# Patient Record
Sex: MALE | Race: WHITE | NOT HISPANIC OR LATINO | Employment: OTHER | URBAN - METROPOLITAN AREA
[De-identification: names, ages, dates, MRNs, and addresses within clinical notes are randomized per-mention and may not be internally consistent; named-entity substitution may affect disease eponyms.]

---

## 2017-10-16 ENCOUNTER — HOSPITAL ENCOUNTER (OUTPATIENT)
Dept: RADIOLOGY | Facility: HOSPITAL | Age: 67
Discharge: HOME | End: 2017-10-16
Attending: ORTHOPAEDIC SURGERY
Payer: MEDICARE

## 2017-10-16 ENCOUNTER — OFFICE VISIT (OUTPATIENT)
Dept: SPORTS MEDICINE | Facility: CLINIC | Age: 67
End: 2017-10-16
Payer: MEDICARE

## 2017-10-16 VITALS
HEIGHT: 76 IN | DIASTOLIC BLOOD PRESSURE: 93 MMHG | BODY MASS INDEX: 23.38 KG/M2 | HEART RATE: 72 BPM | WEIGHT: 192 LBS | SYSTOLIC BLOOD PRESSURE: 144 MMHG

## 2017-10-16 DIAGNOSIS — M25.569 KNEE PAIN, UNSPECIFIED CHRONICITY, UNSPECIFIED LATERALITY: ICD-10-CM

## 2017-10-16 DIAGNOSIS — M23.91 ACUTE INTERNAL DERANGEMENT OF RIGHT KNEE: ICD-10-CM

## 2017-10-16 DIAGNOSIS — M76.51 PATELLAR TENDINITIS OF RIGHT KNEE: ICD-10-CM

## 2017-10-16 DIAGNOSIS — M25.569 KNEE PAIN, UNSPECIFIED CHRONICITY, UNSPECIFIED LATERALITY: Primary | ICD-10-CM

## 2017-10-16 DIAGNOSIS — M17.31 POST-TRAUMATIC OSTEOARTHRITIS OF RIGHT KNEE: ICD-10-CM

## 2017-10-16 PROCEDURE — 73564 X-RAY EXAM KNEE 4 OR MORE: CPT | Mod: TC,50,PO

## 2017-10-16 PROCEDURE — 99999 PR PBB SHADOW E&M-NEW PATIENT-LVL III: CPT | Mod: PBBFAC,,, | Performed by: ORTHOPAEDIC SURGERY

## 2017-10-16 PROCEDURE — 73564 X-RAY EXAM KNEE 4 OR MORE: CPT | Mod: 26,RT,, | Performed by: RADIOLOGY

## 2017-10-16 PROCEDURE — 99203 OFFICE O/P NEW LOW 30 MIN: CPT | Mod: PBBFAC,25,PO | Performed by: ORTHOPAEDIC SURGERY

## 2017-10-16 PROCEDURE — 73564 X-RAY EXAM KNEE 4 OR MORE: CPT | Mod: 26,59,LT, | Performed by: RADIOLOGY

## 2017-10-16 PROCEDURE — 99204 OFFICE O/P NEW MOD 45 MIN: CPT | Mod: S$PBB,,, | Performed by: ORTHOPAEDIC SURGERY

## 2017-10-16 RX ORDER — ATORVASTATIN CALCIUM 10 MG/1
10 TABLET, FILM COATED ORAL NIGHTLY
Status: ON HOLD | COMMUNITY
End: 2018-05-14 | Stop reason: HOSPADM

## 2017-10-16 RX ORDER — ZOLPIDEM TARTRATE 10 MG/1
TABLET ORAL
Refills: 2 | COMMUNITY
Start: 2017-08-22

## 2017-10-16 RX ORDER — MELOXICAM 15 MG/1
15 TABLET ORAL DAILY
COMMUNITY
End: 2018-04-30 | Stop reason: SDUPTHER

## 2017-10-16 NOTE — PROGRESS NOTES
Subjective:          Chief Complaint: Leonid Harris III is a 67 y.o. male who had concerns including Pain of the Right Knee.    Patient reports history of bilateral knee arthroscopy for meniscus tears in remote past.  Now has right knee pain. Is taking mobic. No PT or injections.    He has undergone bilateral knee arthroscopic procedures 20 + years ago. He recently stopped running and now plays volleyball.      Handedness: right-handed  Sport played: volleyball      Level: recreational          Leonid also participates in running.  Pain   This is a new problem. The current episode started 1 to 4 weeks ago. The problem occurs intermittently. The problem has been waxing and waning. Pertinent negatives include no chest pain, fever, joint swelling, numbness or rash. The symptoms are aggravated by exertion. He has tried nothing for the symptoms. The treatment provided mild relief.       Review of Systems   Constitution: Negative for fever and night sweats.   HENT: Negative for hearing loss.    Eyes: Negative for blurred vision and visual disturbance.   Cardiovascular: Negative for chest pain and leg swelling.   Respiratory: Negative for shortness of breath.    Endocrine: Negative for polyuria.   Hematologic/Lymphatic: Negative for bleeding problem.   Skin: Negative for rash.   Musculoskeletal: Positive for joint pain. Negative for back pain, joint swelling, muscle cramps and muscle weakness.   Gastrointestinal: Negative for melena.   Genitourinary: Negative for hematuria.   Neurological: Negative for loss of balance, numbness and paresthesias.   Psychiatric/Behavioral: Negative for altered mental status.       Pain Related Questions  Over the past 3 days, what was your average pain during activity? (I.e. running, jogging, walking, climbing stairs, getting dressed, ect.): 4  Over the past 3 days, what was your highest pain level?: 6  Over the past 3 days, what was your lowest pain level? : 1    Other  How many nights a  week are you awakened by your affected body part?: 0  Was the patient's HEIGHT measured or patient reported?: Patient Reported  Was the patient's WEIGHT measured or patient reported?: Measured      Objective:        General: Leonid is well-developed, well-nourished, appears stated age, in no acute distress, alert and oriented to time, place and person.     General    Vitals reviewed.  Constitutional: He is oriented to person, place, and time. He appears well-developed and well-nourished. No distress.   HENT:   Mouth/Throat: No oropharyngeal exudate.   Eyes: Right eye exhibits no discharge. Left eye exhibits no discharge.   Neck: Normal range of motion.   Pulmonary/Chest: Effort normal and breath sounds normal. No respiratory distress.   Neurological: He is alert and oriented to person, place, and time. He has normal reflexes. No cranial nerve deficit. Coordination normal.   Psychiatric: He has a normal mood and affect. His behavior is normal. Judgment and thought content normal.     General Musculoskeletal Exam   Gait: normal       Right Knee Exam     Inspection   Erythema: absent  Scars: present  Swelling: absent  Effusion: effusion  Deformity: deformity  Bruising: absent    Tenderness   The patient is tender to palpation of the patella and patellar tendon.    Crepitus   The patient has crepitus of the patella (moderate).    Range of Motion   Extension: 0   Flexion: 150     Tests   Meniscus   Michi:  Medial - negative Lateral - negative  Ligament Examination Lachman: normal (-1 to 2mm) PCL-Posterior Drawer: normal (0 to 2mm)     MCL - Valgus: normal (0 to 2mm)  LCL - Varus: normalPivot Shift: normal (Equal)Reverse Pivot Shift: normal (Equal)Dial Test at 30 degrees: normal (< 5 degrees)Dial Test at 90 degrees: normal (< 5 degrees)  Posterior Sag Test: negative  Posterolateral Corner: unstable (>15 degrees difference)  Patella   Patellar Apprehension: negative  Passive Patellar Tilt: neutral  Patellar Tracking:  normal  Patellar Glide (quadrants): Lateral - 1   Medial - 2  Q-Angle at 90 degrees: normal  Patellar Grind: negative  J-Sign: none    Other   Meniscal Cyst: absent  Popliteal (Baker's) Cyst: absent  Sensation: normal    Left Knee Exam     Inspection   Erythema: absent  Scars: present  Swelling: absent  Effusion: absent  Deformity: deformity  Bruising: absent    Tenderness   The patient is experiencing no tenderness.         Range of Motion   Extension: 0   Flexion: 150     Tests   Meniscus   Michi:  Medial - negative Lateral - negative  Stability Lachman: normal (-1 to 2mm) PCL-Posterior Drawer: normal (0 to 2mm)  MCL - Valgus: normal (0 to 2mm)  LCL - Varus: normal (0 to 2mm)Pivot Shift: normal (Equal)Reverse Pivot Shift: normal (Equal)Dial Test at 30 degrees: normal (< 5 degrees)Dial Test at 90 degrees: normal (< 5 degrees)  Posterior Sag Test: negative  Posterolateral Corner: unstable (>15 degrees difference)  Patella   Patellar Apprehension: negative  Passive Patellar Tilt: neutral  Patellar Tracking: normal  Patellar Glide (Quadrants): Lateral - 1 Medial - 2  Q-Angle at 90 degrees: normal  Patellar Grind: negative  J-Sign: J sign absent    Other   Meniscal Cyst: absent  Popliteal (Baker's) Cyst: absent  Sensation: normal    Right Hip Exam     Tests   Erin: negative  Left Hip Exam     Tests   Erin: negative          Muscle Strength   Right Lower Extremity   Hip Abduction: 5/5   Quadriceps:  5/5   Hamstrin/5   Left Lower Extremity   Hip Abduction: 5/5   Quadriceps:  5/5   Hamstrin/5     Reflexes     Left Side  Quadriceps:  2+  Achilles:  2+    Right Side   Quadriceps:  2+  Achilles:  2+    Vascular Exam     Right Pulses  Dorsalis Pedis:      2+  Posterior Tibial:      2+        Left Pulses  Dorsalis Pedis:      2+  Posterior Tibial:      2+        Narrative     Time of Procedure: 10/16/17 12:32:27  Accession # [id number]    Comparison: None.    Number of images: 5.    Presented for review:  Both  knees, AP standing  Both knees, AP standing in flexion  Each knee, lateral view  Both knees, Merchant view.      Findings:    There is slight lateral tilt of each patella on merchant views, worse on the RIGHT.    AP standing views reveal mild loss of joint space the medial tibial femoral compartments and spurring of the intra-articular eminences.    Moderate spurring is present at the intra-articular eminences bilaterally.  Small spurs are present at the superior pole of each patella.  I detect no fracture, dislocation, radiopaque retained foreign body, lytic or blastic lesion, erosion or chondrocalcinosis.   Impression       Please see above.                   Assessment:       Encounter Diagnoses   Name Primary?    Knee pain, unspecified chronicity, unspecified laterality Yes    Patellar tendinitis of right knee     Acute internal derangement of right knee     Post-traumatic osteoarthritis of right knee           Plan:       1. IKDC, SF-12 and KOOS was filled out today in clinic.     RTC in 2 weeks with Dr. Fran Shelton Synvisc one injection and MRI results review. Patient will not fill out IKDC, SF-12 and KOOS on return.    2. Authorization placed for Synvisc one injection    3. Information given for Arthrosurface patellofemoral replacement    4.  Continue meloxicam at this time    5. MRI ordered                      [facility] patient questionnaires have been collected today.

## 2017-10-16 NOTE — PATIENT INSTRUCTIONS
DESCRIPTION  Synvisc-One (hylan G-F 20) is an elastoviscous high molecular weight fluid containing hylan A and hylan B polymers produced from chicken almaraz. Hylans are derivatives of hyaluronan (sodium hyaluronate). Hylan G-F 20 is unique in that the hyaluronan is chemically cross linked. Hyaluronan is a long-chain polymer containing repeating disaccharide units of Ax-pamxnbawmak-C-acetylglucosamine.     INDICATIONS FOR USE  Synvisc-One is indicated for the treatment of pain in osteoarthritis (OA) of the knee in patients who have failed to respond adequately to conservative nonpharmacologic therapy and simple analgesics, e.g., acetaminophen.     Who is a candidate for Synvisc-One  Patients with knee osteoarthritis, who have tried diet, exercise and over-the-counter pain medication but still have pain, should talk to their doctor to see if Synvisc-One is right for them.     How Synvisc-One is administered  Synvisc-One is a single injection. It's a simple, in-office procedure that only takes a few minutes.     What you can expect following a Synvisc-One knee injection Synvisc-One can provide up to six months of osteoarthritis knee pain relief. Everyone responds differently, but in a medical study* patients experienced relief starting one month after the injection.     After the injection, you can resume normal day-to-day activities, but you should avoid any strenuous activities for about 48 hours.     Contraindication  Do not administer to patients with known hypersensitivity (allergy) to hyaluronan (sodium hyaluronate) preparations.   Do not inject Synvisc-One in the knees of patients having knee joint infections or skin diseases or infections in the area of theinjection site.    What are possible side effects?  Synvisc may occur short-term pain, swelling at the injection site and / or the appearance of synovial exudate after injection. In some cases, exudation may be significant and cause more prolonged pain.      Important Safety Information  Before trying Synvisc-One or SYNVISC, tell your doctor if you are allergic to products from birds - such as feathers, eggs or poultry - or if your leg is swollen or infected. Synvisc-One and SYNVISC are only for injection into the knee, performed by a doctor or other qualified health care professional. Synvisc-One and SYNVISC have not been tested to show pain relief in joints other than the knee. Talk to your doctor before resuming strenuous weight-bearing activities after treatment. Synvisc-One and SYNVISC have not been tested in children, pregnant women or women who are nursing. You should tell your doctor if you think you are pregnant or if you are nursing a child. The side effects most commonly seen when Synvisc-One or SYNVISC is injected into the knee were pain, swelling and/or fluid buildup in or around the knee. Cases where the swelling is extensive or painful should be discussed with your doctor. Allergic reactions such as rash and hives have been reported rarely.

## 2017-10-24 ENCOUNTER — HOSPITAL ENCOUNTER (OUTPATIENT)
Dept: RADIOLOGY | Facility: HOSPITAL | Age: 67
Discharge: HOME | End: 2017-10-24
Attending: ORTHOPAEDIC SURGERY
Payer: MEDICARE

## 2017-10-24 DIAGNOSIS — M76.51 PATELLAR TENDINITIS OF RIGHT KNEE: ICD-10-CM

## 2017-10-24 DIAGNOSIS — M25.569 KNEE PAIN, UNSPECIFIED CHRONICITY, UNSPECIFIED LATERALITY: ICD-10-CM

## 2017-10-24 DIAGNOSIS — M23.91 ACUTE INTERNAL DERANGEMENT OF RIGHT KNEE: ICD-10-CM

## 2017-10-24 PROCEDURE — 73721 MRI JNT OF LWR EXTRE W/O DYE: CPT | Mod: TC,RT

## 2017-10-24 PROCEDURE — 73721 MRI JNT OF LWR EXTRE W/O DYE: CPT | Mod: 26,RT,GC, | Performed by: RADIOLOGY

## 2017-11-01 ENCOUNTER — OFFICE VISIT (OUTPATIENT)
Dept: SPORTS MEDICINE | Facility: CLINIC | Age: 67
End: 2017-11-01
Payer: MEDICARE

## 2017-11-01 VITALS
BODY MASS INDEX: 23.38 KG/M2 | HEIGHT: 76 IN | SYSTOLIC BLOOD PRESSURE: 138 MMHG | DIASTOLIC BLOOD PRESSURE: 85 MMHG | WEIGHT: 192 LBS | HEART RATE: 65 BPM

## 2017-11-01 DIAGNOSIS — M23.91 ACUTE INTERNAL DERANGEMENT OF RIGHT KNEE: ICD-10-CM

## 2017-11-01 DIAGNOSIS — G89.29 CHRONIC PAIN OF RIGHT KNEE: ICD-10-CM

## 2017-11-01 DIAGNOSIS — M25.561 CHRONIC PAIN OF RIGHT KNEE: ICD-10-CM

## 2017-11-01 DIAGNOSIS — M76.51 PATELLAR TENDINITIS OF RIGHT KNEE: ICD-10-CM

## 2017-11-01 DIAGNOSIS — M17.31 POST-TRAUMATIC OSTEOARTHRITIS OF RIGHT KNEE: Primary | ICD-10-CM

## 2017-11-01 PROCEDURE — 99499 UNLISTED E&M SERVICE: CPT | Mod: S$PBB,,, | Performed by: ORTHOPAEDIC SURGERY

## 2017-11-01 PROCEDURE — 99213 OFFICE O/P EST LOW 20 MIN: CPT | Mod: PBBFAC,PO | Performed by: ORTHOPAEDIC SURGERY

## 2017-11-01 PROCEDURE — 20611 DRAIN/INJ JOINT/BURSA W/US: CPT | Mod: S$PBB,RT,, | Performed by: ORTHOPAEDIC SURGERY

## 2017-11-01 PROCEDURE — 99999 PR PBB SHADOW E&M-EST. PATIENT-LVL III: CPT | Mod: PBBFAC,,, | Performed by: ORTHOPAEDIC SURGERY

## 2017-11-01 PROCEDURE — 20610 DRAIN/INJ JOINT/BURSA W/O US: CPT | Mod: PBBFAC,PO | Performed by: ORTHOPAEDIC SURGERY

## 2017-11-01 PROCEDURE — 20611 DRAIN/INJ JOINT/BURSA W/US: CPT | Mod: PBBFAC,PO | Performed by: ORTHOPAEDIC SURGERY

## 2017-11-01 RX ADMIN — Medication 48 MG: at 09:11

## 2017-11-01 NOTE — PATIENT INSTRUCTIONS
DESCRIPTION  Synvisc-One (hylan G-F 20) is an elastoviscous high molecular weight fluid containing hylan A and hylan B polymers produced from chicken almaraz. Hylans are derivatives of hyaluronan (sodium hyaluronate). Hylan G-F 20 is unique in that the hyaluronan is chemically cross linked. Hyaluronan is a long-chain polymer containing repeating disaccharide units of Wu-beasvevosnd-V-acetylglucosamine.     INDICATIONS FOR USE  Synvisc-One is indicated for the treatment of pain in osteoarthritis (OA) of the knee in patients who have failed to respond adequately to conservative nonpharmacologic therapy and simple analgesics, e.g., acetaminophen.     Who is a candidate for Synvisc-One  Patients with knee osteoarthritis, who have tried diet, exercise and over-the-counter pain medication but still have pain, should talk to their doctor to see if Synvisc-One is right for them.     How Synvisc-One is administered  Synvisc-One is a single injection. It's a simple, in-office procedure that only takes a few minutes.     What you can expect following a Synvisc-One knee injection Synvisc-One can provide up to six months of osteoarthritis knee pain relief. Everyone responds differently, but in a medical study* patients experienced relief starting one month after the injection.     After the injection, you can resume normal day-to-day activities, but you should avoid any strenuous activities for about 48 hours.     Contraindication  Do not administer to patients with known hypersensitivity (allergy) to hyaluronan (sodium hyaluronate) preparations.   Do not inject Synvisc-One in the knees of patients having knee joint infections or skin diseases or infections in the area of theinjection site.    What are possible side effects?  Synvisc may occur short-term pain, swelling at the injection site and / or the appearance of synovial exudate after injection. In some cases, exudation may be significant and cause more prolonged pain.      Important Safety Information  Before trying Synvisc-One or SYNVISC, tell your doctor if you are allergic to products from birds - such as feathers, eggs or poultry - or if your leg is swollen or infected. Synvisc-One and SYNVISC are only for injection into the knee, performed by a doctor or other qualified health care professional. Synvisc-One and SYNVISC have not been tested to show pain relief in joints other than the knee. Talk to your doctor before resuming strenuous weight-bearing activities after treatment. Synvisc-One and SYNVISC have not been tested in children, pregnant women or women who are nursing. You should tell your doctor if you think you are pregnant or if you are nursing a child. The side effects most commonly seen when Synvisc-One or SYNVISC is injected into the knee were pain, swelling and/or fluid buildup in or around the knee. Cases where the swelling is extensive or painful should be discussed with your doctor. Allergic reactions such as rash and hives have been reported rarely.

## 2017-11-01 NOTE — PROGRESS NOTES
Subjective:          Chief Complaint: Leonid Harris III is a 67 y.o. male who had concerns including Injections of the Right Knee.    Patient is here for a follow up for his right knee to review his MRI results and to proceed with a right knee Synvisc-One injection.    He has undergone bilateral knee arthroscopic procedures 20 + years ago. He recently stopped running and now plays volleyball.      Handedness: right-handed  Sport played: volleyball      Level: recreational          Leonid also participates in running.  Pain   This is a new problem. The current episode started 1 to 4 weeks ago. The problem occurs intermittently. The problem has been waxing and waning. Pertinent negatives include no chest pain, fever, joint swelling, numbness or rash. The symptoms are aggravated by exertion. He has tried nothing for the symptoms. The treatment provided mild relief.       Review of Systems   Constitution: Negative for fever and night sweats.   HENT: Negative for hearing loss.    Eyes: Negative for blurred vision and visual disturbance.   Cardiovascular: Negative for chest pain and leg swelling.   Respiratory: Negative for shortness of breath.    Endocrine: Negative for polyuria.   Hematologic/Lymphatic: Negative for bleeding problem.   Skin: Negative for rash.   Musculoskeletal: Positive for joint pain. Negative for back pain, joint swelling, muscle cramps and muscle weakness.   Gastrointestinal: Negative for melena.   Genitourinary: Negative for hematuria.   Neurological: Negative for loss of balance, numbness and paresthesias.   Psychiatric/Behavioral: Negative for altered mental status.       Pain Related Questions  Over the past 3 days, what was your average pain during activity? (I.e. running, jogging, walking, climbing stairs, getting dressed, ect.): 0  Over the past 3 days, what was your highest pain level?: 0  Over the past 3 days, what was your lowest pain level? : 0    Other  How many nights a week are you  awakened by your affected body part?: 0  Was the patient's HEIGHT measured or patient reported?: Patient Reported  Was the patient's WEIGHT measured or patient reported?: Measured      Objective:        General: Leonid is well-developed, well-nourished, appears stated age, in no acute distress, alert and oriented to time, place and person.     General    Vitals reviewed.  Constitutional: He is oriented to person, place, and time. He appears well-developed and well-nourished. No distress.   HENT:   Mouth/Throat: No oropharyngeal exudate.   Eyes: Right eye exhibits no discharge. Left eye exhibits no discharge.   Neck: Normal range of motion.   Pulmonary/Chest: Effort normal and breath sounds normal. No respiratory distress.   Neurological: He is alert and oriented to person, place, and time. He has normal reflexes. No cranial nerve deficit. Coordination normal.   Psychiatric: He has a normal mood and affect. His behavior is normal. Judgment and thought content normal.     General Musculoskeletal Exam   Gait: normal       Right Knee Exam     Inspection   Erythema: absent  Scars: present  Swelling: absent  Effusion: effusion  Deformity: deformity  Bruising: absent    Tenderness   The patient is tender to palpation of the patella and patellar tendon.    Crepitus   The patient has crepitus of the patella (moderate).    Range of Motion   Extension: 0   Flexion: 150     Tests   Meniscus   Michi:  Medial - negative Lateral - negative  Ligament Examination Lachman: normal (-1 to 2mm) PCL-Posterior Drawer: normal (0 to 2mm)     MCL - Valgus: normal (0 to 2mm)  LCL - Varus: normalPivot Shift: normal (Equal)Reverse Pivot Shift: normal (Equal)Dial Test at 30 degrees: normal (< 5 degrees)Dial Test at 90 degrees: normal (< 5 degrees)  Posterior Sag Test: negative  Posterolateral Corner: unstable (>15 degrees difference)  Patella   Patellar Apprehension: negative  Passive Patellar Tilt: neutral  Patellar Tracking: normal  Patellar  Glide (quadrants): Lateral - 1   Medial - 2  Q-Angle at 90 degrees: normal  Patellar Grind: negative  J-Sign: none    Other   Meniscal Cyst: absent  Popliteal (Baker's) Cyst: absent  Sensation: normal    Left Knee Exam     Inspection   Erythema: absent  Scars: present  Swelling: absent  Effusion: absent  Deformity: deformity  Bruising: absent    Tenderness   The patient is experiencing no tenderness.         Range of Motion   Extension: 0   Flexion: 150     Tests   Meniscus   Michi:  Medial - negative Lateral - negative  Stability Lachman: normal (-1 to 2mm) PCL-Posterior Drawer: normal (0 to 2mm)  MCL - Valgus: normal (0 to 2mm)  LCL - Varus: normal (0 to 2mm)Pivot Shift: normal (Equal)Reverse Pivot Shift: normal (Equal)Dial Test at 30 degrees: normal (< 5 degrees)Dial Test at 90 degrees: normal (< 5 degrees)  Posterior Sag Test: negative  Posterolateral Corner: unstable (>15 degrees difference)  Patella   Patellar Apprehension: negative  Passive Patellar Tilt: neutral  Patellar Tracking: normal  Patellar Glide (Quadrants): Lateral - 1 Medial - 2  Q-Angle at 90 degrees: normal  Patellar Grind: negative  J-Sign: J sign absent    Other   Meniscal Cyst: absent  Popliteal (Baker's) Cyst: absent  Sensation: normal    Right Hip Exam     Tests   Erin: negative  Left Hip Exam     Tests   Erin: negative          Muscle Strength   Right Lower Extremity   Hip Abduction: 5/5   Quadriceps:  5/5   Hamstrin/5   Left Lower Extremity   Hip Abduction: 5/5   Quadriceps:  5/5   Hamstrin/5     Reflexes     Left Side  Quadriceps:  2+  Achilles:  2+    Right Side   Quadriceps:  2+  Achilles:  2+    Vascular Exam     Right Pulses  Dorsalis Pedis:      2+  Posterior Tibial:      2+        Left Pulses  Dorsalis Pedis:      2+  Posterior Tibial:      2+        MRI RESULTS    Menisci:  Lateral meniscus is intact.  Medial meniscus demonstrates a diminutive body segment consistent with prior surgery and/or complex tearing.      Ligaments:  ACL, PCL, MCL, and LCL complex are intact.    Tendons:  Quadriceps tendon is intact.  Patellar tendon is intact with some mild thickening proximally.    Cartilage:   Patellofemoral: There is a large area of full-thickness cartilage loss involving the lateral patellar facet and median eminence.  Subchondral edema/cystic changes noted.  High-grade partial to full-thickness cartilage loss noted of the lateral trochlea with underlying subchondral edema.  Small osteochondral lesion is present within the central and lateral trochlea.  Medial tibiofemoral: Articular cartilage is maintained.  Lateral tibiofemoral: Articular cartilage is maintained.  Marginal osteophyte formation present.    Bone: No acute fracture.    Miscellaneous: Significant joint effusion                  Assessment:       Encounter Diagnoses   Name Primary?    Post-traumatic osteoarthritis of right knee Yes    Chronic pain of right knee     Acute internal derangement of right knee     Patellar tendinitis of right knee           Plan:       1. IKDC, SF-12 and KOOS was not filled out today in clinic.     RTC in 3 months with Dr. Fran Shelton Patient will fill out IKDC, SF-12 and KOOS on return.    2. Injection Procedure right knee    After time out was performed, including verification of patient ID, procedure, site and side, availability of information and equipment, review of safety issues, and agreement with consent, the procedure site was marked and the patient was prepped aseptically. A diagnostic and therapeutic injection of 6cc SynviscOne and ethyl chloride spray was given under sterile technique using a 22g x 1.5 needle into the right Knee Joint in seated position.     The patient had no adverse reactions to the medication. Pain decreased. The patient was instructed to apply ice to the joint for 20 minutes and avoid strenuous activities for 24-36 hours following the injection. Patient was warned of possible blood sugar and/or  blood pressure changes during that time. Following that time, patient can resume regular activities.    Patient was reminded to call the clinic immediately for any adverse side effects as explained in clinic today.    Ultrasound Guidance Procedure  right Knee Joint visualized with documented DJD. Dynamic visualization of the 22g x 1.5 needle performed.      3. Discussed Arthrosurface patellofemoral replacement                              [facility] patient questionnaires have been collected today.

## 2017-11-06 ENCOUNTER — PATIENT MESSAGE (OUTPATIENT)
Dept: SPORTS MEDICINE | Facility: CLINIC | Age: 67
End: 2017-11-06

## 2017-12-19 ENCOUNTER — TELEPHONE (OUTPATIENT)
Dept: SPORTS MEDICINE | Facility: CLINIC | Age: 67
End: 2017-12-19

## 2017-12-19 NOTE — TELEPHONE ENCOUNTER
Called and s/w the pt in regards to the recall of the synvisc one injection. Told him that it is a voluntary recall due to possible bacterial contamination and we are seeing how he was doing and if he would like to come in to see Dr. Shelton free of charge sooner to discuss. He said that he has not had any issues and does not feel that he needs to move his appt up to see him.

## 2018-01-04 ENCOUNTER — PATIENT MESSAGE (OUTPATIENT)
Dept: SPORTS MEDICINE | Facility: CLINIC | Age: 68
End: 2018-01-04

## 2018-01-24 ENCOUNTER — OFFICE VISIT (OUTPATIENT)
Dept: SPORTS MEDICINE | Facility: CLINIC | Age: 68
End: 2018-01-24
Payer: MEDICARE

## 2018-01-24 VITALS
HEIGHT: 76 IN | BODY MASS INDEX: 23.38 KG/M2 | WEIGHT: 192 LBS | DIASTOLIC BLOOD PRESSURE: 88 MMHG | SYSTOLIC BLOOD PRESSURE: 145 MMHG | HEART RATE: 63 BPM

## 2018-01-24 DIAGNOSIS — M17.31 POST-TRAUMATIC OSTEOARTHRITIS OF RIGHT KNEE: ICD-10-CM

## 2018-01-24 DIAGNOSIS — M23.91 ACUTE INTERNAL DERANGEMENT OF RIGHT KNEE: Primary | ICD-10-CM

## 2018-01-24 PROCEDURE — 99214 OFFICE O/P EST MOD 30 MIN: CPT | Mod: S$PBB,,, | Performed by: PHYSICIAN ASSISTANT

## 2018-01-24 PROCEDURE — 99213 OFFICE O/P EST LOW 20 MIN: CPT | Mod: PBBFAC,PO | Performed by: PHYSICIAN ASSISTANT

## 2018-01-24 PROCEDURE — 99999 PR PBB SHADOW E&M-EST. PATIENT-LVL III: CPT | Mod: PBBFAC,,, | Performed by: PHYSICIAN ASSISTANT

## 2018-01-24 NOTE — PROGRESS NOTES
Subjective:          Chief Complaint: Leonid Harris III is a 67 y.o. male who had concerns including Follow-up of the Right Knee.    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 The pain started years ago and is becoming progressively worse over the last couple of weeks. He reports getting a Synvisc One injection 3 months ago with mild relief. He still feels pain behind his patella when playing volleyball. He and Dr. Shelton talked about surgery (Arthrosurface patellofemoral wave) and wants to go forward. He reports that the pain is a 2 /10 sharp, stabbing and intermittent pain and not responding adequately to conservative measures which have included activity modifications, rest, and oral medication. Is affecting ADLs and limiting desired level of activity. Denies numbness, tingling, radiation, and  inability to bear weight.  Pain is 3 /10 at its worst    Mechanical symptoms: none  Subjective instability: (--)   Worse with planting and bending  Better with rest.   Nocturnal symptoms: (--)      He has undergone bilateral knee arthroscopic procedures 20 + years ago. He recently stopped running and now plays volleyball.      Handedness: right-handed  Sport played: volleyball      Level: recreational          Leonid also participates in running.  Pain   This is a new problem. The current episode started 1 to 4 weeks ago. The problem occurs intermittently. The problem has been waxing and waning. Pertinent negatives include no abdominal pain, chest pain, chills, congestion, coughing, fever, joint swelling, myalgias, nausea, numbness, rash, sore throat or vomiting. The symptoms are aggravated by exertion. He has tried nothing for the symptoms. The treatment provided mild relief.       Review of Systems   Constitution: Negative for chills, fever and night sweats.   HENT: Negative for congestion, hearing loss and sore throat.    Eyes: Negative for blurred vision, discharge, double vision  and visual disturbance.   Cardiovascular: Negative for chest pain, leg swelling, palpitations and syncope.   Respiratory: Negative for cough and shortness of breath.    Endocrine: Negative for cold intolerance, heat intolerance and polyuria.   Hematologic/Lymphatic: Negative for bleeding problem.   Skin: Negative for dry skin and rash.   Musculoskeletal: Positive for joint pain. Negative for back pain, falls, joint swelling, muscle cramps, muscle weakness, myalgias and stiffness.   Gastrointestinal: Negative for abdominal pain, melena, nausea and vomiting.   Genitourinary: Negative for hematuria.   Neurological: Negative for focal weakness, loss of balance, numbness and paresthesias.   Psychiatric/Behavioral: Negative for altered mental status.       Pain Related Questions  Over the past 3 days, what was your average pain during activity? (I.e. running, jogging, walking, climbing stairs, getting dressed, ect.): 3  Over the past 3 days, what was your highest pain level?: 3  Over the past 3 days, what was your lowest pain level? : 2    Other  How many nights a week are you awakened by your affected body part?: 0  Was the patient's HEIGHT measured or patient reported?: Patient Reported  Was the patient's WEIGHT measured or patient reported?: Measured      Objective:        General: Leonid is well-developed, well-nourished, appears stated age, in no acute distress, alert and oriented to time, place and person.     General    Vitals reviewed.  Constitutional: He is oriented to person, place, and time. He appears well-developed and well-nourished. No distress.   HENT:   Mouth/Throat: No oropharyngeal exudate.   Eyes: Conjunctivae and EOM are normal. Pupils are equal, round, and reactive to light. Right eye exhibits no discharge. Left eye exhibits no discharge.   Neck: Normal range of motion. Neck supple. No JVD present.   Cardiovascular: Normal heart sounds and intact distal pulses.    No murmur heard.  Pulmonary/Chest:  Effort normal and breath sounds normal. No respiratory distress.   Abdominal: Soft. Bowel sounds are normal. He exhibits no distension. There is no tenderness.   Neurological: He is alert and oriented to person, place, and time. He has normal reflexes. No cranial nerve deficit. Coordination normal.   Psychiatric: He has a normal mood and affect. His behavior is normal. Judgment and thought content normal.     General Musculoskeletal Exam   Gait: normal       Right Knee Exam     Inspection   Erythema: absent  Scars: present  Swelling: absent  Effusion: effusion  Deformity: deformity  Bruising: absent    Tenderness   The patient is tender to palpation of the patella and patellar tendon.    Crepitus   The patient has crepitus of the patella (moderate).    Range of Motion   Extension: -5   Flexion: 150     Tests   Meniscus   Michi:  Medial - negative Lateral - negative  Ligament Examination Lachman: normal (-1 to 2mm) PCL-Posterior Drawer: normal (0 to 2mm)     MCL - Valgus: normal (0 to 2mm)  LCL - Varus: normalPivot Shift: normal (Equal)Reverse Pivot Shift: normal (Equal)Dial Test at 30 degrees: normal (< 5 degrees)Dial Test at 90 degrees: normal (< 5 degrees)  Posterior Sag Test: negative  Posterolateral Corner: unstable (>15 degrees difference)  Patella   Patellar Apprehension: negative  Passive Patellar Tilt: neutral  Patellar Tracking: normal  Patellar Glide (quadrants): Lateral - 1   Medial - 2  Q-Angle at 90 degrees: normal  Patellar Grind: positive  J-Sign: none    Other   Meniscal Cyst: absent  Popliteal (Baker's) Cyst: absent  Sensation: normal    Left Knee Exam     Inspection   Erythema: absent  Scars: present  Swelling: absent  Effusion: absent  Deformity: deformity  Bruising: absent    Tenderness   The patient is experiencing no tenderness.         Range of Motion   Extension: 0   Flexion:  150 normal     Tests   Meniscus   Michi:  Medial - negative Lateral - negative  Stability Lachman: normal (-1  to 2mm) PCL-Posterior Drawer: normal (0 to 2mm)  MCL - Valgus: normal (0 to 2mm)  LCL - Varus: normal (0 to 2mm)Pivot Shift: normal (Equal)Reverse Pivot Shift: normal (Equal)Dial Test at 30 degrees: normal (< 5 degrees)Dial Test at 90 degrees: normal (< 5 degrees)  Posterior Sag Test: negative  Posterolateral Corner: unstable (>15 degrees difference)  Patella   Patellar Apprehension: negative  Passive Patellar Tilt: neutral  Patellar Tracking: normal  Patellar Glide (Quadrants): Lateral - 1 Medial - 2  Q-Angle at 90 degrees: normal  Patellar Grind: negative  J-Sign: J sign absent    Other   Meniscal Cyst: absent  Popliteal (Baker's) Cyst: absent  Sensation: normal    Right Hip Exam     Tests   Erin: negative  Left Hip Exam     Tests   Erin: negative          Muscle Strength   Right Lower Extremity   Hip Abduction: 5/5   Quadriceps:  5/5   Hamstrin/5   Left Lower Extremity   Hip Abduction: 5/5   Quadriceps:  5/5   Hamstrin/5     Reflexes     Left Side  Quadriceps:  2+  Achilles:  2+    Right Side   Quadriceps:  2+  Achilles:  2+    Vascular Exam     Right Pulses  Dorsalis Pedis:      2+  Posterior Tibial:      2+        Left Pulses  Dorsalis Pedis:      2+  Posterior Tibial:      2+        Edema  Right Lower Leg: absent  Left Lower Leg: absent  MRI RESULTS    Menisci:  Lateral meniscus is intact.  Medial meniscus demonstrates a diminutive body segment consistent with prior surgery and/or complex tearing.     Ligaments:  ACL, PCL, MCL, and LCL complex are intact.    Tendons:  Quadriceps tendon is intact.  Patellar tendon is intact with some mild thickening proximally.    Cartilage:   Patellofemoral: There is a large area of full-thickness cartilage loss involving the lateral patellar facet and median eminence.  Subchondral edema/cystic changes noted.  High-grade partial to full-thickness cartilage loss noted of the lateral trochlea with underlying subchondral edema.  Small osteochondral lesion is present  within the central and lateral trochlea.  Medial tibiofemoral: Articular cartilage is maintained.  Lateral tibiofemoral: Articular cartilage is maintained.  Marginal osteophyte formation present.    Bone: No acute fracture.    Miscellaneous: Significant joint effusion                  Assessment:       Encounter Diagnoses   Name Primary?    Acute internal derangement of right knee Yes    Post-traumatic osteoarthritis of right knee           Plan:       1. IKDC, SF-12 and KOOS was filled out today in clinic.     RTC in 1 months with Dr. Fran Shelton Patient will not out IKDC, SF-12 and KOOS on return.    2. We reviewed with Leonid today, the pathology and natural history of his diagnosis. We have discussed a variety of treatment options including medications, physical therapy and other alternative treatments. I also explained the indications, risks and benefits of surgery. After discussion, he decided to proceed with surgery. The decision was made to go forward with      1. RIGHT Arthrosurface patellofemoral replacement WAVE  2. RIGHT knee arthroscopic chondroplasty   3. RIGHT knee arthroscopic meniscectomy   4. Amniox arthrocentesis, 09713     The details of the surgical procedure were explained, including the location of probable incisions and a description of likely hardware and/or grafts to be used.  The patient understands the likely convalescence after surgery.  Also, we have thoroughly discussed the risks, benefits and alternatives to surgery, including, but not limited to, the risk of infection, joint stiffness, blood clot (including DVT and/or pulmonary embolus), neurologic and vascular injury.  It was explained that, if tissue has been repaired or reconstructed, there is a chance of failure, which may require further management.    3. Continue Mobic 15 mg 1 time daily PRN for pain management.    All of the patient's questions were answered and the patient will contact us if they have any questions or  concerns in the interim.             [facility] patient questionnaires have been collected today.

## 2018-01-26 ENCOUNTER — PATIENT MESSAGE (OUTPATIENT)
Dept: SPORTS MEDICINE | Facility: CLINIC | Age: 68
End: 2018-01-26

## 2018-01-30 ENCOUNTER — PATIENT MESSAGE (OUTPATIENT)
Dept: SPORTS MEDICINE | Facility: CLINIC | Age: 68
End: 2018-01-30

## 2018-02-08 ENCOUNTER — PATIENT MESSAGE (OUTPATIENT)
Dept: SPORTS MEDICINE | Facility: CLINIC | Age: 68
End: 2018-02-08

## 2018-02-09 DIAGNOSIS — M23.91 INTERNAL DERANGEMENT OF RIGHT KNEE: ICD-10-CM

## 2018-02-09 DIAGNOSIS — M22.41 CHONDROMALACIA OF RIGHT PATELLA: Primary | ICD-10-CM

## 2018-02-26 ENCOUNTER — OFFICE VISIT (OUTPATIENT)
Dept: SPORTS MEDICINE | Facility: CLINIC | Age: 68
End: 2018-02-26
Payer: MEDICARE

## 2018-02-26 ENCOUNTER — HOSPITAL ENCOUNTER (OUTPATIENT)
Dept: PREADMISSION TESTING | Facility: OTHER | Age: 68
Discharge: HOME | End: 2018-02-26
Attending: ORTHOPAEDIC SURGERY
Payer: MEDICARE

## 2018-02-26 ENCOUNTER — ANESTHESIA EVENT (OUTPATIENT)
Dept: SURGERY | Facility: OTHER | Age: 68
End: 2018-02-26
Payer: MEDICARE

## 2018-02-26 VITALS
DIASTOLIC BLOOD PRESSURE: 81 MMHG | WEIGHT: 192 LBS | SYSTOLIC BLOOD PRESSURE: 136 MMHG | HEART RATE: 63 BPM | BODY MASS INDEX: 23.38 KG/M2 | HEIGHT: 76 IN

## 2018-02-26 VITALS
HEIGHT: 76 IN | RESPIRATION RATE: 16 BRPM | DIASTOLIC BLOOD PRESSURE: 75 MMHG | TEMPERATURE: 99 F | SYSTOLIC BLOOD PRESSURE: 139 MMHG | WEIGHT: 194 LBS | BODY MASS INDEX: 23.62 KG/M2 | OXYGEN SATURATION: 97 % | HEART RATE: 77 BPM

## 2018-02-26 DIAGNOSIS — M25.561 RIGHT KNEE PAIN, UNSPECIFIED CHRONICITY: Primary | ICD-10-CM

## 2018-02-26 DIAGNOSIS — M23.91 ACUTE INTERNAL DERANGEMENT OF RIGHT KNEE: ICD-10-CM

## 2018-02-26 DIAGNOSIS — M17.31 POST-TRAUMATIC OSTEOARTHRITIS OF RIGHT KNEE: ICD-10-CM

## 2018-02-26 DIAGNOSIS — I49.3 PVC (PREMATURE VENTRICULAR CONTRACTION): ICD-10-CM

## 2018-02-26 LAB
ANION GAP SERPL CALC-SCNC: 9 MMOL/L
BASOPHILS # BLD AUTO: 0.02 K/UL
BASOPHILS NFR BLD: 0.3 %
BUN SERPL-MCNC: 14 MG/DL
CALCIUM SERPL-MCNC: 9.5 MG/DL
CHLORIDE SERPL-SCNC: 106 MMOL/L
CO2 SERPL-SCNC: 26 MMOL/L
CREAT SERPL-MCNC: 0.9 MG/DL
DIFFERENTIAL METHOD: NORMAL
EOSINOPHIL # BLD AUTO: 0.1 K/UL
EOSINOPHIL NFR BLD: 1.9 %
ERYTHROCYTE [DISTWIDTH] IN BLOOD BY AUTOMATED COUNT: 12.5 %
EST. GFR  (AFRICAN AMERICAN): >60 ML/MIN/1.73 M^2
EST. GFR  (NON AFRICAN AMERICAN): >60 ML/MIN/1.73 M^2
GLUCOSE SERPL-MCNC: 93 MG/DL
HCT VFR BLD AUTO: 42.8 %
HGB BLD-MCNC: 14.5 G/DL
LYMPHOCYTES # BLD AUTO: 1.7 K/UL
LYMPHOCYTES NFR BLD: 29.6 %
MCH RBC QN AUTO: 30.9 PG
MCHC RBC AUTO-ENTMCNC: 33.9 G/DL
MCV RBC AUTO: 91 FL
MONOCYTES # BLD AUTO: 0.4 K/UL
MONOCYTES NFR BLD: 7.4 %
NEUTROPHILS # BLD AUTO: 3.6 K/UL
NEUTROPHILS NFR BLD: 60.8 %
PLATELET # BLD AUTO: 198 K/UL
PMV BLD AUTO: 10.2 FL
POTASSIUM SERPL-SCNC: 4.1 MMOL/L
RBC # BLD AUTO: 4.69 M/UL
SODIUM SERPL-SCNC: 141 MMOL/L
WBC # BLD AUTO: 5.85 K/UL

## 2018-02-26 PROCEDURE — 85025 COMPLETE CBC W/AUTO DIFF WBC: CPT

## 2018-02-26 PROCEDURE — 99213 OFFICE O/P EST LOW 20 MIN: CPT | Mod: PBBFAC,PO | Performed by: ORTHOPAEDIC SURGERY

## 2018-02-26 PROCEDURE — 80048 BASIC METABOLIC PNL TOTAL CA: CPT

## 2018-02-26 PROCEDURE — 93010 ELECTROCARDIOGRAM REPORT: CPT | Mod: ,,, | Performed by: INTERNAL MEDICINE

## 2018-02-26 PROCEDURE — 99999 PR PBB SHADOW E&M-EST. PATIENT-LVL III: CPT | Mod: PBBFAC,,, | Performed by: ORTHOPAEDIC SURGERY

## 2018-02-26 PROCEDURE — 36415 COLL VENOUS BLD VENIPUNCTURE: CPT

## 2018-02-26 PROCEDURE — 93005 ELECTROCARDIOGRAM TRACING: CPT

## 2018-02-26 PROCEDURE — 99214 OFFICE O/P EST MOD 30 MIN: CPT | Mod: S$PBB,,, | Performed by: ORTHOPAEDIC SURGERY

## 2018-02-26 RX ORDER — SODIUM CHLORIDE 0.9 % (FLUSH) 0.9 %
3 SYRINGE (ML) INJECTION
Status: CANCELLED | OUTPATIENT
Start: 2018-02-26

## 2018-02-26 RX ORDER — FAMOTIDINE 20 MG/1
20 TABLET, FILM COATED ORAL
Status: CANCELLED | OUTPATIENT
Start: 2018-02-26 | End: 2018-02-26

## 2018-02-26 RX ORDER — DILTIAZEM HYDROCHLORIDE 180 MG/1
180 CAPSULE, COATED, EXTENDED RELEASE ORAL DAILY
COMMUNITY
End: 2019-03-18 | Stop reason: CLARIF

## 2018-02-26 RX ORDER — OXYCODONE AND ACETAMINOPHEN 10; 325 MG/1; MG/1
1 TABLET ORAL EVERY 4 HOURS PRN
Qty: 60 TABLET | Refills: 0 | Status: ON HOLD | OUTPATIENT
Start: 2018-02-26 | End: 2018-05-14

## 2018-02-26 RX ORDER — TRAMADOL HYDROCHLORIDE 50 MG/1
50 TABLET ORAL EVERY 6 HOURS PRN
Qty: 60 TABLET | Refills: 0 | Status: SHIPPED | OUTPATIENT
Start: 2018-02-26 | End: 2018-03-08

## 2018-02-26 RX ORDER — ASPIRIN 325 MG
325 TABLET ORAL DAILY
Qty: 42 TABLET | Refills: 0 | COMMUNITY
Start: 2018-02-26 | End: 2018-04-09

## 2018-02-26 RX ORDER — PREGABALIN 75 MG/1
150 CAPSULE ORAL
Status: DISCONTINUED | OUTPATIENT
Start: 2018-02-27 | End: 2018-02-27 | Stop reason: HOSPADM

## 2018-02-26 RX ORDER — SODIUM CHLORIDE, SODIUM LACTATE, POTASSIUM CHLORIDE, CALCIUM CHLORIDE 600; 310; 30; 20 MG/100ML; MG/100ML; MG/100ML; MG/100ML
INJECTION, SOLUTION INTRAVENOUS CONTINUOUS
Status: CANCELLED | OUTPATIENT
Start: 2018-02-26

## 2018-02-26 RX ORDER — MUPIROCIN 20 MG/G
OINTMENT TOPICAL
Status: CANCELLED | OUTPATIENT
Start: 2018-02-26

## 2018-02-26 RX ORDER — PROMETHAZINE HYDROCHLORIDE 25 MG/1
25 TABLET ORAL EVERY 6 HOURS PRN
Qty: 20 TABLET | Refills: 0 | Status: SHIPPED | OUTPATIENT
Start: 2018-02-26 | End: 2018-03-14 | Stop reason: SDUPTHER

## 2018-02-26 NOTE — ANESTHESIA PREPROCEDURE EVALUATION
02/26/2018  Leonid Harris III is a 68 y.o., male.    Anesthesia Evaluation    I have reviewed the Patient Summary Reports.    I have reviewed the Nursing Notes.   I have reviewed the Medications.     Review of Systems  Anesthesia Hx:  No problems with previous Anesthesia  Denies Family Hx of Anesthesia complications.   Denies Personal Hx of Anesthesia complications.   Social:  Former Smoker    Hematology/Oncology:  Hematology Normal      Oncology Comments: Prostate cancer s/p prostatectomy 2010    Cardiovascular:   Dysrhythmias Hx of PVCs recent Rx with Cardizem   Pulmonary:  Pulmonary Normal    Renal/:  Renal/ Normal     Hepatic/GI:   GERD    Musculoskeletal:   Low back pain Spine Disorders: lumbar Degenerative disease    Neurological:  Neurology Normal    Endocrine:  Endocrine Normal        Physical Exam  General:  Well nourished    Airway/Jaw/Neck:  Airway Findings: Mouth Opening: Normal Tongue: Normal  General Airway Assessment: Adult  Mallampati: I  TM Distance: Normal, at least 6 cm         Dental:  Dental Findings:Upper front fixed bridge x 3 teeth             Anesthesia Plan  Type of Anesthesia, risks & benefits discussed:  Anesthesia Type:  general  Patient's Preference:   Intra-op Monitoring Plan: standard ASA monitors  Intra-op Monitoring Plan Comments:   Post Op Pain Control Plan: peripheral nerve block  Post Op Pain Control Plan Comments:   Induction:   IV  Beta Blocker:         Informed Consent: Patient understands risks and agrees with Anesthesia plan.  Questions answered. Anesthesia consent signed with patient.  ASA Score: 2     Day of Surgery Review of History & Physical:    H&P update referred to the surgeon.         Ready For Surgery From Anesthesia Perspective.

## 2018-02-26 NOTE — PRE ADMISSION SCREENING
Pt states he is not having a knee or patella replacement as stated as procedure in Epic. Scheduling notified and E. Dafne notified. Consent not available. Pt agrees  He will review consent in AM to verify procedure.

## 2018-02-26 NOTE — H&P
Leonid Harris III  is here for a completion of his perioperative paperwork. he  Is scheduled to undergo   1. RIGHT Arthrosurface patellofemoral replacement WAVE  2. RIGHT knee arthroscopic chondroplasty   3. RIGHT knee arthroscopic meniscectomy   4. Amniox arthrocentesis, 93130 on 2/27/18.  He is a healthy individual and does not need clearance for this procedure.     Risks, indications and benefits of the surgical procedure were discussed with the patient. All questions with regard to surgery, rehab, expected return to functional activities, activities of daily living and recreational endeavors were answered to his satisfaction.    Once no other questions were asked, a brief history and physical exam was then performed.      PHYSICAL EXAM:  GEN: A&Ox3, WD WN NAD  HEENT: WNL  CHEST: CTAB, no W/R/R  HEART: RRR, no M/R/G  ABD: Soft, NT ND, BS x4 QUADS  MS; See Epic  NEURO: CN II-XII intact       The surgical consent was then reviewed with the patient, who agreed with all the contents of the consent form and it was signed. he was then given the [facility] surgery packet to bring with him to [facility] for the anesthesia portion of his perioperative paperwork.     PHYSICAL THERAPY:  He was also instructed regarding physical therapy and will begin on  POD 3. He was given a copy of the original prescription to schedule.   POST OP CARE:instructions were reviewed including care of the wound and dressing after surgery and when he can shower.     PAIN MANAGEMENT: Leonid Harris III was also given  pain management regime, which includes the TENS unit given to him by Moreno Spivey along with the education required for its use. He was also instructed regarding the Polar ice unit that will be in place after surgery and his postoperative pain medications.     PAIN MEDICATION:  Percocet 10/325mg 1 po q 4-6 hours prn pain  Ultram 50 mg one p.o. q.4-6 hours p.r.n. breakthrough pain,   Phenergan 25 mg one p.o. q.4-6 hours p.r.n. nausea  and vomiting.    As there were no other questions to be asked, he was given my business card along with Fran Shelton MD business card if he has any questions or concerns prior to surgery or in the postop period.

## 2018-02-26 NOTE — DISCHARGE INSTRUCTIONS
PRE-ADMIT TESTING -  [phone]    [address]  [place] [facility]          Your surgery has been scheduled at [facility]. We are pleased to have the opportunity to serve you. For Further Information please call [phone].    On the day of surgery please report to the Information Desk on the 1st floor.    · CONTACT YOUR PHYSICIAN'S OFFICE THE DAY PRIOR TO YOUR SURGERY TO OBTAIN YOUR ARRIVAL TIME.     · The evening before surgery do not eat anything after 9 p.m. ( this includes hard candy, chewing gum and mints).  You may only have GATORADE, POWERADE AND WATER  from 9 p.m. until you leave your home.   DO NOT DRINK ANY LIQUIDS ON THE WAY TO THE HOSPITAL.      SPECIAL MEDICATION INSTRUCTIONS: TAKE medications checked off by the Anesthesiologist on your Medication List.    Angiogram Patients: Take medications as instructed by your physician, including aspirin.     Surgery Patients:    If you take ASPIRIN - Your PHYSICIAN/SURGEON will need to inform you IF/OR when you need to stop taking aspirin prior to your surgery.     Do Not take any medications containing IBUPROFEN.  Do Not Wear any make-up or dark nail polish   (especially eye make-up) to surgery. If you come to surgery with makeup on you will be required to remove the makeup or nail polish.    Do not shave your surgical area at least 5 days prior to your surgery. The surgical prep will be performed at the hospital according to Infection Control regulations.    Leave all valuables at home.   Do Not wear any jewelry or watches, including any metal in body piercings.  Contact Lens must be removed before surgery. Either do not wear the contact lens or bring a case and solution for storage.  Please bring a container for eyeglasses or dentures as required.  Bring any paperwork your physician has provided, such as consent forms,  history and physicals, doctor's orders, etc.   Bring comfortable clothes that are loose fitting to wear upon  discharge. Take into consideration the type of surgery being performed.  Maintain your diet as advised per your physician the day prior to surgery.      Adequate rest the night before surgery is advised.   Park in the Parking lot behind the hospital or in the [facility] across the street from the parking lot. Parking is complimentary.  If you will be discharged the same day as your procedure, please arrange for a responsible adult to drive you home or to accompany you if traveling by taxi.   YOU WILL NOT BE PERMITTED TO DRIVE OR TO LEAVE THE HOSPITAL ALONE AFTER SURGERY.   It is strongly recommended that you arrange for someone to remain with you for the first 24 hrs following your surgery.       Thank you for your cooperation.  The Staff of [facility].        Bathing Instructions                                                                 Please shower the evening before and morning of your procedure with    ANTIBACTERIAL SOAP. ( DIAL, etc )  Concentrate on the surgical area   for at least 3 minutes and rinse completely. Dry off as usual.   Do not use any deodorant, powder, body lotions, perfume, after shave or    cologne.

## 2018-02-27 ENCOUNTER — ANESTHESIA (OUTPATIENT)
Dept: SURGERY | Facility: OTHER | Age: 68
End: 2018-02-27
Payer: MEDICARE

## 2018-02-27 ENCOUNTER — HOSPITAL ENCOUNTER (OUTPATIENT)
Facility: OTHER | Age: 68
Discharge: HOME | End: 2018-02-28
Attending: ORTHOPAEDIC SURGERY | Admitting: ORTHOPAEDIC SURGERY
Payer: MEDICARE

## 2018-02-27 DIAGNOSIS — M25.561 RIGHT KNEE PAIN, UNSPECIFIED CHRONICITY: ICD-10-CM

## 2018-02-27 DIAGNOSIS — M22.41 CHONDROMALACIA OF RIGHT PATELLA: ICD-10-CM

## 2018-02-27 DIAGNOSIS — G89.29 CHRONIC PAIN OF RIGHT KNEE: Primary | ICD-10-CM

## 2018-02-27 DIAGNOSIS — M25.561 CHRONIC PAIN OF RIGHT KNEE: Primary | ICD-10-CM

## 2018-02-27 LAB
HCT VFR BLD AUTO: 40.4 %
HGB BLD-MCNC: 13.7 G/DL

## 2018-02-27 PROCEDURE — 63600175 PHARM REV CODE 636 W HCPCS: Performed by: ANESTHESIOLOGY

## 2018-02-27 PROCEDURE — 25000003 PHARM REV CODE 250: Performed by: ANESTHESIOLOGY

## 2018-02-27 PROCEDURE — 63600175 PHARM REV CODE 636 W HCPCS: Performed by: ORTHOPAEDIC SURGERY

## 2018-02-27 PROCEDURE — 36000710: Performed by: ORTHOPAEDIC SURGERY

## 2018-02-27 PROCEDURE — 25000003 PHARM REV CODE 250: Performed by: NURSE ANESTHETIST, CERTIFIED REGISTERED

## 2018-02-27 PROCEDURE — C9290 INJ, BUPIVACAINE LIPOSOME: HCPCS | Performed by: ORTHOPAEDIC SURGERY

## 2018-02-27 PROCEDURE — 25000003 PHARM REV CODE 250: Performed by: ORTHOPAEDIC SURGERY

## 2018-02-27 PROCEDURE — V2790 AMNIOTIC MEMBRANE: HCPCS | Performed by: ORTHOPAEDIC SURGERY

## 2018-02-27 PROCEDURE — C1776 JOINT DEVICE (IMPLANTABLE): HCPCS | Performed by: ORTHOPAEDIC SURGERY

## 2018-02-27 PROCEDURE — 71000033 HC RECOVERY, INTIAL HOUR: Performed by: ORTHOPAEDIC SURGERY

## 2018-02-27 PROCEDURE — 36000711: Performed by: ORTHOPAEDIC SURGERY

## 2018-02-27 PROCEDURE — 36415 COLL VENOUS BLD VENIPUNCTURE: CPT

## 2018-02-27 PROCEDURE — 37000009 HC ANESTHESIA EA ADD 15 MINS: Performed by: ORTHOPAEDIC SURGERY

## 2018-02-27 PROCEDURE — 71000039 HC RECOVERY, EACH ADD'L HOUR: Performed by: ORTHOPAEDIC SURGERY

## 2018-02-27 PROCEDURE — 37000008 HC ANESTHESIA 1ST 15 MINUTES: Performed by: ORTHOPAEDIC SURGERY

## 2018-02-27 PROCEDURE — 27438 REVISE KNEECAP WITH IMPLANT: CPT | Mod: RT,,, | Performed by: ORTHOPAEDIC SURGERY

## 2018-02-27 PROCEDURE — C1713 ANCHOR/SCREW BN/BN,TIS/BN: HCPCS | Performed by: ORTHOPAEDIC SURGERY

## 2018-02-27 PROCEDURE — 85018 HEMOGLOBIN: CPT

## 2018-02-27 PROCEDURE — 29881 ARTHRS KNE SRG MNISECTMY M/L: CPT | Mod: 59,RT,, | Performed by: ORTHOPAEDIC SURGERY

## 2018-02-27 PROCEDURE — 85014 HEMATOCRIT: CPT

## 2018-02-27 PROCEDURE — C1769 GUIDE WIRE: HCPCS | Performed by: ORTHOPAEDIC SURGERY

## 2018-02-27 PROCEDURE — 17999 UNLISTD PX SKN MUC MEMB SUBQ: CPT | Mod: ,,, | Performed by: ORTHOPAEDIC SURGERY

## 2018-02-27 PROCEDURE — 27201423 OPTIME MED/SURG SUP & DEVICES STERILE SUPPLY: Performed by: ORTHOPAEDIC SURGERY

## 2018-02-27 PROCEDURE — 63600175 PHARM REV CODE 636 W HCPCS: Performed by: NURSE ANESTHETIST, CERTIFIED REGISTERED

## 2018-02-27 DEVICE — IMPLANTABLE DEVICE: Type: IMPLANTABLE DEVICE | Site: KNEE | Status: FUNCTIONAL

## 2018-02-27 DEVICE — CEMENT BONE IMPLANT: Type: IMPLANTABLE DEVICE | Site: KNEE | Status: FUNCTIONAL

## 2018-02-27 DEVICE — MATRIX REGENERATIVE CLARIX FLO: Type: IMPLANTABLE DEVICE | Site: KNEE | Status: FUNCTIONAL

## 2018-02-27 RX ORDER — CEFAZOLIN SODIUM 2 G/50ML
2 SOLUTION INTRAVENOUS
Status: COMPLETED | OUTPATIENT
Start: 2018-02-27 | End: 2018-02-28

## 2018-02-27 RX ORDER — FENTANYL CITRATE 50 UG/ML
INJECTION, SOLUTION INTRAMUSCULAR; INTRAVENOUS
Status: DISCONTINUED | OUTPATIENT
Start: 2018-02-27 | End: 2018-02-27

## 2018-02-27 RX ORDER — HYDROMORPHONE HYDROCHLORIDE 2 MG/ML
0.4 INJECTION, SOLUTION INTRAMUSCULAR; INTRAVENOUS; SUBCUTANEOUS EVERY 5 MIN PRN
Status: DISCONTINUED | OUTPATIENT
Start: 2018-02-27 | End: 2018-02-27 | Stop reason: HOSPADM

## 2018-02-27 RX ORDER — SODIUM CHLORIDE, SODIUM LACTATE, POTASSIUM CHLORIDE, CALCIUM CHLORIDE 600; 310; 30; 20 MG/100ML; MG/100ML; MG/100ML; MG/100ML
INJECTION, SOLUTION INTRAVENOUS CONTINUOUS
Status: DISCONTINUED | OUTPATIENT
Start: 2018-02-27 | End: 2018-02-27

## 2018-02-27 RX ORDER — MUPIROCIN 20 MG/G
OINTMENT TOPICAL
Status: DISCONTINUED | OUTPATIENT
Start: 2018-02-27 | End: 2018-02-27 | Stop reason: HOSPADM

## 2018-02-27 RX ORDER — DEXAMETHASONE SODIUM PHOSPHATE 4 MG/ML
INJECTION, SOLUTION INTRA-ARTICULAR; INTRALESIONAL; INTRAMUSCULAR; INTRAVENOUS; SOFT TISSUE
Status: DISCONTINUED | OUTPATIENT
Start: 2018-02-27 | End: 2018-02-27

## 2018-02-27 RX ORDER — GLYCOPYRROLATE 0.2 MG/ML
INJECTION INTRAMUSCULAR; INTRAVENOUS
Status: DISCONTINUED | OUTPATIENT
Start: 2018-02-27 | End: 2018-02-27

## 2018-02-27 RX ORDER — SODIUM CHLORIDE 0.9 % (FLUSH) 0.9 %
3 SYRINGE (ML) INJECTION
Status: DISCONTINUED | OUTPATIENT
Start: 2018-02-27 | End: 2018-02-28 | Stop reason: HOSPADM

## 2018-02-27 RX ORDER — MUPIROCIN 20 MG/G
1 OINTMENT TOPICAL 2 TIMES DAILY
Status: DISCONTINUED | OUTPATIENT
Start: 2018-02-27 | End: 2018-02-28 | Stop reason: HOSPADM

## 2018-02-27 RX ORDER — OXYCODONE HYDROCHLORIDE 5 MG/1
10 TABLET ORAL
Status: DISCONTINUED | OUTPATIENT
Start: 2018-02-27 | End: 2018-02-28 | Stop reason: HOSPADM

## 2018-02-27 RX ORDER — TRANEXAMIC ACID 100 MG/ML
1000 INJECTION, SOLUTION INTRAVENOUS ONCE
Status: COMPLETED | OUTPATIENT
Start: 2018-02-27 | End: 2018-02-27

## 2018-02-27 RX ORDER — BACITRACIN 50000 [IU]/1
INJECTION, POWDER, FOR SOLUTION INTRAMUSCULAR
Status: DISCONTINUED | OUTPATIENT
Start: 2018-02-27 | End: 2018-02-27 | Stop reason: HOSPADM

## 2018-02-27 RX ORDER — MEPERIDINE HYDROCHLORIDE 50 MG/ML
12.5 INJECTION INTRAMUSCULAR; INTRAVENOUS; SUBCUTANEOUS ONCE AS NEEDED
Status: DISCONTINUED | OUTPATIENT
Start: 2018-02-27 | End: 2018-02-27 | Stop reason: HOSPADM

## 2018-02-27 RX ORDER — FENTANYL CITRATE 50 UG/ML
100 INJECTION, SOLUTION INTRAMUSCULAR; INTRAVENOUS EVERY 5 MIN PRN
Status: COMPLETED | OUTPATIENT
Start: 2018-02-27 | End: 2018-02-27

## 2018-02-27 RX ORDER — MIDAZOLAM HYDROCHLORIDE 1 MG/ML
2 INJECTION INTRAMUSCULAR; INTRAVENOUS ONCE
Status: COMPLETED | OUTPATIENT
Start: 2018-02-27 | End: 2018-02-27

## 2018-02-27 RX ORDER — ASPIRIN 325 MG
325 TABLET ORAL DAILY
Status: DISCONTINUED | OUTPATIENT
Start: 2018-02-28 | End: 2018-02-28 | Stop reason: HOSPADM

## 2018-02-27 RX ORDER — ZOLPIDEM TARTRATE 5 MG/1
5 TABLET ORAL NIGHTLY PRN
Status: DISCONTINUED | OUTPATIENT
Start: 2018-02-27 | End: 2018-02-28 | Stop reason: HOSPADM

## 2018-02-27 RX ORDER — CEFAZOLIN SODIUM 1 G/3ML
2 INJECTION, POWDER, FOR SOLUTION INTRAMUSCULAR; INTRAVENOUS
Status: COMPLETED | OUTPATIENT
Start: 2018-02-27 | End: 2018-02-27

## 2018-02-27 RX ORDER — OXYCODONE HCL 10 MG/1
10 TABLET, FILM COATED, EXTENDED RELEASE ORAL EVERY 12 HOURS
Status: DISCONTINUED | OUTPATIENT
Start: 2018-02-27 | End: 2018-02-28 | Stop reason: HOSPADM

## 2018-02-27 RX ORDER — ONDANSETRON 2 MG/ML
4 INJECTION INTRAMUSCULAR; INTRAVENOUS DAILY PRN
Status: DISCONTINUED | OUTPATIENT
Start: 2018-02-27 | End: 2018-02-27 | Stop reason: HOSPADM

## 2018-02-27 RX ORDER — FENTANYL CITRATE 50 UG/ML
25 INJECTION, SOLUTION INTRAMUSCULAR; INTRAVENOUS EVERY 5 MIN PRN
Status: DISCONTINUED | OUTPATIENT
Start: 2018-02-27 | End: 2018-02-27 | Stop reason: HOSPADM

## 2018-02-27 RX ORDER — SODIUM CHLORIDE 0.9 % (FLUSH) 0.9 %
3 SYRINGE (ML) INJECTION
Status: DISCONTINUED | OUTPATIENT
Start: 2018-02-27 | End: 2018-02-27 | Stop reason: HOSPADM

## 2018-02-27 RX ORDER — LIDOCAINE HCL/PF 100 MG/5ML
SYRINGE (ML) INTRAVENOUS
Status: DISCONTINUED | OUTPATIENT
Start: 2018-02-27 | End: 2018-02-27

## 2018-02-27 RX ORDER — ATORVASTATIN CALCIUM 10 MG/1
10 TABLET, FILM COATED ORAL DAILY
Status: DISCONTINUED | OUTPATIENT
Start: 2018-02-28 | End: 2018-02-28 | Stop reason: HOSPADM

## 2018-02-27 RX ORDER — OXYCODONE HYDROCHLORIDE 5 MG/1
5 TABLET ORAL
Status: DISCONTINUED | OUTPATIENT
Start: 2018-02-27 | End: 2018-02-27 | Stop reason: HOSPADM

## 2018-02-27 RX ORDER — DILTIAZEM HYDROCHLORIDE 180 MG/1
180 CAPSULE, COATED, EXTENDED RELEASE ORAL DAILY
Status: DISCONTINUED | OUTPATIENT
Start: 2018-02-28 | End: 2018-02-28 | Stop reason: HOSPADM

## 2018-02-27 RX ORDER — ROPIVACAINE HYDROCHLORIDE 5 MG/ML
INJECTION, SOLUTION EPIDURAL; INFILTRATION; PERINEURAL
Status: DISCONTINUED | OUTPATIENT
Start: 2018-02-27 | End: 2018-02-27

## 2018-02-27 RX ORDER — EPINEPHRINE 1 MG/ML
INJECTION INTRAMUSCULAR; INTRAVENOUS; SUBCUTANEOUS
Status: DISCONTINUED | OUTPATIENT
Start: 2018-02-27 | End: 2018-02-27 | Stop reason: HOSPADM

## 2018-02-27 RX ORDER — ONDANSETRON 2 MG/ML
INJECTION INTRAMUSCULAR; INTRAVENOUS
Status: DISCONTINUED | OUTPATIENT
Start: 2018-02-27 | End: 2018-02-27

## 2018-02-27 RX ORDER — MORPHINE SULFATE 2 MG/ML
2 INJECTION, SOLUTION INTRAMUSCULAR; INTRAVENOUS
Status: DISCONTINUED | OUTPATIENT
Start: 2018-02-27 | End: 2018-02-28 | Stop reason: HOSPADM

## 2018-02-27 RX ORDER — ACETAMINOPHEN 10 MG/ML
INJECTION, SOLUTION INTRAVENOUS
Status: DISCONTINUED | OUTPATIENT
Start: 2018-02-27 | End: 2018-02-27

## 2018-02-27 RX ORDER — BUPIVACAINE HYDROCHLORIDE 2.5 MG/ML
INJECTION, SOLUTION EPIDURAL; INFILTRATION; INTRACAUDAL
Status: DISCONTINUED | OUTPATIENT
Start: 2018-02-27 | End: 2018-02-27 | Stop reason: HOSPADM

## 2018-02-27 RX ORDER — PROMETHAZINE HYDROCHLORIDE 25 MG/1
25 TABLET ORAL EVERY 6 HOURS PRN
Status: DISCONTINUED | OUTPATIENT
Start: 2018-02-27 | End: 2018-02-28 | Stop reason: HOSPADM

## 2018-02-27 RX ORDER — PROPOFOL 10 MG/ML
VIAL (ML) INTRAVENOUS
Status: DISCONTINUED | OUTPATIENT
Start: 2018-02-27 | End: 2018-02-27

## 2018-02-27 RX ORDER — FAMOTIDINE 20 MG/1
20 TABLET, FILM COATED ORAL
Status: COMPLETED | OUTPATIENT
Start: 2018-02-27 | End: 2018-02-27

## 2018-02-27 RX ORDER — TRANEXAMIC ACID 100 MG/ML
1000 INJECTION, SOLUTION INTRAVENOUS
Status: COMPLETED | OUTPATIENT
Start: 2018-02-27 | End: 2018-02-27

## 2018-02-27 RX ADMIN — MUPIROCIN: 20 OINTMENT TOPICAL at 10:02

## 2018-02-27 RX ADMIN — SODIUM CHLORIDE, SODIUM LACTATE, POTASSIUM CHLORIDE, AND CALCIUM CHLORIDE: 600; 310; 30; 20 INJECTION, SOLUTION INTRAVENOUS at 11:02

## 2018-02-27 RX ADMIN — FAMOTIDINE 20 MG: 20 TABLET, FILM COATED ORAL at 10:02

## 2018-02-27 RX ADMIN — ZOLPIDEM TARTRATE 5 MG: 5 TABLET, FILM COATED ORAL at 11:02

## 2018-02-27 RX ADMIN — FENTANYL CITRATE 100 MCG: 50 INJECTION, SOLUTION INTRAMUSCULAR; INTRAVENOUS at 11:02

## 2018-02-27 RX ADMIN — ROPIVACAINE HYDROCHLORIDE 30 ML: 5 INJECTION, SOLUTION EPIDURAL; INFILTRATION; PERINEURAL at 11:02

## 2018-02-27 RX ADMIN — MIDAZOLAM HYDROCHLORIDE 2 MG: 1 INJECTION INTRAMUSCULAR; INTRAVENOUS at 11:02

## 2018-02-27 RX ADMIN — OXYCODONE HYDROCHLORIDE 10 MG: 5 TABLET ORAL at 07:02

## 2018-02-27 RX ADMIN — FENTANYL CITRATE 25 MCG: 50 INJECTION, SOLUTION INTRAMUSCULAR; INTRAVENOUS at 03:02

## 2018-02-27 RX ADMIN — PROPOFOL 200 MG: 10 INJECTION, EMULSION INTRAVENOUS at 12:02

## 2018-02-27 RX ADMIN — TRANEXAMIC ACID 1000 MG: 100 INJECTION, SOLUTION INTRAVENOUS at 10:02

## 2018-02-27 RX ADMIN — CARBOXYMETHYLCELLULOSE SODIUM 2 DROP: 2.5 SOLUTION/ DROPS OPHTHALMIC at 12:02

## 2018-02-27 RX ADMIN — CEFAZOLIN 2 G: 330 INJECTION, POWDER, FOR SOLUTION INTRAMUSCULAR; INTRAVENOUS at 12:02

## 2018-02-27 RX ADMIN — FENTANYL CITRATE 100 MCG: 50 INJECTION, SOLUTION INTRAMUSCULAR; INTRAVENOUS at 12:02

## 2018-02-27 RX ADMIN — PROPOFOL 50 MG: 10 INJECTION, EMULSION INTRAVENOUS at 02:02

## 2018-02-27 RX ADMIN — CEFAZOLIN SODIUM 2 G: 2 SOLUTION INTRAVENOUS at 07:02

## 2018-02-27 RX ADMIN — TRANEXAMIC ACID 1 G: 100 INJECTION, SOLUTION INTRAVENOUS at 12:02

## 2018-02-27 RX ADMIN — EPHEDRINE SULFATE 25 MG: 50 INJECTION INTRAMUSCULAR; INTRAVENOUS; SUBCUTANEOUS at 01:02

## 2018-02-27 RX ADMIN — DEXAMETHASONE SODIUM PHOSPHATE 8 MG: 4 INJECTION, SOLUTION INTRAMUSCULAR; INTRAVENOUS at 12:02

## 2018-02-27 RX ADMIN — ONDANSETRON 4 MG: 2 INJECTION INTRAMUSCULAR; INTRAVENOUS at 12:02

## 2018-02-27 RX ADMIN — LIDOCAINE HYDROCHLORIDE 100 MG: 20 INJECTION, SOLUTION INTRAVENOUS at 12:02

## 2018-02-27 RX ADMIN — GLYCOPYRROLATE 0.2 MG: 0.2 INJECTION, SOLUTION INTRAMUSCULAR; INTRAVENOUS at 12:02

## 2018-02-27 RX ADMIN — EPHEDRINE SULFATE 25 MG: 50 INJECTION INTRAMUSCULAR; INTRAVENOUS; SUBCUTANEOUS at 12:02

## 2018-02-27 RX ADMIN — ACETAMINOPHEN 1000 MG: 10 INJECTION, SOLUTION INTRAVENOUS at 12:02

## 2018-02-27 RX ADMIN — MUPIROCIN 1 G: 20 OINTMENT TOPICAL at 09:02

## 2018-02-27 RX ADMIN — OXYCODONE HYDROCHLORIDE 10 MG: 10 TABLET, FILM COATED, EXTENDED RELEASE ORAL at 09:02

## 2018-02-27 RX ADMIN — FENTANYL CITRATE 100 MCG: 50 INJECTION, SOLUTION INTRAMUSCULAR; INTRAVENOUS at 01:02

## 2018-02-27 RX ADMIN — OXYCODONE HYDROCHLORIDE 5 MG: 5 TABLET ORAL at 03:02

## 2018-02-27 NOTE — INTERVAL H&P NOTE
The patient has been examined and the H&P has been reviewed:    I concur with the findings and no changes have occurred since H&P was written.    Anesthesia/Surgery risks, benefits and alternative options discussed and understood by patient/family.          Active Hospital Problems    Diagnosis  POA    Right knee pain [M25.561]  Yes      Resolved Hospital Problems    Diagnosis Date Resolved POA   No resolved problems to display.

## 2018-02-27 NOTE — ANESTHESIA POSTPROCEDURE EVALUATION
"Anesthesia Post Evaluation    Patient: Leonid Harris III    Procedure(s) Performed: Procedure(s) (LRB):  ARTHROPLASTY-KNEE WAVE ARTHROSURFACE PATELLO-FEMORAL REPLACEMENT (Right)  INJECTION-STEROID; RIGHT KNEE AMNIOX (Right)  ARTHROSCOPIC PARTIAL SYNOVECTOMY-KNEE (Right)  MENISCECTOMY-MEDIAL ARTHROSCOPIC (Right)    Final Anesthesia Type: general  Patient location during evaluation: PACU  Patient participation: Yes- Able to Participate  Level of consciousness: awake and alert  Post-procedure vital signs: reviewed and stable  Pain management: adequate  Airway patency: patent  PONV status at discharge: No PONV  Anesthetic complications: no      Cardiovascular status: blood pressure returned to baseline  Respiratory status: unassisted, spontaneous ventilation and room air  Hydration status: euvolemic  Follow-up not needed.        Visit Vitals  /62   Pulse 80   Temp 36.5 °C (97.7 °F) (Oral)   Resp 18   Ht 6' 4" (1.93 m)   Wt 88 kg (194 lb)   SpO2 100%   BMI 23.61 kg/m²       Pain/Marguerite Score: Pain Assessment Performed: Yes (2/27/2018  3:03 PM)  Presence of Pain: complains of pain/discomfort (2/27/2018  3:30 PM)  Pain Rating Prior to Med Admin: 6 (2/27/2018  3:49 PM)  Pain Rating Post Med Admin: 5 (2/27/2018  5:05 PM)  Marguerite Score: 10 (2/27/2018  5:05 PM)      "

## 2018-02-27 NOTE — DISCHARGE INSTRUCTIONS
[address], [place]                                                                                          [phone]                   Postoperative Instructions for Knee Surgery                 Your Surgery Included:   Open               Arthroscopic   [] Ligament Repair       [] Diagnostic           [] ACL     [] PCL     [] MCL     [] PLLC      [] Synovectomy / Plica Removal [] Meniscal Cartilage Repair / Transplantation      [] Lysis of Adhesions / Manipulation [] Articular Cartilage Repair      [] Interval Release           [] Microfracture       [] OATS   [] ACI      [] Meniscectomy           [] Osteochondral Allograft      [] Meniscal Cartilage Repair  [] Patellar Realignment       [] Debridement / Chondroplasty         [] Lateral Release   [] Ligament Repair      [] Articular Cartilage Repair          [] Extensor Mechanism             []   Microfracture  []  OATS         []  Cartilage Biopsy [] Tendon Repair          [] Ligament Reconstruction          [] Patella                  [] Quadriceps             []   ACL    []   PCL  [] High Tibial Osteotomy       [] PRP Arthrocentesis  [] Joint Replacement         [] Amniox Arthrocentesis           [] Unicompartmental   [x] Patellofemoral                    [] Total Knee                  Call our office ([phone]) immediately if you experience any of the following:       Excessive bleeding or pus like drainage at the incision site       Uncontrollable pain not relieved by pain medication       Excessive swelling or redness at the incision site       Fever above 101.5 degrees not controlled with Tylenol or Motrin       Shortness of Breath       Any foul odor or blistering from the surgery site    FOR EMERGENCIES: If any unusual problems or difficulties occur, call our office at [phone], or page the  at [phone] who will direct your call appropriately    1.   Pain Management: A cold therapy cuff,  pain medications, local injections, and in some cases, regional anesthesia injections are used to manage your post-operative pain. The decision to use each of these options is based on their risks and benefits.     Medications: You were given one or more of the following medication prescriptions before leaving the hospital. Have the prescriptions filled at a pharmacy on your way home and follow the instructions on the bottles. If you need a refill, please call your pharmacy.      Narcotic Medication (usually Vicodin ES, Lortab, Percocet or Nucynta): Begin taking the medication before your knee starts to hurt. Some patients do not like to take any medication, but if you wait until your pain is severe before taking, you will be very uncomfortable for several hours waiting for the narcotic to work. Always take with food.     Nausea / Vomiting: For this issue, we prescribe Phenergan, use this medication as directed.     Cold Therapy: You may have been sent home with a [facility]® cold therapy unit and wrap for your knee. Fill with ice and water to the indicated fill line and use throughout the day for the first two days and then as needed to help relieve pain and control swelling.      Regional Anesthesia Injections (Blocks): You may have been given a regional nerve block either before or after surgery. This may make your entire leg numb for 24-36 hours.                            * Proceed with caution when bearing weight on your leg.     2.   Diet: Eat a bland diet for the first day after surgery. Progress your diet as tolerated. Constipation may occur with Narcotic usage, contact our office if you are experiencing constipation.    3.   Activity: Limit your activity during the first 48 hours, keep your leg elevated with pillows under your heel. After the first 48 hours at home, increase your activity level based on your symptoms.    4.   Dressing Change: Remove the dressing on the 3rd day. It is normal for some blood  to be seen on the dressings. It is also normal for you to see apparent bruising on the skin around your knee when you remove the dressing. If present, leave the steri-strip tape across the incisions. If you are concerned by the drainage or the appearance of your knee, please call one of the numbers listed below.    5.   Showering: You may shower on the 3rd day after surgery with waterproof bandages over small incisions. If you have an incision with Prineo (clear mesh), it does not need to be covered. Do not submerge in any water until after your postoperative appointment in clinic.    6.   Knee Brace: You may have been sent home in a hinged knee brace. Your brace is set at 0 to 120 degrees of motion. Wear the brace for 4 weeks, LOCKED in full extension when walking, you will need to wear this brace at all time unless instructed otherwise. You may unlock at rest or for exercises.    7.   CPM Device: You may have been sent home with a Continuous Passive Motion (CPM) machine. This device helps relieve pain, improve motion, and heal cartilage. Use the machine for 3 weeks (Settings: Extension 0, Flexion 100). Try to use at night if able. Use 6 hours per day. Call the phone number on the device for return instructions.    8.   Weight Bearing: You may have been sent home with crutches. If instructed (see below), use these crutches at all times unless at complete rest.      [] Non-weight bearing for      weeks (you may touch your toes to the floor)      [] Partial weight bearing for   weeks    [] 25% Body Weight   [] 50% Body Weight      [x] Full weight bearing            [x]  NOW    []  after  weeks     9.  Knee Exercises: Begin these exercises the first day after surgery in order to help you regain your motion and strength. You may do the following marked exercises:     [x] Quad Sets - Begin activating your quadriceps muscle by driving your          knee downward into full knee extension while seated on a table or bed  "  with a towel rolled and propped under your heel     [x] Straight Leg Raise (SLR) - While thomas your quadriceps muscle, lift     your fully extended leg to the level of your non-operative knee (as shown)     [x] Heel Slides - With the knee straight, slide your heel slowly toward your   buttocks, hold at the endpoint for 10-15 seconds, then slowly straighten     [x] Ankle pumps - With your knee straight, move your ankle in a "pumping"    fashion to activate your calf and leg muscles      10.  Physical Therapy: Physical therapy is an essential component to your recovery from surgery. Your physical therapy will start in 3 days.    FIRST POSTOPERATIVE VISIT: As scheduled.       Deep Vein Thrombosis (DVT)    Deep vein thrombosis (DVT) occurs when a blood clot (thrombus) forms in a deep vein. This happens most often in the leg. It can also happen in the arms or other parts of the body. A part of the clot called an embolus can break off and travel to the lungs. When this happens, its called a pulmonary embolism (PE). PE is a medical emergency. It can cut off blood flow and lead to death. Both DVT and PE are closely related. Together, they are often referred to by the term venous thromboembolism (VTE).  Risk factors for DVT  Anything that slows blood flow, injures the lining of a vein, or increases blood clotting can make you more prone to having DVT. This includes the following:  · Long periods without movement (such as when sitting for many hours at a time or when recovering from major surgery or illness)  · Estrogen (female hormone) therapy, such as hormone replacement therapy (HRT) or oral contraceptives  · Fractured hip or leg  · Major surgery or joint replacement  · Major trauma or spinal cord injury  · Cancer  · Family history  · Excess weight or obesity  · Smoking  · Older age  Symptoms  DVT does not always cause symptoms. When symptoms do occur, they may appear around the site of the DVT, such as in the " leg. Possible symptoms include:  · Swelling  · Pain  · Warmth  · Redness  · Tenderness  Home care  · You were likely prescribed blood thinners (anticoagulants). They may be given as pills (oral) or shots (injections). Follow all instructions when using these medicines. Note: Do not take blood thinners with other medicines, herbal remedies, or supplements without talking to your provider first. Certain medicines or products can affect how blood thinners work.  · Follow your providers instructions about activity and rest.  · If support or compression stockings are prescribed, wear them as directed. These may help improve blood flow in the legs.  · When sitting or lying down, move your ankles, toes and knees often. This may also help improve blood flow in the legs.  Follow-up care  Follow up with your healthcare provider, or as advised. If imaging tests were done, they may need further review by a doctor. You will be told of any new findings that may affect your care.  When to seek medical advice  Call your healthcare provider right away if any of these occur:  · New or increased swelling, pain, tenderness, warmth, or redness, in the leg, arm, or other area  · Blood in the urine  · Bleeding with bowel movements  Call 911  Call 911 right away if any of these occur:  · Bleeding from the nose, gums, a cut, or vagina  · Heavy or uncontrolled bleeding  · Trouble breathing  · Chest pain or discomfort that worsens with deep breathing or coughing  · Coughing (may cough up blood)  · Fast heartbeat  · Sweating  · Anxiety  · Lightheadedness, dizziness, or fainting  Date Last Reviewed: 9/21/2015 © 2000-2017 [employer]. [address] [place] [address], [place] [address]. All rights reserved. This information is not intended as a substitute for professional medical care. Always follow your healthcare professional's instructions.

## 2018-02-27 NOTE — NURSING
Pt arrived to floor via stretcher with ,RN and transferred to bed. Oriented to room, call light placed within reach, bed low and locked, and family at bedside. Pt reports  pain 4/10. Dressing to LLE , CDI. No acute distress noted at this time. Will continue to monitor.

## 2018-02-27 NOTE — ANESTHESIA PROCEDURE NOTES
Peripheral    Patient location during procedure: holding area   Block not for primary anesthetic.  Reason for block: at surgeon's request and post-op pain management   Post-op Pain Location: shoulder pain   End time: 2/27/2018 11:28 AM  Staffing  Anesthesiologist: ELVI HUERTA  Preanesthetic Checklist  Completed: patient identified, site marked, surgical consent, pre-op evaluation, timeout performed, IV checked, risks and benefits discussed and monitors and equipment checked  Peripheral Block  Patient position: supine  Prep: ChloraPrep  Patient monitoring: heart rate, cardiac monitor, continuous pulse ox and frequent blood pressure checks  Block type: adductor canal  Laterality: right  Injection technique: single shot  Needle  Needle type: Stimuplex   Needle gauge: 22 G  Needle length: 4 in  Needle localization: ultrasound guidance   -ultrasound image captured on disc.  Assessment  Injection assessment: negative aspiration, negative parasthesia and local visualized surrounding nerve  Paresthesia pain: none  Slow fractionated injection: yes  Medications:  Bolus administered: 30 mL of 0.5 ropivacaine

## 2018-02-27 NOTE — BRIEF OP NOTE
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]     * Internal derangement of right knee [M23.91]    Procedure: Procedure(s) (LRB):  ARTHROPLASTY-KNEE WAVE ARTHROSURFACE PATELLO-FEMORAL REPLACEMENT (Right)  INJECTION-STEROID; RIGHT KNEE AMNIOX (Right)  ARTHROSCOPIC PARTIAL SYNOVECTOMY-KNEE (Right)  MENISCECTOMY-MEDIAL ARTHROSCOPIC (Right)    Anesthesia: General    Description of the findings of the procedure: See Dictation    Findings/Key Components: see op note    Estimated Blood Loss: * No values recorded between 2/27/2018 12:33 PM and 2/27/2018  2:44 PM *         Specimens:   Specimen (12h ago through future)    None          Disposition: Patient tolerated the procedure well and was transferred to PACU in stable condition.

## 2018-02-27 NOTE — OR NURSING
Marge Haynes called with exact wording of consent and verbalized that the case booking is the same procedure.

## 2018-02-27 NOTE — TRANSFER OF CARE
"Anesthesia Transfer of Care Note    Patient: Leonid Harris III    Procedure(s) Performed: Procedure(s) (LRB):  ARTHROPLASTY-KNEE WAVE ARTHROSURFACE PATELLO-FEMORAL REPLACEMENT (Right)  INJECTION-STEROID; RIGHT KNEE AMNIOX (Right)  ARTHROSCOPIC PARTIAL SYNOVECTOMY-KNEE (Right)  MENISCECTOMY-MEDIAL ARTHROSCOPIC (Right)    Patient location: PACU    Anesthesia Type: general    Transport from OR: Transported from OR on 2-3 L/min O2 by NC with adequate spontaneous ventilation    Post pain: adequate analgesia    Post assessment: no apparent anesthetic complications    Post vital signs: stable    Level of consciousness: awake, alert and oriented    Nausea/Vomiting: no nausea/vomiting    Complications: none    Transfer of care protocol was followed      Last vitals:   Visit Vitals  /82 (BP Location: Left arm, Patient Position: Lying)   Pulse 60   Temp 36.6 °C (97.9 °F) (Oral)   Resp 18   Ht 6' 4" (1.93 m)   Wt 88 kg (194 lb)   SpO2 100%   BMI 23.61 kg/m²     "

## 2018-02-28 VITALS
BODY MASS INDEX: 23.62 KG/M2 | HEART RATE: 68 BPM | TEMPERATURE: 97 F | DIASTOLIC BLOOD PRESSURE: 68 MMHG | SYSTOLIC BLOOD PRESSURE: 121 MMHG | WEIGHT: 194 LBS | HEIGHT: 76 IN | RESPIRATION RATE: 16 BRPM | OXYGEN SATURATION: 97 %

## 2018-02-28 PROBLEM — M22.41 CHONDROMALACIA OF RIGHT PATELLA: Status: ACTIVE | Noted: 2018-02-28

## 2018-02-28 LAB
HCT VFR BLD AUTO: 40.4 %
HGB BLD-MCNC: 13.6 G/DL

## 2018-02-28 PROCEDURE — G8979 MOBILITY GOAL STATUS: HCPCS | Mod: CJ

## 2018-02-28 PROCEDURE — G8978 MOBILITY CURRENT STATUS: HCPCS | Mod: CJ

## 2018-02-28 PROCEDURE — 85018 HEMOGLOBIN: CPT

## 2018-02-28 PROCEDURE — 63600175 PHARM REV CODE 636 W HCPCS: Performed by: ORTHOPAEDIC SURGERY

## 2018-02-28 PROCEDURE — G8980 MOBILITY D/C STATUS: HCPCS | Mod: CJ

## 2018-02-28 PROCEDURE — 85014 HEMATOCRIT: CPT

## 2018-02-28 PROCEDURE — 36415 COLL VENOUS BLD VENIPUNCTURE: CPT

## 2018-02-28 PROCEDURE — 97161 PT EVAL LOW COMPLEX 20 MIN: CPT

## 2018-02-28 RX ADMIN — CEFAZOLIN SODIUM 2 G: 2 SOLUTION INTRAVENOUS at 04:02

## 2018-02-28 NOTE — PT/OT/SLP EVAL
"Physical Therapy Evaluation and treatment/Discharge    Patient Name:  Leonid Harris III   MRN:  [id number]    Recommendations:     Discharge Recommendations:  outpatient PT (OP PT set up for tomorrow afternoon)   Discharge Equipment Recommendations: none (axillary crutches delivered to room)   Barriers to discharge: None    Assessment:     Leonid Harris III is a 68 y.o. male admitted with a medical diagnosis of Chondromalacia of right patella.  He presents with the following impairments/functional limitations:  weakness, gait instability, decreased ROM. PT evaluation completed. Pt educated on proper use of axillary crutches and tolerated gait with CGA without increase in pain.     Rehab Prognosis:  Good; patient would benefit from acute skilled PT services to address these deficits and reach maximum level of function.      Recent Surgery: Procedure(s) (LRB):  ARTHROPLASTY-KNEE WAVE ARTHROSURFACE PATELLO-FEMORAL REPLACEMENT (Right)  INJECTION-STEROID; RIGHT KNEE AMNIOX (Right)  ARTHROSCOPIC PARTIAL SYNOVECTOMY-KNEE (Right)  MENISCECTOMY-MEDIAL ARTHROSCOPIC (Right) 1 Day Post-Op    Plan:     During this hospitalization, patient to be seen   to address the above listed problems via gait training, therapeutic activities, therapeutic exercises, neuromuscular re-education  · Plan of Care Expires:  03/30/18   Plan of Care Reviewed with: patient    Subjective     Communicated with RN prior to session.  Patient found Supine on CPM upon PT entry to room, agreeable to evaluation.      Chief Complaint: "I'm a little anxious" regarding gait  Patient comments/goals: To return home   Pain/Comfort:  · Pain Rating 1: 0/10  · Pain Rating Post-Intervention 1: 0/10    Patients cultural, spiritual, Confucianist conflicts given the current situation: None specified    Living Environment:  Pt lives with his wife in a 1 story house with 0 SIERRA.   Prior to admission, patients level of function was (I) with all mobility and ADLS.  Patient " has the following equipment: none.  DME owned (not currently used): none.  Upon discharge, patient will have assistance from wife.    Objective:     Patient found with: peripheral IV     General Precautions: Standard, fall   Orthopedic Precautions:RLE weight bearing as tolerated Verified by MD Dr. Wright in the patient room.   Braces: Hinged knee brace (0-120 at rest and locked in -10 with gait)  Verified by MD Dr. Wright in the patient room.     Exams:  · Cognition: Patient is oriented to Person, Place, Time and Situation and follows approximately 100% of one step simple commands.    · Posture:    · -       No postural abnormalities identified  · Sensation: Intact to light touch bilateral LEs.   · Skin Integrity: Visible skin intact  · Edema: None noted   · Coordination: No coordination impairments identified with functional mobility. No formal testing performed.   · LE ROM/Strength: RLE: ankle DF: 5, good quad contraction, hip flexion: 3  · Tone: No tone impairments identified during functional mobility.     Functional Mobility:  · Bed Mobility:     · Supine to Sit: independence  · Sit to Supine: independence  · Transfers:     · Sit to Stand:  contact guard assistance with axillary crutches (upon initial attempt pt required return to sitting) upon 2nd attempt pt performed with CGA. Pt required verbal and visual cues to complete.   · Gait: 1 x 150' with axillary crutches and CGA. pt required verbal and visual cues for proper technique, sequencing, and safety.     AM-PAC 6 CLICK MOBILITY  Total Score:20     Therapeutic Activities and Exercises:   Pt educated on locking and unlocking of brace     Patient left supine with all lines intact, call button in reach, RN\ notified and wife present.    GOALS:    Physical Therapy Goals     Not on file          Multidisciplinary Problems (Resolved)        Problem: Physical Therapy Goal    Goal Priority Disciplines Outcome Goal Variances Interventions   Physical Therapy Goal    (Resolved)     PT/OT, PT Outcome(s) achieved                   History:     Past Medical History:   Diagnosis Date    Hyperlipidemia     PVC (premature ventricular contraction)        Past Surgical History:   Procedure Laterality Date    KNEE SURGERY      PROSTATECTOMY      TONSILLECTOMY         Clinical Decision Making:     History  Co-morbidities and personal factors that may impact the plan of care Examination  Body Structures and Functions, activity limitations and participation restrictions that may impact the plan of care Clinical Presentation   Decision Making/ Complexity Score   Co-morbidities:   [] Time since onset of injury / illness / exacerbation  [] Status of current condition  []Patient's cognitive status and safety concerns    [] Multiple Medical Problems (see med hx)  Personal Factors:   [] Patient's age  [] Prior Level of function   [] Patient's home situation (environment and family support)  [] Patient's level of motivation  [] Expected progression of patient      HISTORY:(criteria)    [] 00964 - no personal factors/history    [] 19965 - has 1-2 personal factor/comorbidity     [] 31917 - has >3 personal factor/comorbidity     Body Regions:  [] Objective examination findings  [] Head     []  Neck  [] Trunk   [] Upper Extremity  [] Lower Extremity    Body Systems:  [] For communication ability, affect, cognition, language, and learning style: the assessment of the ability to make needs known, consciousness, orientation (person, place, and time), expected emotional /behavioral responses, and learning preferences (eg, learning barriers, education  needs)  [] For the neuromuscular system: a general assessment of gross coordinated movement (eg, balance, gait, locomotion, transfers, and transitions) and motor function  (motor control and motor learning)  [] For the musculoskeletal system: the assessment of gross symmetry, gross range of motion, gross strength, height, and weight  [] For the  integumentary system: the assessment of pliability(texture), presence of scar formation, skin color, and skin integrity  [] For cardiovascular/pulmonary system: the assessment of heart rate, respiratory rate, blood pressure, and edema     Activity limitations:    [] Patient's cognitive status and saf ety concerns          [] Status of current condition      [] Weight bearing restriction  [] Cardiopulmunary Restriction    Participation Restrictions:   [] Goals and goal agreement with the patient     [] Rehab potential (prognosis) and probable outcome      Examination of Body System: (criteria)    [] 43276 - addressing 1-2 elements    [] 67222 - addressing a total of 3 or more elements     [] 30929 -  Addressing a total of 4 or more elements         Clinical Presentation: (criteria)  Choose one     On examination of body system using standardized tests and measures patient presents with (CHOOSE ONE) elements from any of the following: body structures and functions, activity limitations, and/or participation restrictions.  Leading to a clinical presentation that is considered (CHOOSE ONE)                              Clinical Decision Making  (Eval Complexity):  Low- 01086     Time Tracking:     PT Received On: 02/28/18  PT Start Time: 0844     PT Stop Time: 0915  PT Total Time (min): 31 min     Billable Minutes: Evaluation 16 and Gait Training 15    Melita Juan, PT, DPT  02/28/2018

## 2018-02-28 NOTE — PLAN OF CARE
Eager & in agreement with discharge. Verbal understanding of discharge instructions-- paperwork passed and explained to patient and family. Prescriptions filled in house and delivered by pharmacy.  IV removed w/ cath tip intact, no redness or edema, placed pressure dressing with Coban and gauze. CPM, polar care, and personal belongings packed and ready. Ice pack provided for drive home. To be DCd home w/ family-- will be escorted downstairs via wheelchair and transport team once dressed, ready & ride arrives. Free from falls, injury, or skin breakdown this hospital admission.

## 2018-02-28 NOTE — DISCHARGE SUMMARY
"[facility]  Orthopedics  Discharge Summary      Patient Name: Leonid Harris III  MRN: [id number]  Admission Date: 2/27/2018  Hospital Length of Stay: 0 days  Discharge Date and Time:  02/28/2018 9:18 AM  Attending Physician: Fran Shelton MD   Discharging Provider: Ike Wright MD  Primary Care Provider: David Lane MD    HPI:   Patient admitted for post opPT, antibiotics.      Procedure(s) (LRB):  ARTHROPLASTY-KNEE WAVE ARTHROSURFACE PATELLO-FEMORAL REPLACEMENT (Right)  INJECTION-STEROID; RIGHT KNEE AMNIOX (Right)  ARTHROSCOPIC PARTIAL SYNOVECTOMY-KNEE (Right)  MENISCECTOMY-MEDIAL ARTHROSCOPIC (Right)      Hospital Course:  No acute events.  Worked well with PT.         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    Specialties:  Sports Medicine, Orthopedic Surgery  Why:  as scheduled pre op  Contact information:  [address]  [place] [address]  [phone]                 Patient Instructions:     CRUTCHES FOR HOME USE   Order Comments: Provide if needed   Order Specific Question Answer Comments   Type: Axillary    Height: 6' 4" (1.93 m)    Weight: 88 kg (194 lb)    Does patient have medical equipment at home? none    Length of need (1-99 months): 24      Diet general     Call MD for:  temperature >100.4     Call MD for:  persistent nausea and vomiting     Call MD for:  severe uncontrolled pain     Call MD for:  difficulty breathing, headache or visual disturbances     Call MD for:  redness, tenderness, or signs of infection (pain, " swelling, redness, odor or green/yellow discharge around incision site)     Call MD for:  hives     Call MD for:  persistent dizziness or light-headedness     Remove dressing in 72 hours     Weight bearing as tolerated     Call MD for:  temperature >100.4     Call MD for:  persistent nausea and vomiting or diarrhea     Call MD for:  severe uncontrolled pain     Call MD for:  redness, tenderness, or signs of infection (pain, swelling, redness, odor or green/yellow discharge around incision site)     Call MD for:  difficulty breathing or increased cough     Leave dressing on - Keep it clean, dry, and intact until clinic visit   Order Comments: No showers or baths.  Keep dressing clean and dry.  Home health with change dressing only if completely saturated.     Other restrictions (specify):   Scheduling Instructions: Knee locked at -10 for gait.       Medications:  Reconciled Home Medications:   Current Discharge Medication List      CONTINUE these medications which have NOT CHANGED    Details   aspirin 325 MG tablet Take 1 tablet (325 mg total) by mouth once daily.  Qty: 42 tablet, Refills: 0      diltiaZEM (CARDIZEM CD) 180 MG 24 hr capsule Take 180 mg by mouth once daily.      meloxicam (MOBIC) 15 MG tablet Take 15 mg by mouth once daily.      zolpidem (AMBIEN) 10 mg Tab Refills: 2      atorvastatin (LIPITOR) 10 MG tablet Take 10 mg by mouth once daily.      oxyCODONE-acetaminophen (PERCOCET)  mg per tablet Take 1 tablet by mouth every 4 (four) hours as needed for Pain.  Qty: 60 tablet, Refills: 0      promethazine (PHENERGAN) 25 MG tablet Take 1 tablet (25 mg total) by mouth every 6 (six) hours as needed for Nausea.  Qty: 20 tablet, Refills: 0      traMADol (ULTRAM) 50 mg tablet Take 1 tablet (50 mg total) by mouth every 6 (six) hours as needed for Pain.  Qty: 60 tablet, Refills: 0             Ike Wright MD  Orthopedics  [facility]

## 2018-02-28 NOTE — OP NOTE
DATE OF PROCEDURE: 2/27/2018    ATTENDING SURGEON: Surgeon(s) and Role:     * Fran Shelton MD - Primary    Co-surgeon:  Jose Alberto Perez MD    First Assistant:  SMA Jame     PREOPERATIVE DIAGNOSIS:  Right  M17.11    POSTOPERATIVE DIAGNOSIS:   Right  Tear, Medial meniscus, old M23.205   M17.11    PROCEDURES(S) PERFORMED:   1. Right  Patellofemoral Arthroplasty, 10009  2.  Right  Arthroscopy, with meniscectomy (medial OR lateral) 44841, medial  3.  Right  Amniox Arthrocentesis, 62530      ANESTHESIA: General, Local and Regional w/o Catheter  Adductor block, Exparel mixture 30 cc 0.5% ropivicaine mixture, 30 cc 0.5% Marcaine with epinephrine    FLUIDS IN THE CASE: 800 ml    ESTIMATED BLOOD LOSS: less than 50 mL    URINE OUTPUT: 0 ml    COMPLICATIONS: none    CONDITION ON RETURN TO RECOVERY ROOM: Good     Expand All Collapse All       IMPLANTS UTILIZED:     Arthrosurface HemiCAP resurfacing 11-mm taper post,   Arthrosurface HemiCAP femoral trochlear extra large + 7.0  x - 4.0 mm offset component,   Arthrosurface 30 mm patellar dome 9 mm thick patellar component, SmartSet with   gentamicin utilized on patellar component.    GRAFT SOURCE: Clarix DAVID 100 mg injectable material    INDICATIONS FOR OPERATIVE PROCEDURE: Leonid Harris III is a 68 y.o.  male with history of right knee pain and pathology. The patient's history and physical examination findings consistent with the procedure performed. He noted significant problems in the area of concern with problems on activities of daily living and aggressive use of the right leg. As a result of these problems and problems with overall activity level, the patient was deemed to be an appropriate candidate for operative intervention. Nonoperative versus operative options were discussed. The risks and benefits were discussed with the patient. The patient acknowledged understanding and wished to proceed with operative intervention. Informed consent was obtained  prior to the procedure. Details of the surgical procedure were explained, including incisions and probable rehabilitation course. The patient understands the likely length of convalescence after surgery; and we have explained the risks, benefits, and alternatives of surgery. Reasonable expectations and potential complications were discussed and acknowledged, including but not limited to infection, bleeding, blood clots, (DVT and/or PE), nerve injury, retear, instability, continued pain and stiffness. It was also explained that there was a chance of failure which may require further management. The patient agreed and understood and wished to proceed.       FINDINGS:     ARTICULAR CARTILAGE LESION(S):  Medial Femoral Condyle: ICRS Grade 0      Size: none  Medial tibial plateau: ICRS Grade 0      Size: none        Lateral Femoral Condyle: ICRS Grade 0      Size: none  Lateral tibial plateau: ICRS Grade 0      Size: none        Patellar surface: ICRS Grade 4A      Size: 3.5 x 3.5 cm  Trochlear groove: ICRS Grade 4A      Size: 3.0 x 3.0 cm        EXAMINATION UNDER ANESTHESIA:   Extension 0 degrees  Flexion 140 degrees  Lachman Maneuver:  Negative  Anterior Drawer: Negative  Pivot Shift: Negative  Posterior Drawer:  Negative  Varus stability @ 30 degrees: 0  Valgus stability @ 30 degrees: 0  Patellar glide:1 quadrant lateral, 2 quadrant medial      DESCRIPTION OF PROCEDURE: The patient was brought into the Operating Room and placed in supine position. Upon application of Regional w/o Catheter  adductor block in the preoperative holding area, the patient underwent General to stabilize the airway. The patient was given the appropriate dose of antibiotics based on body weight. Timeout was utilized to verify the side as the operative side. Both upper extremities were placed in comfortable position. Examination under anesthesia was performed. The nonoperative leg was carefully padded along the heel and peroneal nerve regions  and maintained flat on the table. The operative leg was then stabilized with a lateral post for intra-operative positioning as well as a popliteal post placed at the mid-calf level.  No bump was placed under the hip on the operative side. The operative leg was prepped and draped in a sterile fashion with ChloraPrep material.    We injected 0.5% ropivacaine mixture into the anterolateral and anteromedial aspect of the knee with application of 15 mL per portal site. A #11 blade was used to make the arthroscopic portals. Blunt trocar and sheath were inserted into the intercondylar notch and then subsequently into the suprapatellar pouch. This patellofemoral joint was visualized, demonstrating mild lateral patellar tilt, mild patellar subluxation. The patellar tracked midline with flexion and extension. Arthroscopic pictures were obtained. There was articular cartilage damage was present in the patellofemoral compartment as noted in the Findings section of this operative report. The lesion if present was treated with conversion to open procedure to replace the patellofemoral joint as later described.    Attention was turned to the intercondylar notch where the anterior cruciate ligament (ACL) and posterior cruciate ligament (PCL) structures were visualized. Visualization demonstrated an intact ACL and an intact PCL. Probe analysis revealed no signs of occult pathology within the ligamentous structures.     Attention was then turned to the lateral compartment. The patient demonstrated an intact lateral meniscus with probe analysis demonstrating no occult tears or pathology Arthroscopic instrumentation was used to veify no tear and pictures obtained. no articular cartilage damage in the lateral compartment The lesion if present was treated withobservation.    Attention was then turned to the medial compartment. The patient demonstrated a complex type tear of the posterior horn and body region of the medial meniscus.  Arthroscopic instrumentation was used to  carefully remove the tear and removing 50 % of the posterior horn and body region of the medial meniscus. The remaining meniscus structure medially was found to be intact. There was no articular cartilage damage was noted in the medial compartment. The lesion if present was treated with observation.     Final arthroscopic pictures were obtained. Fluid was extravasated from the joint. A 4-0 nylon sutures were used to close the arthroscopic portal on the medial side of the anterior region of the knee.     Attention was turned to the open portion of the procedure.  A 4-0 nylon suture was used to close the medial portal. An Ioban was placed   over the knee. A lateral based incision was carried down through the skin down   to fat and fascia incorporating the previous lateral portal. We raised flaps   superiorly and inferiorly as well as medially and laterally along the area of   concern. A subvastus approach was performed in the lateral aspect of knee.   Patella was flexed medially. Distal femur was exposed demonstrating grade IV   lesion along the trochlear region. With the use a sizing guide demonstrating a   size +7.0 x -4.0 mm Arthrosurface femoral implant would most normalize the patient's   anatomy. As a result, we placed the central pin. We then reamed centrally. We  replaced this with a proximal and distal reaming device from the Arthrosurface   set and reamed proximally and distally with the 2 reaming devices from the   Arthrosurface set. We then further contoured the area of concern with a   high-speed bur and placed a trial implant, which fit in excellent fashion. We   then pinned this in position. We then drilled centrally, tapped centrally and   then placed the tapered centrally. We maintained bone and blood along the   surface of the prepared femoral region and using modular technique, press-fit   and HemiCAP trochlear implant into position. This was performed with  press-fit   technique. We then rongeured along the periphery of the trochlear implant   demonstrating appropriate contour the area of concern. Final pictures were   obtained of the implant in place and position.    Attention was turned to the patella itself. The patella edges and involved area of concern which was exposed with Bovie cautery. We then used a trial patellar implant and placed a central pin within the patella, then based on the trial implant, we   then reamed centrally and then placed trial implant, was fit in excellent   Fashion. The exposed patellar edges that were not covered by the trial implant were then contoured with the use of sagittal saw. An excellent contour was achieved. We drilled the base of the prepared site and pulsavaced the base of   the site, dried the area and placed the cement, we placed the actual patellar   component, which was opened sterilely and then used the patellar clamp to   extrude cement. Extruded cement was removed. We then used a rongeur to remove   additional bone around the periphery of the patella as well as using Bovie   cautery further expose scar along the region of concern to fully expose the   edges of the patella. This left an appropriate remnant of patellar bone and the  implant in position. Following appropriate hardening, clamp was removed. We   then took the knee through range of motion demonstrating excellent tracking of   the patella within in the center of the knee and appropriate tracking medially   and laterally as well as superiorly and inferiorly. Final 1-0 vicryl sutures were placed around the periphery of the patella to allow for normal soft tissue coverage around the periphery of the patella. Final debridement of all scar along the superior and inferior aspects of the patella was performed. We then pulsavaced the area of concern with 3 L of antibiotic solution and Pulsavac the region of concern.     We then injected additional Exparel mixture  using 10 ml per portal site and along then anterior and lateral regions of the knee. We then closed the defects within the quadriceps tendon to obtain the hardware removal with a series of #1 Vicryl sutures placed in figure-of-eight fashion to close the patellar tendon defects distally with #1 Vicryl sutures placed in figure-of-eight fashion and closed the subvastus approach and parapatellar tendon region with a series of #1 Vicryl placed in figure-of-eight fashion.   Knee was taken through range of motion demonstrating excellent tracking with   full extension and flexion to 130+ degrees. No significant damage to the soft   tissue structures was noted with flexion. Irrigation performed along the area   of concern. 3-0 Vicryl sutures to close the subcutaneous tissues. Skin was   closed with 4-0 Monocryl sutures placed in subcuticular fashion along with   application of Dermabond ointment and tape.    Amniox arthrocentesis was performed. Xeroform was applied along with application of sterile electrodes proximally and distally, gauze, ABD pads,cast padding, long-leg NORMA hose stocking and cooling unit. The patient's knee was placed into a hinged immobilizer, which was locked in -10 degrees extension and allowed the flexion to 120 degrees. The patient was then allowed to recover from anesthesia.  General was removed. The patient was taken to Recovery Room in  Good condition. At the completion case, all instrument and sponge counts were correct.    NOTE: I was present and scrubbed for the key portions of the procedure.    PHYSICAL THERAPY:  The patient should begin physical therapy on postoperative   day # 3 and will be advanced to outpatient therapy as soon as   Possible following discharge.  Weight bearing:as tolerated  right leg  Range of Motion:Full normal motion symmetric to opposite side    Continuous passive motion: (CPM) machine will be used postoperatively:    CPM :   Start CPM on postop day # 0 at  10 degrees  hyperextension to 120}degrees flexion as tolerated for 6-8 hours per day for 4 weeks.     If the CPM is required the patient is to be taken out of the CPM machine as much as possible:    Additional exercises to be performed are:   To begin quad sets with a heel roll and heel slides  To begin quad sets with a heel roll to obtain hyperextension  Straight leg raise and heel slides with the heel supported in a closed-chain fashion  Avoid motion maintaining the immobilizer locked in hyperextension while not in the CPM    Immediate specific exercises should include:   Gait program should include the above stated weightbearing; in addition: active extension with a heel-to-toe gait working on maintaining extension    Immobilizer if present should be locked @ -10 degrees with gait and allowed to flex to 120 degrees at rest.     Discharge summary:  The patient was discharged to home in Good  Follow-up as scheduled preoperatively.    Medication(s): Refer to Discharge Medication List         Resume preoperative diet as tolerated    Activity per outpatient discharge instruction sheet

## 2018-02-28 NOTE — PLAN OF CARE
Problem: Physical Therapy Goal  Goal: Physical Therapy Goal  Outcome: Outcome(s) achieved Date Met: 02/28/18  PT evaluation completed. Please see progress note for details, POC, and recommendations.

## 2018-02-28 NOTE — DISCHARGE SUMMARY
"[facility]  Orthopedics  Discharge Summary      Patient Name: Leonid Harris III  MRN: [id number]  Admission Date: 2/27/2018  Hospital Length of Stay: 0 days  Discharge Date and Time:  02/28/2018 9:16 AM  Attending Physician: Fran Shelton MD   Discharging Provider: Ike Wright MD  Primary Care Provider: David Lane MD    HPI:   Patient admitted for post opPT, antibiotics.      Procedure(s) (LRB):  ARTHROPLASTY-KNEE WAVE ARTHROSURFACE PATELLO-FEMORAL REPLACEMENT (Right)  INJECTION-STEROID; RIGHT KNEE AMNIOX (Right)  ARTHROSCOPIC PARTIAL SYNOVECTOMY-KNEE (Right)  MENISCECTOMY-MEDIAL ARTHROSCOPIC (Right)      Hospital Course:  No acute events.  Worked well with PT.         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    Specialties:  Sports Medicine, Orthopedic Surgery  Why:  as scheduled pre op  Contact information:  [address]  [place] [address]  [phone]                 Patient Instructions:     CRUTCHES FOR HOME USE   Order Comments: Provide if needed   Order Specific Question Answer Comments   Type: Axillary    Height: 6' 4" (1.93 m)    Weight: 88 kg (194 lb)    Does patient have medical equipment at home? none    Length of need (1-99 months): 24      Diet general     Call MD for:  temperature >100.4     Call MD for:  persistent nausea and vomiting     Call MD for:  severe uncontrolled pain     Call MD for:  difficulty breathing, headache or visual disturbances     Call MD for:  redness, tenderness, or signs of infection (pain, " swelling, redness, odor or green/yellow discharge around incision site)     Call MD for:  hives     Call MD for:  persistent dizziness or light-headedness     Remove dressing in 72 hours     Weight bearing as tolerated     Call MD for:  temperature >100.4     Call MD for:  persistent nausea and vomiting or diarrhea     Call MD for:  severe uncontrolled pain     Call MD for:  redness, tenderness, or signs of infection (pain, swelling, redness, odor or green/yellow discharge around incision site)     Call MD for:  difficulty breathing or increased cough     Leave dressing on - Keep it clean, dry, and intact until clinic visit   Order Comments: No showers or baths.  Keep dressing clean and dry.  Home health with change dressing only if completely saturated.     Other restrictions (specify):   Scheduling Instructions: Knee locked at -10 for gait.       Medications:  Reconciled Home Medications:   Current Discharge Medication List      CONTINUE these medications which have NOT CHANGED    Details   aspirin 325 MG tablet Take 1 tablet (325 mg total) by mouth once daily.  Qty: 42 tablet, Refills: 0      diltiaZEM (CARDIZEM CD) 180 MG 24 hr capsule Take 180 mg by mouth once daily.      meloxicam (MOBIC) 15 MG tablet Take 15 mg by mouth once daily.      zolpidem (AMBIEN) 10 mg Tab Refills: 2      atorvastatin (LIPITOR) 10 MG tablet Take 10 mg by mouth once daily.      oxyCODONE-acetaminophen (PERCOCET)  mg per tablet Take 1 tablet by mouth every 4 (four) hours as needed for Pain.  Qty: 60 tablet, Refills: 0      promethazine (PHENERGAN) 25 MG tablet Take 1 tablet (25 mg total) by mouth every 6 (six) hours as needed for Nausea.  Qty: 20 tablet, Refills: 0      traMADol (ULTRAM) 50 mg tablet Take 1 tablet (50 mg total) by mouth every 6 (six) hours as needed for Pain.  Qty: 60 tablet, Refills: 0             Ike Wright MD  Orthopedics  [facility]

## 2018-02-28 NOTE — PLAN OF CARE
02/28/18 0941   Final Note   Assessment Type Final Discharge Note   Discharge Disposition Home  (outpatient rehab)   What phone number can be called within the next 1-3 days to see how you are doing after discharge? [phone]   Hospital Follow Up  Appt(s) scheduled? Yes   Discharge plans and expectations educations in teach back method with documentation complete? Yes   Right Care Referral Info   Post Acute Recommendation No Care

## 2018-02-28 NOTE — PROGRESS NOTES
"Doing well.  No pain.  Tolerating diet.     No chest pain, dyspnea, nausea/vomiting, dysuria, headaches, syncope.    /68 (Patient Position: Lying)   Pulse 68   Temp 97.4 °F (36.3 °C) (Oral)   Resp 16   Ht 6' 4" (1.93 m)   Wt 88 kg (194 lb)   SpO2 97%   BMI 23.61 kg/m²   RLE:  Dressings c/d/i. NVI distally.      POD-1 s/p right patellofemoral replacement.  Doing well    Home today after PT.    Ike Wright MD  Orthopedic Surgery PGY-5  [phone] pager    "

## 2018-02-28 NOTE — PT/OT/SLP PROGRESS
Occupational Therapy  Not Seen - Discharge    Patient Name:  Leonid Harris III   MRN:  [id number]    Patient not seen today secondary to Other (the patient was discharged from the hospital before he could been for OT this morning). No follow-up planned..    BING Mendoza  2/28/2018

## 2018-02-28 NOTE — PLAN OF CARE
"Met with patient & wife at bedside to complete discharge planning assessment. Patient lives with wife & was independent of ADLs prior to today's surgery. Wife will provide minimal assist needed during recovery. Patient is current with Dr David Lane PCP & pharmacy of choice is [facility]. Patient will attend outpatient rehab at [facility]. Patient reports "i'm calling to make my first appt on the way home." Patient will need crutches. Approval given from Debbie at [facility] to pull from our supply. Delivered to patient's bedside      02/28/18 0910   Discharge Assessment   Assessment Type Discharge Planning Assessment   Confirmed/corrected address and phone number on facesheet? Yes   Assessment information obtained from? Patient;Caregiver   Expected Length of Stay (days) 0   Communicated expected length of stay with patient/caregiver yes   Prior to hospitilization cognitive status: Alert/Oriented   Prior to hospitalization functional status: Independent   Current cognitive status: Alert/Oriented   Current Functional Status: Needs Assistance;Assistive Equipment   Lives With spouse   Able to Return to Prior Arrangements yes   Is patient able to care for self after discharge? Yes   Who are your caregiver(s) and their phone number(s)? Chari Harris [phone]   Patient's perception of discharge disposition home or selfcare  (outpatient rehab)   Readmission Within The Last 30 Days no previous admission in last 30 days   Patient currently being followed by outpatient case management? No   Patient currently receives any other outside agency services? No   Equipment Currently Used at Home none   Do you have any problems affording any of your prescribed medications? No   Is the patient taking medications as prescribed? yes   Does the patient have transportation home? Yes   Transportation Available family or friend will provide   Does the patient receive services at the [facility]? No   Discharge Plan A Home with family  (outpatient rehab)   Patient/Family In Agreement With Plan yes

## 2018-03-01 PROBLEM — M22.41 CHONDROMALACIA OF RIGHT PATELLA: Status: ACTIVE | Noted: 2018-03-01

## 2018-03-07 ENCOUNTER — PATIENT MESSAGE (OUTPATIENT)
Dept: SPORTS MEDICINE | Facility: CLINIC | Age: 68
End: 2018-03-07

## 2018-03-14 ENCOUNTER — HOSPITAL ENCOUNTER (OUTPATIENT)
Dept: RADIOLOGY | Facility: HOSPITAL | Age: 68
Discharge: HOME | End: 2018-03-14
Attending: ORTHOPAEDIC SURGERY
Payer: MEDICARE

## 2018-03-14 ENCOUNTER — OFFICE VISIT (OUTPATIENT)
Dept: SPORTS MEDICINE | Facility: CLINIC | Age: 68
End: 2018-03-14
Payer: MEDICARE

## 2018-03-14 VITALS
HEIGHT: 76 IN | DIASTOLIC BLOOD PRESSURE: 69 MMHG | HEART RATE: 76 BPM | SYSTOLIC BLOOD PRESSURE: 112 MMHG | WEIGHT: 194 LBS | BODY MASS INDEX: 23.62 KG/M2

## 2018-03-14 DIAGNOSIS — M76.51 PATELLAR TENDINITIS OF RIGHT KNEE: ICD-10-CM

## 2018-03-14 DIAGNOSIS — M23.91 ACUTE INTERNAL DERANGEMENT OF RIGHT KNEE: ICD-10-CM

## 2018-03-14 DIAGNOSIS — G89.29 CHRONIC PAIN OF RIGHT KNEE: ICD-10-CM

## 2018-03-14 DIAGNOSIS — M25.461 EFFUSION OF RIGHT KNEE: ICD-10-CM

## 2018-03-14 DIAGNOSIS — M17.31 POST-TRAUMATIC OSTEOARTHRITIS OF RIGHT KNEE: ICD-10-CM

## 2018-03-14 DIAGNOSIS — M25.561 RIGHT KNEE PAIN, UNSPECIFIED CHRONICITY: Primary | ICD-10-CM

## 2018-03-14 DIAGNOSIS — M25.561 RIGHT KNEE PAIN, UNSPECIFIED CHRONICITY: ICD-10-CM

## 2018-03-14 DIAGNOSIS — M25.561 CHRONIC PAIN OF RIGHT KNEE: ICD-10-CM

## 2018-03-14 PROBLEM — M22.41 CHONDROMALACIA OF RIGHT PATELLA: Status: RESOLVED | Noted: 2018-03-01 | Resolved: 2018-03-14

## 2018-03-14 LAB
APPEARANCE FLD: NORMAL
BODY FLD TYPE: NORMAL
COLOR FLD: NORMAL
LYMPHOCYTES NFR FLD MANUAL: 15 %
MONOS+MACROS NFR FLD MANUAL: 49 %
NEUTROPHILS NFR FLD MANUAL: 36 %
WBC # FLD: 760 /CU MM

## 2018-03-14 PROCEDURE — 20610 DRAIN/INJ JOINT/BURSA W/O US: CPT | Mod: 58,S$PBB,RT, | Performed by: ORTHOPAEDIC SURGERY

## 2018-03-14 PROCEDURE — 89051 BODY FLUID CELL COUNT: CPT

## 2018-03-14 PROCEDURE — 87102 FUNGUS ISOLATION CULTURE: CPT

## 2018-03-14 PROCEDURE — 73560 X-RAY EXAM OF KNEE 1 OR 2: CPT | Mod: TC,FY,PO,RT

## 2018-03-14 PROCEDURE — 99024 POSTOP FOLLOW-UP VISIT: CPT | Mod: POP,,, | Performed by: ORTHOPAEDIC SURGERY

## 2018-03-14 PROCEDURE — 87116 MYCOBACTERIA CULTURE: CPT

## 2018-03-14 PROCEDURE — 87070 CULTURE OTHR SPECIMN AEROBIC: CPT

## 2018-03-14 PROCEDURE — 99213 OFFICE O/P EST LOW 20 MIN: CPT | Mod: PBBFAC,25,PO | Performed by: ORTHOPAEDIC SURGERY

## 2018-03-14 PROCEDURE — 87206 SMEAR FLUORESCENT/ACID STAI: CPT

## 2018-03-14 PROCEDURE — 73560 X-RAY EXAM OF KNEE 1 OR 2: CPT | Mod: 26,RT,, | Performed by: RADIOLOGY

## 2018-03-14 PROCEDURE — 99999 PR PBB SHADOW E&M-EST. PATIENT-LVL III: CPT | Mod: PBBFAC,,, | Performed by: ORTHOPAEDIC SURGERY

## 2018-03-14 PROCEDURE — 20610 DRAIN/INJ JOINT/BURSA W/O US: CPT | Mod: PBBFAC,PO | Performed by: ORTHOPAEDIC SURGERY

## 2018-03-14 PROCEDURE — 87075 CULTR BACTERIA EXCEPT BLOOD: CPT

## 2018-03-14 PROCEDURE — 87205 SMEAR GRAM STAIN: CPT

## 2018-03-14 RX ORDER — PROMETHAZINE HYDROCHLORIDE 25 MG/1
25 TABLET ORAL EVERY 6 HOURS PRN
Qty: 20 TABLET | Refills: 0 | Status: SHIPPED | OUTPATIENT
Start: 2018-03-14 | End: 2018-05-28

## 2018-03-14 NOTE — PROGRESS NOTES
Subjective:          Chief Complaint: Leonid Harris III is a 68 y.o. male who had concerns including Post-op Evaluation of the Right Knee.    Patient is here for a follow up for his right knee.     DATE OF PROCEDURE: 2/27/2018     ATTENDING SURGEON: Surgeon(s) and Role:     * Fran Shelton MD - Primary     Co-surgeon:  Jose Alberto Perez MD     First Assistant:  SMA Jame     PREOPERATIVE DIAGNOSIS:  Right  M17.11     POSTOPERATIVE DIAGNOSIS:   Right  Tear, Medial meniscus, old M23.205   M17.11     PROCEDURES(S) PERFORMED:   1. Right  Patellofemoral Arthroplasty, 35118  2.  Right  Arthroscopy, with meniscectomy (medial OR lateral) 47199, medial  3.  Right  Amniox Arthrocentesis, 75527      Handedness: right-handed  Sport played: volleyball      Level: recreational          Leonid also participates in running.  Pain   This is a new problem. The current episode started 1 to 4 weeks ago. The problem occurs intermittently. The problem has been waxing and waning. Pertinent negatives include no abdominal pain, chest pain, chills, congestion, coughing, fever, joint swelling, myalgias, nausea, numbness, rash, sore throat or vomiting. The symptoms are aggravated by exertion. He has tried nothing for the symptoms. The treatment provided mild relief.       Review of Systems   Constitution: Negative for chills, fever and night sweats.   HENT: Negative for congestion, hearing loss and sore throat.    Eyes: Negative for blurred vision, discharge, double vision and visual disturbance.   Cardiovascular: Negative for chest pain, leg swelling, palpitations and syncope.   Respiratory: Negative for cough and shortness of breath.    Endocrine: Negative for cold intolerance, heat intolerance and polyuria.   Hematologic/Lymphatic: Negative for bleeding problem.   Skin: Negative for dry skin and rash.   Musculoskeletal: Positive for joint pain. Negative for back pain, falls, joint swelling, muscle cramps, muscle weakness,  myalgias and stiffness.   Gastrointestinal: Negative for abdominal pain, melena, nausea and vomiting.   Genitourinary: Negative for hematuria.   Neurological: Negative for focal weakness, loss of balance, numbness and paresthesias.   Psychiatric/Behavioral: Negative for altered mental status.       Pain Related Questions  Over the past 3 days, what was your average pain during activity? (I.e. running, jogging, walking, climbing stairs, getting dressed, ect.): 3  Over the past 3 days, what was your highest pain level?: 3  Over the past 3 days, what was your lowest pain level? : 0    Other  How many nights a week are you awakened by your affected body part?: 0  Was the patient's HEIGHT measured or patient reported?: Patient Reported  Was the patient's WEIGHT measured or patient reported?: Measured      Objective:        General: Leonid is well-developed, well-nourished, appears stated age, in no acute distress, alert and oriented to time, place and person.     General    Vitals reviewed.  Constitutional: He is oriented to person, place, and time. He appears well-developed and well-nourished. No distress.   HENT:   Mouth/Throat: No oropharyngeal exudate.   Eyes: Conjunctivae and EOM are normal. Pupils are equal, round, and reactive to light. Right eye exhibits no discharge. Left eye exhibits no discharge.   Neck: Normal range of motion. Neck supple. No JVD present.   Cardiovascular: Normal heart sounds and intact distal pulses.    No murmur heard.  Pulmonary/Chest: Effort normal and breath sounds normal. No respiratory distress.   Abdominal: Soft. Bowel sounds are normal. He exhibits no distension. There is no tenderness.   Neurological: He is alert and oriented to person, place, and time. He has normal reflexes. No cranial nerve deficit. Coordination normal.   Psychiatric: He has a normal mood and affect. His behavior is normal. Judgment and thought content normal.     General Musculoskeletal Exam   Gait: antalgic        Right Knee Exam     Inspection   Erythema: absent  Scars: present  Swelling: present  Effusion: effusion  Deformity: deformity  Bruising: absent    Tenderness   The patient is tender to palpation of the patella and patellar tendon.    Crepitus   The patient has crepitus of the patella (moderate).    Range of Motion   Extension:  0 abnormal   Flexion:  100 abnormal     Tests   Meniscus   Michi:  Medial - negative Lateral - negative  Ligament Examination Lachman: normal (-1 to 2mm) PCL-Posterior Drawer: normal (0 to 2mm)     MCL - Valgus: normal (0 to 2mm)  LCL - Varus: normalPivot Shift: normal (Equal)Reverse Pivot Shift: normal (Equal)Dial Test at 30 degrees: normal (< 5 degrees)Dial Test at 90 degrees: normal (< 5 degrees)  Posterior Sag Test: negative  Posterolateral Corner: unstable (>15 degrees difference)  Patella   Patellar Apprehension: negative  Passive Patellar Tilt: neutral  Patellar Tracking: normal  Patellar Glide (quadrants): Lateral - 1   Medial - 2  Q-Angle at 90 degrees: normal  Patellar Grind: negative  J-Sign: none    Other   Meniscal Cyst: absent  Popliteal (Baker's) Cyst: absent  Sensation: normal    Left Knee Exam     Inspection   Erythema: absent  Scars: present  Swelling: absent  Effusion: absent  Deformity: deformity  Bruising: absent    Tenderness   The patient is experiencing no tenderness.         Range of Motion   Extension: 0   Flexion:  150 normal     Tests   Meniscus   Michi:  Medial - negative Lateral - negative  Stability Lachman: normal (-1 to 2mm) PCL-Posterior Drawer: normal (0 to 2mm)  MCL - Valgus: normal (0 to 2mm)  LCL - Varus: normal (0 to 2mm)Pivot Shift: normal (Equal)Reverse Pivot Shift: normal (Equal)Dial Test at 30 degrees: normal (< 5 degrees)Dial Test at 90 degrees: normal (< 5 degrees)  Posterior Sag Test: negative  Posterolateral Corner: unstable (>15 degrees difference)  Patella   Patellar Apprehension: negative  Passive Patellar Tilt: neutral  Patellar  Tracking: normal  Patellar Glide (Quadrants): Lateral - 1 Medial - 2  Q-Angle at 90 degrees: normal  Patellar Grind: negative  J-Sign: J sign absent    Other   Meniscal Cyst: absent  Popliteal (Baker's) Cyst: absent  Sensation: normal    Right Hip Exam     Tests   Erin: negative  Left Hip Exam     Tests   Erin: negative          Muscle Strength   Right Lower Extremity   Hip Abduction: 5/5   Quadriceps:  4/5   Hamstrin/5   Left Lower Extremity   Hip Abduction: 5/5   Quadriceps:  5/5   Hamstrin/5     Reflexes     Left Side  Quadriceps:  2+  Achilles:  2+    Right Side   Quadriceps:  2+  Achilles:  2+    Vascular Exam     Right Pulses  Dorsalis Pedis:      2+  Posterior Tibial:      2+        Left Pulses  Dorsalis Pedis:      2+  Posterior Tibial:      2+        Edema  Right Lower Leg: absent  Left Lower Leg: absent    RADIOGRAPHS TODAY              Assessment:       Encounter Diagnosis   Name Primary?    Right knee pain, unspecified chronicity Yes          Plan:       1. IKDC, SF-12 and KOOS was not filled out today in clinic.     RTC in 4 weeks with Dr. Fran Shelton Patient will not fill out IKDC, SF-12 and KOOS and bilateral knee series on return.    2. Continue PT per protocol     3. Continue brace - locked in full extension  Full weight bearing as tolerated    4. Jobst stocking prescribed today     5. Aspiration Procedure right knee    After time out was performed, including verification of patient ID, procedure, site and side, availability of information and equipment, review of safety issues, and agreement with consent, the procedure site was marked and the patient was prepped aseptically. 62cc's of sanguineous joint fluid was aspirated from the right Knee Joint using an 22g x 1.5 needle in sterile fashion without complication. Bandage was applied. Patient was reminded to rest with RICE for 48 hours post injection and to call the clinic immediately for any adverse side effects as explained in clinic  today.    6. Fluid sent for cultures today               [facility] patient questionnaires have been collected today.

## 2018-03-15 LAB
GRAM STN SPEC: NORMAL
GRAM STN SPEC: NORMAL

## 2018-03-16 DIAGNOSIS — M23.91 ACUTE INTERNAL DERANGEMENT OF RIGHT KNEE: ICD-10-CM

## 2018-03-16 DIAGNOSIS — M76.51 PATELLAR TENDINITIS OF RIGHT KNEE: ICD-10-CM

## 2018-03-16 DIAGNOSIS — M25.461 EFFUSION OF RIGHT KNEE: ICD-10-CM

## 2018-03-16 DIAGNOSIS — M17.31 POST-TRAUMATIC OSTEOARTHRITIS OF RIGHT KNEE: ICD-10-CM

## 2018-03-16 DIAGNOSIS — M25.561 RIGHT KNEE PAIN, UNSPECIFIED CHRONICITY: ICD-10-CM

## 2018-03-16 RX ORDER — PROMETHAZINE HYDROCHLORIDE 25 MG/1
25 TABLET ORAL EVERY 6 HOURS PRN
Qty: 20 TABLET | Refills: 0 | OUTPATIENT
Start: 2018-03-16

## 2018-03-19 LAB — BACTERIA SPEC AEROBE CULT: NO GROWTH

## 2018-03-22 LAB — BACTERIA SPEC ANAEROBE CULT: NORMAL

## 2018-03-27 ENCOUNTER — PATIENT MESSAGE (OUTPATIENT)
Dept: SPORTS MEDICINE | Facility: CLINIC | Age: 68
End: 2018-03-27

## 2018-04-09 ENCOUNTER — OFFICE VISIT (OUTPATIENT)
Dept: SPORTS MEDICINE | Facility: CLINIC | Age: 68
DRG: 857 | End: 2018-04-09
Payer: MEDICARE

## 2018-04-09 ENCOUNTER — HOSPITAL ENCOUNTER (OUTPATIENT)
Dept: RADIOLOGY | Facility: HOSPITAL | Age: 68
Discharge: HOME | DRG: 857 | End: 2018-04-09
Attending: ORTHOPAEDIC SURGERY
Payer: MEDICARE

## 2018-04-09 VITALS
DIASTOLIC BLOOD PRESSURE: 73 MMHG | WEIGHT: 195 LBS | HEIGHT: 76 IN | SYSTOLIC BLOOD PRESSURE: 117 MMHG | HEART RATE: 65 BPM | BODY MASS INDEX: 23.75 KG/M2

## 2018-04-09 DIAGNOSIS — M25.561 RIGHT KNEE PAIN, UNSPECIFIED CHRONICITY: ICD-10-CM

## 2018-04-09 DIAGNOSIS — M23.91 INTERNAL DERANGEMENT OF KNEE JOINT, RIGHT: ICD-10-CM

## 2018-04-09 DIAGNOSIS — M25.561 RIGHT KNEE PAIN, UNSPECIFIED CHRONICITY: Primary | ICD-10-CM

## 2018-04-09 PROCEDURE — 87077 CULTURE AEROBIC IDENTIFY: CPT

## 2018-04-09 PROCEDURE — 73564 X-RAY EXAM KNEE 4 OR MORE: CPT | Mod: 26,50,, | Performed by: RADIOLOGY

## 2018-04-09 PROCEDURE — 99999 PR PBB SHADOW E&M-EST. PATIENT-LVL IV: CPT | Mod: PBBFAC,,, | Performed by: ORTHOPAEDIC SURGERY

## 2018-04-09 PROCEDURE — 87186 SC STD MICRODIL/AGAR DIL: CPT

## 2018-04-09 PROCEDURE — 73564 X-RAY EXAM KNEE 4 OR MORE: CPT | Mod: TC,50,FY,PO

## 2018-04-09 PROCEDURE — 87075 CULTR BACTERIA EXCEPT BLOOD: CPT

## 2018-04-09 PROCEDURE — 87070 CULTURE OTHR SPECIMN AEROBIC: CPT

## 2018-04-09 PROCEDURE — 99024 POSTOP FOLLOW-UP VISIT: CPT | Mod: POP,,, | Performed by: ORTHOPAEDIC SURGERY

## 2018-04-09 PROCEDURE — 99214 OFFICE O/P EST MOD 30 MIN: CPT | Mod: PBBFAC,25,PO | Performed by: ORTHOPAEDIC SURGERY

## 2018-04-09 PROCEDURE — 97110 THERAPEUTIC EXERCISES: CPT | Mod: PBBFAC,PO | Performed by: ORTHOPAEDIC SURGERY

## 2018-04-09 RX ORDER — SULFAMETHOXAZOLE AND TRIMETHOPRIM 800; 160 MG/1; MG/1
1 TABLET ORAL 2 TIMES DAILY
Qty: 28 TABLET | Refills: 0 | Status: ON HOLD | OUTPATIENT
Start: 2018-04-09 | End: 2018-04-13 | Stop reason: HOSPADM

## 2018-04-09 NOTE — PROGRESS NOTES
Subjective:          Chief Complaint: Leonid Harris III is a 68 y.o. male who had concerns including Post-op Evaluation of the Right Knee.    Patient is here for a follow up for his right knee. He is doing PT in [place]. She is able to sleep well. He is taking Mobic and using voltaren. He has some discomfort with steps    DATE OF PROCEDURE: 2/27/2018     ATTENDING SURGEON: Surgeon(s) and Role:     * Fran Shelton MD - Primary     Co-surgeon:  Jose Alberto Perez MD     First Assistant:  SMA Jame     PREOPERATIVE DIAGNOSIS:  Right  M17.11     POSTOPERATIVE DIAGNOSIS:   Right  Tear, Medial meniscus, old M23.205   M17.11     PROCEDURES(S) PERFORMED:   1. Right  Patellofemoral Arthroplasty, 32793  2.  Right  Arthroscopy, with meniscectomy (medial OR lateral) 52142, medial  3.  Right  Amniox Arthrocentesis, 06897      Handedness: right-handed  Sport played: volleyball      Level: recreational          Leonid also participates in running.  Pain   This is a new problem. The current episode started 1 to 4 weeks ago. The problem occurs intermittently. The problem has been waxing and waning. Associated symptoms include joint swelling. Pertinent negatives include no abdominal pain, chest pain, chills, congestion, coughing, fever, myalgias, nausea, numbness, rash, sore throat or vomiting. The symptoms are aggravated by exertion. He has tried nothing for the symptoms. The treatment provided mild relief.       Review of Systems   Constitution: Negative for chills, fever and night sweats.   HENT: Negative for congestion, hearing loss and sore throat.    Eyes: Negative for blurred vision, discharge, double vision and visual disturbance.   Cardiovascular: Negative for chest pain, leg swelling, palpitations and syncope.   Respiratory: Negative for cough and shortness of breath.    Endocrine: Negative for cold intolerance, heat intolerance and polyuria.   Hematologic/Lymphatic: Negative for bleeding problem.   Skin:  Negative for dry skin and rash.   Musculoskeletal: Positive for joint pain and joint swelling. Negative for back pain, falls, muscle cramps, muscle weakness, myalgias and stiffness.   Gastrointestinal: Negative for abdominal pain, melena, nausea and vomiting.   Genitourinary: Negative for hematuria.   Neurological: Negative for focal weakness, loss of balance, numbness and paresthesias.   Psychiatric/Behavioral: Negative for altered mental status.       Pain Related Questions  Over the past 3 days, what was your average pain during activity? (I.e. running, jogging, walking, climbing stairs, getting dressed, ect.): 3  Over the past 3 days, what was your highest pain level?: 3  Over the past 3 days, what was your lowest pain level? : 0    Other  How many nights a week are you awakened by your affected body part?: 7  Was the patient's HEIGHT measured or patient reported?: Patient Reported  Was the patient's WEIGHT measured or patient reported?: Measured      Objective:        General: Leonid is well-developed, well-nourished, appears stated age, in no acute distress, alert and oriented to time, place and person.     General    Vitals reviewed.  Constitutional: He is oriented to person, place, and time. He appears well-developed and well-nourished. No distress.   HENT:   Mouth/Throat: No oropharyngeal exudate.   Eyes: Conjunctivae and EOM are normal. Pupils are equal, round, and reactive to light. Right eye exhibits no discharge. Left eye exhibits no discharge.   Neck: Normal range of motion. Neck supple. No JVD present.   Cardiovascular: Normal heart sounds and intact distal pulses.    No murmur heard.  Pulmonary/Chest: Effort normal and breath sounds normal. No respiratory distress.   Abdominal: Soft. Bowel sounds are normal. He exhibits no distension. There is no tenderness.   Neurological: He is alert and oriented to person, place, and time. He has normal reflexes. No cranial nerve deficit. Coordination normal.    Psychiatric: He has a normal mood and affect. His behavior is normal. Judgment and thought content normal.     General Musculoskeletal Exam   Gait: antalgic       Right Knee Exam     Inspection   Erythema: absent  Scars: present  Swelling: present  Effusion: effusion  Deformity: deformity  Bruising: absent    Crepitus   The patient has crepitus of the patella (moderate).    Range of Motion   Extension:  0 abnormal   Flexion:  140 abnormal     Tests   Meniscus   Michi:  Medial - negative Lateral - negative  Ligament Examination Lachman: normal (-1 to 2mm) PCL-Posterior Drawer: normal (0 to 2mm)     MCL - Valgus: normal (0 to 2mm)  LCL - Varus: normalPivot Shift: normal (Equal)Reverse Pivot Shift: normal (Equal)Dial Test at 30 degrees: normal (< 5 degrees)Dial Test at 90 degrees: normal (< 5 degrees)  Posterior Sag Test: negative  Posterolateral Corner: unstable (>15 degrees difference)  Patella   Patellar Apprehension: negative  Passive Patellar Tilt: neutral  Patellar Tracking: normal  Patellar Glide (quadrants): Lateral - 1   Medial - 2  Q-Angle at 90 degrees: normal  Patellar Grind: negative  J-Sign: none    Other   Meniscal Cyst: absent  Popliteal (Baker's) Cyst: absent  Sensation: normal    Comments:  Scab at distal incision, unroofed today, does not track    Left Knee Exam     Inspection   Erythema: absent  Scars: present  Swelling: absent  Effusion: absent  Deformity: deformity  Bruising: absent    Tenderness   The patient is experiencing no tenderness.         Range of Motion   Extension: 0   Flexion:  150 normal     Tests   Meniscus   Michi:  Medial - negative Lateral - negative  Stability Lachman: normal (-1 to 2mm) PCL-Posterior Drawer: normal (0 to 2mm)  MCL - Valgus: normal (0 to 2mm)  LCL - Varus: normal (0 to 2mm)Pivot Shift: normal (Equal)Reverse Pivot Shift: normal (Equal)Dial Test at 30 degrees: normal (< 5 degrees)Dial Test at 90 degrees: normal (< 5 degrees)  Posterior Sag Test:  negative  Posterolateral Corner: unstable (>15 degrees difference)  Patella   Patellar Apprehension: negative  Passive Patellar Tilt: neutral  Patellar Tracking: normal  Patellar Glide (Quadrants): Lateral - 1 Medial - 2  Q-Angle at 90 degrees: normal  Patellar Grind: negative  J-Sign: J sign absent    Other   Meniscal Cyst: absent  Popliteal (Baker's) Cyst: absent  Sensation: normal    Right Hip Exam     Tests   Erin: negative  Left Hip Exam     Tests   Erin: negative          Muscle Strength   Right Lower Extremity   Hip Abduction: 5/5   Quadriceps:  5/5   Hamstrin/5   Left Lower Extremity   Hip Abduction: 5/5   Quadriceps:  5/5   Hamstrin/5     Reflexes     Left Side  Quadriceps:  2+  Achilles:  2+    Right Side   Quadriceps:  2+  Achilles:  2+    Vascular Exam     Right Pulses  Dorsalis Pedis:      2+  Posterior Tibial:      2+        Left Pulses  Dorsalis Pedis:      2+  Posterior Tibial:      2+        Edema  Right Lower Leg: absent  Left Lower Leg: absent    RADIOGRAPHS TODAY: Radiographs ordered and reviewed today in clinic of the right knee demonstrates stable patellofemoral arthroplasty.                 Assessment:       Encounter Diagnoses   Name Primary?    Right knee pain, unspecified chronicity Yes    Internal derangement of knee joint, right           Plan:       1. IKDC, SF-12 and KOOS was not filled out today in clinic.     RTC in 6 weeks with Dr. Fran Shelton Patient will not fill out IKDC, SF-12 and KOOS and bilateral knee series on return.    2. Continue PT per protocol     3. Full weight bearing as tolerated, can wean off brace    4. Bactrim DS PO BID x 2 weeks for mild redness and distal incision scab    5. Superficial cultures taken today, will wait for results    6. HEP 43753 - Fran Shelton, instructed and demonstrated a core HEP. The patient then demonstrated understanding of exercises and proper technique. This program was performed for 15 minutes.     7. 71260 - Moreno Spivey  performed a custom orthotic / brace adjustment, fitting and training with the patient. The patient demonstrated understanding and proper care. This was performed for 15 minutes. viscoskin    ADDENDUM: 4/11/18  Swab culture demonstrated MRSA growth; talked with the patient on the phone tonight (4/11/18) at 7:11 pm and no improvement with Bactrim. Will have him come down, maintain NPO after midnight and Plan:    We reviewed with Leonid today, the pathology and natural history of his diagnosis. We have discussed a variety of treatment options including medications, physical therapy and other alternative treatments. I also explained the indications, risks and benefits of surgery. After discussion, Leonid decided to proceed with surgery. The decision was made to go forward with :  1. Right knee arthroscopic incision and drainage  2. Right knee arthroscopic debridement / Synovectomy  3. Possible conversion and open debridement lateral knee wound  4. KRISTI drain application    The details of the surgical procedure were explained, including the location of probable incisions and a description of likely hardware and/or grafts to be used.  The patient understands the likely convalescence after surgery.  Also, we have thoroughly discussed the risks, benefits and alternatives to surgery, including, but not limited to, the risk of infection, joint stiffness, blood clot (including DVT and/or pulmonary embolus), neurologic and vascular injury.  It was explained that, if tissue has been repaired or reconstructed, there is a chance of failure, which may require further management.      All of the patient's questions were answered and informed consent was obtained. The patient will contact us if they have any questions or concerns in the interim.             [facility] patient questionnaires have been collected today.

## 2018-04-11 LAB — BACTERIA SPEC AEROBE CULT: NORMAL

## 2018-04-12 ENCOUNTER — ANESTHESIA (OUTPATIENT)
Dept: SURGERY | Facility: OTHER | Age: 68
DRG: 857 | End: 2018-04-12
Payer: MEDICARE

## 2018-04-12 ENCOUNTER — ANESTHESIA EVENT (OUTPATIENT)
Dept: SURGERY | Facility: OTHER | Age: 68
DRG: 857 | End: 2018-04-12
Payer: MEDICARE

## 2018-04-12 ENCOUNTER — HOSPITAL ENCOUNTER (INPATIENT)
Facility: OTHER | Age: 68
LOS: 1 days | Discharge: HOME HEALTH | DRG: 857 | End: 2018-04-13
Attending: ORTHOPAEDIC SURGERY | Admitting: ORTHOPAEDIC SURGERY
Payer: MEDICARE

## 2018-04-12 ENCOUNTER — SURGERY (OUTPATIENT)
Age: 68
End: 2018-04-12

## 2018-04-12 DIAGNOSIS — L08.9 RIGHT KNEE SKIN INFECTION: ICD-10-CM

## 2018-04-12 DIAGNOSIS — M25.461 EFFUSION OF RIGHT KNEE: Primary | ICD-10-CM

## 2018-04-12 DIAGNOSIS — T81.49XA WOUND INFECTION AFTER SURGERY: ICD-10-CM

## 2018-04-12 PROCEDURE — 63600175 PHARM REV CODE 636 W HCPCS: Performed by: ANESTHESIOLOGY

## 2018-04-12 PROCEDURE — 87186 SC STD MICRODIL/AGAR DIL: CPT

## 2018-04-12 PROCEDURE — 25000003 PHARM REV CODE 250: Performed by: ORTHOPAEDIC SURGERY

## 2018-04-12 PROCEDURE — 87102 FUNGUS ISOLATION CULTURE: CPT | Mod: 59

## 2018-04-12 PROCEDURE — 87205 SMEAR GRAM STAIN: CPT

## 2018-04-12 PROCEDURE — 87075 CULTR BACTERIA EXCEPT BLOOD: CPT | Mod: 59

## 2018-04-12 PROCEDURE — 25000003 PHARM REV CODE 250: Performed by: NURSE ANESTHETIST, CERTIFIED REGISTERED

## 2018-04-12 PROCEDURE — 63600175 PHARM REV CODE 636 W HCPCS: Performed by: ORTHOPAEDIC SURGERY

## 2018-04-12 PROCEDURE — 87070 CULTURE OTHR SPECIMN AEROBIC: CPT

## 2018-04-12 PROCEDURE — 25000003 PHARM REV CODE 250: Performed by: PHYSICIAN ASSISTANT

## 2018-04-12 PROCEDURE — 87116 MYCOBACTERIA CULTURE: CPT

## 2018-04-12 PROCEDURE — 25000003 PHARM REV CODE 250: Performed by: ANESTHESIOLOGY

## 2018-04-12 PROCEDURE — 37000008 HC ANESTHESIA 1ST 15 MINUTES: Performed by: ORTHOPAEDIC SURGERY

## 2018-04-12 PROCEDURE — 0SBC4ZZ EXCISION OF RIGHT KNEE JOINT, PERCUTANEOUS ENDOSCOPIC APPROACH: ICD-10-PCS | Performed by: ORTHOPAEDIC SURGERY

## 2018-04-12 PROCEDURE — 63600175 PHARM REV CODE 636 W HCPCS: Performed by: NURSE ANESTHETIST, CERTIFIED REGISTERED

## 2018-04-12 PROCEDURE — 37000009 HC ANESTHESIA EA ADD 15 MINS: Performed by: ORTHOPAEDIC SURGERY

## 2018-04-12 PROCEDURE — 36000711: Performed by: ORTHOPAEDIC SURGERY

## 2018-04-12 PROCEDURE — 71000039 HC RECOVERY, EACH ADD'L HOUR: Performed by: ORTHOPAEDIC SURGERY

## 2018-04-12 PROCEDURE — 0J9N0ZZ DRAINAGE OF RIGHT LOWER LEG SUBCUTANEOUS TISSUE AND FASCIA, OPEN APPROACH: ICD-10-PCS | Performed by: ORTHOPAEDIC SURGERY

## 2018-04-12 PROCEDURE — 0S9C4ZZ DRAINAGE OF RIGHT KNEE JOINT, PERCUTANEOUS ENDOSCOPIC APPROACH: ICD-10-PCS | Performed by: ORTHOPAEDIC SURGERY

## 2018-04-12 PROCEDURE — 36000710: Performed by: ORTHOPAEDIC SURGERY

## 2018-04-12 PROCEDURE — 11000001 HC ACUTE MED/SURG PRIVATE ROOM

## 2018-04-12 PROCEDURE — 71000033 HC RECOVERY, INTIAL HOUR: Performed by: ORTHOPAEDIC SURGERY

## 2018-04-12 PROCEDURE — 87206 SMEAR FLUORESCENT/ACID STAI: CPT | Mod: 91

## 2018-04-12 PROCEDURE — 87077 CULTURE AEROBIC IDENTIFY: CPT

## 2018-04-12 PROCEDURE — 27201423 OPTIME MED/SURG SUP & DEVICES STERILE SUPPLY: Performed by: ORTHOPAEDIC SURGERY

## 2018-04-12 PROCEDURE — 29876 ARTHRS KNEE SURG SYNVCT MAJ: CPT | Mod: 58,RT,, | Performed by: ORTHOPAEDIC SURGERY

## 2018-04-12 PROCEDURE — 94761 N-INVAS EAR/PLS OXIMETRY MLT: CPT

## 2018-04-12 PROCEDURE — 27301 DRAIN THIGH/KNEE LESION: CPT | Mod: 58,51,RT, | Performed by: ORTHOPAEDIC SURGERY

## 2018-04-12 PROCEDURE — C1729 CATH, DRAINAGE: HCPCS | Performed by: ORTHOPAEDIC SURGERY

## 2018-04-12 RX ORDER — ONDANSETRON 2 MG/ML
4 INJECTION INTRAMUSCULAR; INTRAVENOUS DAILY PRN
Status: DISCONTINUED | OUTPATIENT
Start: 2018-04-12 | End: 2018-04-12 | Stop reason: HOSPADM

## 2018-04-12 RX ORDER — OXYCODONE AND ACETAMINOPHEN 10; 325 MG/1; MG/1
2 TABLET ORAL EVERY 6 HOURS PRN
Status: DISCONTINUED | OUTPATIENT
Start: 2018-04-12 | End: 2018-04-13 | Stop reason: HOSPADM

## 2018-04-12 RX ORDER — SODIUM CHLORIDE, SODIUM LACTATE, POTASSIUM CHLORIDE, CALCIUM CHLORIDE 600; 310; 30; 20 MG/100ML; MG/100ML; MG/100ML; MG/100ML
INJECTION, SOLUTION INTRAVENOUS CONTINUOUS PRN
Status: DISCONTINUED | OUTPATIENT
Start: 2018-04-12 | End: 2018-04-12

## 2018-04-12 RX ORDER — OXYCODONE AND ACETAMINOPHEN 10; 325 MG/1; MG/1
1 TABLET ORAL EVERY 6 HOURS PRN
Status: DISCONTINUED | OUTPATIENT
Start: 2018-04-12 | End: 2018-04-13 | Stop reason: HOSPADM

## 2018-04-12 RX ORDER — ONDANSETRON 2 MG/ML
4 INJECTION INTRAMUSCULAR; INTRAVENOUS EVERY 12 HOURS PRN
Status: DISCONTINUED | OUTPATIENT
Start: 2018-04-12 | End: 2018-04-13 | Stop reason: HOSPADM

## 2018-04-12 RX ORDER — ONDANSETRON 2 MG/ML
INJECTION INTRAMUSCULAR; INTRAVENOUS
Status: DISCONTINUED | OUTPATIENT
Start: 2018-04-12 | End: 2018-04-12

## 2018-04-12 RX ORDER — DOCUSATE SODIUM 100 MG/1
100 CAPSULE, LIQUID FILLED ORAL 2 TIMES DAILY
Status: DISCONTINUED | OUTPATIENT
Start: 2018-04-12 | End: 2018-04-13 | Stop reason: HOSPADM

## 2018-04-12 RX ORDER — SODIUM CHLORIDE, SODIUM LACTATE, POTASSIUM CHLORIDE, CALCIUM CHLORIDE 600; 310; 30; 20 MG/100ML; MG/100ML; MG/100ML; MG/100ML
INJECTION, SOLUTION INTRAVENOUS CONTINUOUS
Status: DISCONTINUED | OUTPATIENT
Start: 2018-04-12 | End: 2018-04-12

## 2018-04-12 RX ORDER — HYDROMORPHONE HYDROCHLORIDE 1 MG/ML
0.5 INJECTION, SOLUTION INTRAMUSCULAR; INTRAVENOUS; SUBCUTANEOUS EVERY 6 HOURS PRN
Status: DISCONTINUED | OUTPATIENT
Start: 2018-04-12 | End: 2018-04-13 | Stop reason: HOSPADM

## 2018-04-12 RX ORDER — ASPIRIN 81 MG/1
81 TABLET ORAL DAILY
Status: ON HOLD | COMMUNITY
End: 2018-04-13 | Stop reason: HOSPADM

## 2018-04-12 RX ORDER — CEFAZOLIN SODIUM 2 G/50ML
2 SOLUTION INTRAVENOUS
Status: COMPLETED | OUTPATIENT
Start: 2018-04-12 | End: 2018-04-13

## 2018-04-12 RX ORDER — PROPOFOL 10 MG/ML
VIAL (ML) INTRAVENOUS
Status: DISCONTINUED | OUTPATIENT
Start: 2018-04-12 | End: 2018-04-12

## 2018-04-12 RX ORDER — ASPIRIN 325 MG
325 TABLET ORAL DAILY
Status: DISCONTINUED | OUTPATIENT
Start: 2018-04-13 | End: 2018-04-13 | Stop reason: HOSPADM

## 2018-04-12 RX ORDER — MIDAZOLAM HYDROCHLORIDE 1 MG/ML
INJECTION INTRAMUSCULAR; INTRAVENOUS
Status: DISCONTINUED | OUTPATIENT
Start: 2018-04-12 | End: 2018-04-12

## 2018-04-12 RX ORDER — HYDROMORPHONE HYDROCHLORIDE 2 MG/ML
0.4 INJECTION, SOLUTION INTRAMUSCULAR; INTRAVENOUS; SUBCUTANEOUS EVERY 5 MIN PRN
Status: COMPLETED | OUTPATIENT
Start: 2018-04-12 | End: 2018-04-12

## 2018-04-12 RX ORDER — BACITRACIN 50000 [IU]/1
INJECTION, POWDER, FOR SOLUTION INTRAMUSCULAR
Status: DISCONTINUED | OUTPATIENT
Start: 2018-04-12 | End: 2018-04-12 | Stop reason: HOSPADM

## 2018-04-12 RX ORDER — DIPHENHYDRAMINE HYDROCHLORIDE 50 MG/ML
25 INJECTION INTRAMUSCULAR; INTRAVENOUS EVERY 6 HOURS PRN
Status: DISCONTINUED | OUTPATIENT
Start: 2018-04-12 | End: 2018-04-12 | Stop reason: HOSPADM

## 2018-04-12 RX ORDER — FENTANYL CITRATE 50 UG/ML
INJECTION, SOLUTION INTRAMUSCULAR; INTRAVENOUS
Status: DISCONTINUED | OUTPATIENT
Start: 2018-04-12 | End: 2018-04-12

## 2018-04-12 RX ORDER — MAG HYDROX/ALUMINUM HYD/SIMETH 200-200-20
30 SUSPENSION, ORAL (FINAL DOSE FORM) ORAL
Status: DISCONTINUED | OUTPATIENT
Start: 2018-04-12 | End: 2018-04-13 | Stop reason: HOSPADM

## 2018-04-12 RX ORDER — VANCOMYCIN HYDROCHLORIDE
1500 ONCE
Status: COMPLETED | OUTPATIENT
Start: 2018-04-13 | End: 2018-04-13

## 2018-04-12 RX ORDER — CEFAZOLIN SODIUM 1 G/3ML
2 INJECTION, POWDER, FOR SOLUTION INTRAMUSCULAR; INTRAVENOUS
Status: DISCONTINUED | OUTPATIENT
Start: 2018-04-12 | End: 2018-04-13 | Stop reason: HOSPADM

## 2018-04-12 RX ORDER — FENTANYL CITRATE 50 UG/ML
25 INJECTION, SOLUTION INTRAMUSCULAR; INTRAVENOUS EVERY 5 MIN PRN
Status: COMPLETED | OUTPATIENT
Start: 2018-04-12 | End: 2018-04-12

## 2018-04-12 RX ORDER — LIDOCAINE HCL/PF 100 MG/5ML
SYRINGE (ML) INTRAVENOUS
Status: DISCONTINUED | OUTPATIENT
Start: 2018-04-12 | End: 2018-04-12

## 2018-04-12 RX ORDER — OXYCODONE HYDROCHLORIDE 5 MG/1
5 TABLET ORAL
Status: DISCONTINUED | OUTPATIENT
Start: 2018-04-12 | End: 2018-04-12 | Stop reason: HOSPADM

## 2018-04-12 RX ORDER — MEPERIDINE HYDROCHLORIDE 50 MG/ML
12.5 INJECTION INTRAMUSCULAR; INTRAVENOUS; SUBCUTANEOUS ONCE AS NEEDED
Status: COMPLETED | OUTPATIENT
Start: 2018-04-12 | End: 2018-04-12

## 2018-04-12 RX ORDER — EPINEPHRINE 1 MG/ML
INJECTION, SOLUTION INTRACARDIAC; INTRAMUSCULAR; INTRAVENOUS; SUBCUTANEOUS
Status: DISCONTINUED | OUTPATIENT
Start: 2018-04-12 | End: 2018-04-12 | Stop reason: HOSPADM

## 2018-04-12 RX ORDER — ZOLPIDEM TARTRATE 5 MG/1
5 TABLET ORAL NIGHTLY PRN
Status: DISCONTINUED | OUTPATIENT
Start: 2018-04-12 | End: 2018-04-13 | Stop reason: HOSPADM

## 2018-04-12 RX ORDER — MELOXICAM 7.5 MG/1
15 TABLET ORAL DAILY
Status: DISCONTINUED | OUTPATIENT
Start: 2018-04-13 | End: 2018-04-13 | Stop reason: HOSPADM

## 2018-04-12 RX ORDER — MUPIROCIN 20 MG/G
OINTMENT TOPICAL
Status: DISCONTINUED | OUTPATIENT
Start: 2018-04-12 | End: 2018-04-12 | Stop reason: HOSPADM

## 2018-04-12 RX ORDER — OXYCODONE HCL 10 MG/1
10 TABLET, FILM COATED, EXTENDED RELEASE ORAL EVERY 12 HOURS
Status: DISCONTINUED | OUTPATIENT
Start: 2018-04-12 | End: 2018-04-13 | Stop reason: HOSPADM

## 2018-04-12 RX ORDER — DILTIAZEM HYDROCHLORIDE 180 MG/1
180 CAPSULE, COATED, EXTENDED RELEASE ORAL DAILY
Status: DISCONTINUED | OUTPATIENT
Start: 2018-04-13 | End: 2018-04-13 | Stop reason: HOSPADM

## 2018-04-12 RX ORDER — MUPIROCIN 20 MG/G
1 OINTMENT TOPICAL 2 TIMES DAILY
Status: DISCONTINUED | OUTPATIENT
Start: 2018-04-12 | End: 2018-04-13 | Stop reason: HOSPADM

## 2018-04-12 RX ORDER — PROMETHAZINE HYDROCHLORIDE 25 MG/1
25 TABLET ORAL EVERY 6 HOURS PRN
Status: DISCONTINUED | OUTPATIENT
Start: 2018-04-12 | End: 2018-04-13 | Stop reason: HOSPADM

## 2018-04-12 RX ORDER — KETOROLAC TROMETHAMINE 30 MG/ML
INJECTION, SOLUTION INTRAMUSCULAR; INTRAVENOUS
Status: DISCONTINUED | OUTPATIENT
Start: 2018-04-12 | End: 2018-04-12

## 2018-04-12 RX ORDER — SODIUM CHLORIDE 0.9 % (FLUSH) 0.9 %
3 SYRINGE (ML) INJECTION
Status: DISCONTINUED | OUTPATIENT
Start: 2018-04-12 | End: 2018-04-13 | Stop reason: HOSPADM

## 2018-04-12 RX ORDER — ATORVASTATIN CALCIUM 10 MG/1
10 TABLET, FILM COATED ORAL NIGHTLY
Status: DISCONTINUED | OUTPATIENT
Start: 2018-04-12 | End: 2018-04-13 | Stop reason: HOSPADM

## 2018-04-12 RX ORDER — SODIUM CHLORIDE 0.9 % (FLUSH) 0.9 %
5 SYRINGE (ML) INJECTION
Status: DISCONTINUED | OUTPATIENT
Start: 2018-04-12 | End: 2018-04-12 | Stop reason: HOSPADM

## 2018-04-12 RX ADMIN — FENTANYL CITRATE 25 MCG: 50 INJECTION INTRAMUSCULAR; INTRAVENOUS at 05:04

## 2018-04-12 RX ADMIN — DOCUSATE SODIUM 100 MG: 100 CAPSULE, LIQUID FILLED ORAL at 09:04

## 2018-04-12 RX ADMIN — PROPOFOL 200 MG: 10 INJECTION, EMULSION INTRAVENOUS at 04:04

## 2018-04-12 RX ADMIN — SODIUM CHLORIDE, POTASSIUM CHLORIDE, SODIUM LACTATE AND CALCIUM CHLORIDE: 600; 310; 30; 20 INJECTION, SOLUTION INTRAVENOUS at 11:04

## 2018-04-12 RX ADMIN — ONDANSETRON 4 MG: 2 INJECTION INTRAMUSCULAR; INTRAVENOUS at 04:04

## 2018-04-12 RX ADMIN — MUPIROCIN: 20 OINTMENT TOPICAL at 11:04

## 2018-04-12 RX ADMIN — HYDROMORPHONE HYDROCHLORIDE 0.4 MG: 2 INJECTION, SOLUTION INTRAMUSCULAR; INTRAVENOUS; SUBCUTANEOUS at 05:04

## 2018-04-12 RX ADMIN — SODIUM CHLORIDE, SODIUM LACTATE, POTASSIUM CHLORIDE, AND CALCIUM CHLORIDE: 600; 310; 30; 20 INJECTION, SOLUTION INTRAVENOUS at 03:04

## 2018-04-12 RX ADMIN — LIDOCAINE HYDROCHLORIDE 100 MG: 20 INJECTION, SOLUTION INTRAVENOUS at 04:04

## 2018-04-12 RX ADMIN — HYDROMORPHONE HYDROCHLORIDE 0.4 MG: 2 INJECTION, SOLUTION INTRAMUSCULAR; INTRAVENOUS; SUBCUTANEOUS at 06:04

## 2018-04-12 RX ADMIN — KETOROLAC TROMETHAMINE 30 MG: 30 INJECTION, SOLUTION INTRAMUSCULAR; INTRAVENOUS at 04:04

## 2018-04-12 RX ADMIN — BACITRACIN 300000 UNITS: 50000 INJECTION, POWDER, FOR SOLUTION INTRAMUSCULAR at 04:04

## 2018-04-12 RX ADMIN — MEPERIDINE HYDROCHLORIDE 12.5 MG: 50 INJECTION INTRAMUSCULAR; INTRAVENOUS; SUBCUTANEOUS at 05:04

## 2018-04-12 RX ADMIN — ATORVASTATIN CALCIUM 10 MG: 10 TABLET, FILM COATED ORAL at 09:04

## 2018-04-12 RX ADMIN — OXYCODONE HYDROCHLORIDE 5 MG: 5 TABLET ORAL at 05:04

## 2018-04-12 RX ADMIN — OXYCODONE HYDROCHLORIDE 10 MG: 10 TABLET, FILM COATED, EXTENDED RELEASE ORAL at 09:04

## 2018-04-12 RX ADMIN — EPINEPHRINE 6 ML: 1 INJECTION, SOLUTION INTRAMUSCULAR; SUBCUTANEOUS at 04:04

## 2018-04-12 RX ADMIN — MIDAZOLAM HYDROCHLORIDE 2 MG: 1 INJECTION, SOLUTION INTRAMUSCULAR; INTRAVENOUS at 04:04

## 2018-04-12 RX ADMIN — VANCOMYCIN HYDROCHLORIDE 1.5 G: 1 INJECTION, POWDER, LYOPHILIZED, FOR SOLUTION INTRAVENOUS at 04:04

## 2018-04-12 RX ADMIN — FENTANYL CITRATE 100 MCG: 50 INJECTION, SOLUTION INTRAMUSCULAR; INTRAVENOUS at 05:04

## 2018-04-12 RX ADMIN — MUPIROCIN 1 G: 20 OINTMENT TOPICAL at 09:04

## 2018-04-12 RX ADMIN — FENTANYL CITRATE 100 MCG: 50 INJECTION, SOLUTION INTRAMUSCULAR; INTRAVENOUS at 04:04

## 2018-04-12 RX ADMIN — CEFAZOLIN SODIUM 2 G: 2 SOLUTION INTRAVENOUS at 07:04

## 2018-04-12 NOTE — TRANSFER OF CARE
"Anesthesia Transfer of Care Note    Patient: Leonid Harris III    Procedure(s) Performed: Procedure(s) (LRB):  INCISION AND DRAINAGE (I&D) (Right)  ARTHROSCOPY-KNEE DEBRIDEMENT (Right)    Patient location: PACU    Anesthesia Type: general    Transport from OR: Transported from OR on 2-3 L/min O2 by NC with adequate spontaneous ventilation    Post pain: adequate analgesia    Post assessment: no apparent anesthetic complications    Post vital signs: stable    Level of consciousness: awake, alert and oriented    Nausea/Vomiting: no nausea/vomiting    Complications: none    Transfer of care protocol was followed      Last vitals:   Visit Vitals  /71 (BP Location: Right arm, Patient Position: Lying)   Pulse 70   Temp 36.7 °C (98 °F) (Oral)   Resp 16   Ht 6' 4" (1.93 m)   Wt 88.5 kg (195 lb)   SpO2 99%   BMI 23.74 kg/m²     "

## 2018-04-12 NOTE — ANESTHESIA POSTPROCEDURE EVALUATION
"Anesthesia Post Evaluation    Patient: Leonid Harris III    Procedure(s) Performed: Procedure(s) (LRB):  INCISION AND DRAINAGE (I&D) (Right)  ARTHROSCOPY-KNEE DEBRIDEMENT (Right)    Final Anesthesia Type: general  Patient location during evaluation: PACU  Patient participation: Yes- Able to Participate  Level of consciousness: awake and alert  Post-procedure vital signs: reviewed and stable  Pain management: adequate  Airway patency: patent  PONV status at discharge: No PONV  Anesthetic complications: no      Cardiovascular status: blood pressure returned to baseline and stable  Respiratory status: unassisted, spontaneous ventilation and room air  Hydration status: euvolemic  Follow-up not needed.        Visit Vitals  BP (!) 106/54   Pulse 83   Temp 36.7 °C (98 °F) (Oral)   Resp 16   Ht 6' 4" (1.93 m)   Wt 88.5 kg (195 lb)   SpO2 95%   BMI 23.74 kg/m²       Pain/Marguerite Score: Pain Assessment Performed: Yes (4/12/2018  5:15 PM)  Presence of Pain: complains of pain/discomfort (4/12/2018  5:15 PM)  Pain Rating Prior to Med Admin: 7 (4/12/2018  5:52 PM)  Marguerite Score: 9 (4/12/2018  5:15 PM)      "

## 2018-04-12 NOTE — BRIEF OP NOTE
Operative Note       Surgery Date: 4/12/2018     Surgeon(s) and Role:     * Fran Shelton MD - Primary     * Melquiades Bajwa MD - Fellow     * Kennedy Brambila PA-C    Pre-op Diagnosis:  Effusion of right knee [M25.461]  Right knee skin infection [L08.9]    Post-op Diagnosis:  Superficial skin infection    Procedure(s) (LRB):  INCISION AND DRAINAGE (I&D) (Right)  ARTHROSCOPY-KNEE DEBRIDEMENT (Right)    Anesthesia: General    Findings/Key Components:  No communication with joint    Core Measure Documentation:  Were antibiotics extended? No  Was the patient administered a VTE Prophylaxis? No. Short procedure; low risk  Estimated Blood Loss: minimal           Specimens     None        Implants: * No implants in log *    Complications: none           Disposition: PACU - hemodynamically stable.           Condition: Stable    Attestation:  I was present for the entire procedure.

## 2018-04-12 NOTE — ANESTHESIA PREPROCEDURE EVALUATION
04/12/2018  Leonid Harris III is a 68 y.o., male.    Pre-op Assessment    I have reviewed the Patient Summary Reports.     I have reviewed the Nursing Notes.   I have reviewed the Medications.     Review of Systems  Anesthesia Hx:  No problems with previous Anesthesia  History of prior surgery of interest to airway management or planning: Previous anesthesia: General 2/27/18 Knee with general anesthesia.  Airway issues documented on chart review include mask, easy, laryngeal mask airway used  Denies Family Hx of Anesthesia complications.   Denies Personal Hx of Anesthesia complications.   Social:  Former Smoker    Hematology/Oncology:  Hematology Normal      Oncology Comments: Prostate cancer s/p prostatectomy 2010    Cardiovascular:   Dysrhythmias Hx of PVCs recent Rx with Cardizem   Pulmonary:  Pulmonary Normal    Renal/:  Renal/ Normal     Hepatic/GI:   GERD    Musculoskeletal:   Low back pain Spine Disorders: lumbar Degenerative disease    Neurological:  Neurology Normal    Endocrine:  Endocrine Normal        Physical Exam  General:  Well nourished    Airway/Jaw/Neck:  Airway Findings: Mouth Opening: Normal Tongue: Normal  General Airway Assessment: Adult  Mallampati: I  TM Distance: Normal, at least 6 cm         Dental:  Dental Findings:Upper front fixed bridge x 3 teeth             Anesthesia Plan  Type of Anesthesia, risks & benefits discussed:  Anesthesia Type:  general  Patient's Preference:   Intra-op Monitoring Plan: standard ASA monitors  Intra-op Monitoring Plan Comments:   Post Op Pain Control Plan:   Post Op Pain Control Plan Comments:   Induction:   IV  Beta Blocker:         Informed Consent: Patient understands risks and agrees with Anesthesia plan.  Questions answered. Anesthesia consent signed with patient.  ASA Score: 2     Day of Surgery Review of History & Physical:    H&P update  referred to the surgeon.         Ready For Surgery From Anesthesia Perspective.

## 2018-04-12 NOTE — H&P
Leonid DORSEY Kimberly TR  is here for a completion of his perioperative paperwork. he  Is scheduled to undergo 1. Right knee arthroscopic incision and drainage   2. Right knee arthroscopic debridement / Synovectomy   3. Possible conversion and open debridement lateral knee wound   4. KRISTI drain application  on 4/12/2018.  He is a healthy individual and does not need clearance for this procedure.     Risks, indications and benefits of the surgical procedure were discussed with the patient. All questions with regard to surgery, rehab, expected return to functional activities, activities of daily living and recreational endeavors were answered to his satisfaction.    Patient was informed and understands the risks of surgery are greater for patients with a current condition or history of heart disease, obesity, clotting disorders, recurrent infections, steroid use, current or past smoking, and factors such as sedentary lifestyle and noncompliance with medications, therapy or follow-up. The degree of the increased risk is hard to estimate with any degree of precision.    Once no other questions were asked, a brief history and physical exam was then performed.    PAST MEDICAL HISTORY:   Past Medical History:   Diagnosis Date    Hyperlipidemia     PVC (premature ventricular contraction)      PAST SURGICAL HISTORY:   Past Surgical History:   Procedure Laterality Date    KNEE SURGERY      PROSTATECTOMY      TONSILLECTOMY       FAMILY HISTORY: History reviewed. No pertinent family history.  SOCIAL HISTORY:   Social History     Social History    Marital status:      Spouse name: N/A    Number of children: N/A    Years of education: N/A     Occupational History    Not on file.     Social History Main Topics    Smoking status: Former Smoker     Types: Cigarettes    Smokeless tobacco: Never Used    Alcohol use 1.2 oz/week     2 Glasses of wine per week    Drug use: No    Sexual activity: Not on file     Other  Topics Concern    Not on file     Social History Narrative    No narrative on file       MEDICATIONS:   Current Facility-Administered Medications:     ceFAZolin injection 2 g, 2 g, Intravenous, On Call Procedure, David Brambila PA-C    lactated ringers infusion, , Intravenous, Continuous, Fran Shelton MD, Last Rate: 100 mL/hr at 04/12/18 1150    mupirocin 2 % ointment, , Nasal, On Call Procedure, David Brambila PA-C    sodium chloride 0.9% flush 5 mL, 5 mL, Intravenous, PRN, David Brambila PA-C  ALLERGIES: Review of patient's allergies indicates:  No Known Allergies    Review of Systems   Constitution: Negative. Negative for chills, fever and night sweats.   HENT: Negative for congestion and headaches.    Eyes: Negative for blurred vision, left vision loss and right vision loss.   Cardiovascular: Negative for chest pain and syncope.   Respiratory: Negative for cough and shortness of breath.    Endocrine: Negative for polydipsia, polyphagia and polyuria.   Hematologic/Lymphatic: Negative for bleeding problem. Does not bruise/bleed easily.   Skin: Negative for dry skin, itching and rash.   Musculoskeletal: Negative for falls and muscle weakness.   Gastrointestinal: Negative for abdominal pain and bowel incontinence.   Genitourinary: Negative for bladder incontinence and nocturia.   Neurological: Negative for disturbances in coordination, loss of balance and seizures.   Psychiatric/Behavioral: Negative for depression. The patient does not have insomnia.    Allergic/Immunologic: Negative for hives and persistent infections.     PHYSICAL EXAM:  GEN: A&Ox3, WD WN NAD  HEENT: WNL  CHEST: CTAB, no W/R/R  HEART: RRR, no M/R/G   ABD: Soft, NT ND, BS x4 QUADS  MS: Refer to previous note for detailed MS exam  NEURO: CN II-XII intact       The surgical consent was then reviewed with the patient, who agreed with all the contents of the consent form and it was signed. he was then given the [employer]  surgery packet to bring with him to [facility] for the anesthesia portion of his perioperative paperwork.     PHYSICAL THERAPY:  He was also instructed regarding physical therapy and will begin POD # 1-3.    POST OP CARE:instructions were reviewed including care of the wound and dressing after surgery and when he can shower.     PAIN MANAGEMENT: Leonid Harris III was also given a pain management regime, which includes the TENS unit given to him by Moreno Spivey along with the education required for its use. He was also instructed regarding the Polar ice unit that will be in place after surgery and his postoperative pain medications.     PAIN MEDICATION:  Percocet 10/325mg 1 po q 4-6 hours prn pain  Ultram 50 mg one p.o. q.4-6 hours p.r.n. breakthrough pain,   Phenergan 25 mg one p.o. q.4-6 hours p.r.n. nausea and vomiting.  Celebrex 200 mg BID    Patient denies history of seizures.     Patient was instructed to buy and take:  Aspirin 325mg daily x 6 weeks for DVT prophylaxis starting on the evening after surgery.  Patient will also use bilateral TEDs on lower extremities, SCDs during surgery, and early ambulation post-op. If the patient was previously taking 81mg baby aspirin, they were told to not take it will using the above stated aspirin and to restart the 81mg aspirin after completion of the aspirin dose.       Patient was also told to buy over the counter Prilosec medication and take it once daily for GI protection as long as they are taking NSAIDs or Aspirin.    DVT prophylaxis was discussed with the patient today including risk factors for developing DVTs and history of DVTs. The patient was asked if any specific recommendations were given from the doctor/s that did pre-operative surgical clearance.      Patient was asked if they were taking or using OCP pills or devices. If they answered yes, then they were instructed to stop using OCPs at this pre-operative appointment until 2 months post-op to help prevent  DVT development. They understand that there are other forms of birth control that do not involve hormones. They expressed understanding that ignoring/not following this instruction could result in a DVT which could turn into a deadly pulmonary embolism.      The patient was told that narcotic pain medications may make them drowsy and instructions were given to not sign legal documents, drive or operate heavy machinery, cars, or equipment while under the influence of narcotic medications.     As there were no other questions to be asked, he was given my business card along with Fran Shelton MD business card if he has any questions or concerns prior to surgery or in the postop period.

## 2018-04-13 VITALS
DIASTOLIC BLOOD PRESSURE: 69 MMHG | RESPIRATION RATE: 16 BRPM | HEART RATE: 70 BPM | HEIGHT: 76 IN | WEIGHT: 195 LBS | BODY MASS INDEX: 23.75 KG/M2 | TEMPERATURE: 98 F | OXYGEN SATURATION: 97 % | SYSTOLIC BLOOD PRESSURE: 126 MMHG

## 2018-04-13 LAB
ANION GAP SERPL CALC-SCNC: 7 MMOL/L
BACTERIA SPEC ANAEROBE CULT: NORMAL
BUN SERPL-MCNC: 19 MG/DL
CALCIUM SERPL-MCNC: 8.7 MG/DL
CHLORIDE SERPL-SCNC: 105 MMOL/L
CO2 SERPL-SCNC: 26 MMOL/L
CREAT SERPL-MCNC: 1 MG/DL
EST. GFR  (AFRICAN AMERICAN): >60 ML/MIN/1.73 M^2
EST. GFR  (NON AFRICAN AMERICAN): >60 ML/MIN/1.73 M^2
GLUCOSE SERPL-MCNC: 119 MG/DL
GRAM STN SPEC: NORMAL
HCT VFR BLD AUTO: 35.6 %
HGB BLD-MCNC: 11.8 G/DL
POTASSIUM SERPL-SCNC: 4.7 MMOL/L
SODIUM SERPL-SCNC: 138 MMOL/L

## 2018-04-13 PROCEDURE — 63600175 PHARM REV CODE 636 W HCPCS: Performed by: ORTHOPAEDIC SURGERY

## 2018-04-13 PROCEDURE — 85018 HEMOGLOBIN: CPT

## 2018-04-13 PROCEDURE — 36415 COLL VENOUS BLD VENIPUNCTURE: CPT

## 2018-04-13 PROCEDURE — 94761 N-INVAS EAR/PLS OXIMETRY MLT: CPT

## 2018-04-13 PROCEDURE — 02HV33Z INSERTION OF INFUSION DEVICE INTO SUPERIOR VENA CAVA, PERCUTANEOUS APPROACH: ICD-10-PCS | Performed by: ORTHOPAEDIC SURGERY

## 2018-04-13 PROCEDURE — G8978 MOBILITY CURRENT STATUS: HCPCS | Mod: CI

## 2018-04-13 PROCEDURE — G8989 SELF CARE D/C STATUS: HCPCS | Mod: CJ

## 2018-04-13 PROCEDURE — C1751 CATH, INF, PER/CENT/MIDLINE: HCPCS

## 2018-04-13 PROCEDURE — 97116 GAIT TRAINING THERAPY: CPT

## 2018-04-13 PROCEDURE — G8987 SELF CARE CURRENT STATUS: HCPCS | Mod: CJ

## 2018-04-13 PROCEDURE — 97165 OT EVAL LOW COMPLEX 30 MIN: CPT

## 2018-04-13 PROCEDURE — 97535 SELF CARE MNGMENT TRAINING: CPT

## 2018-04-13 PROCEDURE — 85014 HEMATOCRIT: CPT

## 2018-04-13 PROCEDURE — G8980 MOBILITY D/C STATUS: HCPCS | Mod: CI

## 2018-04-13 PROCEDURE — G8978 MOBILITY CURRENT STATUS: HCPCS | Mod: CH

## 2018-04-13 PROCEDURE — 36569 INSJ PICC 5 YR+ W/O IMAGING: CPT

## 2018-04-13 PROCEDURE — G8980 MOBILITY D/C STATUS: HCPCS | Mod: CH

## 2018-04-13 PROCEDURE — G8979 MOBILITY GOAL STATUS: HCPCS | Mod: CI

## 2018-04-13 PROCEDURE — 76937 US GUIDE VASCULAR ACCESS: CPT

## 2018-04-13 PROCEDURE — G8979 MOBILITY GOAL STATUS: HCPCS | Mod: CH

## 2018-04-13 PROCEDURE — G8988 SELF CARE GOAL STATUS: HCPCS | Mod: CJ

## 2018-04-13 PROCEDURE — 97161 PT EVAL LOW COMPLEX 20 MIN: CPT

## 2018-04-13 PROCEDURE — 80048 BASIC METABOLIC PNL TOTAL CA: CPT

## 2018-04-13 PROCEDURE — 25000003 PHARM REV CODE 250: Performed by: ORTHOPAEDIC SURGERY

## 2018-04-13 RX ORDER — PROMETHAZINE HYDROCHLORIDE 25 MG/1
25 TABLET ORAL EVERY 6 HOURS PRN
Qty: 60 TABLET | Refills: 0 | Status: SHIPPED | OUTPATIENT
Start: 2018-04-13 | End: 2018-05-28

## 2018-04-13 RX ORDER — OXYCODONE AND ACETAMINOPHEN 10; 325 MG/1; MG/1
1 TABLET ORAL EVERY 6 HOURS PRN
Qty: 60 TABLET | Refills: 0 | Status: SHIPPED | OUTPATIENT
Start: 2018-04-13 | End: 2019-03-11

## 2018-04-13 RX ADMIN — ASPIRIN 325 MG ORAL TABLET 325 MG: 325 PILL ORAL at 08:04

## 2018-04-13 RX ADMIN — CEFAZOLIN SODIUM 2 G: 2 SOLUTION INTRAVENOUS at 02:04

## 2018-04-13 RX ADMIN — MUPIROCIN 1 G: 20 OINTMENT TOPICAL at 08:04

## 2018-04-13 RX ADMIN — ONDANSETRON 4 MG: 2 INJECTION, SOLUTION INTRAMUSCULAR; INTRAVENOUS at 02:04

## 2018-04-13 RX ADMIN — OXYCODONE HYDROCHLORIDE AND ACETAMINOPHEN 1 TABLET: 10; 325 TABLET ORAL at 12:04

## 2018-04-13 RX ADMIN — MELOXICAM 15 MG: 7.5 TABLET ORAL at 08:04

## 2018-04-13 RX ADMIN — DOCUSATE SODIUM 100 MG: 100 CAPSULE, LIQUID FILLED ORAL at 08:04

## 2018-04-13 RX ADMIN — ONDANSETRON 4 MG: 2 INJECTION, SOLUTION INTRAMUSCULAR; INTRAVENOUS at 08:04

## 2018-04-13 RX ADMIN — OXYCODONE HYDROCHLORIDE 10 MG: 10 TABLET, FILM COATED, EXTENDED RELEASE ORAL at 08:04

## 2018-04-13 RX ADMIN — DILTIAZEM HYDROCHLORIDE 180 MG: 180 CAPSULE, COATED, EXTENDED RELEASE ORAL at 08:04

## 2018-04-13 RX ADMIN — VANCOMYCIN HYDROCHLORIDE 1500 MG: 10 INJECTION, POWDER, LYOPHILIZED, FOR SOLUTION INTRAVENOUS at 07:04

## 2018-04-13 NOTE — NURSING
PICC line placed this am and Vancomycin infusion completed via new PICC line. IV to rt FA paresh. Pt up c knee brace on c physical therapy. Polar ice on to rt knee. Accordian drain discontinued by Ansley Shukla. Pt instructed on emptying KRISTI drain. Pt also instructed on PICC line care and home infusion to come do teaching on IV infusion. Pt given discharge instructions and prescriptions meds. Verbalizes understanding of home meds and follow up care. Pt verbalizes understanding of IV antibiotic infusion and out pt physical therapy. Rt knee dressing dry and intact c kimberly hose on.

## 2018-04-13 NOTE — DISCHARGE INSTRUCTIONS
Discharge Instructions: Caring for Your Yoel-Osei Drainage Tube  Your doctor discharges you with a Yoel-Osei drainage tube. Doctors commonly leave this drain within the abdominal cavity after surgery. It helps drain and collect blood and body fluid after surgery. This can prevent swelling and reduces the risk for infection. The tube is held in place by a few stitches. It is covered with a bandage. Your doctor will remove the drain when he or she determines you no longer need it.  Home care  · Dont sleep on the same side as the tube.  · Secure the tube and bag inside your clothing with a safety pin. This helps keep the tube from being pulled out.  · Empty your drain at least twice a day. Empty it more often if the drain is full. Wash  and dry your hands before emptying the drain.  ¨ Lift the opening on the drain.  ¨ Drain the fluid into a measuring cup.  ¨ Record the amount of fluid each time you empty the drain. Include the date and time it was emptied. Share this information with your doctor on your next visit.  ¨ Squeeze the bulb with your hands until you hear air coming out of the bulb if your doctor has instructed you to do so (sometimes the bulb is used as a reservoir without suction). Check with your doctor about specific drain instructions.  ¨ Close the opening.  · Change the dressing around the tube every day.  ¨ Wash your hands.  ¨ Remove the old bandage.  ¨ Wash your hands again.  ¨ Wet a cotton swab and clean the skin around the incision and tube site. Use normal saline solution (salt and water). Or, you can use warm, soapy water.  ¨ Put a new bandage on the incision and tube site. Make the bandage large enough to cover the whole incision area.  ¨ Tape the bandage in place.  · Keep the bandage and tube site dry when you shower. Ask your healthcare provider about the best way to do this.  · Stripping the tube helps keep blood clots from blocking the tube. Ask your nurse how often you should  strip the tube. Stripping may not be needed, depending on where and why your doctor placed the tube. It may even be dangerous in some cases.   ¨ Hold the tubing where it leaves the skin, with one hand. This keeps it from pulling on the skin.  ¨ Pinch the tubing with the thumb and first finger of your other hand.  ¨ Slowly and firmly pull your thumb and first finger down the tubing. You may find it helpful to hold an alcohol swab between your fingers and the tube to lubricate the tubing.  ¨ If the pulling hurts or feels like its coming out of the skin, stop. Begin again more gently.  Follow-up care  Make a follow-up appointment as directed by our staff.     When to seek medical care  Call your healthcare provider right away if you have any of the following:  · New or increased pain around the tube  · Redness, swelling, or warmth around the incision or tube  · Drainage that is foul-smelling  · Vomiting  · Fever of 100.4°F (38°C)  · Fluid leaking around the tube  · Incision seems not to be healing  · Stitches become loose  · Tube falls out or breaks  · Drainage that changes from light pink to dark red  · Blood clots in the drainage bulb  · A sudden increase or decrease in the amount of drainage (over 30 mL)   Date Last Reviewed: 2/1/2017 © 2000-2017 [employer]. [address] [place] [address], [place] [address]. All rights reserved. This information is not intended as a substitute for professional medical care. Always follow your healthcare professional's instructions.           [facility] [address], [place]                                                                                          [phone]                   Postoperative Instructions for Knee Surgery                 Your Surgery Included:   Open               Arthroscopic   [] Ligament Repair       [x] Diagnostic           [] ACL     [] PCL     [] MCL     [] PLLC      [] Synovectomy / Plica Removal [x] incision and  drainage      [] Lysis of Adhesions / Manipulation [] Articular Cartilage Repair      [] Interval Release           [] Microfracture       [] OATS   [] ACI      [] Meniscectomy           [] Osteochondral Allograft      [] Meniscal Cartilage Repair  [] Patellar Realignment       [] Debridement / Chondroplasty         [] Lateral Release   [] Ligament Repair      [] Articular Cartilage Repair          [] Extensor Mechanism             []   Microfracture  []  OATS         []  Cartilage Biopsy [] Tendon Repair          [] Ligament Reconstruction          [] Patella                  [] Quadriceps             []   ACL    []   PCL  [] High Tibial Osteotomy       [] PRP Arthrocentesis  [] Joint Replacement         [] Amniox Arthrocentesis           [] Unicompartmental   [] Patellofemoral                    [] Total Knee                  Call our office ([phone]) immediately if you experience any of the following:       Excessive bleeding or pus like drainage at the incision site       Uncontrollable pain not relieved by pain medication       Excessive swelling or redness at the incision site       Fever above 101.5 degrees not controlled with Tylenol or Motrin       Shortness of Breath       Any foul odor or blistering from the surgery site    FOR EMERGENCIES: If any unusual problems or difficulties occur, call our office at [phone], or page the  at [phone] who will direct your call appropriately    1.   Pain Management: A cold therapy cuff, pain medications, local injections, and in some cases, regional anesthesia injections are used to manage your post-operative pain. The decision to use each of these options is based on their risks and benefits.     Medications: You were given one or more of the following medication prescriptions before leaving the hospital. Have the prescriptions filled at a pharmacy on your way home and follow the instructions on the bottles. If you need a refill, please  call your pharmacy.      Narcotic Medication (usually Vicodin ES, Lortab, Percocet or Nucynta): Begin taking the medication before your knee starts to hurt. Some patients do not like to take any medication, but if you wait until your pain is severe before taking, you will be very uncomfortable for several hours waiting for the narcotic to work. Always take with food.     Nausea / Vomiting: For this issue, we prescribe Phenergan, use this medication as directed.     Cold Therapy: You may have been sent home with a [facility] cold therapy unit and wrap for your knee. Fill with ice and water to the indicated fill line and use throughout the day for the first two days and then as needed to help relieve pain and control swelling.      Regional Anesthesia Injections (Blocks): You may have been given a regional nerve block either before or after surgery. This may make your entire leg numb for 24-36 hours.                            * Proceed with caution when bearing weight on your leg.     2.   Diet: Eat a bland diet for the first day after surgery. Progress your diet as tolerated. Constipation may occur with Narcotic usage, contact our office if you are experiencing constipation.    3.   Activity: Limit your activity during the first 48 hours, keep your leg elevated with pillows under your heel. After the first 48 hours at home, increase your activity level based on your symptoms.    4.   Dressing Change: Remove the dressing on the 3rd day. It is normal for some blood to be seen on the dressings. It is also normal for you to see apparent bruising on the skin around your knee when you remove the dressing. If present, leave the steri-strip tape across the incisions. If you are concerned by the drainage or the appearance of your knee, please call one of the numbers listed below.    5.   Showering: You may shower on the 3rd day after surgery with waterproof bandages over small incisions. If you have an incision with Prineo  "(clear mesh), it does not need to be covered. Do not submerge in any water until after your postoperative appointment in clinic.    6.   Knee Brace: You may have been sent home in a hinged knee brace. Your brace is set at -10 to 120 degrees of motion. Wear the brace for 6 weeks, LOCKED in full extension when walking, you will need to wear this brace at all time unless instructed otherwise. You may unlock at rest or for exercises.    7.   Your procedure did not require a Continuous Passive Motion (CPM) device.    8.   Weight Bearing: You may have been sent home with crutches. If instructed (see below), use these crutches at all times unless at complete rest.      [] Non-weight bearing for     0 weeks (you may touch your toes to the floor)      [] Partial weight bearing for  0 weeks    [] 25% Body Weight   [] 50% Body Weight      [x] Full weight bearing            [x]  NOW    []  after 0 weeks     9.  Knee Exercises: Begin these exercises the first day after surgery in order to help you regain your motion and strength. You may do the following marked exercises:     [x] Quad Sets - Begin activating your quadriceps muscle by driving your          knee downward into full knee extension while seated on a table or bed   with a towel rolled and propped under your heel     [x] Straight Leg Raise (SLR) - While thomas your quadriceps muscle, lift     your fully extended leg to the level of your non-operative knee (as shown)     [x] Heel Slides - With the knee straight, slide your heel slowly toward your   buttocks, hold at the endpoint for 10-15 seconds, then slowly straighten     [x] Ankle pumps - With your knee straight, move your ankle in a "pumping"    fashion to activate your calf and leg muscles      10.  Physical Therapy: Physical therapy is an essential component to your recovery from surgery. Your physical therapy will start in 3 days.    FIRST POSTOPERATIVE VISIT: As scheduled.       "

## 2018-04-13 NOTE — PLAN OF CARE
"[facility]'s copay for meds & supplies is approximately $600/week. Patient reports "i can not afford that." Referral called & faxed to Kennedy at [facility] to question better martinez - Kennedy feels they can get cost of med down - awaiting response   "

## 2018-04-13 NOTE — PROCEDURES
"Leonid Harris III is a 68 y.o. male patient.    Temp: 98.2 °F (36.8 °C) (04/13/18 0734)  Pulse: 70 (04/13/18 0734)  Resp: 16 (04/13/18 0734)  BP: 126/69 (04/13/18 0734)  SpO2: 97 % (04/13/18 0806)  Weight: 88.5 kg (195 lb) (04/12/18 1844)  Height: 6' 4" (193 cm) (04/12/18 1844)    PICC  Date/Time: 4/13/2018 9:05 AM  Location procedure was performed: [facility] PICC LINE PLACEMENT  Performed by: CEH WOOD  Consent Done: Yes  Time out: Immediately prior to procedure a time out was called to verify the correct patient, procedure, equipment, support staff and site/side marked as required  Indications: med administration and vascular access  Anesthesia: local infiltration  Local anesthetic: lidocaine 1% without epinephrine  Anesthetic Total (mL): 1  Preparation: skin prepped with ChloraPrep  Skin prep agent dried: skin prep agent completely dried prior to procedure  Sterile barriers: all five maximum sterile barriers used - cap, mask, sterile gown, sterile gloves, and large sterile sheet  Hand hygiene: hand hygiene performed prior to central venous catheter insertion  Location details: right cephalic  Catheter type: double lumen  Catheter size: 5 Fr  Catheter Length: 48cm    Ultrasound guidance: yes  Vessel Caliber: medium and patent, compressibility normal  Vascular Doppler: not done  Needle advanced into vessel with real time Ultrasound guidance.  Guidewire confirmed in vessel.  Image recorded and saved.  Sterile sheath used.  no esophageal manometryNumber of attempts: 1  Post-procedure: blood return through all ports, chlorhexidine patch and sterile dressing applied  Technical procedures used: microintroducer with ultrasound  Estimated blood loss (mL): 0  Specimens: No  Implants: No  Assessment: placement verified by x-ray, no pneumothorax on x-ray, tip termination and successful placement  Tip Termination Explanation: svc  Complications: none        Che Wood  4/13/2018    "

## 2018-04-13 NOTE — PLAN OF CARE
Jess from [facility] unable to view benefits therefore would have to quote patient a cash price which would be more expensive than [employer]. Referral sent to Jalyn at [place] who felt patient didn't have part D coverage & quoted patient a price of $1600/week. Attempted to contact Medicare but told patient needs to call member services. Brian in [facility] did research & saw patient does have a Part D plan with [facility] but unable to tell if med cost was due to deductibles or not & how much patient's deductibles are. Patient in agreement to utilize home health services for nursing/PT/OT to reduce cost of supplies & drop patient expected amount to $368/week. Dr Perez updated. Referral called to [facility] in [place] [phone]. Order & clinicals faxed to [phone]. [facility] completed bedside education with patient on IV administration. IV antibiotic will be delivered to [place] address this pm.

## 2018-04-13 NOTE — NURSING
Pt alert, oriented, c/o of pain to RLE once overnight with relief reported from oxycodone scheduled dose, medicated once with zofran for nausea and relief reported, vitals stable throughout shift, pt voiding, fall precautions maintained at all times, mnimal output from Aaron/hemovac drains, purposeful rounding q2 hrs, call bell at bedside, POC reviewed with pt and pt verbalized understanding, pt resting comfortably at this time, no signs of distress, isolation px maintained, pt voided adequately and good oral fluid intake as encouraged.

## 2018-04-13 NOTE — PT/OT/SLP EVAL
Occupational Therapy   Evaluation/Treatment/Discharge    Name: Leonid Harris III  MRN: [id number]  Admitting Diagnosis:  Wound infection after surgery 1 Day Post-Op, s/p I&D    Recommendations:     Discharge Recommendations: outpatient PT  Discharge Equipment Recommendations:  none  Barriers to discharge:  None    History:     Occupational Profile:  Living Environment: lives with his wife in a single story house with no steps at entry  Previous level of function: ambulatory without AD, drives a car, Independent ADLs and IADLs  Roles and Routines: Retired, enjoys long distance bicycling, and playing volleyball  Equipment Owned:  crutches, axillary  Assistance upon Discharge: the patient has full-time assist from his wife at discharge    Past Medical History:   Diagnosis Date    Hyperlipidemia     PVC (premature ventricular contraction)        Past Surgical History:   Procedure Laterality Date    KNEE SURGERY      PROSTATECTOMY      TONSILLECTOMY         Subjective     Chief Complaint: none  Patient/Family stated goals: regain PLOF  Communicated with: patient prior to session.  He was agreeable to OT service  Pain/Comfort:  · Pain Rating 1: 2/10  · Location - Side 1: Right  · Location - Orientation 1: generalized  · Location 1: knee  · Pain Addressed 1: Pre-medicate for activity  · Pain Rating Post-Intervention 1: 2/10    Patients cultural, spiritual, Hinduism conflicts given the current situation: Sabianist    Objective:     Patient found with: KRISTI drain, PICC line, cryotherapy    General Precautions: Standard, fall   Orthopedic Precautions:RLE weight bearing as tolerated   Braces: Hinged knee brace (locked in extension with standing and ambulating)     Occupational Performance:    Bed Mobility:    · MI supine>sit EOB    Functional Mobility/Transfers:  · MI sit><stand with axillary crutches  · Supervision toilet transfer with axillary crutches    He ambulated bed><bathroom with axillary crutches MI [place] LE  balance with ambulation, transfers, and standing self-care tasks (no LOB)    Activities of Daily Living:  · MI G/H with set-up (brush teeth and wash hands) standing at sink with axillary crutches  · Independent UBD ([facility] hospital gown and don shirt) seated EOB  · Min A LBD (doff/don socks, don briefs and shorts) seated and standing EOB - MI technique, management of hinged knee brace, and safety discussed and practiced.  · Supervision simulated toileting in standing with axillary crutches at toilet    Cognitive/Visual Perceptual:  Cognitive/Psychosocial Skills:     -       Oriented to: Person, Place, Time and Situation   -       Follows Commands/attention:Follows multistep  commands  -       Communication: clear/fluent  -       Memory: No Deficits noted  -       Safety awareness/insight to disability: intact   -       Mood/Affect/Coping skills/emotional control: Appropriate to situation  Visual/Perceptual:      -Impaired  acuity (contact lenses)    Physical Exam:  Postural examination/scapula alignment:    -       No postural abnormalities identified  Skin integrity: Visible skin intact  Edema:  None noted  Sensation:    -       Intact for light touch both hands  Motor Planning:    -       WNL  Dominant hand:    -       Left  UE ROM: WNL BUE  UE Strength: WNL BUE  Hand Function: WNL Both Hands  Gross motor coordination:   sitting balance EOB WNL, good standing balance with impairment in gait with axillary crutches during ambulation in his hospital room, safety judgement good    Patient left sitting EOB with call button in reach and nurse and person for infusion companey notified    AM PAC 6 Click:  AM PAC Total Score: 22    Treatment & Education:  LBD, safety leaning on objects to assist LE balance, home safety and set-up for self-care, bathing restrictions and recommendations, TENS used (purpose and use), polar ice (use recommendations for home)  Education:    Assessment:     Leonid Harris III is a 68 y.o. male  "with a medical diagnosis of Wound infection after surgery, s/p I&D.  He presents with Performance deficits affecting function are gait instability, impaired balance, decreased lower extremity function, orthopedic precautions.effected performance during the session.  OT treatment was provided to maximize safety and skill with self-care, mobility, and ambulatory ADLs.     Rehab Prognosis:  good; no further OT treatment planned due to hospital discharge planned for today.    Clinical Decision Makin.  OT Low:  "Pt evaluation falls under low complexity for evaluation coding due to performance deficits noted in 1-3 areas as stated above and 0 co-morbities affecting current functional status. Data obtained from problem focused assessments. No modifications or assistance was required for completion of evaluation. Only brief occupational profile and history review completed."     Plan:     Patient to be seen   to address the above listed problems via    · Plan of Care Expires: 18  · Plan of Care Reviewed with: patient    This Plan of care has been discussed with the patient who was involved in its development and understands and is in agreement with the identified goals and treatment plan    GOALS:    Occupational Therapy Goals     Not on file          Multidisciplinary Problems (Resolved)        Problem: Occupational Therapy Goal    Goal Priority Disciplines Outcome Interventions   Occupational Therapy Goal   (Resolved)     OT, PT/OT Outcome(s) achieved    Description:  No goals                    Time Tracking:     OT Date of Treatment: 18  OT Start Time: 1156  OT Stop Time: 1241  OT Total Time (min): 45 min    Billable Minutes:Evaluation 21  Self Care/Home Management 24    BING Mendoza  2018    "

## 2018-04-13 NOTE — PLAN OF CARE
Problem: Occupational Therapy Goal  Goal: Occupational Therapy Goal  No goals  Outcome: Outcome(s) achieved Date Met: 04/13/18  OT evaluation completed with treatment provided.  Out--Pt PT is planned for discharge. DME: No needs.  BING Mendoza 4/13/2018

## 2018-04-13 NOTE — NURSING
IV infusion RN here and instructing pt on administration of antibiotics. Rt arm PICC line site patent. PICC flushed c Ns. Pt ready for discharge . Discharged home in stable condition c friend via wc.

## 2018-04-13 NOTE — PLAN OF CARE
Met with patient at bedside to complete discharge planning assessment. Patient will be staying at his sister's home [address] in [place] through the weekend. Patient has a follow up scheduled with Dr Fran Shelton Monday then will return home to [place]. Patient will need IV antibiotics for 6 weeks & has no agency preference. Cheryl from [facility] reports they accept patient's insurance & service both [place] & [place]. Referral sent via [facility]. Cheryl will be out to provide bedside education with patient shortly. Patient attends outpatient PT in [place] & uses crutches for mobility. [facility] & dressing changes      04/13/18 1112   Discharge Assessment   Assessment Type Discharge Planning Assessment   Confirmed/corrected address and phone number on facesheet? Yes   Assessment information obtained from? Patient   Expected Length of Stay (days) 0   Communicated expected length of stay with patient/caregiver yes   Prior to hospitilization cognitive status: Alert/Oriented   Prior to hospitalization functional status: Independent;Assistive Equipment   Current cognitive status: Alert/Oriented   Current Functional Status: Needs Assistance;Assistive Equipment   Lives With spouse   Able to Return to Prior Arrangements yes   Is patient able to care for self after discharge? Yes   Patient's perception of discharge disposition home or selfcare  (outpatient rehab & home infusions )   Readmission Within The Last 30 Days no previous admission in last 30 days   Patient currently being followed by outpatient case management? No   Patient currently receives any other outside agency services? No   Equipment Currently Used at Home crutches, axillary   Do you have any problems affording any of your prescribed medications? No   Is the patient taking medications as prescribed? yes   Does the patient have transportation home? Yes   Transportation  Available family or friend will provide   Does the patient receive services at the [facility]? No   Discharge Plan A Home with family  (outpatient rehab & home infusions)   Patient/Family In Agreement With Plan yes

## 2018-04-13 NOTE — PT/OT/SLP EVAL
Physical Therapy Evaluation    Patient Name:  Leonid Harris III   MRN:  [id number]    Recommendations:     Discharge Recommendations:  home, outpatient PT   Discharge Equipment Recommendations: none   Barriers to discharge: None    Assessment:     Leonid Harris III is a 68 y.o. male admitted with a medical diagnosis of Wound infection after surgery.  He presents with the following impairments/functional limitations:  . Pt instructed in use of brace and in ambulation.  Pt appears safe and independent with all functional mobility and is to d/c home today.  Rehab Prognosis:  excellent; patient would benefit from acute skilled PT services to address these deficits and reach maximum level of function.      Recent Surgery: Procedure(s) (LRB):  INCISION AND DRAINAGE (I&D) (Right)  ARTHROSCOPY-KNEE DEBRIDEMENT (Right) 1 Day Post-Op    Plan:     During this hospitalization, patient to be seen   to address the above listed problems via    · Plan of Care Expires:  05/13/18   Plan of Care Reviewed with: patient    Subjective     Communicated with patient prior to session.  Patient found in bed supine upon PT entry to room, agreeable to evaluation.      Chief Complaint: no c/o  Patient comments/goals: would like to leave hospital ASAP  Pain/Comfort:  · Pain Rating 1: 3/10  · Location - Side 1: Right  · Location 1: knee  · Pain Addressed 1: Pre-medicate for activity, Distraction  · Pain Rating Post-Intervention 1: 3/10    Patients cultural, spiritual, Baptist conflicts given the current situation: Religion    Living Environment:  Pt lives with his wife in a 1 story house with 0 SIERRA.     Prior to admission, patients level of function was I without AD.  Patient has the following equipment: crutches, axillary (hinged knee brace).  DME owned (not currently used): none.  Upon discharge, patient will have assistance from wife.    Objective:     Patient found with: KRISTI drain, peripheral IV, cryotherapy, SCD     General  Precautions: Standard,     Orthopedic Precautions:RLE weight bearing as tolerated   Braces:       Exams:  · Cognitive Exam:  Patient is oriented to Person, Place, Time and Situation and follows 100% of verbal commands   · Fine Motor Coordination:    · -       Intact  · Gross Motor Coordination:  WFL  · Postural Exam:  Patient presented with the following abnormalities:    · -       Rounded shoulders  · -       Forward head  · Sensation:    · -       Intact  · Skin Integrity/Edema:      · -       Skin integrity: Visible skin intact  · -       Edema: Mild R LE  · RLE ROM: WFL  · RLE Strength: WFL  · LLE ROM: WFL  · LLE Strength: WFL    Functional Mobility:  · Bed Mobility:     · Sit to Supine: modified independence  · Transfers:     · Sit to Stand:  modified independence with ax crutches  · Gait: amb 150' with ax crutches WBAT R LE mod I  · Balance: good standing dynamic balance    AM-PAC 6 CLICK MOBILITY  Total Score:24       Therapeutic Activities and Exercises:   pt shown exercises from d/c handout    Patient left in bed supine with all lines intact, call button in reach, nurse notified and nurse present.    GOALS:    Physical Therapy Goals        Problem: Physical Therapy Goal    Goal Priority Disciplines Outcome Goal Variances Interventions   Physical Therapy Goal     PT/OT, PT      Description:  No goals - d/c home                    History:     Past Medical History:   Diagnosis Date    Hyperlipidemia     PVC (premature ventricular contraction)        Past Surgical History:   Procedure Laterality Date    KNEE SURGERY      PROSTATECTOMY      TONSILLECTOMY         Clinical Decision Making:     History  Co-morbidities and personal factors that may impact the plan of care Examination  Body Structures and Functions, activity limitations and participation restrictions that may impact the plan of care Clinical Presentation   Decision Making/ Complexity Score   Co-morbidities:   [] Time since onset of injury /  illness / exacerbation  [] Status of current condition  []Patient's cognitive status and safety concerns    [] Multiple Medical Problems (see med hx)  Personal Factors:   [] Patient's age  [] Prior Level of function   [] Patient's home situation (environment and family support)  [] Patient's level of motivation  [] Expected progression of patient      HISTORY:(criteria)    [] 24399 - no personal factors/history    [] 13230 - has 1-2 personal factor/comorbidity     [] 61564 - has >3 personal factor/comorbidity     Body Regions:  [] Objective examination findings  [] Head     []  Neck  [] Trunk   [] Upper Extremity  [] Lower Extremity    Body Systems:  [] For communication ability, affect, cognition, language, and learning style: the assessment of the ability to make needs known, consciousness, orientation (person, place, and time), expected emotional /behavioral responses, and learning preferences (eg, learning barriers, education  needs)  [] For the neuromuscular system: a general assessment of gross coordinated movement (eg, balance, gait, locomotion, transfers, and transitions) and motor function  (motor control and motor learning)  [] For the musculoskeletal system: the assessment of gross symmetry, gross range of motion, gross strength, height, and weight  [] For the integumentary system: the assessment of pliability(texture), presence of scar formation, skin color, and skin integrity  [] For cardiovascular/pulmonary system: the assessment of heart rate, respiratory rate, blood pressure, and edema     Activity limitations:    [] Patient's cognitive status and saf ety concerns          [] Status of current condition      [] Weight bearing restriction  [] Cardiopulmunary Restriction    Participation Restrictions:   [] Goals and goal agreement with the patient     [] Rehab potential (prognosis) and probable outcome      Examination of Body System: (criteria)    [] 58022 - addressing 1-2 elements    [] 75747 -  addressing a total of 3 or more elements     [] 93356 -  Addressing a total of 4 or more elements         Clinical Presentation: (criteria)  Choose one     On examination of body system using standardized tests and measures patient presents with (CHOOSE ONE) elements from any of the following: body structures and functions, activity limitations, and/or participation restrictions.  Leading to a clinical presentation that is considered (CHOOSE ONE)                              Clinical Decision Making  (Eval Complexity):  Choose One     Time Tracking:     PT Received On: 04/13/18  PT Start Time: 1030     PT Stop Time: 1105  PT Total Time (min): 35 min     Billable Minutes: Evaluation 20 and Gait Training 15      Scott Blanco, PT  04/13/2018

## 2018-04-13 NOTE — PLAN OF CARE
04/13/18 1748   Final Note   Assessment Type Final Discharge Note   Discharge Disposition Home-Health   What phone number can be called within the next 1-3 days to see how you are doing after discharge? [phone]   Hospital Follow Up  Appt(s) scheduled? Yes   Discharge plans and expectations educations in teach back method with documentation complete? Yes   Right Care Referral Info   Post Acute Recommendation Home-care   Facility Name [facility]

## 2018-04-13 NOTE — DISCHARGE SUMMARY
Admit Date: 4/12/18    Discharge Date: 4/13/18    Attending:Nikita    Discharge Diagnosis: wound infection after knee surgery    Unit: 3S    Consulting Service: none    Operative Procedure: right knee I and D, drain placement    HPI: 67 yo M s/p patellofemoral arthroplasty, presented to clinic with superficial erythema. Cultures from skin show MRSA.    Hospital Course: tolerated procedure well, up with Pt. PICC line placed and home health set up for vancomycin administration. OK to DC home    Discharge Instructions: Take all medications as prescribed. Vancomycin per PICC 1.5g IV q12 hrs.  mg PO daily for dvt ppx, wbat locked in extension all times except for at rest, full rom at rest. Keep clean and dry until clinic visit    Discharge Medications: vancomycin, ASA, percocet, celebrex, phenergan    Follow-up: Monday 845 AM Dr Shelton

## 2018-04-13 NOTE — PLAN OF CARE
Problem: Physical Therapy Goal  Goal: Physical Therapy Goal  No goals - d/c home  -    Comments: Physical therapy eval completed.  Pt appears safe and independent with all functional mobility and is to d/c home today. No equipment needed.

## 2018-04-13 NOTE — NURSING
Pt up ad ivet in room c brace on to rt knee and using crutches. Continues to wait for insurance approval for payment of IV antibiotics for discharge. Pt is working c case management.

## 2018-04-13 NOTE — NURSING
6189 Pt admitted to room from PACU. Report received from Nesha Shukla. Vs stable. Denies pain. Rt knee kimberly hose to cast padding dressing dry and intact c KRISTI drain and Hemovac drain. Pt diet advanced. Isolation precautions noted for MRSA due to previous culture report. Pt oriented to unit and room. Report given to oncoming RN

## 2018-04-13 NOTE — OP NOTE
DATE OF PROCEDURE: 4/12/2018    ATTENDING SURGEON: Surgeon(s) and Role:     * Fran Shelton MD - Primary    ASSISTANTS:  Kennedy Brambila PA-C - Assistant    PREOPERATIVE DIAGNOSIS:  Right  Chondromalacia, (excludes patella) M94.29, Internal derangement knee M23.90, Pain, arthralgia M25.569, Synovitis M65.9 and Hematoma, lateral soft tissues    POSTOPERATIVE DIAGNOSIS:   Right  Chondromalacia, (excludes patella) M94.29, Internal derangement knee M23.90, Synovitis M65.9 and Hematoma, lateral soft tissues    PROCEDURES(S) PERFORMED:   1. Right  Arthroscopy, knee, surgical; for infection, lavage and drainage 96083  2.  Right  Arthroscopy, knee, synovectomy, major 46677  3.  Right  Open incisional and drainage lateral hematoma  4. Right knee Bactisure, 19631  5. KRISTI drain application    ANESTHESIA: General / LMA2    FLUIDS IN THE CASE: 1000 ml    ESTIMATED BLOOD LOSS: Minimal    URINE OUTPUT: 0 ml    COMPLICATIONS: none    CONDITION ON RETURN TO RECOVERY ROOM: Good     Expand All Collapse All       IMPLANTS UTILIZED: Richard Bactisure and pulsavac  KRISTI Drain #7  INDICATIONS FOR OPERATIVE PROCEDURE: Leonid Harris III is a 68 y.o.  male with history of right knee pain and pathology. The patient's history and physical examination findings consistent with the procedure performed. He noted significant problems in the area of concern with problems on activities of daily living and aggressive use of the right leg. As a result of these problems and problems with overall activity level, the patient was deemed to be an appropriate candidate for operative intervention. Nonoperative versus operative options were discussed. The risks and benefits were discussed with the patient. The patient acknowledged understanding and wished to proceed with operative intervention. Informed consent was obtained prior to the procedure. Details of the surgical procedure were explained, including incisions and probable rehabilitation course. The  patient understands the likely length of convalescence after surgery; and we have explained the risks, benefits, and alternatives of surgery. Reasonable expectations and potential complications were discussed and acknowledged, including but not limited to infection, bleeding, blood clots, (DVT and/or PE), nerve injury, retear, instability, continued pain and stiffness. It was also explained that there was a chance of failure which may require further management. The patient agreed and understood and wished to proceed.       FINDINGS:     ARTICULAR CARTILAGE LESION(S):  Medial Femoral Condyle: ICRS Grade 0      Size: none  Medial tibial plateau: ICRS Grade 0      Size: none    Lateral Femoral Condyle: ICRS Grade 2      Size: 1.0 x 1.0 cm  Lateral tibial plateau: ICRS Grade 0      Size: none        Patellar surface: ICRS Grade Intact Arthrosurface WAVE patellofemoral Arthroplasty  Patellar 30 x 9 mm polyethylene component, 40 mm Metal pressfit femoral component  Stable  No loosening noted         EXAMINATION UNDER ANESTHESIA:   Extension 5 degrees  Flexion 120 degrees  Lachman Maneuver:  Negative  Anterior Drawer: Negative  Pivot Shift: Negative  Posterior Drawer:  Negative  Varus stability @ 30 degrees: 0  Valgus stability @ 30 degrees: 0  Patellar glide:1 quadrant lateral, 2 quadrant medial          DESCRIPTION OF PROCEDURE: The patient was brought into the Operating Room and placed in supine position. Upon application of no block in the preoperative holding area, the patient underwent General to stabilize the airway. Knee aspiration was performed with an 18 gauge spinal needle. Swab cultures were taken laterally as well after the open incision was made. The patient was given the appropriate dose of antibiotics based on body weight after cultures were obtained. Timeout was utilized to verify the side as the operative side. Both upper extremities were placed in comfortable position. Examination under anesthesia was  performed. The nonoperative leg was carefully padded along the heel and peroneal nerve regions and maintained flat on the table. The operative leg was then stabilized with a lateral post for intra-operative positioning as well as a popliteal post placed at the mid-calf level.  No bump was placed under the hip on the operative side. The operative leg was prepped and draped in a sterile fashion with ChloraPrep material.    We injected 0.5% ropivacaine mixture into the anterolateral and anteromedial aspect of the knee with application of 15 mL per portal site. A #11 blade was used to make the arthroscopic portals. Blunt trocar and sheath were inserted into the intercondylar notch and then subsequently into the suprapatellar pouch. This patellofemoral joint was visualized, demonstrating normal lateral patellar tilt, normal patellar subluxation. The patellar tracked midline with flexion and extension. Arthroscopic pictures were obtained. There was an intact WAVE patellofemoral arthroplasty. Arthroscopic debridement of synovitic tissue and scar within the patellofemoral pouch region.    Attention was turned to the intercondylar notch where the anterior cruciate ligament (ACL) and posterior cruciate ligament (PCL) structures were visualized. Visualization demonstrated an intact ACL and an intact PCL. Probe analysis revealed no signs of occult pathology within the ligamentous structures.     Attention was then turned to the lateral compartment. The patient demonstrated an intact lateral meniscus with probe analysis demonstrating no occult tears or pathology Arthroscopic instrumentation was used to veify no tear and pictures obtained. articular cartilage damage was present in the lateral compartment as noted in the Findings section of this operative report. The lesion if present was treated witharthroscopic chondroplasty technique removing all irregular edges and flaps of articular cartilage at the lesion.    Attention was  then turned to the medial compartment. The patient demonstrated an intact medial meniscus with probe analysis demonstrating no occult tears or pathology Arthroscopic instrumentation was used to  veify no tear and pictures obtained. There was articular cartilage damage was present in the medial compartment as noted in the Findings section of this operative report. The lesion if present was treated with arthroscopic chondroplasty technique removing all irregular edges and flaps of articular cartilage at the lesion.    Arthroscopic major synovectomy was performed in the suprapatellar pouch and medial and lateral  regions of the knee.    A lateral based incision was made in the region of the previous incision. Hematoma was evacuated and the Richard bactisure was applied. We then washed 4 liters of saline through the same region. A # 7 KRISTI drain was placed superolaterally and closure was begun.      Final arthroscopic pictures were obtained. Fluid was extravasated from the joint. A 4-0 nylon sutures were used to close the arthroscopic portals. 2 no further treatments were performed to the knee. Xeroform was applied along with application of sterile electrodes proximally and distally, gauze, ABD pads,cast padding, long-leg NROMA hose stocking and cooling unit. The patient's knee was placed into a hinged immobilizer, which was locked in -10 degrees extension and allowed the flexion to 120 degrees. The patient was then allowed to recover from anesthesia.  General was removed. The patient was taken to Recovery Room in  Good condition. At the completion case, all instrument and sponge counts were correct.    NOTE: I was present and scrubbed for the key portions of the procedure.    PHYSICAL THERAPY:  The patient should begin physical therapy on postoperative   day # 3 and will be advanced to outpatient therapy as soon as   Possible following discharge.  Weight bearing:as tolerated  right leg  Range of Motion:limited to -10  degrees extension and 120 degrees flexion    No CPM required due to procedure performed    Additional exercises to be performed are:   to begin quad sets with a heel roll to obtain hyperextension, straight leg raise and heel slides with the heel supported in a closed-chain fashion    Immediate specific exercises should include:   Gait program should include the above stated weightbearing; in addition: active extension with a heel-to-toe gait working on maintaining extension    Immobilizer if present should be locked @ -10 degrees with gait and allowed to flex to 120 degrees at rest.     Discharge summary:  The patient was discharged to home in [place]  Follow-up as scheduled preoperatively.    Medication(s): Refer to Discharge Medication List         Resume preoperative diet as tolerated    Activity per outpatient discharge instruction sheet

## 2018-04-13 NOTE — PROGRESS NOTES
Seen and examined, no acute events    O:   Vitals:    04/13/18 0027 04/13/18 0459 04/13/18 0734 04/13/18 0806   BP: 107/60 105/61 126/69    BP Location:       Patient Position:   Lying    Pulse: 69 69 70    Resp:   16    Temp: 98.1 °F (36.7 °C) 97.8 °F (36.6 °C) 98.2 °F (36.8 °C)    TempSrc:   Oral    SpO2: (!) 93% (!) 94% 98% 97%   Weight:       Height:         Nad, a/ox3  Dressing c/d/i  Compartments soft  +ehl/fhl/df/pf  Palpable pedal pulses  SILT    Recent Labs      04/13/18   0418   HGB  11.8*     Drain 0 cc hemovac, 50 cc neftali    A/p: POD#1 s/p R knee I and D, drain placement  - wbat locked in extension, ok for full ROM at rest  -  mg PO daily, scd, kimberly for dvt ppx  - PICC line today, continue vancomycin 1.5g IV q12 for 6 weeks  - after picc and home health can dc to home, f/u clinic on Monday with Dr Shelton [phone]    Melquiades Bajwa MD  Fellow

## 2018-04-13 NOTE — PLAN OF CARE
Problem: Infection, Risk/Actual (Adult)  Goal: Infection Prevention/Resolution  Patient will demonstrate the desired outcomes by discharge/transition of care.   Outcome: Ongoing (interventions implemented as appropriate)  Pt educated on infection prevention and POC and pt verbalized undertanding

## 2018-04-16 ENCOUNTER — PATIENT MESSAGE (OUTPATIENT)
Dept: SPORTS MEDICINE | Facility: CLINIC | Age: 68
End: 2018-04-16

## 2018-04-16 ENCOUNTER — OFFICE VISIT (OUTPATIENT)
Dept: SPORTS MEDICINE | Facility: CLINIC | Age: 68
End: 2018-04-16
Payer: MEDICARE

## 2018-04-16 VITALS
DIASTOLIC BLOOD PRESSURE: 83 MMHG | BODY MASS INDEX: 23.75 KG/M2 | HEART RATE: 66 BPM | WEIGHT: 195 LBS | HEIGHT: 76 IN | SYSTOLIC BLOOD PRESSURE: 139 MMHG

## 2018-04-16 DIAGNOSIS — M25.561 CHRONIC PAIN OF RIGHT KNEE: ICD-10-CM

## 2018-04-16 DIAGNOSIS — M23.91 INTERNAL DERANGEMENT OF KNEE JOINT, RIGHT: ICD-10-CM

## 2018-04-16 DIAGNOSIS — M25.461 EFFUSION OF RIGHT KNEE: ICD-10-CM

## 2018-04-16 DIAGNOSIS — L03.115 CELLULITIS OF RIGHT KNEE: ICD-10-CM

## 2018-04-16 DIAGNOSIS — G89.29 CHRONIC PAIN OF RIGHT KNEE: ICD-10-CM

## 2018-04-16 LAB — BACTERIA SPEC AEROBE CULT: NO GROWTH

## 2018-04-16 PROCEDURE — 99999 PR PBB SHADOW E&M-EST. PATIENT-LVL III: CPT | Mod: PBBFAC,,, | Performed by: ORTHOPAEDIC SURGERY

## 2018-04-16 PROCEDURE — 99213 OFFICE O/P EST LOW 20 MIN: CPT | Mod: PBBFAC,PO | Performed by: ORTHOPAEDIC SURGERY

## 2018-04-16 PROCEDURE — 99024 POSTOP FOLLOW-UP VISIT: CPT | Mod: POP,,, | Performed by: ORTHOPAEDIC SURGERY

## 2018-04-16 NOTE — PROGRESS NOTES
Subjective:          Chief Complaint: Leonid Harris III is a 68 y.o. male who had concerns including Post-op Evaluation of the Right Knee.    Patient is here for a follow up for his right knee. He is doing PT in [place]. She is able to sleep well. He is taking Mobic and using voltaren. Doing well since surgery. Denies significant pain.    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 Right  Arthroscopy, knee, surgical; for infection, lavage and drainage 40078  2.  Right  Arthroscopy, knee, synovectomy, major 36906  3.  Right  Open incisional and drainage lateral hematoma  4. Right knee Bactisure, 05914  5. KRISTI drain application      DATE OF PROCEDURE: 2/27/2018     ATTENDING SURGEON: Surgeon(s) and Role:     * Fran Shelton MD - Primary     Co-surgeon:  Jose Alberto Perez MD     First Assistant:  SMA Jame     PREOPERATIVE DIAGNOSIS:  Right  M17.11     POSTOPERATIVE DIAGNOSIS:   Right  Tear, Medial meniscus, old M23.205   M17.11     PROCEDURES(S) PERFORMED:   1. Right  Patellofemoral Arthroplasty, 87259  2.  Right  Arthroscopy, with meniscectomy (medial OR lateral) 74652, medial  3.  Right  Amniox Arthrocentesis, 89601      Handedness: right-handed  Sport played: volleyball      Level: recreational          Leonid also participates in running.  Pain   This is a new problem. The current episode started 1 to 4 weeks ago. The problem occurs intermittently. The problem has been waxing and waning. Associated  symptoms include joint swelling. Pertinent negatives include no abdominal pain, chest pain, chills, congestion, coughing, fever, myalgias, nausea, numbness, rash, sore throat or vomiting. The symptoms are aggravated by exertion. He has tried nothing for the symptoms. The treatment provided mild relief.       Review of Systems   Constitution: Negative for chills, fever and night sweats.   HENT: Negative for congestion, hearing loss and sore throat.    Eyes: Negative for blurred vision, discharge, double vision and visual disturbance.   Cardiovascular: Negative for chest pain, leg swelling, palpitations and syncope.   Respiratory: Negative for cough and shortness of breath.    Endocrine: Negative for cold intolerance, heat intolerance and polyuria.   Hematologic/Lymphatic: Negative for bleeding problem.   Skin: Negative for dry skin and rash.   Musculoskeletal: Positive for joint pain and joint swelling. Negative for back pain, falls, muscle cramps, muscle weakness, myalgias and stiffness.   Gastrointestinal: Negative for abdominal pain, melena, nausea and vomiting.   Genitourinary: Negative for hematuria.   Neurological: Negative for focal weakness, loss of balance, numbness and paresthesias.   Psychiatric/Behavioral: Negative for altered mental status.       Pain Related Questions  Over the past 3 days, what was your average pain during activity? (I.e. running, jogging, walking, climbing stairs, getting dressed, ect.): 3  Over the past 3 days, what was your highest pain level?: 3  Over the past 3 days, what was your lowest pain level? : 0    Other  How many nights a week are you awakened by your affected body part?: 0  Was the patient's HEIGHT measured or patient reported?: Patient Reported  Was the patient's WEIGHT measured or patient reported?: Measured      Objective:        General: Leonid is well-developed, well-nourished, appears stated age, in no acute distress, alert and oriented to time, place and person.      General    Vitals reviewed.  Constitutional: He is oriented to person, place, and time. He appears well-developed and well-nourished. No distress.   HENT:   Mouth/Throat: No oropharyngeal exudate.   Eyes: Conjunctivae and EOM are normal. Pupils are equal, round, and reactive to light. Right eye exhibits no discharge. Left eye exhibits no discharge.   Neck: Normal range of motion. Neck supple. No JVD present.   Cardiovascular: Normal heart sounds and intact distal pulses.    No murmur heard.  Pulmonary/Chest: Effort normal and breath sounds normal. No respiratory distress.   Abdominal: Soft. Bowel sounds are normal. He exhibits no distension. There is no tenderness.   Neurological: He is alert and oriented to person, place, and time. He has normal reflexes. No cranial nerve deficit. Coordination normal.   Psychiatric: He has a normal mood and affect. His behavior is normal. Judgment and thought content normal.     General Musculoskeletal Exam   Gait: antalgic       Right Knee Exam     Inspection   Erythema: present  Scars: present  Swelling: present  Effusion: effusion  Deformity: deformity  Bruising: absent    Crepitus   The patient has crepitus of the patella (moderate).    Range of Motion   Extension:  0 abnormal   Flexion:  60 abnormal     Tests   Meniscus   Michi:  Medial - negative Lateral - negative  Ligament Examination Lachman: normal (-1 to 2mm) PCL-Posterior Drawer: normal (0 to 2mm)     MCL - Valgus: normal (0 to 2mm)  LCL - Varus: normalPivot Shift: normal (Equal)Reverse Pivot Shift: normal (Equal)Dial Test at 30 degrees: normal (< 5 degrees)Dial Test at 90 degrees: normal (< 5 degrees)  Posterior Sag Test: negative  Posterolateral Corner: unstable (>15 degrees difference)  Patella   Patellar Apprehension: negative  Passive Patellar Tilt: neutral  Patellar Tracking: normal  Patellar Glide (quadrants): Lateral - 1   Medial - 2  Q-Angle at 90 degrees: normal  Patellar Grind: negative  J-Sign:  none    Other   Meniscal Cyst: absent  Popliteal (Baker's) Cyst: absent  Sensation: normal    Comments:  Incision c/d/i, no evidence of significant infection, erythema mildly decreased    Serosanguinous drainage into neftali, approximately 5 cc daily    Left Knee Exam     Inspection   Erythema: absent  Scars: present  Swelling: absent  Effusion: absent  Deformity: deformity  Bruising: absent    Tenderness   The patient is experiencing no tenderness.         Range of Motion   Extension: 0   Flexion:  150 normal     Tests   Meniscus   Michi:  Medial - negative Lateral - negative  Stability Lachman: normal (-1 to 2mm) PCL-Posterior Drawer: normal (0 to 2mm)  MCL - Valgus: normal (0 to 2mm)  LCL - Varus: normal (0 to 2mm)Pivot Shift: normal (Equal)Reverse Pivot Shift: normal (Equal)Dial Test at 30 degrees: normal (< 5 degrees)Dial Test at 90 degrees: normal (< 5 degrees)  Posterior Sag Test: negative  Posterolateral Corner: unstable (>15 degrees difference)  Patella   Patellar Apprehension: negative  Passive Patellar Tilt: neutral  Patellar Tracking: normal  Patellar Glide (Quadrants): Lateral - 1 Medial - 2  Q-Angle at 90 degrees: normal  Patellar Grind: negative  J-Sign: J sign absent    Other   Meniscal Cyst: absent  Popliteal (Baker's) Cyst: absent  Sensation: normal    Right Hip Exam     Tests   Erin: negative  Left Hip Exam     Tests   Erin: negative          Muscle Strength   Right Lower Extremity   Hip Abduction: 5/5   Quadriceps:  5/5   Hamstrin/5   Left Lower Extremity   Hip Abduction: 5/5   Quadriceps:  5/5   Hamstrin/5     Reflexes     Left Side  Quadriceps:  2+  Achilles:  2+    Right Side   Quadriceps:  2+  Achilles:  2+    Vascular Exam     Right Pulses  Dorsalis Pedis:      2+  Posterior Tibial:      2+        Left Pulses  Dorsalis Pedis:      2+  Posterior Tibial:      2+        Edema  Right Lower Leg: absent  Left Lower Leg: absent    RADIOGRAPHS TODAY: Radiographs ordered and reviewed today in  clinic of the right knee demonstrates stable patellofemoral arthroplasty.                 Assessment:       Encounter Diagnoses   Name Primary?    Internal derangement of knee joint, right Yes    Effusion of right knee     Chronic pain of right knee     Cellulitis of right knee           Plan:       1. IKDC, SF-12 and KOOS was not filled out today in clinic.     RTC in 1 weeks with Dr. Fran Shelton Patient will not fill out IKDC, SF-12 and KOOS.    2. Continue PT per protocol     3. Full WB locked in extension while up, OK for full ROM    4. Continue IV vancomycin 1.5 g q12 via picc  - plan for 4 weeks    5. ESR, CRP, CBC, BMP, vancomycin next Monday once a week for 4 weeks      All of the patient's questions were answered and informed consent was obtained. The patient will contact us if they have any questions or concerns in the interim.             [facility] patient questionnaires have been collected today.

## 2018-04-17 LAB — BACTERIA SPEC ANAEROBE CULT: NORMAL

## 2018-04-18 ENCOUNTER — PATIENT MESSAGE (OUTPATIENT)
Dept: SPORTS MEDICINE | Facility: CLINIC | Age: 68
End: 2018-04-18

## 2018-04-18 LAB — FUNGUS SPEC CULT: NORMAL

## 2018-04-20 LAB — BACTERIA SPEC ANAEROBE CULT: NORMAL

## 2018-04-23 ENCOUNTER — OFFICE VISIT (OUTPATIENT)
Dept: SPORTS MEDICINE | Facility: CLINIC | Age: 68
End: 2018-04-23
Payer: MEDICARE

## 2018-04-23 ENCOUNTER — LAB VISIT (OUTPATIENT)
Dept: LAB | Facility: HOSPITAL | Age: 68
End: 2018-04-23
Attending: ORTHOPAEDIC SURGERY
Payer: MEDICARE

## 2018-04-23 ENCOUNTER — PATIENT MESSAGE (OUTPATIENT)
Dept: SPORTS MEDICINE | Facility: CLINIC | Age: 68
End: 2018-04-23

## 2018-04-23 VITALS
HEART RATE: 77 BPM | BODY MASS INDEX: 23.75 KG/M2 | WEIGHT: 195 LBS | HEIGHT: 76 IN | DIASTOLIC BLOOD PRESSURE: 79 MMHG | SYSTOLIC BLOOD PRESSURE: 128 MMHG

## 2018-04-23 DIAGNOSIS — M25.561 CHRONIC PAIN OF RIGHT KNEE: ICD-10-CM

## 2018-04-23 DIAGNOSIS — M23.91 INTERNAL DERANGEMENT OF KNEE JOINT, RIGHT: Primary | ICD-10-CM

## 2018-04-23 DIAGNOSIS — L03.115 CELLULITIS OF RIGHT KNEE: ICD-10-CM

## 2018-04-23 DIAGNOSIS — M25.461 EFFUSION OF RIGHT KNEE: ICD-10-CM

## 2018-04-23 DIAGNOSIS — G89.29 CHRONIC PAIN OF RIGHT KNEE: ICD-10-CM

## 2018-04-23 LAB
ANION GAP SERPL CALC-SCNC: 7 MMOL/L
BASOPHILS # BLD AUTO: 0.05 K/UL
BASOPHILS NFR BLD: 0.8 %
BUN SERPL-MCNC: 16 MG/DL
CALCIUM SERPL-MCNC: 9.6 MG/DL
CHLORIDE SERPL-SCNC: 107 MMOL/L
CO2 SERPL-SCNC: 27 MMOL/L
CREAT SERPL-MCNC: 1 MG/DL
CRP SERPL-MCNC: 22.1 MG/L
DIFFERENTIAL METHOD: ABNORMAL
EOSINOPHIL # BLD AUTO: 0.2 K/UL
EOSINOPHIL NFR BLD: 4.1 %
ERYTHROCYTE [DISTWIDTH] IN BLOOD BY AUTOMATED COUNT: 12 %
ERYTHROCYTE [SEDIMENTATION RATE] IN BLOOD BY WESTERGREN METHOD: 18 MM/HR
EST. GFR  (AFRICAN AMERICAN): >60 ML/MIN/1.73 M^2
EST. GFR  (NON AFRICAN AMERICAN): >60 ML/MIN/1.73 M^2
GLUCOSE SERPL-MCNC: 91 MG/DL
HCT VFR BLD AUTO: 37.4 %
HGB BLD-MCNC: 12.2 G/DL
IMM GRANULOCYTES # BLD AUTO: 0.01 K/UL
IMM GRANULOCYTES NFR BLD AUTO: 0.2 %
LYMPHOCYTES # BLD AUTO: 1 K/UL
LYMPHOCYTES NFR BLD: 16.9 %
MCH RBC QN AUTO: 30.2 PG
MCHC RBC AUTO-ENTMCNC: 32.6 G/DL
MCV RBC AUTO: 93 FL
MONOCYTES # BLD AUTO: 0.7 K/UL
MONOCYTES NFR BLD: 12 %
NEUTROPHILS # BLD AUTO: 3.9 K/UL
NEUTROPHILS NFR BLD: 66 %
NRBC BLD-RTO: 0 /100 WBC
PLATELET # BLD AUTO: 242 K/UL
PMV BLD AUTO: 9.9 FL
POTASSIUM SERPL-SCNC: 4.2 MMOL/L
RBC # BLD AUTO: 4.04 M/UL
SODIUM SERPL-SCNC: 141 MMOL/L
VANCOMYCIN SERPL-MCNC: 41.1 UG/ML
WBC # BLD AUTO: 5.9 K/UL

## 2018-04-23 PROCEDURE — 85651 RBC SED RATE NONAUTOMATED: CPT

## 2018-04-23 PROCEDURE — 99024 POSTOP FOLLOW-UP VISIT: CPT | Mod: POP,,, | Performed by: ORTHOPAEDIC SURGERY

## 2018-04-23 PROCEDURE — 99214 OFFICE O/P EST MOD 30 MIN: CPT | Mod: PBBFAC,PO | Performed by: ORTHOPAEDIC SURGERY

## 2018-04-23 PROCEDURE — 99999 PR PBB SHADOW E&M-EST. PATIENT-LVL IV: CPT | Mod: PBBFAC,,, | Performed by: ORTHOPAEDIC SURGERY

## 2018-04-23 PROCEDURE — 86140 C-REACTIVE PROTEIN: CPT

## 2018-04-23 PROCEDURE — 36415 COLL VENOUS BLD VENIPUNCTURE: CPT | Mod: PO

## 2018-04-23 PROCEDURE — 85025 COMPLETE CBC W/AUTO DIFF WBC: CPT

## 2018-04-23 PROCEDURE — 80048 BASIC METABOLIC PNL TOTAL CA: CPT

## 2018-04-23 PROCEDURE — 80202 ASSAY OF VANCOMYCIN: CPT

## 2018-04-23 NOTE — PROGRESS NOTES
Subjective:          Chief Complaint: Leonid Harris III is a 68 y.o. male who had concerns including Pain of the Right Knee.    Patient is here for a follow up for his right knee. He is doing PT in [place]. She is able to sleep well. He is taking Mobic and using voltaren. Doing well since surgery. Denies significant pain.    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 Right  Arthroscopy, knee, surgical; for infection, lavage and drainage 16555  2.  Right  Arthroscopy, knee, synovectomy, major 06526  3.  Right  Open incisional and drainage lateral hematoma  4. Right knee Bactisure, 37343  5. KRISTI drain application      DATE OF PROCEDURE: 2/27/2018     ATTENDING SURGEON: Surgeon(s) and Role:     * Fran Shelton MD - Primary     Co-surgeon:  Jose Alberto Perez MD     First Assistant:  SMA Jame     PREOPERATIVE DIAGNOSIS:  Right  M17.11     POSTOPERATIVE DIAGNOSIS:   Right  Tear, Medial meniscus, old M23.205   M17.11     PROCEDURES(S) PERFORMED:   1. Right  Patellofemoral Arthroplasty, 93297  2.  Right  Arthroscopy, with meniscectomy (medial OR lateral) 25071, medial  3.  Right  Amniox Arthrocentesis, 80517      Handedness: right-handed  Sport played: volleyball      Level: recreational          Leonid also participates in running.  Pain   This is a new problem. The current episode started 1 to 4 weeks ago. The problem occurs intermittently. The problem has been waxing and waning. Associated symptoms include  joint swelling. Pertinent negatives include no abdominal pain, chest pain, chills, congestion, coughing, fever, myalgias, nausea, numbness, rash, sore throat or vomiting. The symptoms are aggravated by exertion. He has tried nothing for the symptoms. The treatment provided mild relief.       Review of Systems   Constitution: Negative for chills, fever and night sweats.   HENT: Negative for congestion, hearing loss and sore throat.    Eyes: Negative for blurred vision, discharge, double vision and visual disturbance.   Cardiovascular: Negative for chest pain, leg swelling, palpitations and syncope.   Respiratory: Negative for cough and shortness of breath.    Endocrine: Negative for cold intolerance, heat intolerance and polyuria.   Hematologic/Lymphatic: Negative for bleeding problem.   Skin: Negative for dry skin and rash.   Musculoskeletal: Positive for joint pain and joint swelling. Negative for back pain, falls, muscle cramps, muscle weakness, myalgias and stiffness.   Gastrointestinal: Negative for abdominal pain, melena, nausea and vomiting.   Genitourinary: Negative for hematuria.   Neurological: Negative for focal weakness, loss of balance, numbness and paresthesias.   Psychiatric/Behavioral: Negative for altered mental status.       Pain Related Questions  Over the past 3 days, what was your average pain during activity? (I.e. running, jogging, walking, climbing stairs, getting dressed, ect.): 3  Over the past 3 days, what was your highest pain level?: 3  Over the past 3 days, what was your lowest pain level? : 0    Other  How many nights a week are you awakened by your affected body part?: 6  Was the patient's HEIGHT measured or patient reported?: Patient Reported  Was the patient's WEIGHT measured or patient reported?: Measured      Objective:        General: Leonid is well-developed, well-nourished, appears stated age, in no acute distress, alert and oriented to time, place and person.      General    Vitals reviewed.  Constitutional: He is oriented to person, place, and time. He appears well-developed and well-nourished. No distress.   HENT:   Mouth/Throat: No oropharyngeal exudate.   Eyes: Conjunctivae and EOM are normal. Pupils are equal, round, and reactive to light. Right eye exhibits no discharge. Left eye exhibits no discharge.   Neck: Normal range of motion. Neck supple. No JVD present.   Cardiovascular: Normal heart sounds and intact distal pulses.    No murmur heard.  Pulmonary/Chest: Effort normal and breath sounds normal. No respiratory distress.   Abdominal: Soft. Bowel sounds are normal. He exhibits no distension. There is no tenderness.   Neurological: He is alert and oriented to person, place, and time. He has normal reflexes. No cranial nerve deficit. Coordination normal.   Psychiatric: He has a normal mood and affect. His behavior is normal. Judgment and thought content normal.     General Musculoskeletal Exam   Gait: antalgic       Right Knee Exam     Inspection   Erythema: present  Scars: present  Swelling: present  Effusion: effusion  Deformity: deformity  Bruising: absent    Tenderness   The patient is experiencing no tenderness.         Crepitus   The patient has crepitus of the patella (moderate).    Range of Motion   Extension: 0   Flexion:  90 abnormal     Tests   Meniscus   Michi:  Medial - negative Lateral - negative  Ligament Examination Lachman: normal (-1 to 2mm) PCL-Posterior Drawer: normal (0 to 2mm)     MCL - Valgus: normal (0 to 2mm)  LCL - Varus: normalPivot Shift: normal (Equal)Reverse Pivot Shift: normal (Equal)Dial Test at 30 degrees: normal (< 5 degrees)Dial Test at 90 degrees: normal (< 5 degrees)  Posterior Sag Test: negative  Posterolateral Corner: unstable (>15 degrees difference)  Patella   Patellar Apprehension: negative  Passive Patellar Tilt: neutral  Patellar Tracking: normal  Patellar Glide (quadrants): Lateral - 1   Medial - 2  Q-Angle at 90  degrees: normal  Patellar Grind: negative  J-Sign: none    Other   Meniscal Cyst: absent  Popliteal (Baker's) Cyst: absent  Sensation: normal    Comments:  Incision c/d/i, no evidence of significant infection, erythema mildly decreased      Left Knee Exam     Inspection   Erythema: absent  Scars: present  Swelling: absent  Effusion: absent  Deformity: deformity  Bruising: absent    Tenderness   The patient is experiencing no tenderness.         Range of Motion   Extension: 0   Flexion:  150 normal     Tests   Meniscus   Michi:  Medial - negative Lateral - negative  Stability Lachman: normal (-1 to 2mm) PCL-Posterior Drawer: normal (0 to 2mm)  MCL - Valgus: normal (0 to 2mm)  LCL - Varus: normal (0 to 2mm)Pivot Shift: normal (Equal)Reverse Pivot Shift: normal (Equal)Dial Test at 30 degrees: normal (< 5 degrees)Dial Test at 90 degrees: normal (< 5 degrees)  Posterior Sag Test: negative  Posterolateral Corner: unstable (>15 degrees difference)  Patella   Patellar Apprehension: negative  Passive Patellar Tilt: neutral  Patellar Tracking: normal  Patellar Glide (Quadrants): Lateral - 1 Medial - 2  Q-Angle at 90 degrees: normal  Patellar Grind: negative  J-Sign: J sign absent    Other   Meniscal Cyst: absent  Popliteal (Baker's) Cyst: absent  Sensation: normal    Right Hip Exam     Tests   Erin: negative  Left Hip Exam     Tests   Erin: negative          Muscle Strength   Right Lower Extremity   Hip Abduction: 5/5   Quadriceps:  4/5   Hamstrin/5   Left Lower Extremity   Hip Abduction: 5/5   Quadriceps:  5/5   Hamstrin/5     Reflexes     Left Side  Quadriceps:  2+  Achilles:  2+    Right Side   Quadriceps:  2+  Achilles:  2+    Vascular Exam     Right Pulses  Dorsalis Pedis:      2+  Posterior Tibial:      2+        Left Pulses  Dorsalis Pedis:      2+  Posterior Tibial:      2+        Edema  Right Lower Leg: absent  Left Lower Leg: absent    RADIOGRAPHS : 18 Radiographs reviewed today in clinic of the  right knee demonstrates stable patellofemoral arthroplasty.                 Assessment:       Encounter Diagnoses   Name Primary?    Internal derangement of knee joint, right Yes    Chronic pain of right knee     Cellulitis of right knee     Effusion of right knee     Postoperative wound infection, subsequent encounter           Plan:       1. IKDC, SF-12 and KOOS was not filled out today in clinic.     RTC in 1 weeks with Dr. Fran Shelton for suture removal Patient will not fill out IKDC, SF-12 and KOOS.    2. Continue PT per protocol - [facility]    3. Full WB locked in extension while up, OK for full ROM    4. Continue IV vancomycin 1.5 g q12 via picc  - plan for 4 weeks    5. ESR, CRP, CBC, BMP, vancomycin next Monday once a week for 4 weeks      All of the patient's questions were answered and informed consent was obtained. The patient will contact us if they have any questions or concerns in the interim.             [facility] patient questionnaires have been collected today.

## 2018-04-30 ENCOUNTER — OFFICE VISIT (OUTPATIENT)
Dept: SPORTS MEDICINE | Facility: CLINIC | Age: 68
End: 2018-04-30
Payer: MEDICARE

## 2018-04-30 VITALS
BODY MASS INDEX: 23.75 KG/M2 | DIASTOLIC BLOOD PRESSURE: 80 MMHG | SYSTOLIC BLOOD PRESSURE: 125 MMHG | HEIGHT: 76 IN | HEART RATE: 60 BPM | WEIGHT: 195 LBS

## 2018-04-30 DIAGNOSIS — M25.461 EFFUSION OF RIGHT KNEE: ICD-10-CM

## 2018-04-30 DIAGNOSIS — M25.561 CHRONIC PAIN OF RIGHT KNEE: ICD-10-CM

## 2018-04-30 DIAGNOSIS — M23.91 INTERNAL DERANGEMENT OF KNEE JOINT, RIGHT: Primary | ICD-10-CM

## 2018-04-30 DIAGNOSIS — M23.92 INTERNAL DERANGEMENT OF LEFT KNEE: ICD-10-CM

## 2018-04-30 DIAGNOSIS — G89.29 CHRONIC PAIN OF RIGHT KNEE: ICD-10-CM

## 2018-04-30 PROCEDURE — 99999 PR PBB SHADOW E&M-EST. PATIENT-LVL III: CPT | Mod: PBBFAC,,, | Performed by: ORTHOPAEDIC SURGERY

## 2018-04-30 PROCEDURE — 99024 POSTOP FOLLOW-UP VISIT: CPT | Mod: POP,,, | Performed by: ORTHOPAEDIC SURGERY

## 2018-04-30 PROCEDURE — 97110 THERAPEUTIC EXERCISES: CPT | Mod: PBBFAC,PO | Performed by: ORTHOPAEDIC SURGERY

## 2018-04-30 PROCEDURE — 99213 OFFICE O/P EST LOW 20 MIN: CPT | Mod: PBBFAC,PO | Performed by: ORTHOPAEDIC SURGERY

## 2018-04-30 RX ORDER — MELOXICAM 15 MG/1
15 TABLET ORAL DAILY
Qty: 30 TABLET | Refills: 2 | Status: SHIPPED | OUTPATIENT
Start: 2018-04-30 | End: 2019-02-19 | Stop reason: SDUPTHER

## 2018-04-30 NOTE — PROGRESS NOTES
Subjective:          Chief Complaint: Leonid Harris III is a 68 y.o. male who had concerns including Post-op Evaluation of the Right Knee.    Patient is here for a follow up for his right knee. He is doing PT in [place]. He doesn't feel much pain but has some stiffness.      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 Right  Arthroscopy, knee, surgical; for infection, lavage and drainage 53225  2.  Right  Arthroscopy, knee, synovectomy, major 69339  3.  Right  Open incisional and drainage lateral hematoma  4. Right knee Bactisure, 57484  5. KRISTI drain application      DATE OF PROCEDURE: 2/27/2018     ATTENDING SURGEON: Surgeon(s) and Role:     * Fran Shelton MD - Primary     Co-surgeon:  Jose Alberto Perez MD     First Assistant:  SMA Jame     PREOPERATIVE DIAGNOSIS:  Right  M17.11     POSTOPERATIVE DIAGNOSIS:   Right  Tear, Medial meniscus, old M23.205   M17.11     PROCEDURES(S) PERFORMED:   1. Right  Patellofemoral Arthroplasty, 22897  2.  Right  Arthroscopy, with meniscectomy (medial OR lateral) 00429, medial  3.  Right  Amniox Arthrocentesis, 13236      Handedness: right-handed  Sport played: volleyball      Level: recreational          Leonid also participates in running.  Pain   This is a new problem. The current episode started 1 to 4 weeks ago. The problem occurs intermittently. The problem has been waxing and waning. Associated symptoms include joint swelling. Pertinent negatives include no  abdominal pain, chest pain, chills, congestion, coughing, fever, myalgias, nausea, numbness, rash, sore throat or vomiting. The symptoms are aggravated by exertion. He has tried nothing for the symptoms. The treatment provided mild relief.       Review of Systems   Constitution: Negative for chills, fever and night sweats.   HENT: Negative for congestion, hearing loss and sore throat.    Eyes: Negative for blurred vision, discharge, double vision and visual disturbance.   Cardiovascular: Negative for chest pain, leg swelling, palpitations and syncope.   Respiratory: Negative for cough and shortness of breath.    Endocrine: Negative for cold intolerance, heat intolerance and polyuria.   Hematologic/Lymphatic: Negative for bleeding problem.   Skin: Negative for dry skin and rash.   Musculoskeletal: Positive for joint pain and joint swelling. Negative for back pain, falls, muscle cramps, muscle weakness, myalgias and stiffness.   Gastrointestinal: Negative for abdominal pain, melena, nausea and vomiting.   Genitourinary: Negative for hematuria.   Neurological: Negative for focal weakness, loss of balance, numbness and paresthesias.   Psychiatric/Behavioral: Negative for altered mental status.       Pain Related Questions  Over the past 3 days, what was your average pain during activity? (I.e. running, jogging, walking, climbing stairs, getting dressed, ect.): 3  Over the past 3 days, what was your highest pain level?: 3  Over the past 3 days, what was your lowest pain level? : 0    Other  How many nights a week are you awakened by your affected body part?: 3  Was the patient's HEIGHT measured or patient reported?: Patient Reported  Was the patient's WEIGHT measured or patient reported?: Measured      Objective:        General: Leonid is well-developed, well-nourished, appears stated age, in no acute distress, alert and oriented to time, place and person.     General    Vitals reviewed.  Constitutional: He is oriented to  person, place, and time. He appears well-developed and well-nourished. No distress.   HENT:   Mouth/Throat: No oropharyngeal exudate.   Eyes: Conjunctivae and EOM are normal. Pupils are equal, round, and reactive to light. Right eye exhibits no discharge. Left eye exhibits no discharge.   Neck: Normal range of motion. Neck supple. No JVD present.   Cardiovascular: Normal heart sounds and intact distal pulses.    No murmur heard.  Pulmonary/Chest: Effort normal and breath sounds normal. No respiratory distress.   Abdominal: Soft. Bowel sounds are normal. He exhibits no distension. There is no tenderness.   Neurological: He is alert and oriented to person, place, and time. He has normal reflexes. No cranial nerve deficit. Coordination normal.   Psychiatric: He has a normal mood and affect. His behavior is normal. Judgment and thought content normal.     General Musculoskeletal Exam   Gait: antalgic       Right Knee Exam     Inspection   Erythema: present  Scars: present  Swelling: present  Effusion: effusion  Deformity: deformity  Bruising: absent    Tenderness   The patient is experiencing no tenderness.         Crepitus   The patient has crepitus of the patella (moderate).    Range of Motion   Extension: 0   Flexion:  130 abnormal     Tests   Meniscus   Michi:  Medial - negative Lateral - negative  Ligament Examination Lachman: normal (-1 to 2mm) PCL-Posterior Drawer: normal (0 to 2mm)     MCL - Valgus: normal (0 to 2mm)  LCL - Varus: normalPivot Shift: normal (Equal)Reverse Pivot Shift: normal (Equal)Dial Test at 30 degrees: normal (< 5 degrees)Dial Test at 90 degrees: normal (< 5 degrees)  Posterior Sag Test: negative  Posterolateral Corner: unstable (>15 degrees difference)  Patella   Patellar Apprehension: negative  Passive Patellar Tilt: neutral  Patellar Tracking: normal  Patellar Glide (quadrants): Lateral - 1   Medial - 2  Q-Angle at 90 degrees: normal  Patellar Grind: negative  J-Sign: none    Other    Meniscal Cyst: absent  Popliteal (Baker's) Cyst: absent  Sensation: normal    Comments:  Incision c/d/i, no evidence of significant infection, erythema mildly decreased    Stitches removed today    Left Knee Exam     Inspection   Erythema: absent  Scars: present  Swelling: absent  Effusion: absent  Deformity: deformity  Bruising: absent    Tenderness   The patient is experiencing no tenderness.         Range of Motion   Extension: 0   Flexion:  150 normal     Tests   Meniscus   Michi:  Medial - negative Lateral - negative  Stability Lachman: normal (-1 to 2mm) PCL-Posterior Drawer: normal (0 to 2mm)  MCL - Valgus: normal (0 to 2mm)  LCL - Varus: normal (0 to 2mm)Pivot Shift: normal (Equal)Reverse Pivot Shift: normal (Equal)Dial Test at 30 degrees: normal (< 5 degrees)Dial Test at 90 degrees: normal (< 5 degrees)  Posterior Sag Test: negative  Posterolateral Corner: unstable (>15 degrees difference)  Patella   Patellar Apprehension: negative  Passive Patellar Tilt: neutral  Patellar Tracking: normal  Patellar Glide (Quadrants): Lateral - 1 Medial - 2  Q-Angle at 90 degrees: normal  Patellar Grind: negative  J-Sign: J sign absent    Other   Meniscal Cyst: absent  Popliteal (Baker's) Cyst: absent  Sensation: normal    Right Hip Exam     Tests   Erin: negative  Left Hip Exam     Tests   Erin: negative          Muscle Strength   Right Lower Extremity   Hip Abduction: 5/5   Quadriceps:  4/5   Hamstrin/5   Left Lower Extremity   Hip Abduction: 5/5   Quadriceps:  5/5   Hamstrin/5     Reflexes     Left Side  Quadriceps:  2+  Achilles:  2+    Right Side   Quadriceps:  2+  Achilles:  2+    Vascular Exam     Right Pulses  Dorsalis Pedis:      2+  Posterior Tibial:      2+        Left Pulses  Dorsalis Pedis:      2+  Posterior Tibial:      2+        Edema  Right Lower Leg: absent  Left Lower Leg: absent        ESR 18: 18  CRP 18: 22.1            Assessment:       Encounter Diagnoses   Name Primary?     Internal derangement of knee joint, right Yes    Chronic pain of right knee     Effusion of right knee     Internal derangement of left knee           Plan:       1. IKDC, SF-12 and KOOS was not filled out today in clinic.     RTC in 2-3 weeks with Dr. Fran Shelton  Patient will not fill out IKDC, SF-12 and KOOS.    2. Continue PT per protocol - [facility]    3. Full WB, OK for full ROM    4. Continue IV vancomycin 1.5 g q12 via picc  - plan for additional 2 weeks    5. ESR, CRP, CBC, BMP, vancomycin next Monday once a week for additional 2-3 weeks      All of the patient's questions were answered and informed consent was obtained. The patient will contact us if they have any questions or concerns in the interim.             [facility] patient questionnaires have been collected today.

## 2018-05-03 ENCOUNTER — PATIENT MESSAGE (OUTPATIENT)
Dept: SPORTS MEDICINE | Facility: CLINIC | Age: 68
End: 2018-05-03

## 2018-05-03 ENCOUNTER — TELEPHONE (OUTPATIENT)
Dept: SPORTS MEDICINE | Facility: CLINIC | Age: 68
End: 2018-05-03

## 2018-05-03 NOTE — TELEPHONE ENCOUNTER
Called patient at 3:18pm to check in on him. HE saw his PCP today and I spoke with his PCP. He had no concerns of infection in his knee. He reported that he looked well. I asked him to draw labs for CBC, sed rate, and CRP. He faxed those to our office and I reviewed them with the patient. Will speak with Dr. Shelton tomorrow in surgery and possible have patient return to clinic on Monday to see Dr. Shelton.    CRP: 1.7   Sed rate: 15   WBC 4.1  H/H: 12.4/36.2

## 2018-05-03 NOTE — TELEPHONE ENCOUNTER
The patient called the office this morning and I spoke with him in regards to his fever that he was running and the sores in his mouth. After speaking with Fanny we tried to get the patient to come in today but he lives 4hrs away so Fanny advised him to go to his PCP. He made an appointment with his PCP and I gave him Fanny's number for his PCP to contact her while he was at the appointment.

## 2018-05-03 NOTE — TELEPHONE ENCOUNTER
----- Message from Fanny Hines PA-C sent at 5/3/2018 11:27 AM CDT -----  Contact: Cynthia/[facility]-[phone]      ----- Message -----  From: Alexandra Leos MA  Sent: 5/3/2018  10:28 AM  To: Fanny Hines PA-C        ----- Message -----  From: Ciara Marks  Sent: 5/3/2018   9:50 AM  To: [facility] Staff    Called stating that the pt is beginning to run a fever. Was 101.5 and currently now 99.3 Stated that the pt is onvancomycin 1500 mg in 0.9% sodium chloride 250 mL IVPB and she just wanted him to be aware. She would like a call back from an assistant at (office 1st) [phone] or cell at [phone]

## 2018-05-05 ENCOUNTER — PATIENT MESSAGE (OUTPATIENT)
Dept: SPORTS MEDICINE | Facility: CLINIC | Age: 68
End: 2018-05-05

## 2018-05-05 ENCOUNTER — TELEPHONE (OUTPATIENT)
Dept: ORTHOPEDICS | Facility: HOSPITAL | Age: 68
End: 2018-05-05

## 2018-05-05 NOTE — TELEPHONE ENCOUNTER
Called by patient regarding rash over abdomen and proximal thighs. States has been present for past 2 days and been difficult to sleep. Endorses itching. Denies erythema or warmth around knee surgical incision. On vancomycin and has been taking for past 3 weeks. Stated he presented to local ER  Yesterday for rash and had PICC line removed and replaced. Has not taken any antihistamines. Recommended patient try taking antihistamines over the counter. Educated patient that if symptoms worsened or developed new symptoms he should present to ER immediately. Stated if rash persisted should seek care with primary care Monday.

## 2018-05-11 PROBLEM — J18.9 HCAP (HEALTHCARE-ASSOCIATED PNEUMONIA): Status: ACTIVE | Noted: 2018-05-11

## 2018-05-11 NOTE — PLAN OF CARE
Transferring Physician or Mid Level Provider/Speciality: ER physician at [facility]     Accepting Physician: Cecilio Ryder    Date of Acceptance: 05/11/2018    Code Status: Full    Allergies: Vancomycin     Transferring Facility/Hospital: [facility]     Reason for Transfer to [facility]: Concern for Dress syndrome and HCAP    Report from Transferring Physician or Mid-Level provider/Hospital course:     This is a 69 yo male with a PMH of HTN who had a total right knee replacement here at [facility] on 2/9/18 and then developed a subsequent MRSA infection of the knee on 4/12/18 and went for a total knee wash out at that time and was sent home on IV Vanc for a total of 6 week course therapy.  About a week ago, patient developed a diffuse maculopapular rash throughout his whole body and presented to OSH and was switched off of Vanc and placed on Zyvoxx and was discharged two days ago.  Today he presented with back pain and still has the diffuse pruritic rash and was found to have a WBC of 19 and CXR showed LLL infiltrate.  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  Patient was given his dose of zyvoxx today as well as Zosyn.  His right knee actually looks good and has no pain or decrease ROM.    I have asked for patient to get an MRI of the T-spine and L-spine to rule out any seeding to the back and get blood cultures.  Patient also has a rise in his LFTs and have asked for them to get a TSH.  Patient has 10% eosinophilia and 1% bands.  Patient is hemodynamically stable.  Denies any fevers or cough.      Also of note patient had T-wave inversion on EKG in the ER.  Denies chest pain and first set of troponins negative and they are grabbing a second set.  Had a cath a few years ago which was negative.  Would get another EKG and trend troponins and get 2d echo with CFD.      To do list upon patient arrival:     Consult Dermatology, spoke  with Dr. Burr, staff dermatology, they recommend starting Solumedrol 1mg/kg and they will see him in the AM    Consult ID    Consult orthopedics    If MRI T-spine and L-spine was not done at OSH, would need to get done here.  Restart patient on home zyvoxx and start patient on Zosyn for HCAP      Please call extension [phone] upon patient arrival to floor for Hospital Medicine admit team assignment and for additional admit orders. If patient is coming from another [facility] facility please also call [phone] to inform the admit team/office that patient has arrived from the [facility] facility to the floor so patient can be evaluated.

## 2018-05-12 ENCOUNTER — HOSPITAL ENCOUNTER (INPATIENT)
Facility: HOSPITAL | Age: 68
LOS: 2 days | Discharge: HOME | DRG: 606 | End: 2018-05-14
Attending: HOSPITALIST | Admitting: HOSPITALIST
Payer: MEDICARE

## 2018-05-12 ENCOUNTER — PATIENT MESSAGE (OUTPATIENT)
Dept: SPORTS MEDICINE | Facility: CLINIC | Age: 68
End: 2018-05-12

## 2018-05-12 DIAGNOSIS — M25.461 EFFUSION OF RIGHT KNEE: ICD-10-CM

## 2018-05-12 DIAGNOSIS — D72.12 DRESS SYNDROME: Primary | ICD-10-CM

## 2018-05-12 DIAGNOSIS — M23.92 INTERNAL DERANGEMENT OF LEFT KNEE: ICD-10-CM

## 2018-05-12 DIAGNOSIS — T84.53XS INFECTION ASSOCIATED WITH INTERNAL RIGHT KNEE PROSTHESIS, SEQUELA: ICD-10-CM

## 2018-05-12 DIAGNOSIS — M25.561 RIGHT KNEE PAIN, UNSPECIFIED CHRONICITY: ICD-10-CM

## 2018-05-12 DIAGNOSIS — T50.905A DRESS SYNDROME: Primary | ICD-10-CM

## 2018-05-12 DIAGNOSIS — R94.31 ABNORMAL EKG: ICD-10-CM

## 2018-05-12 DIAGNOSIS — J18.9 HCAP (HEALTHCARE-ASSOCIATED PNEUMONIA): ICD-10-CM

## 2018-05-12 PROBLEM — T84.53XA INFECTION OF PROSTHETIC RIGHT KNEE JOINT: Status: ACTIVE | Noted: 2018-05-12

## 2018-05-12 PROBLEM — M54.9 BACK PAIN: Status: ACTIVE | Noted: 2018-05-12

## 2018-05-12 PROBLEM — A49.02 MRSA INFECTION: Status: ACTIVE | Noted: 2018-05-12

## 2018-05-12 PROBLEM — E78.5 HYPERLIPIDEMIA: Status: ACTIVE | Noted: 2018-05-12

## 2018-05-12 LAB
ALBUMIN SERPL BCP-MCNC: 2.2 G/DL
ALBUMIN SERPL BCP-MCNC: 2.3 G/DL
ALP SERPL-CCNC: 230 U/L
ALP SERPL-CCNC: 246 U/L
ALT SERPL W/O P-5'-P-CCNC: 131 U/L
ALT SERPL W/O P-5'-P-CCNC: 141 U/L
ANION GAP SERPL CALC-SCNC: 13 MMOL/L
ANION GAP SERPL CALC-SCNC: 15 MMOL/L
AST SERPL-CCNC: 58 U/L
AST SERPL-CCNC: 62 U/L
BACTERIA SPEC AEROBE CULT: NORMAL
BASOPHILS # BLD AUTO: 0.16 K/UL
BASOPHILS # BLD AUTO: 0.17 K/UL
BASOPHILS NFR BLD: 0.8 %
BASOPHILS NFR BLD: 0.8 %
BILIRUB SERPL-MCNC: 1.5 MG/DL
BILIRUB SERPL-MCNC: 1.6 MG/DL
BUN SERPL-MCNC: 16 MG/DL
BUN SERPL-MCNC: 17 MG/DL
CALCIUM SERPL-MCNC: 7.5 MG/DL
CALCIUM SERPL-MCNC: 7.5 MG/DL
CHLORIDE SERPL-SCNC: 94 MMOL/L
CHLORIDE SERPL-SCNC: 95 MMOL/L
CO2 SERPL-SCNC: 21 MMOL/L
CO2 SERPL-SCNC: 21 MMOL/L
CREAT SERPL-MCNC: 0.8 MG/DL
CREAT SERPL-MCNC: 0.9 MG/DL
CRP SERPL-MCNC: 81 MG/L
DIFFERENTIAL METHOD: ABNORMAL
DIFFERENTIAL METHOD: ABNORMAL
EOSINOPHIL # BLD AUTO: 3.8 K/UL
EOSINOPHIL # BLD AUTO: 3.9 K/UL
EOSINOPHIL NFR BLD: 18.4 %
EOSINOPHIL NFR BLD: 18.7 %
ERYTHROCYTE [DISTWIDTH] IN BLOOD BY AUTOMATED COUNT: 12.4 %
ERYTHROCYTE [DISTWIDTH] IN BLOOD BY AUTOMATED COUNT: 12.5 %
ERYTHROCYTE [SEDIMENTATION RATE] IN BLOOD BY WESTERGREN METHOD: 1 MM/HR
EST. GFR  (AFRICAN AMERICAN): >60 ML/MIN/1.73 M^2
EST. GFR  (AFRICAN AMERICAN): >60 ML/MIN/1.73 M^2
EST. GFR  (NON AFRICAN AMERICAN): >60 ML/MIN/1.73 M^2
EST. GFR  (NON AFRICAN AMERICAN): >60 ML/MIN/1.73 M^2
ESTIMATED AVG GLUCOSE: 105 MG/DL
GLUCOSE SERPL-MCNC: 113 MG/DL
GLUCOSE SERPL-MCNC: 95 MG/DL
HBA1C MFR BLD HPLC: 5.3 %
HCT VFR BLD AUTO: 37.8 %
HCT VFR BLD AUTO: 38.8 %
HGB BLD-MCNC: 13.1 G/DL
HGB BLD-MCNC: 13.3 G/DL
IMM GRANULOCYTES # BLD AUTO: 0.38 K/UL
IMM GRANULOCYTES # BLD AUTO: 0.43 K/UL
IMM GRANULOCYTES NFR BLD AUTO: 1.9 %
IMM GRANULOCYTES NFR BLD AUTO: 2.1 %
LACTATE SERPL-SCNC: 3 MMOL/L
LACTATE SERPL-SCNC: 3.6 MMOL/L
LYMPHOCYTES # BLD AUTO: 5 K/UL
LYMPHOCYTES # BLD AUTO: 5 K/UL
LYMPHOCYTES NFR BLD: 24.4 %
LYMPHOCYTES NFR BLD: 24.6 %
MCH RBC QN AUTO: 29.5 PG
MCH RBC QN AUTO: 30 PG
MCHC RBC AUTO-ENTMCNC: 34.3 G/DL
MCHC RBC AUTO-ENTMCNC: 34.7 G/DL
MCV RBC AUTO: 86 FL
MCV RBC AUTO: 87 FL
MONOCYTES # BLD AUTO: 1.1 K/UL
MONOCYTES # BLD AUTO: 1.2 K/UL
MONOCYTES NFR BLD: 5.3 %
MONOCYTES NFR BLD: 5.7 %
NEUTROPHILS # BLD AUTO: 10.1 K/UL
NEUTROPHILS # BLD AUTO: 9.9 K/UL
NEUTROPHILS NFR BLD: 48.6 %
NEUTROPHILS NFR BLD: 48.7 %
NRBC BLD-RTO: 0 /100 WBC
NRBC BLD-RTO: 0 /100 WBC
PLATELET # BLD AUTO: 246 K/UL
PLATELET # BLD AUTO: 261 K/UL
PMV BLD AUTO: 9.6 FL
PMV BLD AUTO: 9.6 FL
POTASSIUM SERPL-SCNC: 3.6 MMOL/L
POTASSIUM SERPL-SCNC: 3.7 MMOL/L
PROCALCITONIN SERPL IA-MCNC: 0.91 NG/ML
PROCALCITONIN SERPL IA-MCNC: 0.96 NG/ML
PROT SERPL-MCNC: 4.2 G/DL
PROT SERPL-MCNC: 4.5 G/DL
RBC # BLD AUTO: 4.36 M/UL
RBC # BLD AUTO: 4.51 M/UL
SMUDGE CELLS BLD QL SMEAR: PRESENT
SODIUM SERPL-SCNC: 129 MMOL/L
SODIUM SERPL-SCNC: 130 MMOL/L
TSH SERPL DL<=0.005 MIU/L-ACNC: 0.87 UIU/ML
WBC # BLD AUTO: 20.42 K/UL
WBC # BLD AUTO: 20.69 K/UL

## 2018-05-12 PROCEDURE — 86140 C-REACTIVE PROTEIN: CPT

## 2018-05-12 PROCEDURE — 87040 BLOOD CULTURE FOR BACTERIA: CPT

## 2018-05-12 PROCEDURE — 84145 PROCALCITONIN (PCT): CPT

## 2018-05-12 PROCEDURE — 80053 COMPREHEN METABOLIC PANEL: CPT

## 2018-05-12 PROCEDURE — 63600175 PHARM REV CODE 636 W HCPCS: Performed by: HOSPITALIST

## 2018-05-12 PROCEDURE — 83605 ASSAY OF LACTIC ACID: CPT

## 2018-05-12 PROCEDURE — 25000003 PHARM REV CODE 250: Performed by: INTERNAL MEDICINE

## 2018-05-12 PROCEDURE — 25000003 PHARM REV CODE 250: Performed by: HOSPITALIST

## 2018-05-12 PROCEDURE — 85651 RBC SED RATE NONAUTOMATED: CPT

## 2018-05-12 PROCEDURE — 93005 ELECTROCARDIOGRAM TRACING: CPT

## 2018-05-12 PROCEDURE — 83036 HEMOGLOBIN GLYCOSYLATED A1C: CPT

## 2018-05-12 PROCEDURE — 93010 ELECTROCARDIOGRAM REPORT: CPT | Mod: ,,, | Performed by: INTERNAL MEDICINE

## 2018-05-12 PROCEDURE — A9585 GADOBUTROL INJECTION: HCPCS | Performed by: HOSPITALIST

## 2018-05-12 PROCEDURE — 84145 PROCALCITONIN (PCT): CPT | Mod: 91

## 2018-05-12 PROCEDURE — 80053 COMPREHEN METABOLIC PANEL: CPT | Mod: 91

## 2018-05-12 PROCEDURE — 36415 COLL VENOUS BLD VENIPUNCTURE: CPT

## 2018-05-12 PROCEDURE — 99223 1ST HOSP IP/OBS HIGH 75: CPT | Mod: AI,GC,, | Performed by: HOSPITALIST

## 2018-05-12 PROCEDURE — 63600175 PHARM REV CODE 636 W HCPCS: Performed by: INTERNAL MEDICINE

## 2018-05-12 PROCEDURE — 83605 ASSAY OF LACTIC ACID: CPT | Mod: 91

## 2018-05-12 PROCEDURE — 25000003 PHARM REV CODE 250: Performed by: STUDENT IN AN ORGANIZED HEALTH CARE EDUCATION/TRAINING PROGRAM

## 2018-05-12 PROCEDURE — 11000001 HC ACUTE MED/SURG PRIVATE ROOM

## 2018-05-12 PROCEDURE — 25500020 PHARM REV CODE 255: Performed by: HOSPITALIST

## 2018-05-12 PROCEDURE — 84443 ASSAY THYROID STIM HORMONE: CPT

## 2018-05-12 PROCEDURE — 85025 COMPLETE CBC W/AUTO DIFF WBC: CPT | Mod: 91

## 2018-05-12 PROCEDURE — 99223 1ST HOSP IP/OBS HIGH 75: CPT | Mod: GC,,, | Performed by: INTERNAL MEDICINE

## 2018-05-12 RX ORDER — OXYCODONE AND ACETAMINOPHEN 5; 325 MG/1; MG/1
1 TABLET ORAL EVERY 4 HOURS PRN
Status: DISCONTINUED | OUTPATIENT
Start: 2018-05-12 | End: 2018-05-12

## 2018-05-12 RX ORDER — IBUPROFEN 200 MG
16 TABLET ORAL
Status: DISCONTINUED | OUTPATIENT
Start: 2018-05-12 | End: 2018-05-12

## 2018-05-12 RX ORDER — GLUCAGON 1 MG
1 KIT INJECTION
Status: DISCONTINUED | OUTPATIENT
Start: 2018-05-12 | End: 2018-05-12

## 2018-05-12 RX ORDER — TRIAMCINOLONE ACETONIDE 1 MG/G
OINTMENT TOPICAL 2 TIMES DAILY
Status: DISCONTINUED | OUTPATIENT
Start: 2018-05-12 | End: 2018-05-14 | Stop reason: HOSPADM

## 2018-05-12 RX ORDER — LINEZOLID 2 MG/ML
600 INJECTION, SOLUTION INTRAVENOUS
Status: DISCONTINUED | OUTPATIENT
Start: 2018-05-12 | End: 2018-05-12

## 2018-05-12 RX ORDER — DIPHENHYDRAMINE HCL 25 MG
25 CAPSULE ORAL EVERY 6 HOURS PRN
Status: DISCONTINUED | OUTPATIENT
Start: 2018-05-12 | End: 2018-05-14 | Stop reason: HOSPADM

## 2018-05-12 RX ORDER — CEFEPIME HYDROCHLORIDE 2 G/1
2 INJECTION, POWDER, FOR SOLUTION INTRAVENOUS
Status: DISCONTINUED | OUTPATIENT
Start: 2018-05-12 | End: 2018-05-12

## 2018-05-12 RX ORDER — LINEZOLID 600 MG/1
600 TABLET, FILM COATED ORAL EVERY 12 HOURS
Status: DISCONTINUED | OUTPATIENT
Start: 2018-05-12 | End: 2018-05-14 | Stop reason: HOSPADM

## 2018-05-12 RX ORDER — IBUPROFEN 200 MG
24 TABLET ORAL
Status: DISCONTINUED | OUTPATIENT
Start: 2018-05-12 | End: 2018-05-12

## 2018-05-12 RX ORDER — SODIUM CHLORIDE 9 MG/ML
INJECTION, SOLUTION INTRAVENOUS CONTINUOUS
Status: DISCONTINUED | OUTPATIENT
Start: 2018-05-12 | End: 2018-05-13

## 2018-05-12 RX ORDER — GADOBUTROL 604.72 MG/ML
8 INJECTION INTRAVENOUS
Status: COMPLETED | OUTPATIENT
Start: 2018-05-12 | End: 2018-05-12

## 2018-05-12 RX ORDER — SODIUM CHLORIDE 9 MG/ML
INJECTION, SOLUTION INTRAVENOUS CONTINUOUS
Status: DISCONTINUED | OUTPATIENT
Start: 2018-05-12 | End: 2018-05-12

## 2018-05-12 RX ORDER — OXYCODONE HYDROCHLORIDE 5 MG/1
5 TABLET ORAL EVERY 4 HOURS PRN
Status: DISCONTINUED | OUTPATIENT
Start: 2018-05-12 | End: 2018-05-14 | Stop reason: HOSPADM

## 2018-05-12 RX ORDER — ENOXAPARIN SODIUM 100 MG/ML
40 INJECTION SUBCUTANEOUS EVERY 24 HOURS
Status: DISCONTINUED | OUTPATIENT
Start: 2018-05-12 | End: 2018-05-14 | Stop reason: HOSPADM

## 2018-05-12 RX ORDER — SODIUM CHLORIDE 0.9 % (FLUSH) 0.9 %
5 SYRINGE (ML) INJECTION
Status: DISCONTINUED | OUTPATIENT
Start: 2018-05-12 | End: 2018-05-14 | Stop reason: HOSPADM

## 2018-05-12 RX ORDER — PREDNISONE 20 MG/1
60 TABLET ORAL DAILY
Status: DISCONTINUED | OUTPATIENT
Start: 2018-05-12 | End: 2018-05-14 | Stop reason: HOSPADM

## 2018-05-12 RX ORDER — METHYLPREDNISOLONE SOD SUCC 125 MG
1 VIAL (EA) INJECTION ONCE
Status: COMPLETED | OUTPATIENT
Start: 2018-05-12 | End: 2018-05-12

## 2018-05-12 RX ORDER — ATORVASTATIN CALCIUM 10 MG/1
10 TABLET, FILM COATED ORAL DAILY
Status: DISCONTINUED | OUTPATIENT
Start: 2018-05-12 | End: 2018-05-14

## 2018-05-12 RX ORDER — OXYCODONE HYDROCHLORIDE 5 MG/1
10 TABLET ORAL EVERY 4 HOURS PRN
Status: DISCONTINUED | OUTPATIENT
Start: 2018-05-12 | End: 2018-05-14 | Stop reason: HOSPADM

## 2018-05-12 RX ADMIN — LINEZOLID 600 MG: 600 INJECTION, SOLUTION INTRAVENOUS at 06:05

## 2018-05-12 RX ADMIN — TRIAMCINOLONE ACETONIDE: 1 OINTMENT TOPICAL at 08:05

## 2018-05-12 RX ADMIN — OXYCODONE HYDROCHLORIDE 10 MG: 5 TABLET ORAL at 08:05

## 2018-05-12 RX ADMIN — SODIUM CHLORIDE 1000 ML: 0.9 INJECTION, SOLUTION INTRAVENOUS at 12:05

## 2018-05-12 RX ADMIN — SODIUM CHLORIDE 1000 ML: 0.9 INJECTION, SOLUTION INTRAVENOUS at 08:05

## 2018-05-12 RX ADMIN — GADOBUTROL 8 ML: 604.72 INJECTION INTRAVENOUS at 01:05

## 2018-05-12 RX ADMIN — METHYLPREDNISOLONE SODIUM SUCCINATE 78.4 MG: 125 INJECTION, POWDER, FOR SOLUTION INTRAMUSCULAR; INTRAVENOUS at 06:05

## 2018-05-12 RX ADMIN — SODIUM CHLORIDE: 0.9 INJECTION, SOLUTION INTRAVENOUS at 06:05

## 2018-05-12 RX ADMIN — DIPHENHYDRAMINE HYDROCHLORIDE 25 MG: 25 CAPSULE ORAL at 08:05

## 2018-05-12 RX ADMIN — SODIUM CHLORIDE: 0.9 INJECTION, SOLUTION INTRAVENOUS at 02:05

## 2018-05-12 RX ADMIN — CEFEPIME HYDROCHLORIDE 2 G: 2 INJECTION, POWDER, FOR SOLUTION INTRAVENOUS at 08:05

## 2018-05-12 RX ADMIN — LINEZOLID 600 MG: 600 TABLET, FILM COATED ORAL at 08:05

## 2018-05-12 RX ADMIN — PREDNISONE 60 MG: 20 TABLET ORAL at 02:05

## 2018-05-12 RX ADMIN — OXYCODONE HYDROCHLORIDE 10 MG: 5 TABLET ORAL at 07:05

## 2018-05-12 RX ADMIN — ENOXAPARIN SODIUM 40 MG: 100 INJECTION SUBCUTANEOUS at 05:05

## 2018-05-12 RX ADMIN — OXYCODONE HYDROCHLORIDE AND ACETAMINOPHEN 1 TABLET: 5; 325 TABLET ORAL at 03:05

## 2018-05-12 RX ADMIN — ATORVASTATIN CALCIUM 10 MG: 10 TABLET, FILM COATED ORAL at 08:05

## 2018-05-12 NOTE — HPI
69 yo male with history of right knee replacement 2/28 with development of MRSA infection over surgical site on 4/12 presents from OSH for evaluation of rash.  The patient had been on Vancomycin for around 4 weeks when he began to develop fever, chills, fatigue and generalized, itchy rash around 5/3. Vancomycin was changed to Linezolid around 5/5, but patient continued with symptoms.  At OSH, he was found to have elevated WBC count with eosinophilia at 10% and elevated LFTs. Also with infiltrate in lungs on CXR.  He was subsequently transferred to [facility].  Today, patient is afebrile and feeling well.  Does complain of mild itching, but states that rash may be improving. Denies vision changes, sore throat, difficulty swallowing, oral ulcers, pain with urination, pain with defecation, CP, SOB, cough.

## 2018-05-12 NOTE — HPI
68 M with PMH prostate cancer s/p prostatectomy, prior smoker, HLD who was transferred here for evaluation of DRESS.  Transfer papers include only lab and imaging results without notes so [place] [facility] [place].  Patient had a total right knee replacement here at [facility] on 2/28/18 and then developed a subsequent MRSA infection of the knee on 4/12/18 and went for a total knee wash out / I&D at that time.  He was sent home on IV Vanc for a total of 6 week course therapy.  However, around 5/3, patient developed a diffuse maculopapular rash throughout his whole body.  He presented to OSH and was switched off of Vanc and placed on Zyvox (linezolid) and was then discharged.      He represented to [facility] (date of admission unknown, most likely 5/11, patient does not recall) with diffuse pruritic rash and was found to have a WBC of 19 and LLL infiltrate on CXR.  Labs also revealed eosinophilia, elevated LFTs, and there was concern for DRESS syndrome.  Patient was given his dose of zyvox prior to transfer as well as Zosyn.  Also of note patient had T-wave inversion on EKG at OSH.  Denies chest pain; troponin negative at OSH.    Imaging at outside hospitals:  R Knee 2 views (5/11) - no effusions, bony destruction, no significant abnormality  CXR (5/11) - infiltrate involving L base c/w PNA  Liver US (5/6) - hepatomegaly with fatty infiltration  Carotid US (5/6) - no significant plaque or stenosis  CT cervical spine (5/6) - degenerative changes of lower cervical spine, loss of the normal lordotic curve of the cervical spine possibly related to spasm  CT brain w/o contrast (5/6) - no acute changes    Upon transfer, patient was noted to be very uncomfortable, unable to stay still in bed.  Morbilliform rash is present over his entire body.  He reports his back pain is chronic but he thinks it may have gotten worse now that he has pain all over.  He reports he underwent MRI  of the spine at [facility], however the only imaging available on CD he came with is Xrays.  He denied chest pain, shortness of breath, headache, abdominal pain, vomiting, diarrhea.   He did complain of dizziness and nausea.

## 2018-05-12 NOTE — CARE UPDATE
Rapid Response Follow-up Note    Followed up with patient for proactive rounding.   Attempted IV start, unable to obtain additional peripheral access. PICC consult placed. Pt to receive NS bolus and recheck lactic acid level at 1200  Reviewed plan of care with primary RN.   Please call Rapid Response RN with any questions or concerns at  [phone]

## 2018-05-12 NOTE — NURSING
MD called requesting that labs be drawn at this time. Multiple calls attempted to call venipuncture at this time. Talked to coordinator and she stated that she would come get them in a few minutes

## 2018-05-12 NOTE — ASSESSMENT & PLAN NOTE
--MRSA infection of the knee  --patient was unable to complete course of vanc due to DRESS and was switched to linezolid  --linezolid has been resumed with ID approval  --advised nurse to collect blood cultures first  --ID consult placed, call in the AM  --follow up procal and lactate.  --patient's complaint of back pain has been chronic and subjectively worse with the onset of DRESS.  See back pain.

## 2018-05-12 NOTE — SUBJECTIVE & OBJECTIVE
Past Medical History:   Diagnosis Date    Hyperlipidemia     Prostate cancer      s/p prostatectomy,     PVC (premature ventricular contraction)        Past Surgical History:   Procedure Laterality Date    KNEE SURGERY      PROSTATECTOMY      TONSILLECTOMY       Family History     None        Social History Main Topics    Smoking status: Former Smoker     Types: Cigarettes    Smokeless tobacco: Never Used    Alcohol use 1.2 oz/week     2 Glasses of wine per week    Drug use: No    Sexual activity: Not on file       Review of patient's allergies indicates:   Allergen Reactions    Vancomycin analogues      DRESS       Medications:  Continuous Infusions:  Scheduled Meds:   atorvastatin  10 mg Oral Daily    ceFEPime (MAXIPIME) IVPB  2 g Intravenous Q8H    enoxaparin  40 mg Subcutaneous Daily    linezolid 600mg/300ml  600 mg Intravenous Q12H    sodium chloride 0.9%  1,000 mL Intravenous Once     PRN Meds:diphenhydrAMINE, oxyCODONE, oxyCODONE, sodium chloride 0.9%    Review of Systems   Constitutional: Negative for fever and chills.   HENT: Negative for sore throat and mouth sores.    Eyes: Negative for eye irritation and visual change.   Respiratory: Negative for cough and shortness of breath.    Gastrointestinal: Negative for nausea and vomiting.   Genitourinary: Negative for dysuria and genital sores.   Musculoskeletal: Negative for arthralgias.   Skin: Positive for itching and rash.   Hematologic/Lymphatic: Negative for adenopathy.     Objective:     Vital Signs (Most Recent):  Temp: 98.2 °F (36.8 °C) (05/12/18 1128)  Pulse: 105 (05/12/18 1128)  Resp: 19 (05/12/18 1128)  BP: 101/64 (05/12/18 1128)  SpO2: 95 % (05/12/18 1128) Vital Signs (24h Range):  Temp:  [97.5 °F (36.4 °C)-98.7 °F (37.1 °C)] 98.2 °F (36.8 °C)  Pulse:  [] 105  Resp:  [13-19] 19  SpO2:  [93 %-100 %] 95 %  BP: ()/(53-80) 101/64     Weight: 78.4 kg (172 lb 13.5 oz)  Body mass index is 21.04 kg/m².    Physical Exam    Constitutional: He appears well-developed and well-nourished. No distress.   HENT:   Mouth/Throat: Gums normal.  Lips normal.    Eyes: No conjunctival no injection.   Lymphadenopathy: No supraclavicular adenopathy is present.     He has no cervical adenopathy.     He has no axillary adenopathy.     He has no inguinal adenopathy.   Neurological: He is alert and oriented to person, place, and time.   Psychiatric: He has a normal mood and affect.   Skin:                          Significant Labs: All pertinent labs within the past 24 hours have been reviewed.  Lab Results   Component Value Date    WBC 20.42 (H) 05/12/2018    WBC 20.69 (H) 05/12/2018    HGB 13.1 (L) 05/12/2018    HGB 13.3 (L) 05/12/2018    HCT 37.8 (L) 05/12/2018    HCT 38.8 (L) 05/12/2018    MCV 87 05/12/2018    MCV 86 05/12/2018     05/12/2018     05/12/2018     CMP  Sodium   Date Value Ref Range Status   05/12/2018 129 (L) 136 - 145 mmol/L Final     Potassium   Date Value Ref Range Status   05/12/2018 3.6 3.5 - 5.1 mmol/L Final     Chloride   Date Value Ref Range Status   05/12/2018 95 95 - 110 mmol/L Final     CO2   Date Value Ref Range Status   05/12/2018 21 (L) 23 - 29 mmol/L Final     Glucose   Date Value Ref Range Status   05/12/2018 113 (H) 70 - 110 mg/dL Final     BUN, Bld   Date Value Ref Range Status   05/12/2018 16 8 - 23 mg/dL Final     Creatinine   Date Value Ref Range Status   05/12/2018 0.8 0.5 - 1.4 mg/dL Final     Calcium   Date Value Ref Range Status   05/12/2018 7.5 (L) 8.7 - 10.5 mg/dL Final     Total Protein   Date Value Ref Range Status   05/12/2018 4.2 (L) 6.0 - 8.4 g/dL Final     Albumin   Date Value Ref Range Status   05/12/2018 2.2 (L) 3.5 - 5.2 g/dL Final     Total Bilirubin   Date Value Ref Range Status   05/12/2018 1.6 (H) 0.1 - 1.0 mg/dL Final     Comment:     For infants and newborns, interpretation of results should be based  on gestational age, weight and in agreement with  clinical  observations.  Premature Infant recommended reference ranges:  Up to 24 hours.............<8.0 mg/dL  Up to 48 hours............<12.0 mg/dL  3-5 days..................<15.0 mg/dL  6-29 days.................<15.0 mg/dL       Alkaline Phosphatase   Date Value Ref Range Status   05/12/2018 230 (H) 55 - 135 U/L Final     AST   Date Value Ref Range Status   05/12/2018 58 (H) 10 - 40 U/L Final     ALT   Date Value Ref Range Status   05/12/2018 131 (H) 10 - 44 U/L Final     Anion Gap   Date Value Ref Range Status   05/12/2018 13 8 - 16 mmol/L Final     eGFR if    Date Value Ref Range Status   05/12/2018 >60.0 >60 mL/min/1.73 m^2 Final     eGFR if non    Date Value Ref Range Status   05/12/2018 >60.0 >60 mL/min/1.73 m^2 Final     Comment:     Calculation used to obtain the estimated glomerular filtration  rate (eGFR) is the CKD-EPI equation.        Significant Imaging: I have reviewed all pertinent imaging results/findings within the past 24 hours.

## 2018-05-12 NOTE — CONSULTS
Consult received. Full note to follow.    Please call for questions.    Thanks,    Holden Coreas  Infectious Diseases PGY-5  Spectra: [phone]  Pager: [phone]

## 2018-05-12 NOTE — H&P
[facility] Hospital Medicine  History & Physical    Patient Name: Leonid Harris III  MRN: [id number]  Admission Date: 5/12/2018  Attending Physician: Ike Martines MD   Primary Care Provider: David Lane MD    Hospital Medicine Team: [facility] HOSP MED 4 Marah Kim MD     Patient information was obtained from patient and past medical records.     Subjective:     Principal Problem:DRESS syndrome    Chief Complaint: DRESS     HPI: 68 M with PMH prostate cancer s/p prostatectomy, prior smoker, HLD who was transferred here for evaluation of DRESS.  Transfer papers include only lab and imaging results without notes so most history obtained from [facility] transfer acceptance note and patient.  Patient had a total right knee replacement here at [facility] on 2/28/18 and then developed a subsequent MRSA infection of the knee on 4/12/18 and went for a total knee wash out / I&D at that time.  He was sent home on IV Vanc for a total of 6 week course therapy.  However, around 5/3, patient developed a diffuse maculopapular rash throughout his whole body.  He presented to OSH and was switched off of Vanc and placed on Zyvox (linezolid) and was then discharged.      He represented to [facility] (date of admission unknown, most likely 5/11, patient does not recall) with diffuse pruritic rash and was found to have a WBC of 19 and LLL infiltrate on CXR.  Labs also revealed eosinophilia, elevated LFTs, and there was concern for DRESS syndrome.  Patient was given his dose of zyvox prior to transfer as well as Zosyn.  Also of note patient had T-wave inversion on EKG at OSH.  Denies chest pain; troponin negative at OSH.    Imaging at outside hospitals:  R Knee 2 views (5/11) - no effusions, bony destruction, no significant abnormality  CXR (5/11) - infiltrate involving L base c/w PNA  Liver US (5/6) - hepatomegaly with fatty infiltration  Carotid US (5/6) - no significant plaque or stenosis  CT cervical  spine (5/6) - degenerative changes of lower cervical spine, loss of the normal lordotic curve of the cervical spine possibly related to spasm  CT brain w/o contrast (5/6) - no acute changes    Upon transfer, patient was noted to be very uncomfortable, unable to stay still in bed.  Morbilliform rash is present over his entire body.  He reports his back pain is chronic but he thinks it may have gotten worse now that he has pain all over.  He reports he underwent MRI of the spine at [facility], however the only imaging available on CD he came with is Xrays.  He denied chest pain, shortness of breath, headache, abdominal pain, vomiting, diarrhea.   He did complain of dizziness and nausea.        Past Medical History:   Diagnosis Date    Hyperlipidemia     PVC (premature ventricular contraction)        Past Surgical History:   Procedure Laterality Date    KNEE SURGERY      PROSTATECTOMY      TONSILLECTOMY         Review of patient's allergies indicates:   Allergen Reactions    Vancomycin analogues      DRESS       No current facility-administered medications on file prior to encounter.      Current Outpatient Prescriptions on File Prior to Encounter   Medication Sig    atorvastatin (LIPITOR) 10 MG tablet Take 10 mg by mouth nightly.     diltiaZEM (CARDIZEM CD) 180 MG 24 hr capsule Take 180 mg by mouth once daily.    meloxicam (MOBIC) 15 MG tablet Take 1 tablet (15 mg total) by mouth once daily.    oxyCODONE-acetaminophen (PERCOCET)  mg per tablet Take 1 tablet by mouth every 4 (four) hours as needed for Pain.    oxyCODONE-acetaminophen (PERCOCET)  mg per tablet Take 1 tablet by mouth every 6 (six) hours as needed.    promethazine (PHENERGAN) 25 MG tablet Take 1 tablet (25 mg total) by mouth every 6 (six) hours as needed for Nausea.    promethazine (PHENERGAN) 25 MG tablet Take 1 tablet (25 mg total) by mouth every 6 (six) hours as needed.    zolpidem (AMBIEN) 10 mg Tab      Family History     None         Social History Main Topics    Smoking status: Former Smoker     Types: Cigarettes    Smokeless tobacco: Never Used    Alcohol use 1.2 oz/week     2 Glasses of wine per week    Drug use: No    Sexual activity: Not on file     Review of Systems   Constitutional: Negative for chills and fever.   HENT: Negative for congestion and sore throat.    Eyes: Negative for discharge and visual disturbance.   Respiratory: Negative for cough and shortness of breath.    Cardiovascular: Negative for chest pain.   Gastrointestinal: Positive for nausea. Negative for constipation, diarrhea and vomiting.   Genitourinary: Negative for dysuria and frequency.   Musculoskeletal: Negative for joint swelling and myalgias.   Skin: Positive for rash.   Neurological: Positive for dizziness.     Objective:     Vital Signs (Most Recent):  Temp: 98.6 °F (37 °C) (05/12/18 0217)  Pulse: 110 (05/12/18 0217)  Resp: 13 (05/12/18 0217)  BP: 97/60 (05/12/18 0217)  SpO2: 99 % (05/12/18 0217) Vital Signs (24h Range):  Temp:  [98.6 °F (37 °C)-98.7 °F (37.1 °C)] 98.6 °F (37 °C)  Pulse:  [] 110  Resp:  [13-16] 13  SpO2:  [96 %-100 %] 99 %  BP: ()/(53-70) 97/60     Weight: 78.4 kg (172 lb 13.5 oz)  Body mass index is 21.04 kg/m².    Physical Exam   Constitutional: He appears well-developed.   Uncomfortable appearing, unable to lie still in bed   HENT:   Head: Normocephalic and atraumatic.   Eyes: Conjunctivae and EOM are normal. Right eye exhibits no discharge. Left eye exhibits no discharge.   Neck: Normal range of motion.   Cardiovascular: Normal rate and regular rhythm.    No murmur heard.  Pulmonary/Chest: Effort normal. No respiratory distress.   Diminished breath sounds over the LLL base   Abdominal: Soft. Bowel sounds are normal. There is no tenderness.   Musculoskeletal: Normal range of motion.   Neurological: He is alert.   Skin: Skin is warm and dry.   Widespread bright red morbilliform rash covering the extremities, back,  chest, abdomen, groin, and some of the neck.  Seborrheic keratoses also present over the back.           CRANIAL NERVES     CN III, IV, VI   Extraocular motions are normal.        Significant Labs: CBC: No results for input(s): WBC, HGB, HCT, PLT in the last 48 hours.  CMP: No results for input(s): NA, K, CL, CO2, GLU, BUN, CREATININE, CALCIUM, PROT, ALBUMIN, BILITOT, ALKPHOS, AST, ALT, ANIONGAP, EGFRNONAA in the last 48 hours.    Invalid input(s): ESTGFAFRICA    Significant Imaging: I have reviewed and interpreted all pertinent imaging results/findings within the past 24 hours.    Assessment/Plan:     * DRESS syndrome    --morbilliform rash present over limbs, abdomen, chest, back with liver involvement (AST 69,  at OSH) and eosinophilia (10% at OSH) without renal involvement  --he is afebrile at this time, hemodynamically stable  --CBC and CMP pending  --LFTs and eosinophils were elevated at OSH  --dermatology consult placed, call in the AM  --IV fluids  --solumedrol 1 mg/kg ordered until derm can evaluate the patient  --vancomycin added to patient allergies  --PRN percocet for pain which significantly alleviated the patient's pain        MRSA infection    --MRSA infection of the knee  --patient was unable to complete course of vanc due to DRESS and was switched to linezolid  --linezolid has been resumed with ID approval  --advised nurse to collect blood cultures first  --ID consult placed, call in the AM  --follow up procal and lactate.  --hemodynamically stable at this time  --patient's complaint of back pain has been chronic and subjectively worse with the onset of DRESS.  See back pain.          Back pain    --there was concern there may be seeding of MRSA to the spine given patient's back pain complaint  --patient's complaint of back pain has been chronic and subjectively worse with the onset of DRESS pain.   --has no tenderness on palpation along the length of the spine  --has been taking meloxicam  long term for the back since before the knee problems started  --did not order MRI of the back because it's possible he already underwent this imaging at the OSH  --would attempt to obtain those records from [place] prior to ordering MRI here        HCAP (healthcare-associated pneumonia)    --CXR (5/12) on my read reveals LLL infiltrate suspicious for pneumonia.  Follow for official read  --received linezolid and zosyn at OSH  --will continue linezolid here and start cefepime (ID approved)  --ID consult given MRSA of knee and HCAP in the setting of DRESS  --respiratory culture ordered        T wave inversion in EKG    --EKGs at OSH revealed T wave inversions in V4 and V5.  Trop negative at OSH  --repeat EKG ordered here  --patient denies chest pain or SOB        Hyperlipidemia    --continue home atorvastatin 10 mg daily          VTE Risk Mitigation         Ordered     enoxaparin injection 40 mg  Daily      05/12/18 0402     IP VTE HIGH RISK PATIENT  Once      05/12/18 0402             Marah Kim MD  Department of Hospital Medicine   [facility]

## 2018-05-12 NOTE — HPI
69 y/o male with a history of prostate cancer s/p prostatectomy, right knee replacement 2/27/18, patient complicated with MRSA infection of right knee s/p I+D 4/12/18 with prosthetic material retention, intra operatives cultures positive for MRSA, no blood cultures taken at that time; patient was discharge on IV vancomycin to complete 4 weeks, but around 1 week ago he developed a rash that started on chest/abdomen and lower extremities then spread to all body; vancomycin was discontinued due to concerns of DRESS syndrome, he was started on linezolid and has been tolerating it well with no adverse events so far; rash continue to worse so he seek medical care and was admitted at an outside facility and  [place] [facility] [place]. On evaluation today, he was feeling better since arrival, and had no particular complains (apart from the rash).

## 2018-05-12 NOTE — ASSESSMENT & PLAN NOTE
68 y.o. male with R knee PJI and subsequent DRESS syndrome possibly related to vanc now with acute low back pain.  Will obtain MRI lspine to evaluate for infectious process.

## 2018-05-12 NOTE — HPI
68M with h/o R UKA by Dr. Shelton 2/28/18 with subsequent arthroscopic I&D for MRSA infection 4/12/18 presents as transfer from OSH with DRESS syndrome.    He was started on IV Vanc for a total of 6 week course therapy postop.  On 5/3, patient developed a diffuse maculopapular rash throughout his whole body.  He presented to OSH and was switched off of Vanc and placed on Zyvox (linezolid) and was then discharged.       He represented to OSH with worsening of rash and was found to have a WBC of 19 and LLL infiltrate on CXR.  Labs also revealed eosinophilia, elevated LFTs, and there was concern for DRESS syndrome.  Patient was given his dose of zyvox prior to transfer as well as Zosyn.      Patient has no complaints from R knee.  Doing well.  Chronic low back pain has acutely worsened however in the past few days.  Denies radicular symptoms, bowel/bladder dysfunction, numbness, tingling, subjective weakness.

## 2018-05-12 NOTE — ASSESSMENT & PLAN NOTE
--there was concern there may be seeding of MRSA to the spine given patient's back pain complaint  --patient's complaint of back pain has been chronic and subjectively worse with the onset of DRESS pain.   --has no tenderness on palpation along the length of the spine  --has been taking meloxicam long term for the back since before the knee problems started  --did not order MRI of the back because it's possible he already underwent this imaging at the OSH  --would attempt to obtain those records from [facility] prior to ordering MRI here

## 2018-05-12 NOTE — ASSESSMENT & PLAN NOTE
67 yo presenting with morbilliform eruption with facial edema associated with constitutional symptoms, eosinophilia, elevated LFTs, and suspected pneumonitis in the setting of Vancomycin x four weeks.  Most consistent with DRESS syndrome.  -Avoid Vancomycin in future  -OK to continue Linezolid as Vancomycin much more likely culprit  -Will likely need >6 weeks of Prednisone over slow taper.  -Give him Prednisone 60mg/d for now. Discharge him on this dose, and he can be tapered as an outpatient in Dermatology clinic.  -Can also use Triamcinolone 0.1% ointment BID to rash as needed   -Use gentle soaps (Dove, Cetaphil) and moisturizers (Cetaphil, Cerave, Aquaphor)  -Fluid and electrolyte replacement; high caloric diet  -Monitor patient with CBC, CMP daily until discharge to assess treatment response  -Recommend obtaining TSH.   -If needed, based on signs/symptoms, can increase Prednisone up to 1mg/kg/day  -At 2 months, he will need repeat CBC, CMP, TSH, EKG, and possibly CXR

## 2018-05-12 NOTE — CONSULTS
[facility]  Orthopedics  Consult Note    Patient Name: Leonid Harris III  MRN: [id number]  Admission Date: 5/12/2018  Hospital Length of Stay: 0 days  Attending Provider: Ike Martines MD  Primary Care Provider: David Lane MD    Patient information was obtained from patient, relative(s), past medical records and ER records.     Consults  Subjective:     Principal Problem:DRESS syndrome    Chief Complaint: No chief complaint on file.       HPI: 68M with h/o R UKA by Dr. Shelton 2/28/18 with subsequent arthroscopic I&D for MRSA infection 4/12/18 presents as transfer from OSH with DRESS syndrome.    He was started on IV Vanc for a total of 6 week course therapy postop.  On 5/3, patient developed a diffuse maculopapular rash throughout his whole body.  He presented to OSH and was switched off of Vanc and placed on Zyvox (linezolid) and was then discharged.       He represented to OSH with worsening of rash and was found to have a WBC of 19 and LLL infiltrate on CXR.  Labs also revealed eosinophilia, elevated LFTs, and there was concern for DRESS syndrome.  Patient was given his dose of zyvox prior to transfer as well as Zosyn.      Patient has no complaints from R knee.  Doing well.  Chronic low back pain has acutely worsened however in the past few days.  Denies radicular symptoms, bowel/bladder dysfunction, numbness, tingling, subjective weakness.       Past Medical History:   Diagnosis Date    Hyperlipidemia     Prostate cancer      s/p prostatectomy,     PVC (premature ventricular contraction)        Past Surgical History:   Procedure Laterality Date    KNEE SURGERY      PROSTATECTOMY      TONSILLECTOMY         Review of patient's allergies indicates:   Allergen Reactions    Vancomycin analogues      DRESS       Current Facility-Administered Medications   Medication    atorvastatin tablet 10 mg    ceFEPIme injection 2 g    diphenhydrAMINE capsule 25 mg    enoxaparin  "injection 40 mg    linezolid 600mg/300ml IVPB 600 mg    oxyCODONE immediate release tablet 10 mg    oxyCODONE immediate release tablet 5 mg    sodium chloride 0.9% bolus 1,000 mL    sodium chloride 0.9% flush 5 mL     Family History     None        Social History Main Topics    Smoking status: Former Smoker     Types: Cigarettes    Smokeless tobacco: Never Used    Alcohol use 1.2 oz/week     2 Glasses of wine per week    Drug use: No    Sexual activity: Not on file     ROS   Constitutional: positive for fevers  Eyes: no visual changes  ENT: negative for hearing loss  Respiratory: negative for dyspnea  Cardiovascular: negative for chest pain  Gastrointestinal: negative for abdominal pain  Genitourinary: negative for dysuria  Neurological: negative for headaches  Behavioral/Psych: negative for hallucinations  Endocrine: negative for temperature intolerance    Objective:     Vital Signs (Most Recent):  Temp: 98.2 °F (36.8 °C) (05/12/18 1128)  Pulse: 105 (05/12/18 1128)  Resp: 19 (05/12/18 1128)  BP: 101/64 (05/12/18 1128)  SpO2: 95 % (05/12/18 1128) Vital Signs (24h Range):  Temp:  [97.5 °F (36.4 °C)-98.7 °F (37.1 °C)] 98.2 °F (36.8 °C)  Pulse:  [] 105  Resp:  [13-19] 19  SpO2:  [93 %-100 %] 95 %  BP: ()/(53-80) 101/64     Weight: 78.4 kg (172 lb 13.5 oz)  Height: 6' 4" (193 cm)  Body mass index is 21.04 kg/m².      Intake/Output Summary (Last 24 hours) at 05/12/18 1235  Last data filed at 05/12/18 1200   Gross per 24 hour   Intake             1300 ml   Output             1650 ml   Net             -350 ml       Ortho/SPM Exam  Vitals: Afebrile.  Vital signs stable.  General: No acute distress.  HEENT: Normocephalic. Atraumatic. Sclera anicteric. No tracheal deviation.  Cardio: Regular rate and rhythm.  Chest: No increased work of breathing.  Abdominal: Nondistended.  Extremities: No cyanosis.  No clubbing.  No edema.  Palpable pulses.  Skin: Generalized morbiliform erythematous rash.  Neuro: " Awake. Alert. Oriented to person, place, time, and situation.  Psych: Normal appearance. Cooperative.  Appropriate mood.  Appropriate affect.      MSK:  RLE:   Scars well healed  No deformity  No ecchymoses  Mild knee swelling  No TTP knee capsule  No pain with knee ROM  SILT T/SP/DP/Campbell/Sa  Motor intact T/SP/DP  Brisk cap refill  Warm well perfused extremities  PT palpable and equal to contralateral    Motor             RIGHT  LEFT  Iliopsoas (L2,3)       5/5    5/5  Quadriceps (L3,4)      5/5    5/5   Tibialis Anterior (L4.5)         5/5    5/5   Extensor Halucis Longus (L5)    5/5    5/5     Gastrocnemius/Soleus (S1)     5/5    5/5  Flexor Hallucis Longus(S2)     5/5    5/5    Sensation (a=absent, i=impaired, n=normal)       RIGHT   LEFT  L2 dermatome       n     n  L3 dermatome       n     n  L4 dermatome       n     n  L5 dermatome       n     n  S1 dermatome       n     n  S2 dermatome       n     n    Reflexes        RIGHT  LEFT  Patellar       2+     2+  Achilles       2+     2+  Babinski      neg    neg  Clonus          neg    neg    Significant Labs:   CBC:   Recent Labs  Lab 05/12/18  0504   WBC 20.42*  20.69*   HGB 13.1*  13.3*   HCT 37.8*  38.8*     261     CMP:   Recent Labs  Lab 05/12/18  0505 05/12/18  0709   * 129*   K 3.7 3.6   CL 94* 95   CO2 21* 21*   GLU 95 113*   BUN 17 16   CREATININE 0.9 0.8   CALCIUM 7.5* 7.5*   PROT 4.5* 4.2*   ALBUMIN 2.3* 2.2*   BILITOT 1.5* 1.6*   ALKPHOS 246* 230*   AST 62* 58*   * 131*   ANIONGAP 15 13   EGFRNONAA >60.0 >60.0     All pertinent labs within the past 24 hours have been reviewed.    Significant Imaging: I have reviewed all pertinent imaging results/findings.    Assessment/Plan:     Infection of prosthetic right knee joint    68 y.o. male with R knee PJI and subsequent DRESS syndrome possibly related to vanc now with acute low back pain.  Will obtain MRI lspine to evaluate for infectious process.              Thank you for your  consult.     Ned Dickey MD  Orthopedics  [facility]

## 2018-05-12 NOTE — ASSESSMENT & PLAN NOTE
--EKGs at OSH revealed T wave inversions in V4 and V5.  Trop negative at OSH  --repeat EKG ordered here  --patient denies chest pain or SOB

## 2018-05-12 NOTE — ASSESSMENT & PLAN NOTE
--CXR (5/12) on my read reveals LLL infiltrate suspicious for pneumonia.  Follow for official read  --received linezolid and zosyn at OSH  --will continue linezolid here and start cefepime (ID approved)  --ID consult given MRSA of knee and HCAP in the setting of DRESS  --respiratory culture ordered

## 2018-05-12 NOTE — CONSULTS
[facility]  Dermatology  Consult Note    Patient Name: Leonid Harris III  MRN: [id number]  Admission Date: 5/12/2018  Hospital Length of Stay: 0 days  Attending Physician: Ike Martines MD  Primary Care Provider: David Lane MD     Inpatient consult to Dermatology  Consult performed by: NATASHA MORRIS  Consult ordered by: CURRY MAJOR        Subjective:     Principal Problem:DRESS syndrome    HPI:  67 yo male with history of right knee replacement 2/28 with development of MRSA infection over surgical site on 4/12 presents from OSH for evaluation of rash.  The patient had been on Vancomycin for around 4 weeks when he began to develop fever, chills, fatigue and generalized, itchy rash around 5/3. Vancomycin was changed to Linezolid around 5/5, but patient continued with symptoms.  At OSH, he was found to have elevated WBC count with eosinophilia at 10% and elevated LFTs. Also with infiltrate in lungs on CXR.  He was subsequently transferred to [facility].  Today, patient is afebrile and feeling well.  Does complain of mild itching, but states that rash may be improving. Denies vision changes, sore throat, difficulty swallowing, oral ulcers, pain with urination, pain with defecation, CP, SOB, cough.    Past Medical History:   Diagnosis Date    Hyperlipidemia     Prostate cancer      s/p prostatectomy,     PVC (premature ventricular contraction)        Past Surgical History:   Procedure Laterality Date    KNEE SURGERY      PROSTATECTOMY      TONSILLECTOMY       Family History     None        Social History Main Topics    Smoking status: Former Smoker     Types: Cigarettes    Smokeless tobacco: Never Used    Alcohol use 1.2 oz/week     2 Glasses of wine per week    Drug use: No    Sexual activity: Not on file       Review of patient's allergies indicates:   Allergen Reactions    Vancomycin analogues      DRESS       Medications:  Continuous Infusions:  Scheduled  Meds:   atorvastatin  10 mg Oral Daily    ceFEPime (MAXIPIME) IVPB  2 g Intravenous Q8H    enoxaparin  40 mg Subcutaneous Daily    linezolid 600mg/300ml  600 mg Intravenous Q12H    sodium chloride 0.9%  1,000 mL Intravenous Once     PRN Meds:diphenhydrAMINE, oxyCODONE, oxyCODONE, sodium chloride 0.9%    Review of Systems   Constitutional: Negative for fever and chills.   HENT: Negative for sore throat and mouth sores.    Eyes: Negative for eye irritation and visual change.   Respiratory: Negative for cough and shortness of breath.    Gastrointestinal: Negative for nausea and vomiting.   Genitourinary: Negative for dysuria and genital sores.   Musculoskeletal: Negative for arthralgias.   Skin: Positive for itching and rash.   Hematologic/Lymphatic: Negative for adenopathy.     Objective:     Vital Signs (Most Recent):  Temp: 98.2 °F (36.8 °C) (05/12/18 1128)  Pulse: 105 (05/12/18 1128)  Resp: 19 (05/12/18 1128)  BP: 101/64 (05/12/18 1128)  SpO2: 95 % (05/12/18 1128) Vital Signs (24h Range):  Temp:  [97.5 °F (36.4 °C)-98.7 °F (37.1 °C)] 98.2 °F (36.8 °C)  Pulse:  [] 105  Resp:  [13-19] 19  SpO2:  [93 %-100 %] 95 %  BP: ()/(53-80) 101/64     Weight: 78.4 kg (172 lb 13.5 oz)  Body mass index is 21.04 kg/m².    Physical Exam   Constitutional: He appears well-developed and well-nourished. No distress.   HENT:   Mouth/Throat: Gums normal.  Lips normal.    Eyes: No conjunctival no injection.   Lymphadenopathy: No supraclavicular adenopathy is present.     He has no cervical adenopathy.     He has no axillary adenopathy.     He has no inguinal adenopathy.   Neurological: He is alert and oriented to person, place, and time.   Psychiatric: He has a normal mood and affect.   Skin:                          Significant Labs: All pertinent labs within the past 24 hours have been reviewed.  Lab Results   Component Value Date    WBC 20.42 (H) 05/12/2018    WBC 20.69 (H) 05/12/2018    HGB 13.1 (L) 05/12/2018    HGB  13.3 (L) 05/12/2018    HCT 37.8 (L) 05/12/2018    HCT 38.8 (L) 05/12/2018    MCV 87 05/12/2018    MCV 86 05/12/2018     05/12/2018     05/12/2018     CMP  Sodium   Date Value Ref Range Status   05/12/2018 129 (L) 136 - 145 mmol/L Final     Potassium   Date Value Ref Range Status   05/12/2018 3.6 3.5 - 5.1 mmol/L Final     Chloride   Date Value Ref Range Status   05/12/2018 95 95 - 110 mmol/L Final     CO2   Date Value Ref Range Status   05/12/2018 21 (L) 23 - 29 mmol/L Final     Glucose   Date Value Ref Range Status   05/12/2018 113 (H) 70 - 110 mg/dL Final     BUN, Bld   Date Value Ref Range Status   05/12/2018 16 8 - 23 mg/dL Final     Creatinine   Date Value Ref Range Status   05/12/2018 0.8 0.5 - 1.4 mg/dL Final     Calcium   Date Value Ref Range Status   05/12/2018 7.5 (L) 8.7 - 10.5 mg/dL Final     Total Protein   Date Value Ref Range Status   05/12/2018 4.2 (L) 6.0 - 8.4 g/dL Final     Albumin   Date Value Ref Range Status   05/12/2018 2.2 (L) 3.5 - 5.2 g/dL Final     Total Bilirubin   Date Value Ref Range Status   05/12/2018 1.6 (H) 0.1 - 1.0 mg/dL Final     Comment:     For infants and newborns, interpretation of results should be based  on gestational age, weight and in agreement with clinical  observations.  Premature Infant recommended reference ranges:  Up to 24 hours.............<8.0 mg/dL  Up to 48 hours............<12.0 mg/dL  3-5 days..................<15.0 mg/dL  6-29 days.................<15.0 mg/dL       Alkaline Phosphatase   Date Value Ref Range Status   05/12/2018 230 (H) 55 - 135 U/L Final     AST   Date Value Ref Range Status   05/12/2018 58 (H) 10 - 40 U/L Final     ALT   Date Value Ref Range Status   05/12/2018 131 (H) 10 - 44 U/L Final     Anion Gap   Date Value Ref Range Status   05/12/2018 13 8 - 16 mmol/L Final     eGFR if    Date Value Ref Range Status   05/12/2018 >60.0 >60 mL/min/1.73 m^2 Final     eGFR if non    Date Value Ref Range  Status   05/12/2018 >60.0 >60 mL/min/1.73 m^2 Final     Comment:     Calculation used to obtain the estimated glomerular filtration  rate (eGFR) is the CKD-EPI equation.        Significant Imaging: I have reviewed all pertinent imaging results/findings within the past 24 hours.    Assessment/Plan:     * DRESS syndrome    67 yo presenting with morbilliform eruption with facial edema associated with constitutional symptoms, eosinophilia, elevated LFTs, and suspected pneumonitis in the setting of Vancomycin x four weeks.  Most consistent with DRESS syndrome.  -3mm punch biopsy for H&E taken from right anterior thigh; results can take 7 days to return. DRESS is a clinical diagnosis, begin treatment now.  -Avoid Vancomycin in future  -OK to continue Linezolid as Vancomycin much more likely culprit  -Will likely need >6 weeks of Prednisone over slow taper.  -Give him Prednisone 60mg/d for now. Discharge him on this dose, and he can be tapered as an outpatient in Dermatology clinic.  -Can also use Triamcinolone 0.1% ointment BID to rash as needed   -Use gentle soaps (Dove, Cetaphil) and moisturizers (Cetaphil, Cerave, Aquaphor)  -Fluid and electrolyte replacement; high caloric diet  -Monitor patient with CBC, CMP daily until discharge to assess treatment response  -Recommend obtaining TSH.   -If needed, based on signs/symptoms, can increase Prednisone up to 1mg/kg/day  -At 2 months, he will need repeat CBC, CMP, TSH, EKG, and possibly CXR              Thank you for your consult. I will follow-up with patient. Please contact us if you have any additional questions.    Pankaj Beckford MD  Dermatology  [facility]

## 2018-05-12 NOTE — SUBJECTIVE & OBJECTIVE
Past Medical History:   Diagnosis Date    Hyperlipidemia     PVC (premature ventricular contraction)        Past Surgical History:   Procedure Laterality Date    KNEE SURGERY      PROSTATECTOMY      TONSILLECTOMY         Review of patient's allergies indicates:   Allergen Reactions    Vancomycin analogues      DRESS       No current facility-administered medications on file prior to encounter.      Current Outpatient Prescriptions on File Prior to Encounter   Medication Sig    atorvastatin (LIPITOR) 10 MG tablet Take 10 mg by mouth nightly.     diltiaZEM (CARDIZEM CD) 180 MG 24 hr capsule Take 180 mg by mouth once daily.    meloxicam (MOBIC) 15 MG tablet Take 1 tablet (15 mg total) by mouth once daily.    oxyCODONE-acetaminophen (PERCOCET)  mg per tablet Take 1 tablet by mouth every 4 (four) hours as needed for Pain.    oxyCODONE-acetaminophen (PERCOCET)  mg per tablet Take 1 tablet by mouth every 6 (six) hours as needed.    promethazine (PHENERGAN) 25 MG tablet Take 1 tablet (25 mg total) by mouth every 6 (six) hours as needed for Nausea.    promethazine (PHENERGAN) 25 MG tablet Take 1 tablet (25 mg total) by mouth every 6 (six) hours as needed.    zolpidem (AMBIEN) 10 mg Tab      Family History     None        Social History Main Topics    Smoking status: Former Smoker     Types: Cigarettes    Smokeless tobacco: Never Used    Alcohol use 1.2 oz/week     2 Glasses of wine per week    Drug use: No    Sexual activity: Not on file     Review of Systems   Constitutional: Negative for chills and fever.   HENT: Negative for congestion and sore throat.    Eyes: Negative for discharge and visual disturbance.   Respiratory: Negative for cough and shortness of breath.    Cardiovascular: Negative for chest pain.   Gastrointestinal: Positive for nausea. Negative for constipation, diarrhea and vomiting.   Genitourinary: Negative for dysuria and frequency.   Musculoskeletal: Negative for joint  swelling and myalgias.   Skin: Positive for rash.   Neurological: Positive for dizziness.     Objective:     Vital Signs (Most Recent):  Temp: 98.6 °F (37 °C) (05/12/18 0217)  Pulse: 110 (05/12/18 0217)  Resp: 13 (05/12/18 0217)  BP: 97/60 (05/12/18 0217)  SpO2: 99 % (05/12/18 0217) Vital Signs (24h Range):  Temp:  [98.6 °F (37 °C)-98.7 °F (37.1 °C)] 98.6 °F (37 °C)  Pulse:  [] 110  Resp:  [13-16] 13  SpO2:  [96 %-100 %] 99 %  BP: ()/(53-70) 97/60     Weight: 78.4 kg (172 lb 13.5 oz)  Body mass index is 21.04 kg/m².    Physical Exam   Constitutional: He appears well-developed.   Uncomfortable appearing, unable to lie still in bed   HENT:   Head: Normocephalic and atraumatic.   Eyes: Conjunctivae and EOM are normal. Right eye exhibits no discharge. Left eye exhibits no discharge.   Neck: Normal range of motion.   Cardiovascular: Normal rate and regular rhythm.    No murmur heard.  Pulmonary/Chest: Effort normal. No respiratory distress.   Diminished breath sounds over the LLL base   Abdominal: Soft. Bowel sounds are normal. There is no tenderness.   Musculoskeletal: Normal range of motion.   Neurological: He is alert.   Skin: Skin is warm and dry.   Widespread bright red morbilliform rash covering the extremities, back, chest, abdomen, groin, and some of the neck.  Seborrheic keratoses also present over the back.           CRANIAL NERVES     CN III, IV, VI   Extraocular motions are normal.        Significant Labs: CBC: No results for input(s): WBC, HGB, HCT, PLT in the last 48 hours.  CMP: No results for input(s): NA, K, CL, CO2, GLU, BUN, CREATININE, CALCIUM, PROT, ALBUMIN, BILITOT, ALKPHOS, AST, ALT, ANIONGAP, EGFRNONAA in the last 48 hours.    Invalid input(s): ESTGFAFRICA    Significant Imaging: I have reviewed and interpreted all pertinent imaging results/findings within the past 24 hours.

## 2018-05-12 NOTE — CONSULTS
[facility]  Infectious Disease  Consult Note    Patient Name: Leonid Harris III  MRN: [id number]  Admission Date: 5/12/2018  Hospital Length of Stay: 0 days  Attending Physician: Ike Martines MD  Primary Care Provider: David Lane MD     Isolation Status: No active isolations    Patient information was obtained from patient, spouse/SO, past medical records and ER records.      Consults  Assessment/Plan:     Infection of prosthetic right knee joint    69 y/o male with a history of prostate cancer s/p prostatectomy, right knee replacement 2/27/18, patient complicated with MRSA infection of right knee s/p I+D 4/12/18 with prosthetic material retention, intra operatives cultures positive for MRSA, on IV vancomycin for 4 weeks, before developing DRESS.    Recommendations:  - continue linezolid 600 mg PO BID  - anticipate around 2 more weeks of linezolid (monitor for side effects thrombocytopenia, neuropathy)  - likely will need long term antibiotics suppression due to retention of prosthetic material              Thank you for your consult. I will follow-up with patient. Please contact us if you have any additional questions.    Holden Coreas MD  Infectious Diseases Fellow, PGY-5  Spectra: [phone]  Pager: [phone]  [facility]        Subjective:     Principal Problem: DRESS syndrome    HPI: 69 y/o male with a history of prostate cancer s/p prostatectomy, right knee replacement 2/27/18, patient complicated with MRSA infection of right knee s/p I+D 4/12/18 with prosthetic material retention, intra operatives cultures positive for MRSA, no blood cultures taken at that time; patient was discharge on IV vancomycin to complete 4 weeks, but around 1 week ago he developed a rash that started on chest/abdomen and lower extremities then spread to all body; vancomycin was discontinued due to concerns of DRESS syndrome, he was started on linezolid and has been tolerating  it well with no adverse events so far; rash continue to worse so he seek medical care and was admitted at an outside facility and  Transferred to [facility] for evaluation of DRESS. On evaluation today, he was feeling better since arrival, and had no particular complains (apart from the rash).    Past Medical History:   Diagnosis Date    Hyperlipidemia     Prostate cancer      s/p prostatectomy,     PVC (premature ventricular contraction)        Past Surgical History:   Procedure Laterality Date    KNEE SURGERY      PROSTATECTOMY      TONSILLECTOMY         Review of patient's allergies indicates:   Allergen Reactions    Vancomycin analogues      DRESS       Medications:  Prescriptions Prior to Admission   Medication Sig    atorvastatin (LIPITOR) 10 MG tablet Take 10 mg by mouth nightly.     diltiaZEM (CARDIZEM CD) 180 MG 24 hr capsule Take 180 mg by mouth once daily.    meloxicam (MOBIC) 15 MG tablet Take 1 tablet (15 mg total) by mouth once daily.    oxyCODONE-acetaminophen (PERCOCET)  mg per tablet Take 1 tablet by mouth every 4 (four) hours as needed for Pain.    oxyCODONE-acetaminophen (PERCOCET)  mg per tablet Take 1 tablet by mouth every 6 (six) hours as needed.    promethazine (PHENERGAN) 25 MG tablet Take 1 tablet (25 mg total) by mouth every 6 (six) hours as needed for Nausea.    promethazine (PHENERGAN) 25 MG tablet Take 1 tablet (25 mg total) by mouth every 6 (six) hours as needed.    zolpidem (AMBIEN) 10 mg Tab      Antibiotics     Start     Stop Route Frequency Ordered    05/12/18 0509  linezolid 600mg/300ml IVPB 600 mg      -- IV Every 12 hours (non-standard times) 05/12/18 0510        Antifungals     None        Antivirals     None             There is no immunization history on file for this patient.    Family History     None        Social History     Social History    Marital status:      Spouse name: N/A    Number of children: N/A    Years of education: N/A     Social  History Main Topics    Smoking status: Former Smoker     Types: Cigarettes    Smokeless tobacco: Never Used    Alcohol use 1.2 oz/week     2 Glasses of wine per week    Drug use: No    Sexual activity: Not Asked     Other Topics Concern    None     Social History Narrative    None     Review of Systems   Constitutional: Negative for chills and fever.   HENT: Negative for sore throat.    Respiratory: Negative for cough, chest tightness and shortness of breath.    Cardiovascular: Negative for chest pain, palpitations and leg swelling.   Gastrointestinal: Negative for abdominal distention, abdominal pain, diarrhea, nausea and vomiting.   Genitourinary: Negative for dysuria, flank pain and urgency.   Skin: Negative for rash (all body).   Neurological: Negative for dizziness, light-headedness and numbness.   Psychiatric/Behavioral: Negative for confusion.     Objective:     Vital Signs (Most Recent):  Temp: 98.2 °F (36.8 °C) (05/12/18 1128)  Pulse: 105 (05/12/18 1128)  Resp: 19 (05/12/18 1128)  BP: 101/64 (05/12/18 1128)  SpO2: 95 % (05/12/18 1128) Vital Signs (24h Range):  Temp:  [97.5 °F (36.4 °C)-98.7 °F (37.1 °C)] 98.2 °F (36.8 °C)  Pulse:  [] 105  Resp:  [13-19] 19  SpO2:  [93 %-100 %] 95 %  BP: ()/(53-80) 101/64     Weight: 78.4 kg (172 lb 13.5 oz)  Body mass index is 21.04 kg/m².    Estimated Creatinine Clearance: 95.3 mL/min (based on SCr of 0.8 mg/dL).    Physical Exam   Constitutional: He is oriented to person, place, and time. No distress.   HENT:   Head: Normocephalic and atraumatic.   Mouth/Throat: No oropharyngeal exudate.   Eyes: No scleral icterus.   Cardiovascular: Normal rate, regular rhythm and normal heart sounds.  Exam reveals no gallop and no friction rub.    No murmur heard.  Pulmonary/Chest: Effort normal and breath sounds normal. No respiratory distress. He has no wheezes. He has no rales.   Abdominal: Soft. Bowel sounds are normal. He exhibits no distension. There is no  tenderness. There is no rebound and no guarding.   Musculoskeletal: He exhibits no edema.   No pain right knee   Neurological: He is alert and oriented to person, place, and time. No cranial nerve deficit.   Skin:   Morbiliform rash over all body (including face)   Vitals reviewed.      Significant Labs:   Bilirubin:   Recent Labs  Lab 05/12/18  0505 05/12/18  0709   BILITOT 1.5* 1.6*     Blood Culture: No results for input(s): LABBLOO in the last 4320 hours.  BMP:   Recent Labs  Lab 05/12/18  0709   *   *   K 3.6   CL 95   CO2 21*   BUN 16   CREATININE 0.8   CALCIUM 7.5*     CBC:   Recent Labs  Lab 05/12/18  0504   WBC 20.42*  20.69*   HGB 13.1*  13.3*   HCT 37.8*  38.8*     261     CMP:   Recent Labs  Lab 05/12/18  0505 05/12/18  0709   * 129*   K 3.7 3.6   CL 94* 95   CO2 21* 21*   GLU 95 113*   BUN 17 16   CREATININE 0.9 0.8   CALCIUM 7.5* 7.5*   PROT 4.5* 4.2*   ALBUMIN 2.3* 2.2*   BILITOT 1.5* 1.6*   ALKPHOS 246* 230*   AST 62* 58*   * 131*   ANIONGAP 15 13   EGFRNONAA >60.0 >60.0     Microbiology Results (last 7 days)     Procedure Component Value Units Date/Time    Blood culture [[id number]] Collected:  05/12/18 0709    Order Status:  Sent Specimen:  Blood Updated:  05/12/18 0720    Blood culture [[id number]] Collected:  05/12/18 0505    Order Status:  Sent Specimen:  Blood Updated:  05/12/18 0544    Culture, Respiratory with Gram Stain [[id number]]     Order Status:  No result Specimen:  Respiratory           Significant Imaging: I have reviewed all pertinent imaging results/findings within the past 24 hours.

## 2018-05-12 NOTE — CARE UPDATE
Rapid Response Follow-up Note    Followed up with patient for proactive rounding.   No acute issues at this time. Vital signs within normal limits Repeat Lactic Acid 3  Reviewed plan of care with primary RN.   Please call Rapid Response RN with any questions or concerns at  [phone]

## 2018-05-12 NOTE — ASSESSMENT & PLAN NOTE
69 y/o male with a history of prostate cancer s/p prostatectomy, right knee replacement 2/27/18, patient complicated with MRSA infection of right knee s/p I+D 4/12/18 with prosthetic material retention, intra operatives cultures positive for MRSA, on IV vancomycin for 4 weeks, before developing DRESS.    Recommendations:  - continue linezolid 600 mg PO BID  - anticipate around 2 more weeks of linezolid (monitor for side effects thrombocytopenia, neuropathy)  - likely will need long term antibiotics suppression due to retention of prosthetic material

## 2018-05-12 NOTE — ASSESSMENT & PLAN NOTE
--morbilliform rash present over limbs, abdomen, chest, back with liver involvement (AST 69,  at OSH) and eosinophilia (10% at OSH) without renal involvement  --he is afebrile at this time  --CBC and CMP pending  --LFTs and eosinophils were elevated at OSH  --dermatology consult placed, call in the AM  --solumedrol 1 mg/kg ordered until derm can evaluate the patient  --vancomycin added to patient allergies  --PRN percocet for pain which significantly alleviated the patient's pain

## 2018-05-12 NOTE — SUBJECTIVE & OBJECTIVE
Past Medical History:   Diagnosis Date    Hyperlipidemia     Prostate cancer      s/p prostatectomy,     PVC (premature ventricular contraction)        Past Surgical History:   Procedure Laterality Date    KNEE SURGERY      PROSTATECTOMY      TONSILLECTOMY         Review of patient's allergies indicates:   Allergen Reactions    Vancomycin analogues      DRESS       Medications:  Prescriptions Prior to Admission   Medication Sig    atorvastatin (LIPITOR) 10 MG tablet Take 10 mg by mouth nightly.     diltiaZEM (CARDIZEM CD) 180 MG 24 hr capsule Take 180 mg by mouth once daily.    meloxicam (MOBIC) 15 MG tablet Take 1 tablet (15 mg total) by mouth once daily.    oxyCODONE-acetaminophen (PERCOCET)  mg per tablet Take 1 tablet by mouth every 4 (four) hours as needed for Pain.    oxyCODONE-acetaminophen (PERCOCET)  mg per tablet Take 1 tablet by mouth every 6 (six) hours as needed.    promethazine (PHENERGAN) 25 MG tablet Take 1 tablet (25 mg total) by mouth every 6 (six) hours as needed for Nausea.    promethazine (PHENERGAN) 25 MG tablet Take 1 tablet (25 mg total) by mouth every 6 (six) hours as needed.    zolpidem (AMBIEN) 10 mg Tab      Antibiotics     Start     Stop Route Frequency Ordered    05/12/18 0509  linezolid 600mg/300ml IVPB 600 mg      -- IV Every 12 hours (non-standard times) 05/12/18 0510        Antifungals     None        Antivirals     None             There is no immunization history on file for this patient.    Family History     None        Social History     Social History    Marital status:      Spouse name: N/A    Number of children: N/A    Years of education: N/A     Social History Main Topics    Smoking status: Former Smoker     Types: Cigarettes    Smokeless tobacco: Never Used    Alcohol use 1.2 oz/week     2 Glasses of wine per week    Drug use: No    Sexual activity: Not Asked     Other Topics Concern    None     Social History Narrative    None      Review of Systems   Constitutional: Negative for chills and fever.   HENT: Negative for sore throat.    Respiratory: Negative for cough, chest tightness and shortness of breath.    Cardiovascular: Negative for chest pain, palpitations and leg swelling.   Gastrointestinal: Negative for abdominal distention, abdominal pain, diarrhea, nausea and vomiting.   Genitourinary: Negative for dysuria, flank pain and urgency.   Skin: Negative for rash (all body).   Neurological: Negative for dizziness, light-headedness and numbness.   Psychiatric/Behavioral: Negative for confusion.     Objective:     Vital Signs (Most Recent):  Temp: 98.2 °F (36.8 °C) (05/12/18 1128)  Pulse: 105 (05/12/18 1128)  Resp: 19 (05/12/18 1128)  BP: 101/64 (05/12/18 1128)  SpO2: 95 % (05/12/18 1128) Vital Signs (24h Range):  Temp:  [97.5 °F (36.4 °C)-98.7 °F (37.1 °C)] 98.2 °F (36.8 °C)  Pulse:  [] 105  Resp:  [13-19] 19  SpO2:  [93 %-100 %] 95 %  BP: ()/(53-80) 101/64     Weight: 78.4 kg (172 lb 13.5 oz)  Body mass index is 21.04 kg/m².    Estimated Creatinine Clearance: 95.3 mL/min (based on SCr of 0.8 mg/dL).    Physical Exam   Constitutional: He is oriented to person, place, and time. No distress.   HENT:   Head: Normocephalic and atraumatic.   Mouth/Throat: No oropharyngeal exudate.   Eyes: No scleral icterus.   Cardiovascular: Normal rate, regular rhythm and normal heart sounds.  Exam reveals no gallop and no friction rub.    No murmur heard.  Pulmonary/Chest: Effort normal and breath sounds normal. No respiratory distress. He has no wheezes. He has no rales.   Abdominal: Soft. Bowel sounds are normal. He exhibits no distension. There is no tenderness. There is no rebound and no guarding.   Musculoskeletal: He exhibits no edema.   No pain right knee   Neurological: He is alert and oriented to person, place, and time. No cranial nerve deficit.   Skin:   Morbiliform rash over all body (including face)   Vitals  reviewed.      Significant Labs:   Bilirubin:   Recent Labs  Lab 05/12/18  0505 05/12/18  0709   BILITOT 1.5* 1.6*     Blood Culture: No results for input(s): LABBLOO in the last 4320 hours.  BMP:   Recent Labs  Lab 05/12/18  0709   *   *   K 3.6   CL 95   CO2 21*   BUN 16   CREATININE 0.8   CALCIUM 7.5*     CBC:   Recent Labs  Lab 05/12/18  0504   WBC 20.42*  20.69*   HGB 13.1*  13.3*   HCT 37.8*  38.8*     261     CMP:   Recent Labs  Lab 05/12/18  0505 05/12/18  0709   * 129*   K 3.7 3.6   CL 94* 95   CO2 21* 21*   GLU 95 113*   BUN 17 16   CREATININE 0.9 0.8   CALCIUM 7.5* 7.5*   PROT 4.5* 4.2*   ALBUMIN 2.3* 2.2*   BILITOT 1.5* 1.6*   ALKPHOS 246* 230*   AST 62* 58*   * 131*   ANIONGAP 15 13   EGFRNONAA >60.0 >60.0     Microbiology Results (last 7 days)     Procedure Component Value Units Date/Time    Blood culture [[id number]] Collected:  05/12/18 0709    Order Status:  Sent Specimen:  Blood Updated:  05/12/18 0720    Blood culture [[id number]] Collected:  05/12/18 0505    Order Status:  Sent Specimen:  Blood Updated:  05/12/18 0544    Culture, Respiratory with Gram Stain [[id number]]     Order Status:  No result Specimen:  Respiratory           Significant Imaging: I have reviewed all pertinent imaging results/findings within the past 24 hours.

## 2018-05-12 NOTE — SUBJECTIVE & OBJECTIVE
"Past Medical History:   Diagnosis Date    Hyperlipidemia     Prostate cancer      s/p prostatectomy,     PVC (premature ventricular contraction)        Past Surgical History:   Procedure Laterality Date    KNEE SURGERY      PROSTATECTOMY      TONSILLECTOMY         Review of patient's allergies indicates:   Allergen Reactions    Vancomycin analogues      DRESS       Current Facility-Administered Medications   Medication    atorvastatin tablet 10 mg    ceFEPIme injection 2 g    diphenhydrAMINE capsule 25 mg    enoxaparin injection 40 mg    linezolid 600mg/300ml IVPB 600 mg    oxyCODONE immediate release tablet 10 mg    oxyCODONE immediate release tablet 5 mg    sodium chloride 0.9% bolus 1,000 mL    sodium chloride 0.9% flush 5 mL     Family History     None        Social History Main Topics    Smoking status: Former Smoker     Types: Cigarettes    Smokeless tobacco: Never Used    Alcohol use 1.2 oz/week     2 Glasses of wine per week    Drug use: No    Sexual activity: Not on file     ROS   Constitutional: positive for fevers  Eyes: no visual changes  ENT: negative for hearing loss  Respiratory: negative for dyspnea  Cardiovascular: negative for chest pain  Gastrointestinal: negative for abdominal pain  Genitourinary: negative for dysuria  Neurological: negative for headaches  Behavioral/Psych: negative for hallucinations  Endocrine: negative for temperature intolerance    Objective:     Vital Signs (Most Recent):  Temp: 98.2 °F (36.8 °C) (05/12/18 1128)  Pulse: 105 (05/12/18 1128)  Resp: 19 (05/12/18 1128)  BP: 101/64 (05/12/18 1128)  SpO2: 95 % (05/12/18 1128) Vital Signs (24h Range):  Temp:  [97.5 °F (36.4 °C)-98.7 °F (37.1 °C)] 98.2 °F (36.8 °C)  Pulse:  [] 105  Resp:  [13-19] 19  SpO2:  [93 %-100 %] 95 %  BP: ()/(53-80) 101/64     Weight: 78.4 kg (172 lb 13.5 oz)  Height: 6' 4" (193 cm)  Body mass index is 21.04 kg/m².      Intake/Output Summary (Last 24 hours) at 05/12/18 " 1235  Last data filed at 05/12/18 1200   Gross per 24 hour   Intake             1300 ml   Output             1650 ml   Net             -350 ml       Ortho/SPM Exam  Vitals: Afebrile.  Vital signs stable.  General: No acute distress.  HEENT: Normocephalic. Atraumatic. Sclera anicteric. No tracheal deviation.  Cardio: Regular rate and rhythm.  Chest: No increased work of breathing.  Abdominal: Nondistended.  Extremities: No cyanosis.  No clubbing.  No edema.  Palpable pulses.  Skin: Generalized morbiliform erythematous rash.  Neuro: Awake. Alert. Oriented to person, place, time, and situation.  Psych: Normal appearance. Cooperative.  Appropriate mood.  Appropriate affect.      MSK:  RLE:   Scars well healed  No deformity  No ecchymoses  Mild knee swelling  No TTP knee capsule  No pain with knee ROM  SILT T/SP/DP/Campbell/Sa  Motor intact T/SP/DP  Brisk cap refill  Warm well perfused extremities  PT palpable and equal to contralateral    Motor             RIGHT  LEFT  Iliopsoas (L2,3)       5/5    5/5  Quadriceps (L3,4)      5/5    5/5   Tibialis Anterior (L4.5)         5/5    5/5   Extensor Halucis Longus (L5)    5/5    5/5     Gastrocnemius/Soleus (S1)     5/5    5/5  Flexor Hallucis Longus(S2)     5/5    5/5    Sensation (a=absent, i=impaired, n=normal)       RIGHT   LEFT  L2 dermatome       n     n  L3 dermatome       n     n  L4 dermatome       n     n  L5 dermatome       n     n  S1 dermatome       n     n  S2 dermatome       n     n    Reflexes        RIGHT  LEFT  Patellar       2+     2+  Achilles       2+     2+  Babinski      neg    neg  Clonus          neg    neg    Significant Labs:   CBC:   Recent Labs  Lab 05/12/18  0504   WBC 20.42*  20.69*   HGB 13.1*  13.3*   HCT 37.8*  38.8*     261     CMP:   Recent Labs  Lab 05/12/18  0505 05/12/18  0709   * 129*   K 3.7 3.6   CL 94* 95   CO2 21* 21*   GLU 95 113*   BUN 17 16   CREATININE 0.9 0.8   CALCIUM 7.5* 7.5*   PROT 4.5* 4.2*   ALBUMIN 2.3* 2.2*    BILITOT 1.5* 1.6*   ALKPHOS 246* 230*   AST 62* 58*   * 131*   ANIONGAP 15 13   EGFRNONAA >60.0 >60.0     All pertinent labs within the past 24 hours have been reviewed.    Significant Imaging: I have reviewed all pertinent imaging results/findings.

## 2018-05-12 NOTE — NURSING
MD called and informed patient is complaining of pain and wants something to drink. MD states he was unaware patient arrived on floor and would get someone up soon to see patient. Will continue to monitor

## 2018-05-13 LAB
ALBUMIN SERPL BCP-MCNC: 2.3 G/DL
ALP SERPL-CCNC: 305 U/L
ALT SERPL W/O P-5'-P-CCNC: 103 U/L
ANION GAP SERPL CALC-SCNC: 10 MMOL/L
ANISOCYTOSIS BLD QL SMEAR: SLIGHT
AST SERPL-CCNC: 56 U/L
BASOPHILS # BLD AUTO: 0.19 K/UL
BASOPHILS NFR BLD: 1.1 %
BILIRUB SERPL-MCNC: 1.1 MG/DL
BUN SERPL-MCNC: 17 MG/DL
BURR CELLS BLD QL SMEAR: ABNORMAL
CALCIUM SERPL-MCNC: 7.7 MG/DL
CHLORIDE SERPL-SCNC: 104 MMOL/L
CO2 SERPL-SCNC: 22 MMOL/L
CREAT SERPL-MCNC: 0.8 MG/DL
DIFFERENTIAL METHOD: ABNORMAL
EOSINOPHIL # BLD AUTO: 2 K/UL
EOSINOPHIL NFR BLD: 11.6 %
ERYTHROCYTE [DISTWIDTH] IN BLOOD BY AUTOMATED COUNT: 12.8 %
EST. GFR  (AFRICAN AMERICAN): >60 ML/MIN/1.73 M^2
EST. GFR  (NON AFRICAN AMERICAN): >60 ML/MIN/1.73 M^2
GLUCOSE SERPL-MCNC: 103 MG/DL
HCT VFR BLD AUTO: 35.1 %
HGB BLD-MCNC: 11.4 G/DL
HYPOCHROMIA BLD QL SMEAR: ABNORMAL
IMM GRANULOCYTES # BLD AUTO: 0.38 K/UL
IMM GRANULOCYTES NFR BLD AUTO: 2.3 %
LYMPHOCYTES # BLD AUTO: 4.8 K/UL
LYMPHOCYTES NFR BLD: 28.2 %
MCH RBC QN AUTO: 29.3 PG
MCHC RBC AUTO-ENTMCNC: 32.5 G/DL
MCV RBC AUTO: 90 FL
MONOCYTES # BLD AUTO: 1.1 K/UL
MONOCYTES NFR BLD: 6.6 %
NEUTROPHILS # BLD AUTO: 8.5 K/UL
NEUTROPHILS NFR BLD: 50.2 %
NRBC BLD-RTO: 0 /100 WBC
OSMOLALITY SERPL: 292 MOSM/KG
OVALOCYTES BLD QL SMEAR: ABNORMAL
PLATELET # BLD AUTO: 237 K/UL
PMV BLD AUTO: 9 FL
POIKILOCYTOSIS BLD QL SMEAR: SLIGHT
POLYCHROMASIA BLD QL SMEAR: ABNORMAL
POTASSIUM SERPL-SCNC: 4.2 MMOL/L
PROT SERPL-MCNC: 4.4 G/DL
RBC # BLD AUTO: 3.89 M/UL
SODIUM SERPL-SCNC: 136 MMOL/L
WBC # BLD AUTO: 16.85 K/UL

## 2018-05-13 PROCEDURE — 85025 COMPLETE CBC W/AUTO DIFF WBC: CPT

## 2018-05-13 PROCEDURE — 80053 COMPREHEN METABOLIC PANEL: CPT

## 2018-05-13 PROCEDURE — 36415 COLL VENOUS BLD VENIPUNCTURE: CPT

## 2018-05-13 PROCEDURE — 25000003 PHARM REV CODE 250: Performed by: HOSPITALIST

## 2018-05-13 PROCEDURE — 99233 SBSQ HOSP IP/OBS HIGH 50: CPT | Mod: ,,, | Performed by: INTERNAL MEDICINE

## 2018-05-13 PROCEDURE — 63600175 PHARM REV CODE 636 W HCPCS: Performed by: INTERNAL MEDICINE

## 2018-05-13 PROCEDURE — 63600175 PHARM REV CODE 636 W HCPCS: Performed by: HOSPITALIST

## 2018-05-13 PROCEDURE — 99232 SBSQ HOSP IP/OBS MODERATE 35: CPT | Mod: GC,,, | Performed by: HOSPITALIST

## 2018-05-13 PROCEDURE — 11000001 HC ACUTE MED/SURG PRIVATE ROOM

## 2018-05-13 PROCEDURE — 25000003 PHARM REV CODE 250: Performed by: INTERNAL MEDICINE

## 2018-05-13 PROCEDURE — 87449 NOS EACH ORGANISM AG IA: CPT

## 2018-05-13 PROCEDURE — 83930 ASSAY OF BLOOD OSMOLALITY: CPT

## 2018-05-13 RX ORDER — LOPERAMIDE HYDROCHLORIDE 2 MG/1
2 CAPSULE ORAL 4 TIMES DAILY PRN
Status: DISCONTINUED | OUTPATIENT
Start: 2018-05-13 | End: 2018-05-14 | Stop reason: HOSPADM

## 2018-05-13 RX ADMIN — ATORVASTATIN CALCIUM 10 MG: 10 TABLET, FILM COATED ORAL at 08:05

## 2018-05-13 RX ADMIN — ENOXAPARIN SODIUM 40 MG: 100 INJECTION SUBCUTANEOUS at 04:05

## 2018-05-13 RX ADMIN — PREDNISONE 60 MG: 20 TABLET ORAL at 08:05

## 2018-05-13 RX ADMIN — LINEZOLID 600 MG: 600 TABLET, FILM COATED ORAL at 08:05

## 2018-05-13 RX ADMIN — DIPHENHYDRAMINE HYDROCHLORIDE 25 MG: 25 CAPSULE ORAL at 08:05

## 2018-05-13 RX ADMIN — OXYCODONE HYDROCHLORIDE 10 MG: 5 TABLET ORAL at 08:05

## 2018-05-13 NOTE — PROGRESS NOTES
[facility] Hospital Medicine  Progress Note    Patient Name: Leonid Harris III  MRN: [id number]  Patient Class: IP- Inpatient   Admission Date: 5/12/2018  Length of Stay: 1 days  Attending Physician: Ike Martines MD  Primary Care Provider: David Lane MD    Hospital Medicine Team: [facility] HOSP MED 4 Kathleen Ro MD    Subjective:     Principal Problem:DRESS syndrome    HPI:  68 M with PMH prostate cancer s/p prostatectomy, prior smoker, HLD who was transferred here for evaluation of DRESS.  Transfer papers include only lab and imaging results without notes so most history obtained from [facility] transfer acceptance note and patient.  Patient had a total right knee replacement here at [facility] on 2/28/18 and then developed a subsequent MRSA infection of the knee on 4/12/18 and went for a total knee wash out / I&D at that time.  He was sent home on IV Vanc for a total of 6 week course therapy.  However, around 5/3, patient developed a diffuse maculopapular rash throughout his whole body.  He presented to OSH and was switched off of Vanc and placed on Zyvox (linezolid) and was then discharged.      He represented to [facility] (date of admission unknown, most likely 5/11, patient does not recall) with diffuse pruritic rash and was found to have a WBC of 19 and LLL infiltrate on CXR.  Labs also revealed eosinophilia, elevated LFTs, and there was concern for DRESS syndrome.  Patient was given his dose of zyvox prior to transfer as well as Zosyn.  Also of note patient had T-wave inversion on EKG at OSH.  Denies chest pain; troponin negative at OSH.    Imaging at outside hospitals:  R Knee 2 views (5/11) - no effusions, bony destruction, no significant abnormality  CXR (5/11) - infiltrate involving L base c/w PNA  Liver US (5/6) - hepatomegaly with fatty infiltration  Carotid US (5/6) - no significant plaque or stenosis  CT cervical spine (5/6) - degenerative changes of lower cervical  spine, loss of the normal lordotic curve of the cervical spine possibly related to spasm  CT brain w/o contrast (5/6) - no acute changes    Upon transfer, patient was noted to be very uncomfortable, unable to stay still in bed.  Morbilliform rash is present over his entire body.  He reports his back pain is chronic but he thinks it may have gotten worse now that he has pain all over.  He reports he underwent MRI of the spine at [facility], however the only imaging available on CD he came with is Xrays.  He denied chest pain, shortness of breath, headache, abdominal pain, vomiting, diarrhea.   He did complain of dizziness and nausea.        Hospital Course:  - Patient admitted seen by dermatology and ID , was switched to linezolid and started on prednisone for his rash.       Interval History: No acute events ovbernight. Patient feels like he might be more swollen in his right arm . He is tolerating POintake will stop the fluids.     Review of Systems   Constitutional: Negative for chills and fever.   HENT: Negative for sore throat.    Respiratory: Negative for cough, chest tightness and shortness of breath.    Cardiovascular: Negative for chest pain, palpitations and leg swelling.   Gastrointestinal: Negative for abdominal distention, abdominal pain, diarrhea, nausea and vomiting.   Genitourinary: Negative for dysuria, flank pain and urgency.   Skin: Positive for rash (all body).   Neurological: Negative for dizziness, light-headedness and numbness.   Psychiatric/Behavioral: Negative for confusion.     Objective:     Vital Signs (Most Recent):  Temp: 98.2 °F (36.8 °C) (05/13/18 0715)  Pulse: 84 (05/13/18 0715)  Resp: 17 (05/13/18 0715)  BP: 128/77 (05/13/18 0715)  SpO2: 96 % (05/13/18 0715) Vital Signs (24h Range):  Temp:  [97.3 °F (36.3 °C)-98.2 °F (36.8 °C)] 98.2 °F (36.8 °C)  Pulse:  [] 84  Resp:  [17-25] 17  SpO2:  [92 %-96 %] 96 %  BP: (118-133)/(71-83) 128/77     Weight: 78.4 kg (172 lb 13.5 oz)  Body mass  index is 21.04 kg/m².    Intake/Output Summary (Last 24 hours) at 05/13/18 1130  Last data filed at 05/12/18 1816   Gross per 24 hour   Intake             3050 ml   Output             1575 ml   Net             1475 ml      Physical Exam   Constitutional: He is oriented to person, place, and time. No distress.   HENT:   Head: Normocephalic and atraumatic.   Mouth/Throat: No oropharyngeal exudate.   Eyes: No scleral icterus.   Cardiovascular: Normal rate, regular rhythm and normal heart sounds.  Exam reveals no gallop and no friction rub.    No murmur heard.  Pulmonary/Chest: Effort normal and breath sounds normal. No respiratory distress. He has no wheezes. He has no rales.   Abdominal: Soft. Bowel sounds are normal. He exhibits no distension. There is no tenderness. There is no rebound and no guarding.   Musculoskeletal: He exhibits no edema.   No pain right knee   Neurological: He is alert and oriented to person, place, and time. No cranial nerve deficit.   Skin:   Morbiliform rash over all body (including face)   Vitals reviewed.      Significant Labs:   CBC:   Recent Labs  Lab 05/12/18  0504 05/13/18  0616   WBC 20.42*  20.69* 16.85*   HGB 13.1*  13.3* 11.4*   HCT 37.8*  38.8* 35.1*     261 237     CMP:   Recent Labs  Lab 05/12/18  0505 05/12/18  0709 05/13/18  0616   * 129* 136   K 3.7 3.6 4.2   CL 94* 95 104   CO2 21* 21* 22*   GLU 95 113* 103   BUN 17 16 17   CREATININE 0.9 0.8 0.8   CALCIUM 7.5* 7.5* 7.7*   PROT 4.5* 4.2* 4.4*   ALBUMIN 2.3* 2.2* 2.3*   BILITOT 1.5* 1.6* 1.1*   ALKPHOS 246* 230* 305*   AST 62* 58* 56*   * 131* 103*   ANIONGAP 15 13 10   EGFRNONAA >60.0 >60.0 >60.0       Significant Imaging: I have reviewed all pertinent imaging results/findings within the past 24 hours.  I have reviewed and interpreted all pertinent imaging results/findings within the past 24 hours.    Assessment/Plan:      * DRESS syndrome    --morbilliform rash present over limbs, abdomen, chest,  back with liver involvement (AST 69,  at OSH) and eosinophilia (10% at OSH) without renal involvement  --he is afebrile at this time  --Leukocytosis improving   --LFTs trendig down   --dermatology following and recommend PO prednisone until seen in clinic   --solumedrol 1 mg/kg given once   --vancomycin added to patient allergies  --PRN percocet for pain which significantly alleviated the patient's pain        Infection of prosthetic right knee joint    - Continue linezolid for 2 more weeks per ID   - Ortho on board, ordered spine MRI as patient started having back pain , this was negative for any infections   - PT/OT             Hyperlipidemia    --continue home atorvastatin 10 mg daily        T wave inversion in EKG    --EKGs at OSH revealed T wave inversions in V4 and V5.  Trop negative at OSH  --repeat EKG ordered here  --patient denies chest pain or SOB        MRSA infection    --MRSA infection of the knee  --patient was unable to complete course of vanc due to DRESS and was switched to linezolid  --linezolid has been resumed with ID approval  -Blood cultures are negative to date , last positive was knee culture from 4/12  --patient's complaint of back pain has been chronic and subjectively worse with the onset of DRESS.  See back pain.          HCAP (healthcare-associated pneumonia)    --CXR (5/12) on my read reveals LLL infiltrate suspicious for pneumonia.  Follow for official read  --received linezolid and zosyn at OSH  --will continue linezolid here             VTE Risk Mitigation         Ordered     enoxaparin injection 40 mg  Daily      05/12/18 0402     IP VTE HIGH RISK PATIENT  Once      05/12/18 0402              Kathleen Ro MD  Department of Hospital Medicine   [facility]

## 2018-05-13 NOTE — ASSESSMENT & PLAN NOTE
--MRSA infection of the knee  --patient was unable to complete course of vanc due to DRESS and was switched to linezolid  --linezolid has been resumed with ID approval  -Blood cultures are negative to date , last positive was knee culture from 4/12  --patient's complaint of back pain has been chronic and subjectively worse with the onset of DRESS.  See back pain.

## 2018-05-13 NOTE — ASSESSMENT & PLAN NOTE
69 y/o male with a history of prostate cancer s/p prostatectomy, right knee replacement 2/27/18, patient complicated with MRSA infection of right knee s/p I+D 4/12/18 with prosthetic material retention, intra operatives cultures positive for MRSA, on IV vancomycin for 4 weeks, before developing DRESS.    Recommendations:  - continue linezolid 600 mg PO BID  - anticipate around 2 more weeks of linezolid (monitor for side effects thrombocytopenia, neuropathy)  - anticipated stop date would be may 24  - Following completion of linezolid, he should be transitioned to oral doxycycline 100 mg po q day for long term suppression.    Check cbc, cmp, esr, crp once a week while on linezolid and fax to ID clinic at [phone]    Arrange follow up visit in 2 weeks in ID clinic.

## 2018-05-13 NOTE — ASSESSMENT & PLAN NOTE
--morbilliform rash present over limbs, abdomen, chest, back with liver involvement (AST 69,  at OSH) and eosinophilia (10% at OSH) without renal involvement  --he is afebrile at this time  --Leukocytosis improving   --LFTs trendig down   --dermatology following and recommend PO prednisone until seen in clinic   --solumedrol 1 mg/kg given once   --vancomycin added to patient allergies  --PRN percocet for pain which significantly alleviated the patient's pain

## 2018-05-13 NOTE — HOSPITAL COURSE
- Patient admitted seen by dermatology and ID , was switched to linezolid and started on prednisone for his rash. Patient improved with his rash and was cleared to get linezolid until 5/24, then doxycycline 100 mg qd after that. He will have appointment with derm in a  month and an appointment with ID. He was sent home with outpatient PT and home walking roller. His PCP was informed of the plan.    At discharge patient was stable alert and oriented. .Discussed discharge plan. Reviewed medications and side effects, appointments, and answered questions with patient and wife     Value Min Max   Temp 97.6 °F (36.4 °C) 98.4 °F (36.9 °C)   Pulse 78 94   Resp 20 21   BP: Systolic 140 147   BP: Diastolic 80 87   MAP (mmHg) 112 114   SpO2 94 % 97 %     Physical Exam   Constitutional: He is oriented to person, place, and time. No distress.   HENT:   Head: Normocephalic and atraumatic.   Mouth/Throat: No oropharyngeal exudate.   Eyes: No scleral icterus.   Cardiovascular: Normal rate, regular rhythm and normal heart sounds.  Exam reveals no gallop and no friction rub.    No murmur heard.  Pulmonary/Chest: Effort normal and breath sounds normal. No respiratory distress. He has no wheezes. He has no rales.   Abdominal: Soft. Bowel sounds are normal. He exhibits no distension. There is no tenderness. There is no rebound and no guarding.   Musculoskeletal: He exhibits no edema.   No pain right knee   Neurological: He is alert and oriented to person, place, and time. No cranial nerve deficit.   Skin:   Morbiliform rash over all body (including face)   Vitals reviewed.

## 2018-05-13 NOTE — SUBJECTIVE & OBJECTIVE
Interval History:     Pruritus is better    Review of Systems   Skin: Positive for rash.   All other systems reviewed and are negative.    Objective:     Vital Signs (Most Recent):  Temp: 97.9 °F (36.6 °C) (05/13/18 1200)  Pulse: 93 (05/13/18 1200)  Resp: 16 (05/13/18 1200)  BP: 137/75 (05/13/18 1200)  SpO2: 96 % (05/13/18 1200) Vital Signs (24h Range):  Temp:  [97.3 °F (36.3 °C)-98.2 °F (36.8 °C)] 97.9 °F (36.6 °C)  Pulse:  [] 93  Resp:  [16-25] 16  SpO2:  [92 %-96 %] 96 %  BP: (118-137)/(71-83) 137/75     Weight: 78.4 kg (172 lb 13.5 oz)  Body mass index is 21.04 kg/m².    Estimated Creatinine Clearance: 95.3 mL/min (based on SCr of 0.8 mg/dL).    Physical Exam   Constitutional: He is oriented to person, place, and time. He appears well-developed and well-nourished. No distress.   HENT:   Head: Normocephalic and atraumatic.   Right Ear: External ear normal.   Left Ear: External ear normal.   Nose: Nose normal.   Mouth/Throat: Oropharynx is clear and moist. No oropharyngeal exudate.   Eyes: Conjunctivae and EOM are normal. Pupils are equal, round, and reactive to light. Right eye exhibits no discharge. Left eye exhibits no discharge. No scleral icterus.   Neck: Normal range of motion. Neck supple. No JVD present. No tracheal deviation present. No thyromegaly present.   Cardiovascular: Normal rate, regular rhythm and intact distal pulses.  Exam reveals no gallop and no friction rub.    No murmur heard.  Pulmonary/Chest: Effort normal and breath sounds normal. No stridor. No respiratory distress. He has no wheezes. He has no rales. He exhibits no tenderness.   Abdominal: Soft. Bowel sounds are normal. He exhibits no distension and no mass. There is no tenderness. There is no rebound and no guarding.   Musculoskeletal: Normal range of motion. He exhibits no edema or tenderness.   Lymphadenopathy:     He has no cervical adenopathy.   Neurological: He is alert and oriented to person, place, and time. He has normal  reflexes. He displays normal reflexes. No cranial nerve deficit. He exhibits normal muscle tone. Coordination normal.   Skin: Skin is warm. Rash noted. He is not diaphoretic. There is erythema. No pallor.   Psychiatric: He has a normal mood and affect. His behavior is normal. Judgment and thought content normal.   Nursing note and vitals reviewed.      Significant Labs:   Blood Culture:   Recent Labs  Lab 05/12/18  0505 05/12/18  0709   LABBLOO No Growth to date  No Growth to date No Growth to date  No Growth to date     CBC:   Recent Labs  Lab 05/12/18  0504 05/13/18  0616   WBC 20.42*  20.69* 16.85*   HGB 13.1*  13.3* 11.4*   HCT 37.8*  38.8* 35.1*     261 237       Significant Imaging: I have reviewed all pertinent imaging results/findings within the past 24 hours.

## 2018-05-13 NOTE — SUBJECTIVE & OBJECTIVE
Interval History: No acute events ovbernight. Patient feels like he might be more swollen in his right arm . He is tolerating POintake will stop the fluids.     Review of Systems   Constitutional: Negative for chills and fever.   HENT: Negative for sore throat.    Respiratory: Negative for cough, chest tightness and shortness of breath.    Cardiovascular: Negative for chest pain, palpitations and leg swelling.   Gastrointestinal: Negative for abdominal distention, abdominal pain, diarrhea, nausea and vomiting.   Genitourinary: Negative for dysuria, flank pain and urgency.   Skin: Positive for rash (all body).   Neurological: Negative for dizziness, light-headedness and numbness.   Psychiatric/Behavioral: Negative for confusion.     Objective:     Vital Signs (Most Recent):  Temp: 98.2 °F (36.8 °C) (05/13/18 0715)  Pulse: 84 (05/13/18 0715)  Resp: 17 (05/13/18 0715)  BP: 128/77 (05/13/18 0715)  SpO2: 96 % (05/13/18 0715) Vital Signs (24h Range):  Temp:  [97.3 °F (36.3 °C)-98.2 °F (36.8 °C)] 98.2 °F (36.8 °C)  Pulse:  [] 84  Resp:  [17-25] 17  SpO2:  [92 %-96 %] 96 %  BP: (118-133)/(71-83) 128/77     Weight: 78.4 kg (172 lb 13.5 oz)  Body mass index is 21.04 kg/m².    Intake/Output Summary (Last 24 hours) at 05/13/18 1130  Last data filed at 05/12/18 1816   Gross per 24 hour   Intake             3050 ml   Output             1575 ml   Net             1475 ml      Physical Exam   Constitutional: He is oriented to person, place, and time. No distress.   HENT:   Head: Normocephalic and atraumatic.   Mouth/Throat: No oropharyngeal exudate.   Eyes: No scleral icterus.   Cardiovascular: Normal rate, regular rhythm and normal heart sounds.  Exam reveals no gallop and no friction rub.    No murmur heard.  Pulmonary/Chest: Effort normal and breath sounds normal. No respiratory distress. He has no wheezes. He has no rales.   Abdominal: Soft. Bowel sounds are normal. He exhibits no distension. There is no tenderness. There  is no rebound and no guarding.   Musculoskeletal: He exhibits no edema.   No pain right knee   Neurological: He is alert and oriented to person, place, and time. No cranial nerve deficit.   Skin:   Morbiliform rash over all body (including face)   Vitals reviewed.      Significant Labs:   CBC:   Recent Labs  Lab 05/12/18  0504 05/13/18  0616   WBC 20.42*  20.69* 16.85*   HGB 13.1*  13.3* 11.4*   HCT 37.8*  38.8* 35.1*     261 237     CMP:   Recent Labs  Lab 05/12/18  0505 05/12/18  0709 05/13/18  0616   * 129* 136   K 3.7 3.6 4.2   CL 94* 95 104   CO2 21* 21* 22*   GLU 95 113* 103   BUN 17 16 17   CREATININE 0.9 0.8 0.8   CALCIUM 7.5* 7.5* 7.7*   PROT 4.5* 4.2* 4.4*   ALBUMIN 2.3* 2.2* 2.3*   BILITOT 1.5* 1.6* 1.1*   ALKPHOS 246* 230* 305*   AST 62* 58* 56*   * 131* 103*   ANIONGAP 15 13 10   EGFRNONAA >60.0 >60.0 >60.0       Significant Imaging: I have reviewed all pertinent imaging results/findings within the past 24 hours.  I have reviewed and interpreted all pertinent imaging results/findings within the past 24 hours.

## 2018-05-13 NOTE — PROGRESS NOTES
[facility]  Infectious Disease  Progress Note    Patient Name: Leonid Harris III  MRN: [id number]  Admission Date: 5/12/2018  Length of Stay: 1 days  Attending Physician: Ike Martines MD  Primary Care Provider: David Lane MD    Isolation Status: No active isolations  Assessment/Plan:      * DRESS syndrome    Management per dermatology.        Infection of prosthetic right knee joint    69 y/o male with a history of prostate cancer s/p prostatectomy, right knee replacement 2/27/18, patient complicated with MRSA infection of right knee s/p I+D 4/12/18 with prosthetic material retention, intra operatives cultures positive for MRSA, on IV vancomycin for 4 weeks, before developing DRESS.    Recommendations:  - continue linezolid 600 mg PO BID  - anticipate around 2 more weeks of linezolid (monitor for side effects thrombocytopenia, neuropathy)  - anticipated stop date would be may 24  - Following completion of linezolid, he should be transitioned to oral doxycycline 100 mg po q day for long term suppression.    Check cbc, cmp, esr, crp once a week while on linezolid and fax to ID clinic at [phone]    Arrange follow up visit in 2 weeks in ID clinic.            Anticipated Disposition: TBD    Thank you for your consult. I will sign off. Please contact us if you have any additional questions.    Villa Delgado MD  Infectious Disease  [facility]    Subjective:     Principal Problem:DRESS syndrome    HPI: 69 y/o male with a history of prostate cancer s/p prostatectomy, right knee replacement 2/27/18, patient complicated with MRSA infection of right knee s/p I+D 4/12/18 with prosthetic material retention, intra operatives cultures positive for MRSA, no blood cultures taken at that time; patient was discharge on IV vancomycin to complete 4 weeks, but around 1 week ago he developed a rash that started on chest/abdomen and lower extremities then spread to all body;  vancomycin was discontinued due to concerns of DRESS syndrome, he was started on linezolid and has been tolerating it well with no adverse events so far; rash continue to worse so he seek medical care and was admitted at an outside facility and  Transferred to [facility] for evaluation of DRESS. On evaluation today, he was feeling better since arrival, and had no particular complains (apart from the rash).  Interval History:     Pruritus is better    Review of Systems   Skin: Positive for rash.   All other systems reviewed and are negative.    Objective:     Vital Signs (Most Recent):  Temp: 97.9 °F (36.6 °C) (05/13/18 1200)  Pulse: 93 (05/13/18 1200)  Resp: 16 (05/13/18 1200)  BP: 137/75 (05/13/18 1200)  SpO2: 96 % (05/13/18 1200) Vital Signs (24h Range):  Temp:  [97.3 °F (36.3 °C)-98.2 °F (36.8 °C)] 97.9 °F (36.6 °C)  Pulse:  [] 93  Resp:  [16-25] 16  SpO2:  [92 %-96 %] 96 %  BP: (118-137)/(71-83) 137/75     Weight: 78.4 kg (172 lb 13.5 oz)  Body mass index is 21.04 kg/m².    Estimated Creatinine Clearance: 95.3 mL/min (based on SCr of 0.8 mg/dL).    Physical Exam   Constitutional: He is oriented to person, place, and time. He appears well-developed and well-nourished. No distress.   HENT:   Head: Normocephalic and atraumatic.   Right Ear: External ear normal.   Left Ear: External ear normal.   Nose: Nose normal.   Mouth/Throat: Oropharynx is clear and moist. No oropharyngeal exudate.   Eyes: Conjunctivae and EOM are normal. Pupils are equal, round, and reactive to light. Right eye exhibits no discharge. Left eye exhibits no discharge. No scleral icterus.   Neck: Normal range of motion. Neck supple. No JVD present. No tracheal deviation present. No thyromegaly present.   Cardiovascular: Normal rate, regular rhythm and intact distal pulses.  Exam reveals no gallop and no friction rub.    No murmur heard.  Pulmonary/Chest: Effort normal and breath sounds normal. No stridor. No respiratory distress. He has no wheezes.  He has no rales. He exhibits no tenderness.   Abdominal: Soft. Bowel sounds are normal. He exhibits no distension and no mass. There is no tenderness. There is no rebound and no guarding.   Musculoskeletal: Normal range of motion. He exhibits no edema or tenderness.   Lymphadenopathy:     He has no cervical adenopathy.   Neurological: He is alert and oriented to person, place, and time. He has normal reflexes. He displays normal reflexes. No cranial nerve deficit. He exhibits normal muscle tone. Coordination normal.   Skin: Skin is warm. Rash noted. He is not diaphoretic. There is erythema. No pallor.   Psychiatric: He has a normal mood and affect. His behavior is normal. Judgment and thought content normal.   Nursing note and vitals reviewed.      Significant Labs:   Blood Culture:   Recent Labs  Lab 05/12/18  0505 05/12/18  0709   LABBLOO No Growth to date  No Growth to date No Growth to date  No Growth to date     CBC:   Recent Labs  Lab 05/12/18  0504 05/13/18  0616   WBC 20.42*  20.69* 16.85*   HGB 13.1*  13.3* 11.4*   HCT 37.8*  38.8* 35.1*     261 237       Significant Imaging: I have reviewed all pertinent imaging results/findings within the past 24 hours.

## 2018-05-13 NOTE — ASSESSMENT & PLAN NOTE
- Continue linezolid for 2 more weeks per ID   - Ortho on board, ordered spine MRI is negative for infection   - PT/OT

## 2018-05-13 NOTE — ASSESSMENT & PLAN NOTE
--CXR (5/12) on my read reveals LLL infiltrate suspicious for pneumonia.  Follow for official read  --received linezolid and zosyn at OSH  --will continue linezolid here

## 2018-05-14 VITALS
DIASTOLIC BLOOD PRESSURE: 86 MMHG | WEIGHT: 172.81 LBS | SYSTOLIC BLOOD PRESSURE: 144 MMHG | RESPIRATION RATE: 21 BRPM | HEIGHT: 76 IN | HEART RATE: 80 BPM | TEMPERATURE: 98 F | OXYGEN SATURATION: 94 % | BODY MASS INDEX: 21.04 KG/M2

## 2018-05-14 LAB
ALBUMIN SERPL BCP-MCNC: 2.5 G/DL
ALP SERPL-CCNC: 391 U/L
ALT SERPL W/O P-5'-P-CCNC: 133 U/L
ANION GAP SERPL CALC-SCNC: 8 MMOL/L
ANISOCYTOSIS BLD QL SMEAR: SLIGHT
AST SERPL-CCNC: 88 U/L
BASOPHILS # BLD AUTO: 0.35 K/UL
BASOPHILS NFR BLD: 2.2 %
BILIRUB SERPL-MCNC: 1.3 MG/DL
BUN SERPL-MCNC: 19 MG/DL
C DIFF GDH STL QL: NEGATIVE
C DIFF TOX A+B STL QL IA: NEGATIVE
CALCIUM SERPL-MCNC: 7.9 MG/DL
CHLORIDE SERPL-SCNC: 103 MMOL/L
CO2 SERPL-SCNC: 26 MMOL/L
CREAT SERPL-MCNC: 0.8 MG/DL
DIFFERENTIAL METHOD: ABNORMAL
EOSINOPHIL # BLD AUTO: 2.9 K/UL
EOSINOPHIL NFR BLD: 18.6 %
ERYTHROCYTE [DISTWIDTH] IN BLOOD BY AUTOMATED COUNT: 13.4 %
EST. GFR  (AFRICAN AMERICAN): >60 ML/MIN/1.73 M^2
EST. GFR  (NON AFRICAN AMERICAN): >60 ML/MIN/1.73 M^2
GLUCOSE SERPL-MCNC: 82 MG/DL
HCT VFR BLD AUTO: 35.3 %
HGB BLD-MCNC: 11.1 G/DL
IMM GRANULOCYTES # BLD AUTO: 0.28 K/UL
IMM GRANULOCYTES NFR BLD AUTO: 1.8 %
LYMPHOCYTES # BLD AUTO: 6.2 K/UL
LYMPHOCYTES NFR BLD: 39.3 %
MCH RBC QN AUTO: 30.6 PG
MCHC RBC AUTO-ENTMCNC: 31.4 G/DL
MCV RBC AUTO: 97 FL
MONOCYTES # BLD AUTO: 1.2 K/UL
MONOCYTES NFR BLD: 7.5 %
NEUTROPHILS # BLD AUTO: 4.8 K/UL
NEUTROPHILS NFR BLD: 30.6 %
NRBC BLD-RTO: 1 /100 WBC
PLATELET # BLD AUTO: 91 K/UL
PLATELET BLD QL SMEAR: ABNORMAL
PMV BLD AUTO: 11.1 FL
POLYCHROMASIA BLD QL SMEAR: ABNORMAL
POTASSIUM SERPL-SCNC: 3.2 MMOL/L
PROT SERPL-MCNC: 4.7 G/DL
RBC # BLD AUTO: 3.63 M/UL
SODIUM SERPL-SCNC: 137 MMOL/L
WBC # BLD AUTO: 15.66 K/UL

## 2018-05-14 PROCEDURE — 25000003 PHARM REV CODE 250: Performed by: HOSPITALIST

## 2018-05-14 PROCEDURE — 88305 TISSUE EXAM BY PATHOLOGIST: CPT | Performed by: PATHOLOGY

## 2018-05-14 PROCEDURE — 80053 COMPREHEN METABOLIC PANEL: CPT

## 2018-05-14 PROCEDURE — 97116 GAIT TRAINING THERAPY: CPT

## 2018-05-14 PROCEDURE — G8987 SELF CARE CURRENT STATUS: HCPCS | Mod: CK

## 2018-05-14 PROCEDURE — 25000003 PHARM REV CODE 250: Performed by: INTERNAL MEDICINE

## 2018-05-14 PROCEDURE — 36415 COLL VENOUS BLD VENIPUNCTURE: CPT

## 2018-05-14 PROCEDURE — G8988 SELF CARE GOAL STATUS: HCPCS | Mod: CJ

## 2018-05-14 PROCEDURE — 99238 HOSP IP/OBS DSCHRG MGMT 30/<: CPT | Mod: GC,,, | Performed by: HOSPITALIST

## 2018-05-14 PROCEDURE — 85025 COMPLETE CBC W/AUTO DIFF WBC: CPT

## 2018-05-14 PROCEDURE — 97165 OT EVAL LOW COMPLEX 30 MIN: CPT

## 2018-05-14 PROCEDURE — 63600175 PHARM REV CODE 636 W HCPCS: Performed by: INTERNAL MEDICINE

## 2018-05-14 PROCEDURE — 63600175 PHARM REV CODE 636 W HCPCS: Performed by: STUDENT IN AN ORGANIZED HEALTH CARE EDUCATION/TRAINING PROGRAM

## 2018-05-14 PROCEDURE — 97161 PT EVAL LOW COMPLEX 20 MIN: CPT

## 2018-05-14 RX ORDER — LINEZOLID 600 MG/1
600 TABLET, FILM COATED ORAL EVERY 12 HOURS
Qty: 20 TABLET | Refills: 0 | Status: SHIPPED | OUTPATIENT
Start: 2018-05-14 | End: 2018-05-24

## 2018-05-14 RX ORDER — PANTOPRAZOLE SODIUM 40 MG/1
40 TABLET, DELAYED RELEASE ORAL DAILY
Status: DISCONTINUED | OUTPATIENT
Start: 2018-05-14 | End: 2018-05-14 | Stop reason: HOSPADM

## 2018-05-14 RX ORDER — PREDNISONE 20 MG/1
60 TABLET ORAL DAILY
Qty: 90 TABLET | Refills: 0 | Status: SHIPPED | OUTPATIENT
Start: 2018-05-15 | End: 2018-06-14

## 2018-05-14 RX ORDER — DOXYCYCLINE HYCLATE 100 MG
100 TABLET ORAL DAILY
Qty: 30 TABLET | Refills: 11 | Status: SHIPPED | OUTPATIENT
Start: 2018-05-14 | End: 2019-01-02 | Stop reason: SDUPTHER

## 2018-05-14 RX ORDER — OXYCODONE AND ACETAMINOPHEN 10; 325 MG/1; MG/1
1 TABLET ORAL EVERY 4 HOURS PRN
Qty: 60 TABLET | Refills: 0 | Status: SHIPPED | OUTPATIENT
Start: 2018-05-14 | End: 2018-05-21

## 2018-05-14 RX ORDER — OXYCODONE AND ACETAMINOPHEN 10; 325 MG/1; MG/1
1 TABLET ORAL EVERY 4 HOURS PRN
Qty: 60 TABLET | Refills: 0 | Status: SHIPPED | OUTPATIENT
Start: 2018-05-14 | End: 2018-05-14

## 2018-05-14 RX ORDER — PANTOPRAZOLE SODIUM 40 MG/1
40 TABLET, DELAYED RELEASE ORAL DAILY
Qty: 10 TABLET | Refills: 0 | Status: SHIPPED | OUTPATIENT
Start: 2018-05-14 | End: 2018-05-28

## 2018-05-14 RX ORDER — HYDROMORPHONE HYDROCHLORIDE 1 MG/ML
0.5 INJECTION, SOLUTION INTRAMUSCULAR; INTRAVENOUS; SUBCUTANEOUS ONCE
Status: COMPLETED | OUTPATIENT
Start: 2018-05-14 | End: 2018-05-14

## 2018-05-14 RX ADMIN — LINEZOLID 600 MG: 600 TABLET, FILM COATED ORAL at 09:05

## 2018-05-14 RX ADMIN — OXYCODONE HYDROCHLORIDE 10 MG: 5 TABLET ORAL at 08:05

## 2018-05-14 RX ADMIN — HYDROMORPHONE HYDROCHLORIDE 0.5 MG: 1 INJECTION, SOLUTION INTRAMUSCULAR; INTRAVENOUS; SUBCUTANEOUS at 09:05

## 2018-05-14 RX ADMIN — OXYCODONE HYDROCHLORIDE 10 MG: 5 TABLET ORAL at 03:05

## 2018-05-14 RX ADMIN — PANTOPRAZOLE SODIUM 40 MG: 40 TABLET, DELAYED RELEASE ORAL at 11:05

## 2018-05-14 RX ADMIN — PREDNISONE 60 MG: 20 TABLET ORAL at 08:05

## 2018-05-14 RX ADMIN — TRIAMCINOLONE ACETONIDE: 1 OINTMENT TOPICAL at 08:05

## 2018-05-14 RX ADMIN — OXYCODONE HYDROCHLORIDE 10 MG: 5 TABLET ORAL at 11:05

## 2018-05-14 RX ADMIN — ATORVASTATIN CALCIUM 10 MG: 10 TABLET, FILM COATED ORAL at 08:05

## 2018-05-14 NOTE — PLAN OF CARE
"CM to bedside - pt & spouse present; pt provided assessment info. Pt is independent w/ only crutches in place - not in use at present, lives w/ spouse. Pt will likely d/c home w/ no needs.     CM provided patient anticipated KENNY which will be update by nursing staff. Patient provided a Blue "My Health Packet" for d/c planning and health tool. Patient verbalized understanding.     05/14/18 1151   Discharge Assessment   Assessment Type Discharge Planning Assessment   Confirmed/corrected address and phone number on facesheet? Yes   Assessment information obtained from? Patient;Caregiver   Expected Length of Stay (days) 2   Communicated expected length of stay with patient/caregiver yes   Prior to hospitilization cognitive status: Alert/Oriented   Prior to hospitalization functional status: Independent   Current cognitive status: Alert/Oriented   Current Functional Status: Independent   Facility Arrived From: Transferring Facility/Hospital: [facility]    Lives With spouse   Able to Return to Prior Arrangements yes   Is patient able to care for self after discharge? Yes   Who are your caregiver(s) and their phone number(s)? spouse - Chari [phone]   Patient's perception of discharge disposition home or selfcare   Readmission Within The Last 30 Days current reason for admission unrelated to previous admission   If yes, most recent facility name: [facility]   Patient currently being followed by outpatient case management? No   Patient currently receives any other outside agency services? No   Equipment Currently Used at Home crutches   Do you have any problems affording any of your prescribed medications? No   Is the patient taking medications as prescribed? yes   Does the patient have transportation home? Yes   Transportation Available car;family or friend will provide   Dialysis Name and Scheduled days N/A   Does the patient receive services at the [facility]mad[facility]? No   Discharge Plan A Home with " family   Discharge Plan B Home with family;Home Health   Patient/Family In Agreement With Plan yes   Readmission Questionnaire   At the time of your discharge, did someone talk to you about what your health problems were? Yes  (readmit completed on opening screen)

## 2018-05-14 NOTE — ASSESSMENT & PLAN NOTE
--MRSA infection of the knee  --patient was unable to complete course of vanc due to DRESS and was switched to linezolid  --linezolid has been resumed with ID approval  -Blood cultures are negative to date , last positive was knee culture from 4/12  --patient's complaint of back pain has been chronic and subjectively worse with the onset of DRESS. Will work with PT and take PRN percocet

## 2018-05-14 NOTE — PLAN OF CARE
[facility]    HOME HEALTH ORDERS  FACE TO FACE ENCOUNTER    Patient Name: Leonid Harris III  YOB: 1950    PCP: David Lane MD   PCP Address: [address] [place] [address]  / [place] [address]  PCP Phone Number: [phone]  PCP Fax: [phone]    Encounter Date: 05/14/2018    Admit to Home Health    Diagnoses:  Active Hospital Problems    Diagnosis  POA    *DRESS syndrome [L27.0, D72.1, T50.905A]  Yes    MRSA infection [A49.02]  Yes    T wave inversion in EKG [R94.31]  Yes    Infection of prosthetic right knee joint [T84.53XA]  Not Applicable    HCAP (healthcare-associated pneumonia) [J18.9]  Yes      Resolved Hospital Problems    Diagnosis Date Resolved POA   No resolved problems to display.       Future Appointments  Date Time Provider Department Center   5/21/2018 9:00 AM Fran Shelton MD [facility]           I have seen and examined this patient face to face today. My clinical findings that support the need for the home health skilled services and home bound status are the following:  Weakness/numbness causing balance and gait disturbance due to Infection making it taxing to leave home.  Requiring assistive device to leave home due to unsteady gait caused by  Surgery.    Allergies:  Review of patient's allergies indicates:   Allergen Reactions    Vancomycin analogues      DRESS       Diet: regular diet    Activities: activity as tolerated    Nursing:   SN to complete comprehensive assessment including routine vital signs. Instruct on disease process and s/s of complications to report to MD. Review/verify medication list sent home with the patient at time of discharge  and instruct patient/caregiver as needed. Frequency may be adjusted depending on start of care date.    Notify MD if SBP > 160 or < 90; DBP > 90 or < 50; HR > 120 or < 50; Temp > 101;    CONSULTS:    Physical Therapy to evaluate and treat. Evaluate for home safety and equipment needs;  Establish/upgrade home exercise program. Perform / instruct on therapeutic exercises, gait training, transfer training, and Range of Motion.    MISCELLANEOUS CARE:  N/A    WOUND CARE ORDERS  n/a      Medications: Review discharge medications with patient and family and provide education.      Current Discharge Medication List      START taking these medications    Details   linezolid (ZYVOX) 600 mg Tab Take 1 tablet (600 mg total) by mouth every 12 (twelve) hours.  Qty: 20 tablet, Refills: 0      pantoprazole (PROTONIX) 40 MG tablet Take 1 tablet (40 mg total) by mouth once daily.  Qty: 10 tablet, Refills: 0      predniSONE (DELTASONE) 20 MG tablet Take 3 tablets (60 mg total) by mouth once daily.  Qty: 90 tablet, Refills: 0         CONTINUE these medications which have CHANGED    Details   !! oxyCODONE-acetaminophen (PERCOCET)  mg per tablet Take 1 tablet by mouth every 4 (four) hours as needed for Pain.  Qty: 60 tablet, Refills: 0       !! - Potential duplicate medications found. Please discuss with provider.      CONTINUE these medications which have NOT CHANGED    Details   diltiaZEM (CARDIZEM CD) 180 MG 24 hr capsule Take 180 mg by mouth once daily.      meloxicam (MOBIC) 15 MG tablet Take 1 tablet (15 mg total) by mouth once daily.  Qty: 30 tablet, Refills: 2    Associated Diagnoses: Internal derangement of knee joint, right; Chronic pain of right knee; Effusion of right knee; Internal derangement of left knee      !! oxyCODONE-acetaminophen (PERCOCET)  mg per tablet Take 1 tablet by mouth every 6 (six) hours as needed.  Qty: 60 tablet, Refills: 0      !! promethazine (PHENERGAN) 25 MG tablet Take 1 tablet (25 mg total) by mouth every 6 (six) hours as needed for Nausea.  Qty: 20 tablet, Refills: 0    Associated Diagnoses: Right knee pain, unspecified chronicity; Patellar tendinitis of right knee; Post-traumatic osteoarthritis of right knee; Acute internal derangement of right knee; Effusion of  right knee      !! promethazine (PHENERGAN) 25 MG tablet Take 1 tablet (25 mg total) by mouth every 6 (six) hours as needed.  Qty: 60 tablet, Refills: 0      zolpidem (AMBIEN) 10 mg Tab Refills: 2       !! - Potential duplicate medications found. Please discuss with provider.      STOP taking these medications       atorvastatin (LIPITOR) 10 MG tablet Comments:   Reason for Stopping:               I certify that this patient is confined to his home and needs physical therapy.

## 2018-05-14 NOTE — CARE UPDATE
To be sent to his PCP    Patient will need labs drawn on the week of May 21st 2018:      cbc, cmp, esr, crp    Please fax to ID clinic at [phone]      Kathleen Ro MD, MPH  Internal Medicine PGY1  [address] [place] [address]

## 2018-05-14 NOTE — ASSESSMENT & PLAN NOTE
--CXR (5/12) on my read reveals LLL infiltrate suspicious for pneumonia.  Follow for official read  --received linezolid and zosyn at OSH  --will continue linezolid until 5/24

## 2018-05-14 NOTE — DISCHARGE INSTRUCTIONS
Use gentle soaps (Dove, Cetaphil) and moisturizers (Cetaphil, Cerave, Aquaphor)    Please stop taking your atorvastatin until you see your Primary care physician     Only start taking doxycyline after you finish your linezolid  I have sent a note to your primary care doctor we need the following sent over next to infectious disease clinic because you are on the antibitoics. If you notice that your primary care does not call you to set it up please call them                                         Check cbc, cmp, esr, crp once a week while on linezolid and fax to ID clinic at [phone]    We will arrange for dermatology and infectious disease follow up

## 2018-05-14 NOTE — PT/OT/SLP EVAL
Occupational Therapy   Evaluation    Name: Leonid Harris III  MRN: [id number]  Admitting Diagnosis:  DRESS syndrome      Recommendations:     Discharge Recommendations: outpatient PT  Discharge Equipment Recommendations:   (RW)  Barriers to discharge:  None    History:     Occupational Profile:  Living Environment & PLOF: Pt resides with spouse in 1 story house no steps to enter.  Pt has shower stall with built-in bench.  Pt was independent with all ADL's & ambulation prior to 2 weeks ago.  Pt is an active .  Pt is retired from [employer].  Pt enjoys biking & playing racketball & volleyball.    Equipment Owned:   (crutches, 3 in 1 commode, built-in shower bench (none presently being used))  Assistance upon Discharge: spouse    Past Medical History:   Diagnosis Date    Hyperlipidemia     Prostate cancer      s/p prostatectomy,     PVC (premature ventricular contraction)        Past Surgical History:   Procedure Laterality Date    KNEE SURGERY      PROSTATECTOMY      TONSILLECTOMY         Subjective   Pt reported that they plan to go hiking in July.  Chief Complaint: fear of falling  Patient/Family stated goals: to get stronger  Communicated with: RN prior to session.  Pain/Comfort:  · Pain Rating 1: 0/10  · Pain Rating Post-Intervention 1: 0/10    Patients cultural, spiritual, Yarsanism conflicts given the current situation: none stated    Objective:     Patient found with:  (all lines intact & spouse present)    General Precautions: Standard, fall     Occupational Performance:    Bed Mobility:    · Patient completed Supine to Sit with supervision  · Patient completed Sit to Supine with supervision    Functional Mobility/Transfers:  · Patient completed Sit <> Stand Transfer with contact guard assistance  with  rolling walker     Activities of Daily Living:  · UB Dressing: stand by assistance seated EOB  · LB Dressing: stand by assistance donning socks seated EOB    Cognitive/Visual  Perceptual:  Cognitive/Psychosocial Skills:     -       Oriented to: Person, Place, Time and Situation   -       Follows Commands/attention:Follows multistep  commands  -       Communication: clear/fluent  -       Memory: No Deficits noted  -       Safety awareness/insight to disability: intact   -       Mood/Affect/Coping skills/emotional control: Appropriate to situation    Physical Exam:  Postural examination/scapula alignment:    -       Rounded shoulders  -       Forward head  -       Posterior pelvic tilt  Skin integrity: dry & red throughout visible parts of body  Sensation:    -       Intact  Dominant hand:    -       left  Upper Extremity Range of Motion:     -       Right Upper Extremity: WFL  -       Left Upper Extremity: WFL  Upper Extremity Strength:    -       Right Upper Extremity: WFL  -       Left Upper Extremity: WFL   Strength:    -       Right Upper Extremity: WFL  -       Left Upper Extremity: WFL   Edema to (B) hands.    Patient left supine with all lines intact, call button in reach, RN notified, spouse present and white board updated.    [facility] 6 Click:  [facility] Total Score: 18    Treatment & Education:  Provided education regarding role of OT, POC, & discharge recommendations with pt & family verbalizing understanding.  Pt had no further questions & when asked whether there were any concerns pt reported none.  Provided education regarding safety techniques at home & level of (A) needed during all standing activities, showering, & ambulation with pt & spouse verbalizing understanding.      Education:    Assessment:     Leonid Harris III is a 68 y.o. male with a medical diagnosis of DRESS syndrome.  He presents with fair participation and motivation.  Performance deficits affecting function are weakness, impaired endurance, impaired self care skills, impaired functional mobilty, impaired balance, decreased lower extremity function, decreased upper extremity function.      Rehab Prognosis:   "fair; patient would benefit from acute skilled OT services to address these deficits and reach maximum level of function.         Clinical Decision Makin.  OT Low:  "Pt evaluation falls under low complexity for evaluation coding due to performance deficits noted in 1-3 areas as stated above and 0 co-morbities affecting current functional status. Data obtained from problem focused assessments. No modifications or assistance was required for completion of evaluation. Only brief occupational profile and history review completed."     Plan:     Patient to be seen 3 x/week to address the above listed problems via self-care/home management, therapeutic activities, therapeutic exercises  · Plan of Care Expires: 18  · Plan of Care Reviewed with: patient, spouse    This Plan of care has been discussed with the patient who was involved in its development and understands and is in agreement with the identified goals and treatment plan    GOALS:    Occupational Therapy Goals        Problem: Occupational Therapy Goal    Goal Priority Disciplines Outcome Interventions   Occupational Therapy Goal     OT, PT/OT Ongoing (interventions implemented as appropriate)    Description:  Goals to be met by: 18     Patient will increase functional independence with ADLs by performing:    UE Dressing with Modified Payne.  LE Dressing with Modified Payne.  Grooming while standing with Supervision.  Toileting from bedside commode with Supervision for hygiene and clothing management.   Supine to sit with Modified Payne.  Stand pivot transfers with Modified Payne using RW.  Toilet transfer to bedside commode with Modified Payne.                      Time Tracking:     OT Date of Treatment: 18  OT Start Time: 956  OT Stop Time: 1025  OT Total Time (min): 29 min    Billable Minutes:Evaluation 25    BING Mccauley  2018    "

## 2018-05-14 NOTE — NURSING
Patient reported 4 watery stools on previous shift. MD called to obtain sample for C-Diff. MD stated to monitor patient. As I returned to room, to inform patient to save sample for me to assess when he had another bowel movement, patient informed me that he already had one for me. It is watery with mucous. MD called and received order for cdiff test and special contact isolation. Sample obtained and sent to lab. Patient placed on Special contact isolation and educated on use of soap and water to prevent spread of CDIFF. Patient and wife both voiced an understanding of instructions. Will continue to monitor

## 2018-05-14 NOTE — PLAN OF CARE
CM spoke w/ PT outside of pt's room - requesting that pt have RW ordered. CM placed order as pt is expected to d/c today.

## 2018-05-14 NOTE — PT/OT/SLP EVAL
Physical Therapy Evaluation    Patient Name:  Leonid Harris III   MRN:  [id number]    Recommendations:     Discharge Recommendations:  outpatient PT, home   Discharge Equipment Recommendations: walker, rolling   Barriers to discharge: None     Assessment:     Leonid Harris III is a 68 y.o. male admitted with a medical diagnosis of DRESS syndrome.  He presents with the following impairments/functional limitations:  weakness, impaired endurance, gait instability, impaired cognition, impaired balance, impaired functional mobilty, decreased lower extremity function, impaired skin, edema.      Pt tolerated session well with no c/o increase pain/discomort. Pt required Min A for transfers and gait this visit.  Pt demonstrated decrease coordination, smoothness of swing phase, and increase LE fatigue with gait requiring assist and use of RW for additional stability.   Pt would benefit from continued skilled physical therapy for the listed impairments to improve functional independence and overall safety with mobility prior to d/c. PT recommends d/c disposition to home with spouse assist and OP PT for further progression of mobility, coordination, and balance to return to PLOF.     Whiteboard updated with correct mobility information. RN/PCT notified.  Appropriate to transfer with 1 person assist. Use RW for added stability.     Please encourage pt to sit in bedside chair throughout the day to promote OOB mobility and decrease risk of deconditioning while in acute care.     Rehab Prognosis:  good; patient would benefit from acute skilled PT services to address these deficits and reach maximum level of function.      Recent Surgery: * No surgery found *      Plan:     During this hospitalization, patient to be seen 3 x/week to address the above listed problems via gait training, therapeutic activities, therapeutic exercises  · Plan of Care Expires:  06/13/18   Plan of Care Reviewed with: patient, spouse    Subjective  "    Communicated with RN prior to session.  Patient found supine, upon PT entry to room, agreeable to evaluation.      "I tried to stand up yesterday and I felt like I was going to fall. I'm really weak."   Patient comments/goals: to get better and go home; to be able to go on hiking trip in July  Pain/Comfort:  None stated; reported R LBP, but received pain meds prior to session    Patients cultural, spiritual, Evangelical conflicts given the current situation: no    Living Environment:  Pt lives in a [facility] with no SIERRA. (I) with all mobility and ADLs prior to admission. Used axillary crutches after R Knee surgery, but hasn't used anything within the last week.  Pt was planning on attending OP PT for knee rehabilitation, but did not start due to current admission.  Prior to infection, pt was driving, retired, and enjoyed staying active.  Activities include: biking, racket ball, hiking, triathlons, and 3x Iron[employer]. Spouse is able to assist at home.  Bath has walk In shower with built-in bench; has access to BS.       Objective:     Patient found with:  (all lines intact)     General Precautions: Standard, fall   Orthopedic Precautions:N/A   Braces: N/A     Exams:  Pt oriented x Person, Place, Time .     Communication: clear/fluent    Follows Commands: Follows multistep  commands    Posture: Rounded shoulder and Posterior pelvic tilt  Skin Integrity: Visible skin intact and redness/rash trunk & extremities  Edema: Mild bilateral Hands and ankles    Range of Motion and Strength Examination    Right Lower Extremity: WFL as demonstrated with functional mobility    Left Lower Extremity: WFL as demonstrated with functional mobility    Sensation: intact light touch bilateral LE's        Fine Motor Coordination:   RLE heel shin slightly decreased and LLE heel shin slightly decreased    Gross Motor Coordination:  decreased smoothness during swing phase of gait      Functional Mobility:    Bed Mobility:  Supine to Sit:  " Supervision or Set-up Assistance   Sit to supine: Activity did not occur remained on EOB  Scooting: Independent     Transfers:   Sit to stand:Minimal Assistance with No Assistive Device from EOB v/c for safe technique        Gait:   Patient ambulated ~4 feet, then 15 feet (x3 trials) with Rolling Walker with Minimal Assistance.  Pt demonstrated step-to gait with decrease step length, increase hip flexion with swing phase, decrease foot clearance.  Noted decrease coordination and ataxic movements with gait. No buckling or LOB noted.  Encouraged to use B UE's and slow gait with RW. Balance and swing through improved with each trial.         Balance:  Static Sitting: Supervision or Set-up Assistance   Dynamic Sitting: Supervision or Set-up Assistance   Static Standing: Contact Guard Assistance   Dynamic Standing: Minimal Assistance     Education:  Education provided to pt and spouse regarding: PT role/POC; safety with mobility upon d/c; energy conservation tips at home. Verbalized understanding.       AM-PAC 6 CLICK MOBILITY  Total Score:20       Patient left sitting on EOB with all lines intact and call button in reach.    GOALS:    Physical Therapy Goals        Problem: Physical Therapy Goal    Goal Priority Disciplines Outcome Goal Variances Interventions   Physical Therapy Goal     PT/OT, PT      Description:  Goals to be met by: 18     Patient will increase functional independence with mobility by performin. Sit to stand transfer with Stand-by Assistance  2. Gait  x 50 feet with Contact Guard Assistance using Rolling Walker with appropriate swing through pattern and foot clearance.   3. Lower extremity exercise program x15 reps with supervision for general strengthening and endurance.                       History:     Past Medical History:   Diagnosis Date    Hyperlipidemia     Prostate cancer      s/p prostatectomy,     PVC (premature ventricular contraction)        Past Surgical History:    Procedure Laterality Date    KNEE SURGERY      PROSTATECTOMY      TONSILLECTOMY         Clinical Decision Making:     Personal factors/comorbidities: HLD. The listed co-morbidities and personal factors impact pt's current level and progress with functional mobility and independence.   Body systems elements affected: lower extremities, upper extremities, musculoskeletal system, neuromuscular system  Impairments: see assessment  Clinical Presentation: stable  Functional Outcome Tools: AMPAC, MMT, ROM  Evaluation Complexity Level: low    Time Tracking:     PT Received On: 05/14/18  PT Start Time: 0953     PT Stop Time: 1021  PT Total Time (min): 28 min     Billable Minutes: Evaluation 15 and Gait Training 13      Cheryl Jackson PT, DPT  Pager: [phone]  5/14/2018

## 2018-05-14 NOTE — ASSESSMENT & PLAN NOTE
--morbilliform rash present over limbs, abdomen, chest, back with liver involvement (AST 69,  at OSH) and eosinophilia (10% at OSH) without renal involvement  --he is afebrile at this time  --Leukocytosis improved  --LFTs trendig down , recommended to stop statin until PCP follow up   --dermatology following and recommend PO prednisone until seen in clinic which will be within a month   --solumedrol 1 mg/kg given once   --vancomycin added to patient allergies  --PRN percocet for pain which significantly alleviated the patient's pain

## 2018-05-14 NOTE — PLAN OF CARE
Problem: Physical Therapy Goal  Goal: Physical Therapy Goal  Goals to be met by: 18     Patient will increase functional independence with mobility by performin. Sit to stand transfer with Stand-by Assistance  2. Gait  x 50 feet with Contact Guard Assistance using Rolling Walker with appropriate swing through pattern and foot clearance.   3. Lower extremity exercise program x15 reps with supervision for general strengthening and endurance.         PT evaluation completed. POC and goals established.    Cheryl Jackson, PT, DPT  2018

## 2018-05-14 NOTE — DISCHARGE SUMMARY
[facility] Hospital Medicine  Discharge Summary      Patient Name: Leonid Harris III  MRN: [id number]  Admission Date: 5/12/2018  Hospital Length of Stay: 2 days  Discharge Date and Time:  05/14/2018 5:04 PM  Attending Physician: Ike Martines MD   Discharging Provider: Kathleen Ro MD  Primary Care Provider: David Lane MD  Hospital Medicine Team: [facility] HOSP MED 4 Kathleen Ro MD    HPI:   68 M with PMH prostate cancer s/p prostatectomy, prior smoker, HLD who was transferred here for evaluation of DRESS.  Transfer papers include only lab and imaging results without notes so most history obtained from [facility] transfer acceptance note and patient.  Patient had a total right knee replacement here at [facility] on 2/28/18 and then developed a subsequent MRSA infection of the knee on 4/12/18 and went for a total knee wash out / I&D at that time.  He was sent home on IV Vanc for a total of 6 week course therapy.  However, around 5/3, patient developed a diffuse maculopapular rash throughout his whole body.  He presented to OSH and was switched off of Vanc and placed on Zyvox (linezolid) and was then discharged.      He represented to [facility] (date of admission unknown, most likely 5/11, patient does not recall) with diffuse pruritic rash and was found to have a WBC of 19 and LLL infiltrate on CXR.  Labs also revealed eosinophilia, elevated LFTs, and there was concern for DRESS syndrome.  Patient was given his dose of zyvox prior to transfer as well as Zosyn.  Also of note patient had T-wave inversion on EKG at OSH.  Denies chest pain; troponin negative at OSH.    Imaging at outside hospitals:  R Knee 2 views (5/11) - no effusions, bony destruction, no significant abnormality  CXR (5/11) - infiltrate involving L base c/w PNA  Liver US (5/6) - hepatomegaly with fatty infiltration  Carotid US (5/6) - no significant plaque or stenosis  CT cervical spine (5/6) - degenerative changes  of lower cervical spine, loss of the normal lordotic curve of the cervical spine possibly related to spasm  CT brain w/o contrast (5/6) - no acute changes    Upon transfer, patient was noted to be very uncomfortable, unable to stay still in bed.  Morbilliform rash is present over his entire body.  He reports his back pain is chronic but he thinks it may have gotten worse now that he has pain all over.  He reports he underwent MRI of the spine at [facility], however the only imaging available on CD he came with is Xrays.  He denied chest pain, shortness of breath, headache, abdominal pain, vomiting, diarrhea.   He did complain of dizziness and nausea.        * No surgery found *      Hospital Course:   - Patient admitted seen by dermatology and ID , was switched to linezolid and started on prednisone for his rash. Patient improved with his rash and was cleared to get linezolid until 5/24, then doxycycline 100 mg qd after that. He will have appointment with derm in a  month and an appointment with ID. He was sent home with outpatient PT and home walking roller. His PCP was informed of the plan.    At discharge patient was stable alert and oriented. .Discussed discharge plan. Reviewed medications and side effects, appointments, and answered questions with patient and wife     Value Min Max   Temp 97.6 °F (36.4 °C) 98.4 °F (36.9 °C)   Pulse 78 94   Resp 20 21   BP: Systolic 140 147   BP: Diastolic 80 87   MAP (mmHg) 112 114   SpO2 94 % 97 %     Physical Exam   Constitutional: He is oriented to person, place, and time. No distress.   HENT:   Head: Normocephalic and atraumatic.   Mouth/Throat: No oropharyngeal exudate.   Eyes: No scleral icterus.   Cardiovascular: Normal rate, regular rhythm and normal heart sounds.  Exam reveals no gallop and no friction rub.    No murmur heard.  Pulmonary/Chest: Effort normal and breath sounds normal. No respiratory distress. He has no wheezes. He has no rales.   Abdominal: Soft. Bowel  sounds are normal. He exhibits no distension. There is no tenderness. There is no rebound and no guarding.   Musculoskeletal: He exhibits no edema.   No pain right knee   Neurological: He is alert and oriented to person, place, and time. No cranial nerve deficit.   Skin:   Morbiliform rash over all body (including face)   Vitals reviewed.         Consults:   Consults         Status Ordering Provider     Inpatient consult to Dermatology  Once     Provider:  (Not yet assigned)    Completed CURRY MAJOR     Inpatient consult to Infectious Diseases  Once     Provider:  (Not yet assigned)    Completed CURRY MAJOR     Inpatient [facility] team [facility]  Once     Provider:  (Not yet assigned)    Completed GLENNY CAMARA          * DRESS syndrome    --morbilliform rash present over limbs, abdomen, chest, back with liver involvement (AST 69,  at OSH) and eosinophilia (10% at OSH) without renal involvement  --he is afebrile at this time  --Leukocytosis improved  --LFTs trendig down , recommended to stop statin until PCP follow up   --dermatology following and recommend PO prednisone until seen in clinic which will be within a month   --solumedrol 1 mg/kg given once   --vancomycin added to patient allergies  --PRN percocet for pain which significantly alleviated the patient's pain        Infection of prosthetic right knee joint    - Continue linezolid for 2 more weeks per ID , will get doxycycline for maintenance after   - Ortho on board, ordered spine MRI is negative for infection   - PT ordered for outpatient             MRSA infection    --MRSA infection of the knee  --patient was unable to complete course of vanc due to DRESS and was switched to linezolid  --linezolid has been resumed with ID approval  -Blood cultures are negative to date , last positive was knee culture from 4/12  --patient's complaint of back pain has been chronic and subjectively worse with the onset of DRESS. Will work  "with PT and take PRN percocet         HCAP (healthcare-associated pneumonia)    --CXR (5/12) on my read reveals LLL infiltrate suspicious for pneumonia.  Follow for official read  --received linezolid and zosyn at OSH  --will continue linezolid until 5/24          Final Active Diagnoses:    Diagnosis Date Noted POA    PRINCIPAL PROBLEM:  DRESS syndrome [L27.0, D72.1, T50.905A] 05/12/2018 Yes    MRSA infection [A49.02] 05/12/2018 Yes    T wave inversion in EKG [R94.31] 05/12/2018 Yes    Infection of prosthetic right knee joint [T84.53XA] 05/12/2018 Not Applicable    HCAP (healthcare-associated pneumonia) [J18.9] 05/11/2018 Yes      Problems Resolved During this Admission:    Diagnosis Date Noted Date Resolved POA       Discharged Condition: good    Disposition: Home or Self Care    Follow Up:  Follow-up Information     David Lane MD In 1 week.    Specialty:  Family Medicine  Why:  Prescription for Labwork will be faxed to MD's office - Please go Friday 5/18 8-11 or Monday 5/21 8-11 or 1-4 and PCP will fax result's to [facility]   Contact information:  [address] [place] [address] DR Alysia GRIFFITH [address]  [phone]             [facility] - Infectious Diseases On 5/28/2018.    Specialty:  Infectious Diseases  Why:  Hospital Follow-up Monday 5/28 @ 2:30pm with Dr. Delgado  Contact information:  [address]  [facility] [address] [phone]  Additional information:  1st Floor - [facility] - Dermatology On 6/15/2018.    Specialty:  Dermatology  Why:  Hospital Follow-up Friday 6/15 @ 1:30pm with Dr. Yates for follow up for DRESS syndrome and predisone dosing  Contact information:  [facility] [address] [phone]               Patient Instructions:     WALKER FOR HOME USE   Order Specific Question Answer Comments   Type of Walker: Adult (5'4"-6'6")    With wheels? Yes    Height: 6' 4" (1.93 m)    Weight: 78.4 kg (172 lb 13.5 oz)  "   Length of need (1-99 months): 99    Please check all that apply: Patient is unable to safely ambulate without equipment.    Please check all that apply: Patient's condition impairs ambulation.    Vendor: [employer]    Expected Date of Delivery: 5/14/2018      Diet Adult Regular     Activity as tolerated     Notify your health care provider if you experience any of the following:  temperature >100.4     Notify your health care provider if you experience any of the following:  persistent nausea and vomiting or diarrhea     Notify your health care provider if you experience any of the following:  severe uncontrolled pain     No dressing needed         Significant Diagnostic Studies: Labs:   CMP   Recent Labs  Lab 05/13/18  0616 05/14/18  0708    137   K 4.2 3.2*    103   CO2 22* 26    82   BUN 17 19   CREATININE 0.8 0.8   CALCIUM 7.7* 7.9*   PROT 4.4* 4.7*   ALBUMIN 2.3* 2.5*   BILITOT 1.1* 1.3*   ALKPHOS 305* 391*   AST 56* 88*   * 133*   ANIONGAP 10 8   ESTGFRAFRICA >60.0 >60.0   EGFRNONAA >60.0 >60.0   , CBC   Recent Labs  Lab 05/13/18  0616 05/14/18  0709   WBC 16.85* 15.66*   HGB 11.4* 11.1*   HCT 35.1* 35.3*    91*   , INR No results found for: INR, PROTIME and All labs within the past 24 hours have been reviewed  Microbiology:   Microbiology Results (last 7 days)     Procedure Component Value Units Date/Time    Blood culture [[id number]] Collected:  05/12/18 0709    Order Status:  Completed Specimen:  Blood Updated:  05/14/18 1022     Blood Culture, Routine No Growth to date     Blood Culture, Routine No Growth to date     Blood Culture, Routine No Growth to date    Blood culture [[id number]] Collected:  05/12/18 0505    Order Status:  Completed Specimen:  Blood Updated:  05/14/18 0812     Blood Culture, Routine No Growth to date     Blood Culture, Routine No Growth to date     Blood Culture, Routine No Growth to date    Clostridium difficile EIA [[id number]] Collected:  05/13/18  2008    Order Status:  Completed Specimen:  Stool from Stool Updated:  05/14/18 0427     C. diff Antigen Negative     C difficile Toxins A+B, EIA Negative     Comment: Testing not recommended for children <24 months old.       Culture, Respiratory with Gram Stain [[id number]]     Order Status:  No result Specimen:  Respiratory           Radiology:     EXAMINATION:  XR LUMBAR SPINE AP AND LATERAL    CLINICAL HISTORY:  pain;    TECHNIQUE:  AP, lateral and spot images were performed of the lumbar spine.    COMPARISON:  MRI lumbar spine same day    FINDINGS:  Generalized osteopenia.  Five non-rib-bearing lumbar type vertebral bodies.  Straightening of the usual lumbar lordosis, likely related to positioning or muscle strain.  Vertebral body heights appear relatively maintained.  There is grade 1 degenerative related retrolisthesis of L4 on 5 and L5 on S1.  No acute displaced fracture or dislocation identified.  Age-related mild degenerative disc disease with facet arthrosis most prominent at L5-S1.   Impression       No acute displaced fracture-dislocation identified.      Electronically signed by: Kunal Gallagher MD  Date: 05/12/2018  Time: 14:57     EXAMINATION:  MRI LUMBAR SPINE W WO CONTRAST    CLINICAL HISTORY:  eval for infection;    TECHNIQUE:  Multiplanar, multisequence MR images were acquired from the thoracolumbar junction to the sacrum without the administration of contrast.    COMPARISON:  None.    FINDINGS:  Alignment: There is straightening of normal lumbar lordosis.  There is mild retrolisthesis of L5 on S1.    Vertebrae: There is nonspecific heterogeneity of marrow signal.  There is a 1.4 cm T2/STIR hyperintense lesion with associated postcontrast enhancement in the L5 vertebral body which is favored to represent an atypical hemangioma.    Discs: There is intervertebral disc space narrowing most significant at L5-S1 with adjacent degenerative endplate Fatty proliferation.  Additional degenerative endplate  edema is present at the L3-L4 level.  No abnormal T2 hyperintense disc signal or disc enhancement.    Cord: Normal.  Conus terminates at L1    Degenerative findings:    T12-L2: No significant degenerative change.    L2-L4: Mild circumferential disc bulge without compressive phenomena.    L4-L5: Circumferential disc bulge and mild bilateral facet hypertrophy results in mild minimal spinal canal stenosis and mild bilateral neural foraminal narrowing.    L5-S1: Circumferential disc bulge and bilateral facet hypertrophy results in mild right neural foraminal narrowing.    Paraspinal muscles & soft tissues: No paravertebral soft tissue thickening or abscess.   Impression       No evidence of lumbar spondylodiscitis.    Mild disc degeneration of the lumbar spine with associated vertebral endplate degenerative signal.    Electronically signed by resident: Gabe King  Date: 05/12/2018  Time: 14:29    Electronically signed by: Lance Torres MD  Date: 05/12/2018  Time: 15:17         Medications:   Leonid Harris III   Home Medication Instructions BLAINE:[id number]    Printed on:05/14/18 8332   Medication Information                      diltiaZEM (CARDIZEM CD) 180 MG 24 hr capsule  Take 180 mg by mouth once daily.             doxycycline (VIBRA-TABS) 100 MG tablet  Take 1 tablet (100 mg total) by mouth once daily.             linezolid (ZYVOX) 600 mg Tab  Take 1 tablet (600 mg total) by mouth every 12 (twelve) hours.             meloxicam (MOBIC) 15 MG tablet  Take 1 tablet (15 mg total) by mouth once daily.             oxyCODONE-acetaminophen (PERCOCET)  mg per tablet  Take 1 tablet by mouth every 6 (six) hours as needed.             oxyCODONE-acetaminophen (PERCOCET)  mg per tablet  Take 1 tablet by mouth every 4 (four) hours as needed for Pain.             pantoprazole (PROTONIX) 40 MG tablet  Take 1 tablet (40 mg total) by mouth once daily.             predniSONE (DELTASONE) 20 MG tablet  Take 3 tablets (60  mg total) by mouth once daily.             promethazine (PHENERGAN) 25 MG tablet  Take 1 tablet (25 mg total) by mouth every 6 (six) hours as needed for Nausea.             promethazine (PHENERGAN) 25 MG tablet  Take 1 tablet (25 mg total) by mouth every 6 (six) hours as needed.             zolpidem (AMBIEN) 10 mg Tab                     Indwelling Lines/Drains at time of discharge:   Lines/Drains/Airways     Peripherally Inserted Central Catheter Line                 PICC Double Lumen 04/13/18 0935 right cephalic 31 days          Drain                 Closed/Suction Drain 04/12/18 1648 Right Knee Bulb 7 Fr. 32 days         Closed/Suction Drain 04/12/18 1649 Right Knee Accordion 10 Fr. 32 days          Epidural Line                 Perineural Analgesia/Anesthesia Assessment (using dermatomes) Epidural 02/27/18 1128 76 days                Time spent on the discharge of patient: 50 minutes  Patient was seen and examined on the date of discharge and determined to be suitable for discharge.         Kathleen Ro MD  Department of Hospital Medicine  [facility]

## 2018-05-14 NOTE — CARE UPDATE
12:53 PM     To be sent to PCP    Patient will need outside PT upon discharge.Start date 5/14/2018 as he will be discharged today .      Kathleen Ro MD, MPH  Internal Medicine PGY1  [address] [place] [address]

## 2018-05-14 NOTE — ASSESSMENT & PLAN NOTE
- Continue linezolid for 2 more weeks per ID , will get doxycycline for maintenance after   - Ortho on board, ordered spine MRI is negative for infection   - PT ordered for outpatient

## 2018-05-14 NOTE — PLAN OF CARE
Problem: Occupational Therapy Goal  Goal: Occupational Therapy Goal  Goals to be met by: 5/21/18     Patient will increase functional independence with ADLs by performing:    UE Dressing with Modified [place].  LE Dressing with Modified [place].  Grooming while standing with Supervision.  Toileting from bedside commode with Supervision for hygiene and clothing management.   Supine to sit with Modified [place].  Stand pivot transfers with Modified [place] using RW.  Toilet transfer to bedside commode with Modified [place].    Outcome: Ongoing (interventions implemented as appropriate)  OT eval completed.

## 2018-05-15 LAB
FUNGUS SPEC CULT: NORMAL
FUNGUS SPEC CULT: NORMAL

## 2018-05-16 ENCOUNTER — PATIENT OUTREACH (OUTPATIENT)
Dept: ADMINISTRATIVE | Facility: CLINIC | Age: 68
End: 2018-05-16

## 2018-05-16 DIAGNOSIS — M23.91 INTERNAL DERANGEMENT OF KNEE JOINT, RIGHT: Primary | ICD-10-CM

## 2018-05-16 LAB
ACID FAST MOD KINY STN SPEC: NORMAL
MYCOBACTERIUM SPEC QL CULT: NORMAL

## 2018-05-16 RX ORDER — PREGABALIN 75 MG/1
75 CAPSULE ORAL 2 TIMES DAILY
COMMUNITY
End: 2021-02-01 | Stop reason: ALTCHOICE

## 2018-05-16 NOTE — PROGRESS NOTES
Pt and wife stating that they were told outpatient PT would be ordered, but no orders were received at PT clinic. I informed them that I would have to send messages to d\c MD and GORDON in chart. Pt stated if it would be quicker he would just get PCP to order. I told him that it would probably be quicker to go that route. He stated that he would go through PCP. I told pt and wife that if something changes and they need me to contact CM and MD to call me back at [facility] number.

## 2018-05-16 NOTE — PATIENT INSTRUCTIONS
Wound Check After Surgery: Infection  Your surgical wound has become infected. Infection after surgery usually involves just the top layers of skin. Sometimes the infection is deeper in the wound and may involve a collection of fluid or pus. Treatment will depend on the type of infection you have.  Once antibiotic treatment is started, the area of redness should not increase. Pain should start to decrease after 2 days of treatment.  Home care  Different types of surgery require different types of care and dressing changes. It is important to follow all instructions and advice from your surgeon, as well as other members of your healthcare team.  Wound care  · Keep the wound clean, as directed by your healthcare provider.  · Change the dressing as directed. Change the dressing sooner if it becomes wet or stained with blood or fluid from the wound.  · Bathe with a sponge (no shower or tub baths) for the first few days, or until there is no more drainage from the wound. Unless your surgeon gave you different instructions, you can then shower. Don't soak the area in water (no baths or swimming) until the tape, sutures, or staples are removed and any wound opening has dried out and healed.  · If you smoke, stop smoking. Ask your doctor about ways to quit.  Changing the dressing  · Wash your hands with plain soap and water before changing the dressings. Or use an alcohol-based hand .  · Carefully remove the dressing and tape. Dont just yank it off. If it sticks to the wound, you may need to wet it a little to remove it, unless your healthcare provider told you not to wet it.  · Wash your hands again before putting on a new, clean dressing.  · Gently clean the wound with clean water (or saline) using gauze or a clean washcloth. Don't rub it or pick at it.  · Don't use soap, alcohol, hydrogen peroxide, or any other cleanser.  · If you were told to dry the wound before putting on a new dressing, gently pat it dry.  Don't rub.  · Throw out the old dressing. Don't reuse it!  · Wash your hands again when you are done.  Types of dressings  Your healthcare team will tell you what type of dressing to put on your wound. Follow your healthcare teams instructions carefully, and contact them if you have any questions. Two common types of dressings are described below. You may have one of these or another type.  · Dry dressing. Use dry gauze. If the wound is still draining, use a nonadherent dressing, which shouldnt stick to the wound.  · Wet-to-dry dressing. Wet the gauze and squeeze out the extra water (or saline) before putting it on. Then cover this with a dry pad.  Medicines  · If you were given antibiotics, take them until they are used up or your healthcare provider tells you to stop. It is important to finish the antibiotics even though you feel better, to make sure the infection has cleared.  · You can take acetaminophen or ibuprofen for pain, unless you were given a different pain medicine to use. Talk with your doctor before using these medicines if you have chronic liver or kidney disease. Also talk with your doctor if you have ever had a stomach ulcer or digestive bleeding, or are taking blood-thinner medicines.  · Aspirin should never be used in anyone under 18 years of age who is ill with a fever. It may cause severe liver damage.  Follow-up care  Follow up with your healthcare provider for your next wound check or to remove your sutures, staples, or tape.  · If a culture was done, you will be told if the results will affect your treatment. You can call as directed for the results.  · If imaging tests, such as X-rays, an ultrasound, or CT scan were done, they will be looked at by a specialist. You will be told of the results, especially if they affect treatment.  Call 911  Call emergency services right away if any of these occur:  · Trouble breathing or swallowing, wheezing  · Hoarse voice or trouble  speaking  · Extreme confusion  · Extreme drowsiness or trouble awakening  · Fainting or loss of consciousness  · Rapid heart rate or very slow heart rate  · Vomiting blood, or large amounts of blood in stool  · Discomfort in the center of the chest that feels like pressure, squeezing, a sense of fullness, or pain  · Discomfort or pain in other upper body areas, such as the back, one or both arms, neck, jaw, or stomach  · Stroke symptoms (spot a stroke FAST):  ¨ F: Face drooping. One side of the face is numb or droops.  ¨ A: Arm weakness. One arm feels weak or numb.  ¨ S: Speech difficulty: Speech is slurred, or you can't speak.  ¨ T: Time to call 911. Even if symptoms go away, call 911.  When to seek medical advice  Call your healthcare provider right away if any of these occur:  · Increasing pain at the site of surgery  · Pain not controlled by medicine prescribed  · Fever of 100.4°F (38ºC) or higher, or as directed by your healthcare provider  · Increasing redness around the wound  · Fluid, pus, or blood that continues to drain from the wound after 5 days of treatment  · Vomiting, constipation, or diarrhea  Date Last Reviewed: 9/27/2015  © 5576-7074 [employer]. [address] [place] [address], [place] [address]. All rights reserved. This information is not intended as a substitute for professional medical care. Always follow your healthcare professional's instructions.

## 2018-05-17 LAB
BACTERIA BLD CULT: NORMAL
BACTERIA BLD CULT: NORMAL

## 2018-05-17 NOTE — PLAN OF CARE
Pt d/c to home. CM faxed d/c summary & labwork orders to PCP; CM faxed d/c summary, PT eval & PT order to [facility] p [phone] f [phone] per pt's request.     05/17/18 1555   Final Note   Assessment Type Final Discharge Note   Discharge Disposition Home   What phone number can be called within the next 1-3 days to see how you are doing after discharge? [phone]   Hospital Follow Up  Appt(s) scheduled? Yes   Right Care Referral Info   Post Acute Recommendation No Care

## 2018-05-18 ENCOUNTER — PATIENT MESSAGE (OUTPATIENT)
Dept: INFECTIOUS DISEASES | Facility: CLINIC | Age: 68
End: 2018-05-18

## 2018-05-21 ENCOUNTER — OFFICE VISIT (OUTPATIENT)
Dept: SPORTS MEDICINE | Facility: CLINIC | Age: 68
End: 2018-05-21
Payer: MEDICARE

## 2018-05-21 VITALS
DIASTOLIC BLOOD PRESSURE: 87 MMHG | HEART RATE: 102 BPM | WEIGHT: 195 LBS | HEIGHT: 76 IN | BODY MASS INDEX: 23.75 KG/M2 | SYSTOLIC BLOOD PRESSURE: 144 MMHG

## 2018-05-21 DIAGNOSIS — D72.12 DRESS SYNDROME: ICD-10-CM

## 2018-05-21 DIAGNOSIS — T84.53XS INFECTION ASSOCIATED WITH INTERNAL RIGHT KNEE PROSTHESIS, SEQUELA: ICD-10-CM

## 2018-05-21 DIAGNOSIS — G89.29 CHRONIC PAIN OF RIGHT KNEE: ICD-10-CM

## 2018-05-21 DIAGNOSIS — M25.561 CHRONIC PAIN OF RIGHT KNEE: ICD-10-CM

## 2018-05-21 DIAGNOSIS — T50.905A DRESS SYNDROME: ICD-10-CM

## 2018-05-21 DIAGNOSIS — M23.91 INTERNAL DERANGEMENT OF KNEE JOINT, RIGHT: Primary | ICD-10-CM

## 2018-05-21 PROCEDURE — 99024 POSTOP FOLLOW-UP VISIT: CPT | Mod: POP,,, | Performed by: ORTHOPAEDIC SURGERY

## 2018-05-21 PROCEDURE — 99999 PR PBB SHADOW E&M-EST. PATIENT-LVL III: CPT | Mod: PBBFAC,,, | Performed by: ORTHOPAEDIC SURGERY

## 2018-05-21 PROCEDURE — 99213 OFFICE O/P EST LOW 20 MIN: CPT | Mod: PBBFAC,PO | Performed by: ORTHOPAEDIC SURGERY

## 2018-05-21 NOTE — PROGRESS NOTES
Subjective:          Chief Complaint: Leonid Harris III is a 68 y.o. male who had concerns including Post-op Evaluation of the Right Knee.    Patient is here for a follow up for his right knee. He is doing PT in [place]. He doesn't feel much pain but has some stiffness. He had episode of DRESS syndrome. He has been placed on prednisone and doxycycline and linezolid for MRSA cellulitis. He is having difficulty with balance and requires a walker at this time. 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 Right  Arthroscopy, knee, surgical; for infection, lavage and drainage 36152  2.  Right  Arthroscopy, knee, synovectomy, major 07128  3.  Right  Open incisional and drainage lateral hematoma  4. Right knee Bactisure, 17305  5. KRISTI drain application      DATE OF PROCEDURE: 2/27/2018     ATTENDING SURGEON: Surgeon(s) and Role:     * Fran Shelton MD - Primary     Co-surgeon:  Jose Alberto Perez MD     First Assistant:  SMA Jame     PREOPERATIVE DIAGNOSIS:  Right  M17.11     POSTOPERATIVE DIAGNOSIS:   Right  Tear, Medial meniscus, old M23.205   M17.11     PROCEDURES(S) PERFORMED:   1. Right  Patellofemoral Arthroplasty, 99366  2.  Right  Arthroscopy, with meniscectomy (medial OR lateral) 20681, medial  3.  Right  Amniox Arthrocentesis, 31828      Handedness: right-handed  Sport played: volleyball      Level: recreational          Leonid also participates in running.  Pain   This is a  new problem. The current episode started 1 to 4 weeks ago. The problem occurs intermittently. The problem has been waxing and waning. Pertinent negatives include no abdominal pain, chest pain, chills, congestion, coughing, fever, joint swelling, myalgias, nausea, numbness, rash, sore throat or vomiting. The symptoms are aggravated by exertion. He has tried nothing for the symptoms. The treatment provided mild relief.       Review of Systems   Constitution: Negative for chills, fever and night sweats.   HENT: Negative for congestion, hearing loss and sore throat.    Eyes: Negative for blurred vision, discharge, double vision and visual disturbance.   Cardiovascular: Negative for chest pain, leg swelling, palpitations and syncope.   Respiratory: Negative for cough and shortness of breath.    Endocrine: Negative for cold intolerance, heat intolerance and polyuria.   Hematologic/Lymphatic: Negative for bleeding problem.   Skin: Negative for dry skin and rash.   Musculoskeletal: Positive for joint pain. Negative for back pain, falls, joint swelling, muscle cramps, muscle weakness, myalgias and stiffness.   Gastrointestinal: Negative for abdominal pain, melena, nausea and vomiting.   Genitourinary: Negative for hematuria.   Neurological: Negative for focal weakness, loss of balance, numbness and paresthesias.   Psychiatric/Behavioral: Negative for altered mental status.       Pain Related Questions  Over the past 3 days, what was your average pain during activity? (I.e. running, jogging, walking, climbing stairs, getting dressed, ect.): 5  Over the past 3 days, what was your highest pain level?: 8  Over the past 3 days, what was your lowest pain level? : 1    Other  Was the patient's HEIGHT measured or patient reported?: Patient Reported  Was the patient's WEIGHT measured or patient reported?: Measured      Objective:        General: Leonid is well-developed, well-nourished, appears stated age, in no acute distress, alert  and oriented to time, place and person.     General    Vitals reviewed.  Constitutional: He is oriented to person, place, and time. He appears well-developed and well-nourished. No distress.   HENT:   Mouth/Throat: No oropharyngeal exudate.   Eyes: Conjunctivae and EOM are normal. Pupils are equal, round, and reactive to light. Right eye exhibits no discharge. Left eye exhibits no discharge.   Neck: Normal range of motion. Neck supple. No JVD present.   Cardiovascular: Normal heart sounds and intact distal pulses.    No murmur heard.  Pulmonary/Chest: Effort normal and breath sounds normal. No respiratory distress.   Abdominal: Soft. Bowel sounds are normal. He exhibits no distension. There is no tenderness.   Neurological: He is alert and oriented to person, place, and time. He has normal reflexes. No cranial nerve deficit. Coordination normal.   Psychiatric: He has a normal mood and affect. His behavior is normal. Judgment and thought content normal.     General Musculoskeletal Exam   Gait: antalgic       Right Knee Exam     Inspection   Erythema: absent  Scars: present  Swelling: absent  Effusion: effusion  Deformity: deformity  Bruising: absent    Tenderness   The patient is experiencing no tenderness.         Crepitus   The patient has crepitus of the patella (moderate).    Range of Motion   Extension: 0   Flexion:  130 abnormal     Tests   Meniscus   Michi:  Medial - negative Lateral - negative  Ligament Examination Lachman: normal (-1 to 2mm) PCL-Posterior Drawer: normal (0 to 2mm)     MCL - Valgus: normal (0 to 2mm)  LCL - Varus: normalPivot Shift: normal (Equal)Reverse Pivot Shift: normal (Equal)Dial Test at 30 degrees: normal (< 5 degrees)Dial Test at 90 degrees: normal (< 5 degrees)  Posterior Sag Test: negative  Posterolateral Corner: unstable (>15 degrees difference)  Patella   Patellar Apprehension: negative  Passive Patellar Tilt: neutral  Patellar Tracking: normal  Patellar Glide (quadrants):  Lateral - 1   Medial - 2  Q-Angle at 90 degrees: normal  Patellar Grind: negative  J-Sign: none    Other   Meniscal Cyst: absent  Popliteal (Baker's) Cyst: absent  Sensation: normal    Comments:  Incision c/d/i, no evidence of infection    Overall redness to skin is improving, but still present    Left Knee Exam     Inspection   Erythema: absent  Scars: present  Swelling: absent  Effusion: absent  Deformity: deformity  Bruising: absent    Tenderness   The patient is experiencing no tenderness.         Range of Motion   Extension: 0   Flexion:  150 normal     Tests   Meniscus   Michi:  Medial - negative Lateral - negative  Stability Lachman: normal (-1 to 2mm) PCL-Posterior Drawer: normal (0 to 2mm)  MCL - Valgus: normal (0 to 2mm)  LCL - Varus: normal (0 to 2mm)Pivot Shift: normal (Equal)Reverse Pivot Shift: normal (Equal)Dial Test at 30 degrees: normal (< 5 degrees)Dial Test at 90 degrees: normal (< 5 degrees)  Posterior Sag Test: negative  Posterolateral Corner: unstable (>15 degrees difference)  Patella   Patellar Apprehension: negative  Passive Patellar Tilt: neutral  Patellar Tracking: normal  Patellar Glide (Quadrants): Lateral - 1 Medial - 2  Q-Angle at 90 degrees: normal  Patellar Grind: negative  J-Sign: J sign absent    Other   Meniscal Cyst: absent  Popliteal (Baker's) Cyst: absent  Sensation: normal    Right Hip Exam     Tests   Erin: negative  Left Hip Exam     Tests   Erin: negative          Muscle Strength   Right Lower Extremity   Hip Abduction: 5/5   Quadriceps:  4/5   Hamstrin/5   Left Lower Extremity   Hip Abduction: 5/5   Quadriceps:  5/5   Hamstrin/5     Reflexes     Left Side  Quadriceps:  2+  Achilles:  2+    Right Side   Quadriceps:  2+  Achilles:  2+    Vascular Exam     Right Pulses  Dorsalis Pedis:      2+  Posterior Tibial:      2+        Left Pulses  Dorsalis Pedis:      2+  Posterior Tibial:      2+        Edema  Right Lower Leg: absent  Left Lower Leg: absent        ESR  5/13/18 1  CRP 5/13/18: 82.1            Assessment:       Encounter Diagnoses   Name Primary?    Internal derangement of knee joint, right Yes    Chronic pain of right knee     Infection associated with internal right knee prosthesis, sequela     DRESS syndrome           Plan:       1. IKDC, SF-12 and KOOS was not filled out today in clinic.     RTC in 6 weeks with Dr. Fran Shelton  Patient will not fill out IKDC, SF-12 and KOOS.    2. Continue PT per protocol - [facility]    3. Full WB, OK for full ROM    4. Continue doxycycline, linezolid per ID    5. Prednisone per dermatology, would recommend weaning as soon as feasible by dermatology      All of the patient's questions were answered and informed consent was obtained. The patient will contact us if they have any questions or concerns in the interim.             [facility] patient questionnaires have been collected today.

## 2018-05-22 ENCOUNTER — TELEPHONE (OUTPATIENT)
Dept: DERMATOLOGY | Facility: CLINIC | Age: 68
End: 2018-05-22

## 2018-05-22 NOTE — TELEPHONE ENCOUNTER
Called Pt and informed him that Dr. WILKINS feels that he needs to be seen sooner than the 15th of June. M offering appointment tomorrow @3:30PM.

## 2018-05-28 ENCOUNTER — OFFICE VISIT (OUTPATIENT)
Dept: INFECTIOUS DISEASES | Facility: CLINIC | Age: 68
End: 2018-05-28
Payer: MEDICARE

## 2018-05-28 VITALS
SYSTOLIC BLOOD PRESSURE: 141 MMHG | HEART RATE: 104 BPM | WEIGHT: 182.75 LBS | HEIGHT: 76 IN | DIASTOLIC BLOOD PRESSURE: 86 MMHG | TEMPERATURE: 98 F | BODY MASS INDEX: 22.25 KG/M2

## 2018-05-28 DIAGNOSIS — M00.061 STAPHYLOCOCCAL ARTHRITIS OF RIGHT KNEE: Primary | ICD-10-CM

## 2018-05-28 DIAGNOSIS — T84.7XXD HARDWARE COMPLICATING WOUND INFECTION, SUBSEQUENT ENCOUNTER: ICD-10-CM

## 2018-05-28 PROCEDURE — 99214 OFFICE O/P EST MOD 30 MIN: CPT | Mod: S$PBB,,, | Performed by: INTERNAL MEDICINE

## 2018-05-28 PROCEDURE — 99213 OFFICE O/P EST LOW 20 MIN: CPT | Mod: PBBFAC | Performed by: INTERNAL MEDICINE

## 2018-05-28 PROCEDURE — 99999 PR PBB SHADOW E&M-EST. PATIENT-LVL III: CPT | Mod: PBBFAC,,, | Performed by: INTERNAL MEDICINE

## 2018-05-28 NOTE — PROGRESS NOTES
Subjective:      Patient ID: Leonid Harris III is a 68 y.o. male.    Chief Complaint:Hospital Follow Up    History of Present Illness    Mr. Harris is a 69 y/o male with a history of right medial meniscus tear.  He underwent right patello-femoral arhtroplasty and meniscectomy on February 27, 2018.  He developed an infection in the area post-operatively and was taken to the operating room on April 12 for I&D with retention of hardware.  Cultures were positive for MRSA.  He was started on IV vancomycin.  He developed DRESS syndrome as a result of the IV vancomycin.  The vancomycin was discontinued after about 4 weeks of therapy and he finished the rest of the planned 6 week course with oral linezolid.  He has since been transitioned to oral doxycycline daily as long term suppression.  He is here today for follow up.  He reports problems with his balance since his last stay in the hospital.  He has also been having numbness in his legs and trunk.  Had an episode of light headedness yesterday and felt like he was going to pass out.    Review of Systems   Constitution: Positive for weakness, malaise/fatigue, night sweats and weight loss. Negative for chills, decreased appetite, fever and weight gain.   HENT: Negative for congestion, ear pain, hearing loss, hoarse voice, sore throat and tinnitus.    Eyes: Positive for redness. Negative for blurred vision and visual disturbance.   Cardiovascular: Negative for chest pain, leg swelling and palpitations.   Respiratory: Positive for shortness of breath. Negative for cough, hemoptysis and sputum production.    Hematologic/Lymphatic: Negative for adenopathy. Does not bruise/bleed easily.   Skin: Positive for dry skin, itching and rash. Negative for suspicious lesions.   Musculoskeletal: Positive for back pain. Negative for joint pain, myalgias and neck pain.   Gastrointestinal: Negative for abdominal pain, constipation, diarrhea, heartburn, nausea and vomiting.    Genitourinary: Negative for dysuria, flank pain, frequency, hematuria, hesitancy and urgency.   Neurological: Positive for dizziness, numbness and paresthesias. Negative for headaches.   Psychiatric/Behavioral: Positive for depression and memory loss. The patient is nervous/anxious. The patient does not have insomnia.      Objective:   Physical Exam   Constitutional: He is oriented to person, place, and time. He appears well-developed and well-nourished. No distress.   HENT:   Head: Normocephalic and atraumatic.   Right Ear: External ear normal.   Left Ear: External ear normal.   Nose: Nose normal.   Mouth/Throat: Oropharynx is clear and moist. No oropharyngeal exudate.   Eyes: Conjunctivae and EOM are normal. Pupils are equal, round, and reactive to light. Right eye exhibits no discharge. Left eye exhibits no discharge. No scleral icterus.   Neck: Normal range of motion. Neck supple. No JVD present. No tracheal deviation present. No thyromegaly present.   Cardiovascular: Normal rate, regular rhythm, normal heart sounds and intact distal pulses.  Exam reveals no gallop and no friction rub.    No murmur heard.  Pulmonary/Chest: Effort normal and breath sounds normal. No stridor. No respiratory distress. He has no wheezes. He has no rales. He exhibits no tenderness.   Abdominal: Soft. Bowel sounds are normal. He exhibits no distension and no mass. There is no tenderness. There is no rebound and no guarding.   Musculoskeletal: Normal range of motion. He exhibits no edema or tenderness.   Lymphadenopathy:     He has no cervical adenopathy.   Neurological: He is alert and oriented to person, place, and time. He has normal reflexes. He displays normal reflexes. No cranial nerve deficit. He exhibits normal muscle tone. Coordination normal.   Skin: Skin is warm. No rash noted. He is not diaphoretic. No erythema. No pallor.   Psychiatric: He has a normal mood and affect. His behavior is normal. Judgment and thought content  normal.   Nursing note and vitals reviewed.    Assessment:       1. Staphylococcal arthritis of right knee    2. Hardware complicating wound infection, subsequent encounter        67 y/o with MRSA infection of his right knee complicated by presence of hardware.  He is s/p 4 weeks of IV vancomycin and 2 weeks of linezolid.  He has now been transitioned to doxycycline for long term chronic suppression.  He is to remain on doxycycline 100 mg once a day indefinitely given the continued presence of hardware in his knee.  Risks of doxycycline were discussed with the patient.  Signs of recurrence of infection in the event that he decides to stop taking the doxycycline were also discussed.    With regards to his complaint of balance problems and numbness in his legs, I would recommend that he sees a neurologist in [place] to evaluate this.  Plan:       Staphylococcal arthritis of right knee   -doxycycline for long term suppression    Hardware complicating wound infection, subsequent encounter   -doxycycline for long term suppression.

## 2018-05-29 ENCOUNTER — LAB VISIT (OUTPATIENT)
Dept: LAB | Facility: HOSPITAL | Age: 68
End: 2018-05-29
Attending: DERMATOLOGY
Payer: MEDICARE

## 2018-05-29 ENCOUNTER — OFFICE VISIT (OUTPATIENT)
Dept: DERMATOLOGY | Facility: CLINIC | Age: 68
End: 2018-05-29
Payer: MEDICARE

## 2018-05-29 ENCOUNTER — PATIENT MESSAGE (OUTPATIENT)
Dept: DERMATOLOGY | Facility: CLINIC | Age: 68
End: 2018-05-29

## 2018-05-29 DIAGNOSIS — T50.905A DRESS SYNDROME: Primary | ICD-10-CM

## 2018-05-29 DIAGNOSIS — T36.8X5S: ICD-10-CM

## 2018-05-29 DIAGNOSIS — D72.12 DRESS SYNDROME: ICD-10-CM

## 2018-05-29 DIAGNOSIS — L29.9 PRURITUS: ICD-10-CM

## 2018-05-29 DIAGNOSIS — Z79.52 LONG TERM CURRENT USE OF SYSTEMIC STEROIDS: ICD-10-CM

## 2018-05-29 DIAGNOSIS — K75.9 HEPATITIS: ICD-10-CM

## 2018-05-29 DIAGNOSIS — Z13.29 SCREENING FOR THYROID DISORDER: ICD-10-CM

## 2018-05-29 DIAGNOSIS — L85.3 XEROSIS CUTIS: ICD-10-CM

## 2018-05-29 DIAGNOSIS — R06.09 DYSPNEA ON EXERTION: ICD-10-CM

## 2018-05-29 DIAGNOSIS — T50.905A DRESS SYNDROME: ICD-10-CM

## 2018-05-29 DIAGNOSIS — D72.12 DRESS SYNDROME: Primary | ICD-10-CM

## 2018-05-29 DIAGNOSIS — R20.2 PARESTHESIA: ICD-10-CM

## 2018-05-29 DIAGNOSIS — L82.1 SEBORRHEIC KERATOSIS: ICD-10-CM

## 2018-05-29 LAB
ALBUMIN SERPL BCP-MCNC: 3.6 G/DL
ALP SERPL-CCNC: 122 U/L
ALT SERPL W/O P-5'-P-CCNC: 28 U/L
ANION GAP SERPL CALC-SCNC: 8 MMOL/L
AST SERPL-CCNC: 26 U/L
BILIRUB SERPL-MCNC: 1.1 MG/DL
BUN SERPL-MCNC: 19 MG/DL
CALCIUM SERPL-MCNC: 9.7 MG/DL
CHLORIDE SERPL-SCNC: 105 MMOL/L
CO2 SERPL-SCNC: 26 MMOL/L
CREAT SERPL-MCNC: 0.8 MG/DL
EST. GFR  (AFRICAN AMERICAN): >60 ML/MIN/1.73 M^2
EST. GFR  (NON AFRICAN AMERICAN): >60 ML/MIN/1.73 M^2
GLUCOSE SERPL-MCNC: 115 MG/DL
POTASSIUM SERPL-SCNC: 4.4 MMOL/L
PROT SERPL-MCNC: 7.8 G/DL
SODIUM SERPL-SCNC: 139 MMOL/L
TSH SERPL DL<=0.005 MIU/L-ACNC: 0.83 UIU/ML

## 2018-05-29 PROCEDURE — 99214 OFFICE O/P EST MOD 30 MIN: CPT | Mod: S$GLB,,, | Performed by: DERMATOLOGY

## 2018-05-29 PROCEDURE — 84443 ASSAY THYROID STIM HORMONE: CPT

## 2018-05-29 PROCEDURE — 36415 COLL VENOUS BLD VENIPUNCTURE: CPT | Mod: PO

## 2018-05-29 PROCEDURE — 80053 COMPREHEN METABOLIC PANEL: CPT

## 2018-05-29 NOTE — PROGRESS NOTES
Subjective:       Patient ID:  Leonid Harris III is a 68 y.o. male who presents for   Chief Complaint   Patient presents with    Rash     all over     Pt is a 67 y/o M who is here today for followup of DRESS syndrome with associated hepatitis and possible pneumonitis. He was seen in the hospital by derm consult, and DRESS syndrome was thought to have been due to vancomycin (for MRSA infection s/p knee replacement). Prior to discharge his liver enzymes were trending down. He has been taking prednisone 60 mg daily for the last 2 weeks since the time of discharge. He denies any recurrence of the rash, and itching has improved significantly. Facial swelling has gone down as well.    He also has some spots on his chest and abdomen that his wife would like checked while they are here.      Rash  - Follow-up  Diagnosis: DRESS syndrome.  Symptom course: improving  Currently using: prednisone 60 mg daily.  Affected locations: chest  Duration: 3 weeks  Signs / symptoms: redness and itching    Spot  - Initial  Affected locations: torso  Duration: 6 months  Signs / symptoms: asymptomatic  Timing: constant  Aggravated by: nothing  Relieving factors/Treatments tried: nothing      Review of Systems   Constitutional: Positive for fatigue and malaise. Negative for fever and chills.   HENT: Negative for sore throat, rhinorrhea, mouth sores, lip swelling and tongue swelling.    Eyes: Negative for itching, eye watering and eye irritation.   Respiratory: Positive for shortness of breath (exertional). Negative for cough.    Cardiovascular: Negative for chest pain and palpitations.   Gastrointestinal: Negative for abdominal pain, diarrhea and indigestion.   Genitourinary: Negative for frequency, genital sores and genital itching.   Musculoskeletal: Positive for muscle weakness (upper extremities). Negative for joint swelling and arthralgias (lower back pain has resolved with lyrica).   Skin: Positive for itching, rash and dry skin.    Neurological: Positive for numbness (arms and hands). Negative for mental status change.   Endocrine: Negative for cold intolerance, heat intolerance and polydipsia.   Hematologic/Lymphatic: Negative for adenopathy. Does not bruise/bleed easily.        Objective:    Physical Exam   Constitutional: He appears well-developed and well-nourished. No distress.   Cardiovascular: Normal rate, regular rhythm, S1 normal, S2 normal and normal heart sounds.    Pulmonary/Chest: Effort normal. He has decreased breath sounds in the left middle field and the left lower field. He has no wheezes. He has no rhonchi. He has no rales.   Neurological: He is alert and oriented to person, place, and time. He is not disoriented.   Psychiatric: He has a normal mood and affect.   Skin:   Areas Examined (abnormalities noted in diagram):   Scalp / Hair Palpated and Inspected  Head / Face Inspection Performed  Neck Inspection Performed  Chest / Axilla Inspection Performed  Abdomen Inspection Performed  Genitals / Buttocks / Groin Inspection Performed  Back Inspection Performed  RUE Inspected  LUE Inspection Performed  RLE Inspected  LLE Inspection Performed  Nails and Digits Inspection Performed  Gland Inspection Performed                   Diagram Legend     Erythematous scaling macule/papule c/w actinic keratosis       Vascular papule c/w angioma      Pigmented verrucoid papule/plaque c/w seborrheic keratosis      Yellow umbilicated papule c/w sebaceous hyperplasia      Irregularly shaped tan macule c/w lentigo     1-2 mm smooth white papules consistent with Milia      Movable subcutaneous cyst with punctum c/w epidermal inclusion cyst      Subcutaneous movable cyst c/w pilar cyst      Firm pink to brown papule c/w dermatofibroma      Pedunculated fleshy papule(s) c/w skin tag(s)      Evenly pigmented macule c/w junctional nevus     Mildly variegated pigmented, slightly irregular-bordered macule c/w mildly atypical nevus      Flesh colored  to evenly pigmented papule c/w intradermal nevus       Pink pearly papule/plaque c/w basal cell carcinoma      Erythematous hyperkeratotic cursted plaque c/w SCC      Surgical scar with no sign of skin cancer recurrence      Open and closed comedones      Inflammatory papules and pustules      Verrucoid papule consistent consistent with wart     Erythematous eczematous patches and plaques     Dystrophic onycholytic nail with subungual debris c/w onychomycosis     Umbilicated papule    Erythematous-base heme-crusted tan verrucoid plaque consistent with inflamed seborrheic keratosis     Erythematous Silvery Scaling Plaque c/w Psoriasis     See annotation      Assessment / Plan:        DRESS syndrome + Hepatitis + Adverse effect of vancomycin, sequela  -  Discussed with patient the need for close monitoring as there can be delayed sequelae of DRESS syndrome, including myocarditis, thyroiditis/Graves disease, SIADH, and diabetes mellitus.  -  Check CBC, CMP, TSH today.  -  In 1-2 months, he will need repeat CBC, CMP, TSH, EKG, and possibly repeat CXR.  -  Recommended tapering prednisone to 40 mg PO daily x 1 week, then 30 mg PO daily x 1 week, then 20 mg PO daily x 1 week, then 10 mg PO daily. If rash recurs, then increase to previously higher dose until controlled for a few days, then restart taper. He would like to follow up with his dermatologist Dr. Ashleigh Wild in [place], so she can adjust his prednisone taper as she sees fit and based on his clinical response.     Dyspnea on exertion  -     X-Ray Chest PA And Lateral; Future    Long term current use of systemic steroids  Recommend Calcium supplement 5843-1775 mg daily and Vitamin D3 0986-4384 IU daily.  Recommend Nexium or Zantac if symptoms of gastritis.    Xerosis cutis + Pruritus   Good skin care regimen discussed including limiting to one bath or shower per day, using lukewarm water with mild soap and moisturizing cream to skin 1-2 times per day. Handout  was provided and reviewed with patient.  Recommended Dove sensitive skin soap, CeraVe moisturizing cream or healing ointment, and All Free and Clear detergent.    Seborrheic keratosis  These are benign, inherited growths without a malignant potential. Reassurance given to patient. No treatment is necessary. Handout was provided.    Paresthesia and upper extremity weakness  Recommended that he see neurology as his PCP suggested.       Follow-up in about 2 weeks (around 6/12/2018). with dermatology.

## 2018-06-03 ENCOUNTER — PATIENT MESSAGE (OUTPATIENT)
Dept: DERMATOLOGY | Facility: CLINIC | Age: 68
End: 2018-06-03

## 2018-06-04 ENCOUNTER — PATIENT MESSAGE (OUTPATIENT)
Dept: DERMATOLOGY | Facility: CLINIC | Age: 68
End: 2018-06-04

## 2018-06-04 ENCOUNTER — TELEPHONE (OUTPATIENT)
Dept: DERMATOLOGY | Facility: CLINIC | Age: 68
End: 2018-06-04

## 2018-06-04 DIAGNOSIS — L29.9 PRURITUS: Primary | ICD-10-CM

## 2018-06-04 RX ORDER — HYDROXYZINE HYDROCHLORIDE 25 MG/1
TABLET, FILM COATED ORAL
Qty: 60 TABLET | Refills: 0 | Status: SHIPPED | OUTPATIENT
Start: 2018-06-04 | End: 2019-03-18 | Stop reason: CLARIF

## 2018-06-08 ENCOUNTER — PATIENT MESSAGE (OUTPATIENT)
Dept: INFECTIOUS DISEASES | Facility: CLINIC | Age: 68
End: 2018-06-08

## 2018-06-15 LAB
ACID FAST MOD KINY STN SPEC: NORMAL
ACID FAST MOD KINY STN SPEC: NORMAL
MYCOBACTERIUM SPEC QL CULT: NORMAL
MYCOBACTERIUM SPEC QL CULT: NORMAL

## 2018-06-25 ENCOUNTER — OFFICE VISIT (OUTPATIENT)
Dept: SPORTS MEDICINE | Facility: CLINIC | Age: 68
End: 2018-06-25
Payer: MEDICARE

## 2018-06-25 VITALS
BODY MASS INDEX: 22.25 KG/M2 | HEIGHT: 76 IN | HEART RATE: 94 BPM | SYSTOLIC BLOOD PRESSURE: 121 MMHG | WEIGHT: 182.75 LBS | DIASTOLIC BLOOD PRESSURE: 81 MMHG

## 2018-06-25 DIAGNOSIS — M76.51 PATELLAR TENDINITIS OF RIGHT KNEE: ICD-10-CM

## 2018-06-25 DIAGNOSIS — M17.31 POST-TRAUMATIC OSTEOARTHRITIS OF RIGHT KNEE: ICD-10-CM

## 2018-06-25 DIAGNOSIS — M25.461 EFFUSION OF RIGHT KNEE: ICD-10-CM

## 2018-06-25 DIAGNOSIS — T84.53XS INFECTION ASSOCIATED WITH INTERNAL RIGHT KNEE PROSTHESIS, SEQUELA: ICD-10-CM

## 2018-06-25 DIAGNOSIS — M25.561 CHRONIC PAIN OF RIGHT KNEE: Primary | ICD-10-CM

## 2018-06-25 DIAGNOSIS — G89.29 CHRONIC PAIN OF RIGHT KNEE: Primary | ICD-10-CM

## 2018-06-25 DIAGNOSIS — M23.91 INTERNAL DERANGEMENT OF KNEE JOINT, RIGHT: ICD-10-CM

## 2018-06-25 PROCEDURE — 99213 OFFICE O/P EST LOW 20 MIN: CPT | Mod: PBBFAC,PO | Performed by: ORTHOPAEDIC SURGERY

## 2018-06-25 PROCEDURE — 97110 THERAPEUTIC EXERCISES: CPT | Mod: PBBFAC,PO | Performed by: ORTHOPAEDIC SURGERY

## 2018-06-25 PROCEDURE — 99024 POSTOP FOLLOW-UP VISIT: CPT | Mod: S$PBB,,, | Performed by: ORTHOPAEDIC SURGERY

## 2018-06-25 PROCEDURE — 97110 THERAPEUTIC EXERCISES: CPT | Mod: GP,S$PBB,, | Performed by: ORTHOPAEDIC SURGERY

## 2018-06-25 PROCEDURE — 99999 PR PBB SHADOW E&M-EST. PATIENT-LVL III: CPT | Mod: PBBFAC,,, | Performed by: ORTHOPAEDIC SURGERY

## 2018-06-25 RX ORDER — MELOXICAM 15 MG/1
15 TABLET ORAL DAILY
Qty: 30 TABLET | Refills: 2 | Status: SHIPPED | OUTPATIENT
Start: 2018-06-25 | End: 2018-07-25

## 2018-06-25 NOTE — PROGRESS NOTES
Subjective:          Chief Complaint: Leonid Harris III is a 68 y.o. male who had concerns including Post-op Evaluation of the Right Knee.    Patient is here for a follow up for his right knee. He is doing PT in [place]. He doesn't feel much pain but has some stiffness. He had episode of DRESS syndrome. He has been placed on prednisone and doxycyclin MRSA cellulitis. His balance has improved and is off the walker and cane. 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 Right  Arthroscopy, knee, surgical; for infection, lavage and drainage 38060  2.  Right  Arthroscopy, knee, synovectomy, major 03686  3.  Right  Open incisional and drainage lateral hematoma  4. Right knee Bactisure, 13867  5. KRISTI drain application      DATE OF PROCEDURE: 2/27/2018     ATTENDING SURGEON: Surgeon(s) and Role:     * Fran Shelton MD - Primary     Co-surgeon:  Jose Alberto Perez MD     First Assistant:  SMA Jame     PREOPERATIVE DIAGNOSIS:  Right  M17.11     POSTOPERATIVE DIAGNOSIS:   Right  Tear, Medial meniscus, old M23.205   M17.11     PROCEDURES(S) PERFORMED:   1. Right  Patellofemoral Arthroplasty, 12184  2.  Right  Arthroscopy, with meniscectomy (medial OR lateral) 78238, medial  3.  Right  Amniox Arthrocentesis, 36754      Handedness: right-handed  Sport played: volleyball      Level: recreational          Leonid also participates in running.  Pain   This is a new problem. The current episode  started 1 to 4 weeks ago. The problem occurs intermittently. The problem has been waxing and waning. Pertinent negatives include no abdominal pain, chest pain, chills, congestion, coughing, fever, joint swelling, myalgias, nausea, numbness, rash, sore throat or vomiting. The symptoms are aggravated by exertion. He has tried nothing for the symptoms. The treatment provided mild relief.       Review of Systems   Constitution: Negative for chills, fever and night sweats.   HENT: Negative for congestion, hearing loss and sore throat.    Eyes: Negative for blurred vision, discharge, double vision and visual disturbance.   Cardiovascular: Negative for chest pain, leg swelling, palpitations and syncope.   Respiratory: Negative for cough and shortness of breath.    Endocrine: Negative for cold intolerance, heat intolerance and polyuria.   Hematologic/Lymphatic: Negative for bleeding problem.   Skin: Negative for dry skin and rash.   Musculoskeletal: Positive for joint pain. Negative for back pain, falls, joint swelling, muscle cramps, muscle weakness, myalgias and stiffness.   Gastrointestinal: Negative for abdominal pain, melena, nausea and vomiting.   Genitourinary: Negative for hematuria.   Neurological: Negative for focal weakness, loss of balance, numbness and paresthesias.   Psychiatric/Behavioral: Negative for altered mental status.       Pain Related Questions  Over the past 3 days, what was your average pain during activity? (I.e. running, jogging, walking, climbing stairs, getting dressed, ect.): 0  Over the past 3 days, what was your highest pain level?: 0  Over the past 3 days, what was your lowest pain level? : 0    Other  How many nights a week are you awakened by your affected body part?: 0  Was the patient's HEIGHT measured or patient reported?: Patient Reported  Was the patient's WEIGHT measured or patient reported?: Measured      Objective:        General: Leonid is well-developed, well-nourished, appears  stated age, in no acute distress, alert and oriented to time, place and person.     General    Vitals reviewed.  Constitutional: He is oriented to person, place, and time. He appears well-developed and well-nourished. No distress.   HENT:   Mouth/Throat: No oropharyngeal exudate.   Eyes: Conjunctivae and EOM are normal. Pupils are equal, round, and reactive to light. Right eye exhibits no discharge. Left eye exhibits no discharge.   Neck: Normal range of motion. Neck supple. No JVD present.   Cardiovascular: Normal heart sounds and intact distal pulses.    No murmur heard.  Pulmonary/Chest: Effort normal and breath sounds normal. No respiratory distress.   Abdominal: Soft. Bowel sounds are normal. He exhibits no distension. There is no tenderness.   Neurological: He is alert and oriented to person, place, and time. He has normal reflexes. No cranial nerve deficit. Coordination normal.   Psychiatric: He has a normal mood and affect. His behavior is normal. Judgment and thought content normal.     General Musculoskeletal Exam   Gait: antalgic       Right Knee Exam     Inspection   Erythema: absent  Scars: present  Swelling: absent  Effusion: effusion  Deformity: deformity  Bruising: absent    Tenderness   The patient is experiencing no tenderness.         Crepitus   The patient has crepitus of the patella (moderate).    Range of Motion   Extension: 0   Flexion:  150 abnormal     Tests   Meniscus   Michi:  Medial - negative Lateral - negative  Ligament Examination Lachman: normal (-1 to 2mm) PCL-Posterior Drawer: normal (0 to 2mm)     MCL - Valgus: normal (0 to 2mm)  LCL - Varus: normalPivot Shift: normal (Equal)Reverse Pivot Shift: normal (Equal)Dial Test at 30 degrees: normal (< 5 degrees)Dial Test at 90 degrees: normal (< 5 degrees)  Posterior Sag Test: negative  Posterolateral Corner: unstable (>15 degrees difference)  Patella   Patellar Apprehension: negative  Passive Patellar Tilt: neutral  Patellar Tracking:  normal  Patellar Glide (quadrants): Lateral - 1   Medial - 2  Q-Angle at 90 degrees: normal  Patellar Grind: negative  J-Sign: none    Other   Meniscal Cyst: absent  Popliteal (Baker's) Cyst: absent  Sensation: normal    Comments:  Incision c/d/i, no evidence of infection        Left Knee Exam     Inspection   Erythema: absent  Scars: present  Swelling: absent  Effusion: absent  Deformity: deformity  Bruising: absent    Tenderness   The patient is experiencing no tenderness.         Range of Motion   Extension: 0   Flexion:  150 normal     Tests   Meniscus   Michi:  Medial - negative Lateral - negative  Stability Lachman: normal (-1 to 2mm) PCL-Posterior Drawer: normal (0 to 2mm)  MCL - Valgus: normal (0 to 2mm)  LCL - Varus: normal (0 to 2mm)Pivot Shift: normal (Equal)Reverse Pivot Shift: normal (Equal)Dial Test at 30 degrees: normal (< 5 degrees)Dial Test at 90 degrees: normal (< 5 degrees)  Posterior Sag Test: negative  Posterolateral Corner: unstable (>15 degrees difference)  Patella   Patellar Apprehension: negative  Passive Patellar Tilt: neutral  Patellar Tracking: normal  Patellar Glide (Quadrants): Lateral - 1 Medial - 2  Q-Angle at 90 degrees: normal  Patellar Grind: negative  J-Sign: J sign absent    Other   Meniscal Cyst: absent  Popliteal (Baker's) Cyst: absent  Sensation: normal    Right Hip Exam     Tests   Erin: negative  Left Hip Exam     Tests   Erin: negative          Muscle Strength   Right Lower Extremity   Hip Abduction: 5/5   Quadriceps:  4/5   Hamstrin/5   Left Lower Extremity   Hip Abduction: 5/5   Quadriceps:  5/5   Hamstrin/5     Reflexes     Left Side  Quadriceps:  2+  Achilles:  2+    Right Side   Quadriceps:  2+  Achilles:  2+    Vascular Exam     Right Pulses  Dorsalis Pedis:      2+  Posterior Tibial:      2+        Left Pulses  Dorsalis Pedis:      2+  Posterior Tibial:      2+        Edema  Right Lower Leg: absent  Left Lower Leg: absent        ESR 18 1  CRP  5/13/18: 82.1            Assessment:       Encounter Diagnoses   Name Primary?    Chronic pain of right knee Yes    Patellar tendinitis of right knee     Internal derangement of knee joint, right     Post-traumatic osteoarthritis of right knee     Effusion of right knee     Infection associated with internal right knee prosthesis, sequela           Plan:       1. IKDC, SF-12 and KOOS was not filled out today in clinic.     RTC in 3 months with Dr. Fran Shelton  Patient will fill out IKDC, SF-12 and KOOS.    2. Continue PT/HEP per protocol    3. Continue doxycycline per ID    4. Meloxicam PRN    5. HEP 98783 - Fran Shelton MD and SMA Jame, instructed and demonstrated a hip/core HEP. The patient then demonstrated understanding of exercises and proper technique. This program was performed for 16 minutes.       All of the patient's questions were answered and informed consent was obtained. The patient will contact us if they have any questions or concerns in the interim.             [facility] patient questionnaires have been collected today.

## 2018-08-07 NOTE — LETTER
May 29, 2018      David Lane MD  [address] [place] [address] Dr Alysia GRIFFITH [address]           [facility]  [address] [place] [id number]  Phone: [phone]  Fax: [phone]          Patient: Leonid Harris III   MR Number: [id number]   YOB: 1950   Date of Visit: 5/29/2018       Dear Unknown Practice A:    Thank you for referring Leonid Harris to me for evaluation. Attached you will find relevant portions of my assessment and plan of care.    If you have questions, please do not hesitate to call me. I look forward to following Leonid Harris along with you.    Sincerely,    Danielle Yates MD    Enclosure  CC:  Ashleigh Wild MD    If you would like to receive this communication electronically, please contact [email or web address] or [phone] to request more information on [employer] access.    For providers and/or their staff who would like to refer a patient to [facility], please contact us through our one-stop-shop provider referral line, [facility], at [phone].    If you feel you have received this communication in error or would no longer like to receive these types of communications, please e-mail [email or web address]         
No lesions; no rash

## 2018-09-24 ENCOUNTER — OFFICE VISIT (OUTPATIENT)
Dept: SPORTS MEDICINE | Facility: CLINIC | Age: 68
End: 2018-09-24
Payer: MEDICARE

## 2018-09-24 ENCOUNTER — HOSPITAL ENCOUNTER (OUTPATIENT)
Dept: RADIOLOGY | Facility: HOSPITAL | Age: 68
Discharge: HOME | End: 2018-09-24
Attending: ORTHOPAEDIC SURGERY
Payer: MEDICARE

## 2018-09-24 VITALS
WEIGHT: 200 LBS | HEIGHT: 76 IN | HEART RATE: 60 BPM | SYSTOLIC BLOOD PRESSURE: 134 MMHG | BODY MASS INDEX: 24.36 KG/M2 | DIASTOLIC BLOOD PRESSURE: 82 MMHG

## 2018-09-24 DIAGNOSIS — M25.562 CHRONIC PAIN OF BOTH KNEES: ICD-10-CM

## 2018-09-24 DIAGNOSIS — M23.91 INTERNAL DERANGEMENT OF KNEE JOINT, RIGHT: ICD-10-CM

## 2018-09-24 DIAGNOSIS — M25.561 CHRONIC PAIN OF BOTH KNEES: ICD-10-CM

## 2018-09-24 DIAGNOSIS — G89.29 CHRONIC PAIN OF BOTH KNEES: ICD-10-CM

## 2018-09-24 DIAGNOSIS — M25.561 RIGHT KNEE PAIN, UNSPECIFIED CHRONICITY: Primary | ICD-10-CM

## 2018-09-24 DIAGNOSIS — M23.92 INTERNAL DERANGEMENT OF LEFT KNEE: ICD-10-CM

## 2018-09-24 DIAGNOSIS — A49.02 MRSA INFECTION: ICD-10-CM

## 2018-09-24 DIAGNOSIS — M25.561 RIGHT KNEE PAIN, UNSPECIFIED CHRONICITY: ICD-10-CM

## 2018-09-24 PROBLEM — M17.31 POST-TRAUMATIC OSTEOARTHRITIS OF RIGHT KNEE: Status: RESOLVED | Noted: 2017-10-16 | Resolved: 2018-09-24

## 2018-09-24 PROBLEM — T84.53XA INFECTION OF PROSTHETIC RIGHT KNEE JOINT: Status: RESOLVED | Noted: 2018-05-12 | Resolved: 2018-09-24

## 2018-09-24 PROBLEM — M76.51 PATELLAR TENDINITIS OF RIGHT KNEE: Status: RESOLVED | Noted: 2017-10-16 | Resolved: 2018-09-24

## 2018-09-24 PROBLEM — L03.115 CELLULITIS OF RIGHT KNEE: Status: RESOLVED | Noted: 2018-04-16 | Resolved: 2018-09-24

## 2018-09-24 PROBLEM — T81.49XA WOUND INFECTION AFTER SURGERY: Status: RESOLVED | Noted: 2018-04-12 | Resolved: 2018-09-24

## 2018-09-24 PROBLEM — M25.461 EFFUSION OF RIGHT KNEE: Status: RESOLVED | Noted: 2018-03-14 | Resolved: 2018-09-24

## 2018-09-24 PROCEDURE — 99213 OFFICE O/P EST LOW 20 MIN: CPT | Mod: PBBFAC,25,PO | Performed by: ORTHOPAEDIC SURGERY

## 2018-09-24 PROCEDURE — 99999 PR PBB SHADOW E&M-EST. PATIENT-LVL III: CPT | Mod: PBBFAC,,, | Performed by: ORTHOPAEDIC SURGERY

## 2018-09-24 PROCEDURE — 73564 X-RAY EXAM KNEE 4 OR MORE: CPT | Mod: TC,50,FY,PO

## 2018-09-24 PROCEDURE — 99214 OFFICE O/P EST MOD 30 MIN: CPT | Mod: S$PBB,,, | Performed by: ORTHOPAEDIC SURGERY

## 2018-09-24 PROCEDURE — 73564 X-RAY EXAM KNEE 4 OR MORE: CPT | Mod: 26,50,, | Performed by: RADIOLOGY

## 2018-09-24 PROCEDURE — 97110 THERAPEUTIC EXERCISES: CPT | Mod: PBBFAC,PO | Performed by: ORTHOPAEDIC SURGERY

## 2018-09-24 NOTE — PROGRESS NOTES
Subjective:          Chief Complaint: Leonid Harris III is a 68 y.o. male who had concerns including Post-op Evaluation of the Right Knee.    Patient is here for a follow up for his right knee. He is no longer completing PT in [place]. He has been delligent with his HEP.  He has pain with stairs, and walking long distances, standing up in the saddle while biking causing in the anterior knee. Continues to take Meloxicam.     Interval Hx:  He had episode of DRESS syndrome. He has been placed on prednisone and doxycyclin MRSA cellulitis. His balance has improved and is off the walker and cane. 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 Right  Arthroscopy, knee, surgical; for infection, lavage and drainage 98173  2.  Right  Arthroscopy, knee, synovectomy, major 13528  3.  Right  Open incisional and drainage lateral hematoma  4. Right knee Bactisure, 28145  5. KRISTI drain application      DATE OF PROCEDURE: 2/27/2018     ATTENDING SURGEON: Surgeon(s) and Role:     * Fran Shelton MD - Primary     Co-surgeon:  Jose Alberto Perez MD     First Assistant:  SMA Jame     PREOPERATIVE DIAGNOSIS:  Right  M17.11     POSTOPERATIVE DIAGNOSIS:   Right  Tear, Medial meniscus, old M23.205   M17.11     PROCEDURES(S) PERFORMED:   1. Right  Patellofemoral Arthroplasty, 39006  2.  Right  Arthroscopy, with meniscectomy (medial OR lateral) 74453, medial  3.  Right  Amniox Arthrocentesis,  49127      Handedness: right-handed  Sport played: volleyball      Level: recreational          Leonid also participates in running.  Pain   This is a new problem. The current episode started 1 to 4 weeks ago. The problem occurs intermittently. The problem has been waxing and waning. Pertinent negatives include no abdominal pain, chest pain, chills, congestion, coughing, fever, joint swelling, myalgias, nausea, numbness, rash, sore throat or vomiting. The symptoms are aggravated by exertion. He has tried nothing for the symptoms. The treatment provided mild relief.       Review of Systems   Constitution: Negative for chills, fever and night sweats.   HENT: Negative for congestion, hearing loss and sore throat.    Eyes: Negative for blurred vision, discharge, double vision and visual disturbance.   Cardiovascular: Negative for chest pain, leg swelling, palpitations and syncope.   Respiratory: Negative for cough and shortness of breath.    Endocrine: Negative for cold intolerance, heat intolerance and polyuria.   Hematologic/Lymphatic: Negative for bleeding problem.   Skin: Negative for dry skin and rash.   Musculoskeletal: Positive for joint pain. Negative for back pain, falls, joint swelling, muscle cramps, muscle weakness, myalgias and stiffness.   Gastrointestinal: Negative for abdominal pain, melena, nausea and vomiting.   Genitourinary: Negative for hematuria.   Neurological: Negative for focal weakness, loss of balance, numbness and paresthesias.   Psychiatric/Behavioral: Negative for altered mental status.       Pain Related Questions  Over the past 3 days, what was your average pain during activity? (I.e. running, jogging, walking, climbing stairs, getting dressed, ect.): 0  Over the past 3 days, what was your highest pain level?: 2  Over the past 3 days, what was your lowest pain level? : 0    Other  How many nights a week are you awakened by your affected body part?: 0  Was the patient's HEIGHT measured or  patient reported?: Patient Reported  Was the patient's WEIGHT measured or patient reported?: Measured      Objective:        General: Leonid is well-developed, well-nourished, appears stated age, in no acute distress, alert and oriented to time, place and person.     General    Vitals reviewed.  Constitutional: He is oriented to person, place, and time. He appears well-developed and well-nourished. No distress.   HENT:   Mouth/Throat: No oropharyngeal exudate.   Eyes: Conjunctivae and EOM are normal. Pupils are equal, round, and reactive to light. Right eye exhibits no discharge. Left eye exhibits no discharge.   Neck: Normal range of motion. Neck supple. No JVD present.   Cardiovascular: Normal heart sounds and intact distal pulses.    No murmur heard.  Pulmonary/Chest: Effort normal and breath sounds normal. No respiratory distress.   Abdominal: Soft. Bowel sounds are normal. He exhibits no distension. There is no tenderness.   Neurological: He is alert and oriented to person, place, and time. He has normal reflexes. No cranial nerve deficit. Coordination normal.   Psychiatric: He has a normal mood and affect. His behavior is normal. Judgment and thought content normal.     General Musculoskeletal Exam   Gait: antalgic       Right Knee Exam     Inspection   Erythema: absent  Scars: present  Swelling: absent  Effusion: absent  Deformity: absent  Bruising: absent    Tenderness   The patient is experiencing no tenderness.     Crepitus   The patient has crepitus of the patella (moderate).    Range of Motion   Extension: 0   Flexion:  140 abnormal     Tests   Meniscus   Michi:  Medial - negative Lateral - negative  Ligament Examination Lachman: normal (-1 to 2mm) PCL-Posterior Drawer: normal (0 to 2mm)     MCL - Valgus: normal (0 to 2mm)  LCL - Varus: normalPivot Shift: normal (Equal)Reverse Pivot Shift: normal (Equal)Dial Test at 30 degrees: normal (< 5 degrees)Dial Test at 90 degrees: normal (< 5 degrees)  Posterior  Sag Test: negative  Posterolateral Corner: unstable (>15 degrees difference)  Patella   Patellar apprehension: negative  Passive Patellar Tilt: neutral  Patellar Tracking: normal  Patellar Glide (quadrants): Lateral - 1   Medial - 2  Q-Angle at 90 degrees: normal  Patellar Grind: negative  J-Sign: none    Other   Meniscal Cyst: absent  Popliteal (Baker's) Cyst: absent  Sensation: normal    Comments:  + Step Down test        Left Knee Exam     Inspection   Erythema: absent  Scars: present  Swelling: absent  Effusion: absent  Deformity: absent  Bruising: absent    Tenderness   The patient is experiencing no tenderness.     Range of Motion   Extension: 0   Flexion:  140 normal     Tests   Meniscus   Michi:  Medial - negative Lateral - negative  Stability Lachman: normal (-1 to 2mm) PCL-Posterior Drawer: normal (0 to 2mm)  MCL - Valgus: normal (0 to 2mm)  LCL - Varus: normal (0 to 2mm)Pivot Shift: normal (Equal)Reverse Pivot Shift: normal (Equal)Dial Test at 30 degrees: normal (< 5 degrees)Dial Test at 90 degrees: normal (< 5 degrees)  Posterior Sag Test: negative  Posterolateral Corner: unstable (>15 degrees difference)  Patella   Patellar apprehension: negative  Passive Patellar Tilt: neutral  Patellar Tracking: normal  Patellar Glide (Quadrants): Lateral - 1 Medial - 2  Q-Angle at 90 degrees: normal  Patellar Grind: negative  J-Sign: J sign absent    Other   Meniscal Cyst: absent  Popliteal (Baker's) Cyst: absent  Sensation: normal    Right Hip Exam     Tests   Erin: negative  Left Hip Exam     Tests   Erin: negative          Muscle Strength   Right Lower Extremity   Hip Abduction: 5/5   Quadriceps:  4/5   Hamstrin/5   Left Lower Extremity   Hip Abduction: 5/5   Quadriceps:  5/5   Hamstrin/5     Reflexes     Left Side  Quadriceps:  2+  Achilles:  2+    Right Side   Quadriceps:  2+  Achilles:  2+    Vascular Exam     Right Pulses  Dorsalis Pedis:      2+  Posterior Tibial:      2+        Left  Pulses  Dorsalis Pedis:      2+  Posterior Tibial:      2+        Edema  Right Lower Leg: absent  Left Lower Leg: absent    EXAMINATION:  XR KNEE ORTHO BILAT WITH FLEXION    CLINICAL HISTORY:  Pain in right knee    FINDINGS:  Right: There is a patellar trochlea prosthesis good alignment no complication.    Left: There is mild DJD.  No fracture dislocation bone destruction seen.      Impression       See above           Assessment:       Encounter Diagnoses   Name Primary?    Right knee pain, unspecified chronicity Yes    Internal derangement of left knee     MRSA infection     Internal derangement of knee joint, right     Chronic pain of both knees           Plan:       1. IKDC, SF-12 and KOOS was filled out today in clinic.     RTC in 3  months with Dr. Fran Shelton  Patient will fill out IKDC, SF-12 and KOOS and bilateral knee series on return    2. Continue HEP per protocol  Avoid open chain exercises   HEP 71527 - Fran Shelton MD and SMA Geronimo, instructed and demonstrated a CORE HEP. The patient then demonstrated understanding of exercises and proper technique. This program was performed for 15 minutes.     3.  Meloxicam PRN- no refill needed today      All of the patient's questions were answered and informed consent was obtained. The patient will contact us if they have any questions or concerns in the interim.             [facility] patient questionnaires have been collected today.

## 2018-10-30 ENCOUNTER — PATIENT MESSAGE (OUTPATIENT)
Dept: SPORTS MEDICINE | Facility: CLINIC | Age: 68
End: 2018-10-30

## 2018-10-30 DIAGNOSIS — M23.91 INTERNAL DERANGEMENT OF KNEE JOINT, RIGHT: ICD-10-CM

## 2018-10-30 DIAGNOSIS — M23.92 INTERNAL DERANGEMENT OF LEFT KNEE: Primary | ICD-10-CM

## 2018-11-06 ENCOUNTER — PATIENT MESSAGE (OUTPATIENT)
Dept: SPORTS MEDICINE | Facility: CLINIC | Age: 68
End: 2018-11-06

## 2018-12-06 ENCOUNTER — PATIENT MESSAGE (OUTPATIENT)
Dept: SPORTS MEDICINE | Facility: CLINIC | Age: 68
End: 2018-12-06

## 2018-12-18 NOTE — PROGRESS NOTES
Subjective:          Chief Complaint: Leonid Harris III is a 68 y.o. male who had concerns including Pain of the Right Knee.    Patient is here for a follow up for his right knee. He feels that not much has changed with his knee pain. He continues to have swelling and knee stiffness. He has trouble with walking up stairs. He would like to discuss a brace for his knee. He reports that his PT used tape to pull his patella medially and this provided relief.     Interval Hx:  He had episode of DRESS syndrome. He has been placed on prednisone and doxycyclin MRSA cellulitis. His balance has improved and is off the walker and cane. 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 Right  Arthroscopy, knee, surgical; for infection, lavage and drainage 61604  2.  Right  Arthroscopy, knee, synovectomy, major 01012  3.  Right  Open incisional and drainage lateral hematoma  4. Right knee Bactisure, 20931  5. KRISTI drain application      DATE OF PROCEDURE: 2/27/2018     ATTENDING SURGEON: Surgeon(s) and Role:     * Fran Shelton MD - Primary     Co-surgeon:  Jose Alberto Perez MD     First Assistant:  SMA Jame     PREOPERATIVE DIAGNOSIS:  Right  M17.11     POSTOPERATIVE DIAGNOSIS:   Right  Tear, Medial meniscus, old M23.205   M17.11     PROCEDURES(S) PERFORMED:   1. Right  Patellofemoral Arthroplasty, 99672  2.  Right  Arthroscopy, with meniscectomy (medial OR lateral) 36198, medial  3.   Right  Amniox Arthrocentesis, 18328      Handedness: right-handed  Sport played: volleyball      Level: recreational          Leonid also participates in running.  Pain   This is a new problem. The current episode started 1 to 4 weeks ago. The problem occurs intermittently. The problem has been waxing and waning. Pertinent negatives include no abdominal pain, chest pain, chills, congestion, coughing, fever, joint swelling, myalgias, nausea, numbness, rash, sore throat or vomiting. The symptoms are aggravated by exertion. He has tried nothing for the symptoms. The treatment provided mild relief.       Review of Systems   Constitution: Negative for chills, fever and night sweats.   HENT: Negative for congestion, hearing loss and sore throat.    Eyes: Negative for blurred vision, discharge, double vision and visual disturbance.   Cardiovascular: Negative for chest pain, leg swelling, palpitations and syncope.   Respiratory: Negative for cough and shortness of breath.    Endocrine: Negative for cold intolerance, heat intolerance and polyuria.   Hematologic/Lymphatic: Negative for bleeding problem.   Skin: Negative for dry skin and rash.   Musculoskeletal: Positive for joint pain. Negative for back pain, falls, joint swelling, muscle cramps, muscle weakness, myalgias and stiffness.   Gastrointestinal: Negative for abdominal pain, melena, nausea and vomiting.   Genitourinary: Negative for hematuria.   Neurological: Negative for focal weakness, loss of balance, numbness and paresthesias.   Psychiatric/Behavioral: Negative for altered mental status.       Pain Related Questions  Over the past 3 days, what was your average pain during activity? (I.e. running, jogging, walking, climbing stairs, getting dressed, ect.): 0  Over the past 3 days, what was your highest pain level?: 0  Over the past 3 days, what was your lowest pain level? : 0           Objective:        General: Leonid is well-developed, well-nourished, appears stated  age, in no acute distress, alert and oriented to time, place and person.     General    Vitals reviewed.  Constitutional: He is oriented to person, place, and time. He appears well-developed and well-nourished. No distress.   HENT:   Mouth/Throat: No oropharyngeal exudate.   Eyes: Conjunctivae and EOM are normal. Pupils are equal, round, and reactive to light. Right eye exhibits no discharge. Left eye exhibits no discharge.   Neck: Normal range of motion. Neck supple. No JVD present.   Cardiovascular: Normal heart sounds and intact distal pulses.    No murmur heard.  Pulmonary/Chest: Effort normal and breath sounds normal. No respiratory distress.   Abdominal: Soft. Bowel sounds are normal. He exhibits no distension. There is no tenderness.   Neurological: He is alert and oriented to person, place, and time. He has normal reflexes. No cranial nerve deficit. Coordination normal.   Psychiatric: He has a normal mood and affect. His behavior is normal. Judgment and thought content normal.     General Musculoskeletal Exam   Gait: antalgic       Right Knee Exam     Inspection   Erythema: absent  Scars: present  Swelling: present  Effusion: absent  Deformity: absent  Bruising: absent    Tenderness   The patient is experiencing no tenderness.     Crepitus   The patient has crepitus of the patella (moderate).    Range of Motion   Extension: 0   Flexion:  140 abnormal     Tests   Meniscus   Michi:  Medial - negative Lateral - negative  Ligament Examination Lachman: normal (-1 to 2mm) PCL-Posterior Drawer: normal (0 to 2mm)     MCL - Valgus: normal (0 to 2mm)  LCL - Varus: normalPivot Shift: normal (Equal)Reverse Pivot Shift: normal (Equal)Dial Test at 30 degrees: normal (< 5 degrees)Dial Test at 90 degrees: normal (< 5 degrees)  Posterior Sag Test: negative  Posterolateral Corner: unstable (>15 degrees difference)  Patella   Patellar apprehension: negative  Passive Patellar Tilt: neutral  Patellar Tracking:  normal  Patellar Glide (quadrants): Lateral - 1   Medial - 2  Q-Angle at 90 degrees: normal  Patellar Grind: negative  J-Sign: none    Other   Meniscal Cyst: absent  Popliteal (Baker's) Cyst: absent  Sensation: normal    Comments:  + Step Down test        Left Knee Exam     Inspection   Erythema: absent  Scars: present  Swelling: absent  Effusion: absent  Deformity: absent  Bruising: absent    Tenderness   The patient is experiencing no tenderness.     Range of Motion   Extension: 0   Flexion:  140 normal     Tests   Meniscus   Michi:  Medial - negative Lateral - negative  Stability Lachman: normal (-1 to 2mm) PCL-Posterior Drawer: normal (0 to 2mm)  MCL - Valgus: normal (0 to 2mm)  LCL - Varus: normal (0 to 2mm)Pivot Shift: normal (Equal)Reverse Pivot Shift: normal (Equal)Dial Test at 30 degrees: normal (< 5 degrees)Dial Test at 90 degrees: normal (< 5 degrees)  Posterior Sag Test: negative  Posterolateral Corner: unstable (>15 degrees difference)  Patella   Patellar apprehension: negative  Passive Patellar Tilt: neutral  Patellar Tracking: normal  Patellar Glide (Quadrants): Lateral - 1 Medial - 2  Q-Angle at 90 degrees: normal  Patellar Grind: negative  J-Sign: J sign absent    Other   Meniscal Cyst: absent  Popliteal (Baker's) Cyst: absent  Sensation: normal    Right Hip Exam     Tests   Erin: negative  Left Hip Exam     Tests   Erin: negative          Muscle Strength   Right Lower Extremity   Hip Abduction: 5/5   Quadriceps:  4/5   Hamstrin/5   Left Lower Extremity   Hip Abduction: 5/5   Quadriceps:  5/5   Hamstrin/5     Reflexes     Left Side  Quadriceps:  2+  Achilles:  2+    Right Side   Quadriceps:  2+  Achilles:  2+    Vascular Exam     Right Pulses  Dorsalis Pedis:      2+  Posterior Tibial:      2+        Left Pulses  Dorsalis Pedis:      2+  Posterior Tibial:      2+        Edema  Right Lower Leg: absent  Left Lower Leg: absent    Radiographs taken today:  EXAMINATION:  XR KNEE ORTHO BILAT  WITH FLEXION    CLINICAL HISTORY:  Pain in right knee    TECHNIQUE:  AP standing of both knees, PA flexion standing views of both knees, and Merchant views of both knees were performed.  Lateral views of both knees were also performed.    COMPARISON:  N 09/24/2018 one    FINDINGS:  Patella trochlear arthroplasty on the right side.  The position alignment is similar to the previous study.  Mild DJD bilaterally.  No fracture or dislocation.  No bone destruction identified        Assessment:       Encounter Diagnoses   Name Primary?    Right knee pain, unspecified chronicity Yes    Internal derangement of knee joint, right     Internal derangement of left knee     Chronic pain of both knees           Plan:       1. IKDC, SF-12 and KOOS was filled out today in clinic.     RTC in  3 weeks with Dr. Fran Shelton  Patient will fill out IKDC, SF-12 and KOOS and bilateral knee series on return    2. Continue HEP per protocol  Avoid open chain exercises     3.  D/C Meloxicam, Duexis sent to patient's pharmacy    4.Aspiration Procedure-Right Knee    After time out was performed, including verification of patient ID, procedure, site and side, availability of information and equipment, review of safety issues, and agreement with consent, the procedure site was marked and the patient was prepped aseptically. 95cc's of serosanguineous joint fluid was aspirated from the right Knee Joint using a 22g x 1.5 needle in sterile fashion without complication.     The patient had no adverse reactions to the medication. Pain decreased. The patient was instructed to apply ice to the joint for 20 minutes and avoid strenuous activities for 24-36 hours following the injection. Patient was warned of possible blood sugar and/or blood pressure changes during that time. Following that time, patient can resume regular activities.    Ultrasound Guidance Procedure  right Knee Joint visualized with documented right knee DJD. Dynamic visualization of  the 22g x 1.5 needle performed.        All of the patient's questions were answered and informed consent was obtained. The patient will contact us if they have any questions or concerns in the interim.    5. Outpatient cultures sent today.    6. 15170 - Jourdan Solares, performed a custom orthotic / brace adjustment, fitting and training with the patient. The patient demonstrated understanding and proper care. This was performed for 16 minutes. J-Sleeve         [facility] patient questionnaires have been collected today.

## 2018-12-19 ENCOUNTER — OFFICE VISIT (OUTPATIENT)
Dept: SPORTS MEDICINE | Facility: CLINIC | Age: 68
End: 2018-12-19
Payer: MEDICARE

## 2018-12-19 ENCOUNTER — HOSPITAL ENCOUNTER (OUTPATIENT)
Dept: RADIOLOGY | Facility: HOSPITAL | Age: 68
Discharge: HOME | End: 2018-12-19
Attending: ORTHOPAEDIC SURGERY
Payer: MEDICARE

## 2018-12-19 VITALS
BODY MASS INDEX: 24.36 KG/M2 | WEIGHT: 200 LBS | HEART RATE: 60 BPM | DIASTOLIC BLOOD PRESSURE: 88 MMHG | HEIGHT: 76 IN | SYSTOLIC BLOOD PRESSURE: 139 MMHG

## 2018-12-19 DIAGNOSIS — M25.562 CHRONIC PAIN OF BOTH KNEES: ICD-10-CM

## 2018-12-19 DIAGNOSIS — M23.91 INTERNAL DERANGEMENT OF KNEE JOINT, RIGHT: ICD-10-CM

## 2018-12-19 DIAGNOSIS — M25.561 CHRONIC PAIN OF BOTH KNEES: ICD-10-CM

## 2018-12-19 DIAGNOSIS — M25.561 RIGHT KNEE PAIN, UNSPECIFIED CHRONICITY: ICD-10-CM

## 2018-12-19 DIAGNOSIS — G89.29 CHRONIC PAIN OF BOTH KNEES: ICD-10-CM

## 2018-12-19 DIAGNOSIS — M23.92 INTERNAL DERANGEMENT OF LEFT KNEE: ICD-10-CM

## 2018-12-19 DIAGNOSIS — M25.561 RIGHT KNEE PAIN, UNSPECIFIED CHRONICITY: Primary | ICD-10-CM

## 2018-12-19 LAB
GRAM STN SPEC: NORMAL
GRAM STN SPEC: NORMAL

## 2018-12-19 PROCEDURE — 87102 FUNGUS ISOLATION CULTURE: CPT

## 2018-12-19 PROCEDURE — 87116 MYCOBACTERIA CULTURE: CPT

## 2018-12-19 PROCEDURE — 99213 OFFICE O/P EST LOW 20 MIN: CPT | Mod: PBBFAC,25,PO | Performed by: ORTHOPAEDIC SURGERY

## 2018-12-19 PROCEDURE — 87070 CULTURE OTHR SPECIMN AEROBIC: CPT

## 2018-12-19 PROCEDURE — 73564 X-RAY EXAM KNEE 4 OR MORE: CPT | Mod: TC,50,FY,PO

## 2018-12-19 PROCEDURE — 87205 SMEAR GRAM STAIN: CPT

## 2018-12-19 PROCEDURE — 99999 PR PBB SHADOW E&M-EST. PATIENT-LVL III: CPT | Mod: PBBFAC,,, | Performed by: ORTHOPAEDIC SURGERY

## 2018-12-19 PROCEDURE — 87075 CULTR BACTERIA EXCEPT BLOOD: CPT

## 2018-12-19 PROCEDURE — 73564 X-RAY EXAM KNEE 4 OR MORE: CPT | Mod: 26,50,, | Performed by: RADIOLOGY

## 2018-12-19 PROCEDURE — 99214 OFFICE O/P EST MOD 30 MIN: CPT | Mod: S$PBB,,, | Performed by: ORTHOPAEDIC SURGERY

## 2018-12-19 PROCEDURE — 87206 SMEAR FLUORESCENT/ACID STAI: CPT

## 2018-12-22 LAB — BACTERIA SPEC AEROBE CULT: NO GROWTH

## 2018-12-26 ENCOUNTER — PATIENT MESSAGE (OUTPATIENT)
Dept: INFECTIOUS DISEASES | Facility: CLINIC | Age: 68
End: 2018-12-26

## 2018-12-26 LAB — BACTERIA SPEC ANAEROBE CULT: NORMAL

## 2019-01-02 ENCOUNTER — TELEPHONE (OUTPATIENT)
Dept: INFECTIOUS DISEASES | Facility: CLINIC | Age: 69
End: 2019-01-02

## 2019-01-02 DIAGNOSIS — T84.50XD INFECTION OF PROSTHETIC JOINT, SUBSEQUENT ENCOUNTER: Primary | ICD-10-CM

## 2019-01-02 RX ORDER — DOXYCYCLINE HYCLATE 100 MG
100 TABLET ORAL DAILY
Qty: 90 TABLET | Refills: 3 | Status: SHIPPED | OUTPATIENT
Start: 2019-01-02 | End: 2019-01-03 | Stop reason: SDUPTHER

## 2019-01-03 ENCOUNTER — TELEPHONE (OUTPATIENT)
Dept: INFECTIOUS DISEASES | Facility: CLINIC | Age: 69
End: 2019-01-03

## 2019-01-03 DIAGNOSIS — T84.50XD INFECTION OF PROSTHETIC JOINT, SUBSEQUENT ENCOUNTER: ICD-10-CM

## 2019-01-03 RX ORDER — DOXYCYCLINE HYCLATE 100 MG
100 TABLET ORAL DAILY
Qty: 90 TABLET | Refills: 3 | Status: SHIPPED | OUTPATIENT
Start: 2019-01-03 | End: 2019-01-07 | Stop reason: SDUPTHER

## 2019-01-03 NOTE — TELEPHONE ENCOUNTER
Spoke with Naida at [facility] and she confirmed the cancellation of the pt prescription of doxycycline

## 2019-01-07 DIAGNOSIS — T84.50XD INFECTION OF PROSTHETIC JOINT, SUBSEQUENT ENCOUNTER: ICD-10-CM

## 2019-01-07 RX ORDER — DOXYCYCLINE HYCLATE 100 MG
100 TABLET ORAL DAILY
Qty: 90 TABLET | Refills: 3 | Status: SHIPPED | OUTPATIENT
Start: 2019-01-07 | End: 2020-01-07

## 2019-01-08 ENCOUNTER — PATIENT MESSAGE (OUTPATIENT)
Dept: SPORTS MEDICINE | Facility: CLINIC | Age: 69
End: 2019-01-08

## 2019-01-11 ENCOUNTER — OFFICE VISIT (OUTPATIENT)
Dept: SPORTS MEDICINE | Facility: CLINIC | Age: 69
End: 2019-01-11
Payer: MEDICARE

## 2019-01-11 VITALS
SYSTOLIC BLOOD PRESSURE: 138 MMHG | BODY MASS INDEX: 24.36 KG/M2 | DIASTOLIC BLOOD PRESSURE: 81 MMHG | WEIGHT: 200 LBS | HEART RATE: 60 BPM | HEIGHT: 76 IN

## 2019-01-11 DIAGNOSIS — Z96.651 S/P RIGHT UNICOMPARTMENTAL KNEE REPLACEMENT: Primary | ICD-10-CM

## 2019-01-11 DIAGNOSIS — M25.461 EFFUSION OF RIGHT KNEE: ICD-10-CM

## 2019-01-11 PROCEDURE — 99214 OFFICE O/P EST MOD 30 MIN: CPT | Mod: S$PBB,,, | Performed by: PHYSICIAN ASSISTANT

## 2019-01-11 PROCEDURE — 99999 PR PBB SHADOW E&M-EST. PATIENT-LVL III: CPT | Mod: PBBFAC,,, | Performed by: PHYSICIAN ASSISTANT

## 2019-01-11 PROCEDURE — 99214 PR OFFICE/OUTPT VISIT, EST, LEVL IV, 30-39 MIN: ICD-10-PCS | Mod: S$PBB,,, | Performed by: PHYSICIAN ASSISTANT

## 2019-01-11 PROCEDURE — 99213 OFFICE O/P EST LOW 20 MIN: CPT | Mod: PBBFAC,PO | Performed by: PHYSICIAN ASSISTANT

## 2019-01-11 PROCEDURE — 99999 PR PBB SHADOW E&M-EST. PATIENT-LVL III: ICD-10-PCS | Mod: PBBFAC,,, | Performed by: PHYSICIAN ASSISTANT

## 2019-01-11 NOTE — PROGRESS NOTES
Subjective:          Chief Complaint: Leonid Harris III is a 68 y.o. male who had concerns including Pain of the Right Knee.    Patient is here for a follow up for his right knee. He had 95 ccs of serosanguinous fluid aspirated from his knee on 12/19/2018 by Dr. Fran Shelton. He states that his knee effusion returned about a week after aspiration. He feels that not much has changed with his knee pain. He continues to have swelling and knee stiffness. He has trouble with walking up stairs. He has been wearing a knee compression daily. He feels pain along the lateral aspect of the patella with knee flexion. He reports that his PT used tape to pull his patella medially and this provided relief. He would like to get back to cycling and raquetball but cannot sure to knee effusions.    Interval Hx:  He had episode of DRESS syndrome. He has been placed on prednisone and doxycyclin MRSA cellulitis. His balance has improved and is off the walker and cane. 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 Right  Arthroscopy, knee, surgical; for infection, lavage and drainage 73888  2.  Right  Arthroscopy, knee, synovectomy, major 30231  3.  Right  Open incisional and drainage lateral hematoma  4. Right knee Bactisure, 61912  5. KRISTI drain application      DATE OF PROCEDURE: 2/27/2018     ATTENDING SURGEON: Surgeon(s) and Role:     * Fran Shelton MD - Primary     Co-surgeon:  MD Leonides Amador  Assistant:  SMA Jame     PREOPERATIVE DIAGNOSIS:  Right  M17.11     POSTOPERATIVE DIAGNOSIS:   Right  Tear, Medial meniscus, old M23.205   M17.11     PROCEDURES(S) PERFORMED:   1. Right  Patellofemoral Arthroplasty, 54124  2.  Right  Arthroscopy, with meniscectomy (medial OR lateral) 79537, medial  3.  Right  Amniox Arthrocentesis, 55985      Handedness: right-handed  Sport played: volleyball      Level: recreational          Leonid also participates in running.  Pain   This is a new problem. The current episode started 1 to 4 weeks ago. The problem occurs intermittently. The problem has been waxing and waning. Associated symptoms include joint swelling. Pertinent negatives include no abdominal pain, chest pain, chills, congestion, coughing, fever, myalgias, nausea, numbness, rash, sore throat or vomiting. The symptoms are aggravated by exertion. He has tried nothing for the symptoms. The treatment provided mild relief.       Review of Systems   Constitution: Negative for chills, fever and night sweats.   HENT: Negative for congestion, hearing loss and sore throat.    Eyes: Negative for blurred vision, discharge, double vision and visual disturbance.   Cardiovascular: Negative for chest pain, leg swelling, palpitations and syncope.   Respiratory: Negative for cough and shortness of breath.    Endocrine: Negative for cold intolerance, heat intolerance and polyuria.   Hematologic/Lymphatic: Negative for bleeding problem.   Skin: Negative for dry skin and rash.   Musculoskeletal: Positive for joint pain, joint swelling and stiffness. Negative for back pain, falls, muscle cramps, muscle weakness and myalgias.   Gastrointestinal: Negative for abdominal pain, melena, nausea and vomiting.   Genitourinary: Negative for hematuria.   Neurological: Negative for focal weakness, loss of balance, numbness and paresthesias.   Psychiatric/Behavioral: Negative for altered mental status.                   Objective:         General: Leonid is well-developed, well-nourished, appears stated age, in no acute distress, alert and oriented to time, place and person.     General    Vitals reviewed.  Constitutional: He is oriented to person, place, and time. He appears well-developed and well-nourished. No distress.   HENT:   Mouth/Throat: No oropharyngeal exudate.   Eyes: Conjunctivae and EOM are normal. Pupils are equal, round, and reactive to light. Right eye exhibits no discharge. Left eye exhibits no discharge.   Neck: Normal range of motion. Neck supple. No JVD present.   Cardiovascular: Normal heart sounds and intact distal pulses.    No murmur heard.  Pulmonary/Chest: Effort normal and breath sounds normal. No respiratory distress.   Abdominal: Soft. Bowel sounds are normal. He exhibits no distension. There is no tenderness.   Neurological: He is alert and oriented to person, place, and time. He has normal reflexes. No cranial nerve deficit. Coordination normal.   Psychiatric: He has a normal mood and affect. His behavior is normal. Judgment and thought content normal.     General Musculoskeletal Exam   Gait: antalgic       Right Knee Exam     Inspection   Erythema: absent  Scars: present  Swelling: present  Effusion: present  Deformity: absent  Bruising: absent    Tenderness   Right knee tenderness location: lateral patella.    Crepitus   The patient has crepitus of the patella (moderate).    Range of Motion   Extension: 0   Flexion:  140 normal     Tests   Meniscus   Michi:  Medial - negative Lateral - negative  Ligament Examination Lachman: normal (-1 to 2mm) PCL-Posterior Drawer: normal (0 to 2mm)     MCL - Valgus: normal (0 to 2mm)  LCL - Varus: normalPivot Shift: normal (Equal)Reverse Pivot Shift: normal (Equal)  Posterior Sag Test: negative  Posterolateral Corner: unstable (>15 degrees difference)  Patella   Patellar apprehension: negative  Passive Patellar Tilt: neutral  Patellar Tracking: normal  Patellar Glide  (quadrants): Lateral - 1   Medial - 2  Q-Angle at 90 degrees: normal  Patellar Grind: negative  J-Sign: none    Other   Meniscal Cyst: absent  Popliteal (Baker's) Cyst: absent  Sensation: normal    Comments:  + Step Down test        Left Knee Exam     Inspection   Erythema: absent  Scars: present  Swelling: absent  Effusion: absent  Deformity: absent  Bruising: absent    Tenderness   The patient is experiencing no tenderness.     Range of Motion   Extension: 0   Flexion:  140 normal     Tests   Meniscus   Michi:  Medial - negative Lateral - negative  Stability Lachman: normal (-1 to 2mm) PCL-Posterior Drawer: normal (0 to 2mm)  MCL - Valgus: normal (0 to 2mm)  LCL - Varus: normal (0 to 2mm)Pivot Shift: normal (Equal)Reverse Pivot Shift: normal (Equal)  Posterior Sag Test: negative  Posterolateral Corner: unstable (>15 degrees difference)  Patella   Patellar apprehension: negative  Passive Patellar Tilt: neutral  Patellar Tracking: normal  Patellar Glide (Quadrants): Lateral - 1 Medial - 2  Q-Angle at 90 degrees: normal  Patellar Grind: negative  J-Sign: J sign absent    Other   Meniscal Cyst: absent  Popliteal (Baker's) Cyst: absent  Sensation: normal    Right Hip Exam     Tests   Erin: negative  Left Hip Exam     Tests   Erin: negative          Muscle Strength   Right Lower Extremity   Hip Abduction: 5/5   Quadriceps:  4/5   Hamstrin/5   Left Lower Extremity   Hip Abduction: 5/5   Quadriceps:  5/5   Hamstrin/5     Reflexes     Left Side  Quadriceps:  2+  Achilles:  2+    Right Side   Quadriceps:  2+  Achilles:  2+    Vascular Exam     Right Pulses  Dorsalis Pedis:      2+  Posterior Tibial:      2+        Left Pulses  Dorsalis Pedis:      2+  Posterior Tibial:      2+        Edema  Right Lower Leg: absent  Left Lower Leg: absent    Radiographs taken today:  EXAMINATION:  XR KNEE ORTHO BILAT WITH FLEXION    CLINICAL HISTORY:  Pain in right knee    TECHNIQUE:  AP standing of both knees, PA flexion  standing views of both knees, and Merchant views of both knees were performed.  Lateral views of both knees were also performed.    COMPARISON:  N 09/24/2018 one    FINDINGS:  Patella trochlear arthroplasty on the right side.  The position alignment is similar to the previous study.  Mild DJD bilaterally.  No fracture or dislocation.  No bone destruction identified        Assessment:       Encounter Diagnoses   Name Primary?    S/P right unicompartmental knee replacement Yes    Effusion of right knee           Plan:       1. IKDC, SF-12 and KOOS was not filled out today in clinic.     RTC in 4 weeks with Dr. Fran Shelton for metal allergy test results. Patient will fill out IKDC, SF-12 and KOOS and bilateral knee series on return    2. Continue HEP per protocol  Avoid open chain exercises     3. Continue Duexis    4. Metal sensitivity test sent off    5. Continue J-Sleeve/ice/elevate    6. No aspiration performed today due to effusion returning within one week of aspiration         [facility] patient questionnaires have been collected today.

## 2019-01-14 ENCOUNTER — TELEPHONE (OUTPATIENT)
Dept: SPORTS MEDICINE | Facility: CLINIC | Age: 69
End: 2019-01-14

## 2019-01-14 NOTE — TELEPHONE ENCOUNTER
The patient was contacted regarding their call to the office earlier and a voice mail was left to call the office back at their convenience.

## 2019-01-17 ENCOUNTER — LAB VISIT (OUTPATIENT)
Dept: LAB | Facility: HOSPITAL | Age: 69
End: 2019-01-17
Payer: MEDICARE

## 2019-01-17 DIAGNOSIS — Z96.651 S/P RIGHT UNICOMPARTMENTAL KNEE REPLACEMENT: ICD-10-CM

## 2019-01-17 DIAGNOSIS — M25.461 EFFUSION OF RIGHT KNEE: ICD-10-CM

## 2019-01-17 PROCEDURE — 36415 COLL VENOUS BLD VENIPUNCTURE: CPT

## 2019-01-17 PROCEDURE — 86353 LYMPHOCYTE TRANSFORMATION: CPT | Mod: 91

## 2019-01-22 LAB — FUNGUS SPEC CULT: NORMAL

## 2019-01-28 LAB — METAL SENSITIVITY TESTING, METALS ONLY: NORMAL

## 2019-01-30 ENCOUNTER — OFFICE VISIT (OUTPATIENT)
Dept: SPORTS MEDICINE | Facility: CLINIC | Age: 69
End: 2019-01-30
Payer: MEDICARE

## 2019-01-30 ENCOUNTER — HOSPITAL ENCOUNTER (OUTPATIENT)
Dept: RADIOLOGY | Facility: HOSPITAL | Age: 69
Discharge: HOME | End: 2019-01-30
Attending: ORTHOPAEDIC SURGERY
Payer: MEDICARE

## 2019-01-30 VITALS
HEIGHT: 76 IN | SYSTOLIC BLOOD PRESSURE: 139 MMHG | WEIGHT: 200 LBS | BODY MASS INDEX: 24.36 KG/M2 | HEART RATE: 61 BPM | DIASTOLIC BLOOD PRESSURE: 90 MMHG

## 2019-01-30 DIAGNOSIS — M25.561 RIGHT KNEE PAIN, UNSPECIFIED CHRONICITY: Primary | ICD-10-CM

## 2019-01-30 DIAGNOSIS — M25.561 RIGHT KNEE PAIN, UNSPECIFIED CHRONICITY: ICD-10-CM

## 2019-01-30 DIAGNOSIS — A49.02 MRSA INFECTION: ICD-10-CM

## 2019-01-30 DIAGNOSIS — M23.92 INTERNAL DERANGEMENT OF LEFT KNEE: ICD-10-CM

## 2019-01-30 DIAGNOSIS — M25.562 CHRONIC PAIN OF BOTH KNEES: ICD-10-CM

## 2019-01-30 DIAGNOSIS — M23.91 INTERNAL DERANGEMENT OF KNEE JOINT, RIGHT: ICD-10-CM

## 2019-01-30 DIAGNOSIS — M25.561 CHRONIC PAIN OF BOTH KNEES: ICD-10-CM

## 2019-01-30 DIAGNOSIS — G89.29 CHRONIC PAIN OF BOTH KNEES: ICD-10-CM

## 2019-01-30 PROCEDURE — 73564 XR KNEE ORTHO BILAT WITH FLEXION: ICD-10-PCS | Mod: 26,50,, | Performed by: RADIOLOGY

## 2019-01-30 PROCEDURE — 99214 OFFICE O/P EST MOD 30 MIN: CPT | Mod: S$PBB,,, | Performed by: ORTHOPAEDIC SURGERY

## 2019-01-30 PROCEDURE — 99999 PR PBB SHADOW E&M-EST. PATIENT-LVL IV: ICD-10-PCS | Mod: PBBFAC,,, | Performed by: ORTHOPAEDIC SURGERY

## 2019-01-30 PROCEDURE — 73564 X-RAY EXAM KNEE 4 OR MORE: CPT | Mod: TC,50,FY,PO

## 2019-01-30 PROCEDURE — 99214 PR OFFICE/OUTPT VISIT, EST, LEVL IV, 30-39 MIN: ICD-10-PCS | Mod: S$PBB,,, | Performed by: ORTHOPAEDIC SURGERY

## 2019-01-30 PROCEDURE — 99999 PR PBB SHADOW E&M-EST. PATIENT-LVL IV: CPT | Mod: PBBFAC,,, | Performed by: ORTHOPAEDIC SURGERY

## 2019-01-30 PROCEDURE — 99214 OFFICE O/P EST MOD 30 MIN: CPT | Mod: PBBFAC,25,PO | Performed by: ORTHOPAEDIC SURGERY

## 2019-01-30 PROCEDURE — 73564 X-RAY EXAM KNEE 4 OR MORE: CPT | Mod: 26,50,, | Performed by: RADIOLOGY

## 2019-01-30 RX ORDER — ATORVASTATIN CALCIUM 10 MG/1
10 TABLET, FILM COATED ORAL DAILY
COMMUNITY

## 2019-01-30 NOTE — PROGRESS NOTES
Subjective:          Chief Complaint: Leonid Harris III is a 68 y.o. male who had concerns including Follow-up of the Right Knee.    Patient is here for a follow up for his right knee. He had 95 ccs of serosanguinous fluid aspirated from his knee on 12/19/2018 by Dr. Fran Shelton. He states that his knee effusion returned about a week after aspiration. He feels that not much has changed with his knee pain. He continues to have swelling and knee stiffness. He has trouble with walking up stairs. He has been wearing a knee compression daily. He feels pain along the lateral aspect of the patella with knee flexion. He reports that his PT used tape to pull his patella medially and this provided relief. He would like to get back to cycling and raquetball but cannot sure to knee effusions.    Interval Hx 1/30/19:  He returns today to review metal allergy test results. He has large effusion in his knee again. His aspiration cultures from 12/2018 were negative. He is hoping for some definitive resolution so that he can get back to a more active lifestyle. He continues to take chronic suppressive doxycycline 100mg PO QD.      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 Right  Arthroscopy, knee, surgical; for infection, lavage and drainage 88124  2.  Right  Arthroscopy, knee, synovectomy, major 34025  3.  Right  Open incisional and drainage lateral hematoma  4. Right knee Bactisure, 44265  5. KRISTI drain application      DATE OF  PROCEDURE: 2/27/2018     ATTENDING SURGEON: Surgeon(s) and Role:     * Fran Shelton MD - Primary     Co-surgeon:  Jose Alberto Perez MD     First Assistant:  SMA Jame     PREOPERATIVE DIAGNOSIS:  Right  M17.11     POSTOPERATIVE DIAGNOSIS:   Right  Tear, Medial meniscus, old M23.205   M17.11     PROCEDURES(S) PERFORMED:   1. Right  Patellofemoral Arthroplasty, 74200  2.  Right  Arthroscopy, with meniscectomy (medial OR lateral) 41950, medial  3.  Right  Amniox Arthrocentesis, 39798      Handedness: right-handed  Sport played: volleyball      Level: recreational          Leonid also participates in running.  Pain   This is a new problem. The current episode started 1 to 4 weeks ago. The problem occurs intermittently. The problem has been waxing and waning. Associated symptoms include joint swelling. Pertinent negatives include no abdominal pain, chest pain, chills, congestion, coughing, fever, myalgias, nausea, numbness, rash, sore throat or vomiting. The symptoms are aggravated by exertion. He has tried nothing for the symptoms. The treatment provided mild relief.       Review of Systems   Constitution: Negative for chills, fever and night sweats.   HENT: Negative for congestion, hearing loss and sore throat.    Eyes: Negative for blurred vision, discharge, double vision and visual disturbance.   Cardiovascular: Negative for chest pain, leg swelling, palpitations and syncope.   Respiratory: Negative for cough and shortness of breath.    Endocrine: Negative for cold intolerance, heat intolerance and polyuria.   Hematologic/Lymphatic: Negative for bleeding problem.   Skin: Negative for dry skin and rash.   Musculoskeletal: Positive for joint pain, joint swelling and stiffness. Negative for back pain, falls, muscle cramps, muscle weakness and myalgias.   Gastrointestinal: Negative for abdominal pain, melena, nausea and vomiting.   Genitourinary: Negative for hematuria.   Neurological: Negative for focal  weakness, loss of balance, numbness and paresthesias.   Psychiatric/Behavioral: Negative for altered mental status.       Pain Related Questions  Over the past 3 days, what was your average pain during activity? (I.e. running, jogging, walking, climbing stairs, getting dressed, ect.): 4  Over the past 3 days, what was your highest pain level?: 4  Over the past 3 days, what was your lowest pain level? : 3    Other  How many nights a week are you awakened by your affected body part?: 7  Was the patient's HEIGHT measured or patient reported?: Patient Reported  Was the patient's WEIGHT measured or patient reported?: Measured      Objective:        General: Leonid is well-developed, well-nourished, appears stated age, in no acute distress, alert and oriented to time, place and person.     General    Vitals reviewed.  Constitutional: He is oriented to person, place, and time. He appears well-developed and well-nourished. No distress.   HENT:   Mouth/Throat: No oropharyngeal exudate.   Eyes: Conjunctivae and EOM are normal. Pupils are equal, round, and reactive to light. Right eye exhibits no discharge. Left eye exhibits no discharge.   Neck: Normal range of motion. Neck supple. No JVD present.   Cardiovascular: Normal heart sounds and intact distal pulses.    No murmur heard.  Pulmonary/Chest: Effort normal and breath sounds normal. No respiratory distress.   Abdominal: Soft. Bowel sounds are normal. He exhibits no distension. There is no tenderness.   Neurological: He is alert and oriented to person, place, and time. He has normal reflexes. No cranial nerve deficit. Coordination normal.   Psychiatric: He has a normal mood and affect. His behavior is normal. Judgment and thought content normal.     General Musculoskeletal Exam   Gait: antalgic       Right Knee Exam     Inspection   Erythema: absent  Scars: present  Swelling: present  Effusion: present  Deformity: absent  Bruising: absent    Tenderness   Right knee  tenderness location: lateral patella.    Crepitus   The patient has crepitus of the patella (moderate).    Range of Motion   Extension: 0   Flexion:  130 normal     Tests   Meniscus   Michi:  Medial - negative Lateral - negative  Ligament Examination Lachman: normal (-1 to 2mm) PCL-Posterior Drawer: normal (0 to 2mm)     MCL - Valgus: normal (0 to 2mm)  LCL - Varus: normalPivot Shift: normal (Equal)Reverse Pivot Shift: normal (Equal)  Posterior Sag Test: negative  Posterolateral Corner: unstable (>15 degrees difference)  Patella   Patellar apprehension: negative  Passive Patellar Tilt: neutral  Patellar Tracking: normal  Patellar Glide (quadrants): Lateral - 1   Medial - 2  Q-Angle at 90 degrees: normal  Patellar Grind: negative  J-Sign: none    Other   Meniscal Cyst: absent  Popliteal (Baker's) Cyst: absent  Sensation: normal    Comments:  + Step Down test        Left Knee Exam     Inspection   Erythema: absent  Scars: present  Swelling: absent  Effusion: absent  Deformity: absent  Bruising: absent    Tenderness   The patient is experiencing no tenderness.     Range of Motion   Extension: 0   Flexion:  140 normal     Tests   Meniscus   Michi:  Medial - negative Lateral - negative  Stability Lachman: normal (-1 to 2mm) PCL-Posterior Drawer: normal (0 to 2mm)  MCL - Valgus: normal (0 to 2mm)  LCL - Varus: normal (0 to 2mm)Pivot Shift: normal (Equal)Reverse Pivot Shift: normal (Equal)  Posterior Sag Test: negative  Posterolateral Corner: unstable (>15 degrees difference)  Patella   Patellar apprehension: negative  Passive Patellar Tilt: neutral  Patellar Tracking: normal  Patellar Glide (Quadrants): Lateral - 1 Medial - 2  Q-Angle at 90 degrees: normal  Patellar Grind: negative  J-Sign: J sign absent    Other   Meniscal Cyst: absent  Popliteal (Baker's) Cyst: absent  Sensation: normal    Right Hip Exam     Tests   Erin: negative  Left Hip Exam     Tests   Erin: negative          Muscle Strength   Right Lower  Extremity   Hip Abduction: 5/5   Quadriceps:  4/5   Hamstrin/5   Left Lower Extremity   Hip Abduction: 5/5   Quadriceps:  5/5   Hamstrin/5     Reflexes     Left Side  Quadriceps:  2+  Achilles:  2+    Right Side   Quadriceps:  2+  Achilles:  2+    Vascular Exam     Right Pulses  Dorsalis Pedis:      2+  Posterior Tibial:      2+        Left Pulses  Dorsalis Pedis:      2+  Posterior Tibial:      2+        Edema  Right Lower Leg: absent  Left Lower Leg: absent    IMAGING:    BKS XR 19:  Demonstrates intact patellofemoral arthroplasty of R knee. Well maintained joint spacing to medial and lateral compartments of knee. No acute bony findings.    DIAGNOSTICS:  Metal allergy testing 19 with       Assessment:       Encounter Diagnoses   Name Primary?    Right knee pain, unspecified chronicity Yes    Internal derangement of knee joint, right           Plan:       1. IKDC, SF-12 and KOOS was filled out today in clinic.     RTC in 4 weeks with Dr. Fran Shelton for knee aspiration. Patient will not fill out IKDC, SF-12 and KOOS.    2. We discussed his options today - at this time we decided to evaluate infection as his possible source of recurrent effusion. He will stop taking his doxycycline (abx holiday) and will come back in 4 weeks for repeat knee aspiration and we will draw ESR, CRP and send fluid for cell count and cultures at that time. If these are all negative then his effusions could be due to a maltracking issue or something more mechanical. If the infection workup is positive we will need to pursue explant of the hardware w/ planned 2 stage revision.    3. Continue HEP per protocol    4. Stop taking doxycycline at this time    5. Continue J-Sleeve/ice/elevate           [facility] patient questionnaires have been collected today.

## 2019-01-31 ENCOUNTER — PATIENT MESSAGE (OUTPATIENT)
Dept: INFECTIOUS DISEASES | Facility: CLINIC | Age: 69
End: 2019-01-31

## 2019-02-01 ENCOUNTER — PATIENT MESSAGE (OUTPATIENT)
Dept: SPORTS MEDICINE | Facility: CLINIC | Age: 69
End: 2019-02-01

## 2019-02-19 DIAGNOSIS — M23.91 INTERNAL DERANGEMENT OF KNEE JOINT, RIGHT: ICD-10-CM

## 2019-02-19 DIAGNOSIS — G89.29 CHRONIC PAIN OF RIGHT KNEE: ICD-10-CM

## 2019-02-19 DIAGNOSIS — M25.461 EFFUSION OF RIGHT KNEE: ICD-10-CM

## 2019-02-19 DIAGNOSIS — M25.561 CHRONIC PAIN OF RIGHT KNEE: ICD-10-CM

## 2019-02-19 DIAGNOSIS — M23.92 INTERNAL DERANGEMENT OF LEFT KNEE: ICD-10-CM

## 2019-02-19 RX ORDER — MELOXICAM 15 MG/1
15 TABLET ORAL DAILY
Qty: 30 TABLET | Refills: 2 | Status: SHIPPED | OUTPATIENT
Start: 2019-02-19 | End: 2019-03-18 | Stop reason: CLARIF

## 2019-02-20 LAB
ACID FAST MOD KINY STN SPEC: NORMAL
MYCOBACTERIUM SPEC QL CULT: NORMAL

## 2019-02-26 NOTE — PROGRESS NOTES
Subjective:          Chief Complaint: Leonid Harris III is a 69 y.o. male who had concerns including Follow-up of the Right Knee.    Patient is here for a follow up for his right knee. He had 95 ccs of serosanguinous fluid aspirated from his knee on 12/19/2018 by Dr. Fran Shelton. He states that his knee effusion returned about a week after aspiration. He feels that not much has changed with his knee pain. He continues to have swelling and knee stiffness. He has trouble with walking up stairs. He has been wearing a knee compression daily. He feels pain along the lateral aspect of the patella with knee flexion. He reports that his PT used tape to pull his patella medially and this provided relief. He would like to get back to cycling and raquetball but cannot sure to knee effusions.    Interval Hx 2/27/19:  Returns today now off of Abx x 4 weeks. Here for aspiration of knee to send fluid off for analysis, cx.      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 Right  Arthroscopy, knee, surgical; for infection, lavage and drainage 93601  2.  Right  Arthroscopy, knee, synovectomy, major 93117  3.  Right  Open incisional and drainage lateral hematoma  4. Right knee Bactisure, 76512  5. KRISTI drain application      DATE OF PROCEDURE: 2/27/2018     ATTENDING SURGEON: Surgeon(s) and Role:     * Fran Shelton MD - Primary     Co-surgeon:  Jose Alberto Perez MD     First Assistant:  SMA Jame     PREOPERATIVE  DIAGNOSIS:  Right  M17.11     POSTOPERATIVE DIAGNOSIS:   Right  Tear, Medial meniscus, old M23.205   M17.11     PROCEDURES(S) PERFORMED:   1. Right  Patellofemoral Arthroplasty, 22410  2.  Right  Arthroscopy, with meniscectomy (medial OR lateral) 21982, medial  3.  Right  Amniox Arthrocentesis, 49905      Handedness: right-handed  Sport played: volleyball      Level: recreational          Leonid also participates in running.  Pain   This is a new problem. The current episode started 1 to 4 weeks ago. The problem occurs intermittently. The problem has been waxing and waning. Associated symptoms include joint swelling. Pertinent negatives include no abdominal pain, chest pain, chills, congestion, coughing, fever, myalgias, nausea, numbness, rash, sore throat or vomiting. The symptoms are aggravated by exertion. He has tried nothing for the symptoms. The treatment provided mild relief.       Review of Systems   Constitution: Negative for chills, fever and night sweats.   HENT: Negative for congestion, hearing loss and sore throat.    Eyes: Negative for blurred vision, discharge, double vision and visual disturbance.   Cardiovascular: Negative for chest pain, leg swelling, palpitations and syncope.   Respiratory: Negative for cough and shortness of breath.    Endocrine: Negative for cold intolerance, heat intolerance and polyuria.   Hematologic/Lymphatic: Negative for bleeding problem.   Skin: Negative for dry skin and rash.   Musculoskeletal: Positive for joint pain, joint swelling and stiffness. Negative for back pain, falls, muscle cramps, muscle weakness and myalgias.   Gastrointestinal: Negative for abdominal pain, melena, nausea and vomiting.   Genitourinary: Negative for hematuria.   Neurological: Negative for focal weakness, loss of balance, numbness and paresthesias.   Psychiatric/Behavioral: Negative for altered mental status.       Pain Related Questions  Over the past 3 days, what was your average pain during  activity? (I.e. running, jogging, walking, climbing stairs, getting dressed, ect.): 3  Over the past 3 days, what was your highest pain level?: 3  Over the past 3 days, what was your lowest pain level? : 2    Other  How many nights a week are you awakened by your affected body part?: 2  Was the patient's HEIGHT measured or patient reported?: Patient Reported  Was the patient's WEIGHT measured or patient reported?: Measured      Objective:        General: Leonid is well-developed, well-nourished, appears stated age, in no acute distress, alert and oriented to time, place and person.     General    Vitals reviewed.  Constitutional: He is oriented to person, place, and time. He appears well-developed and well-nourished. No distress.   HENT:   Mouth/Throat: No oropharyngeal exudate.   Eyes: Conjunctivae and EOM are normal. Pupils are equal, round, and reactive to light. Right eye exhibits no discharge. Left eye exhibits no discharge.   Neck: Normal range of motion. Neck supple. No JVD present.   Cardiovascular: Normal heart sounds and intact distal pulses.    No murmur heard.  Pulmonary/Chest: Effort normal and breath sounds normal. No respiratory distress.   Abdominal: Soft. Bowel sounds are normal. He exhibits no distension. There is no tenderness.   Neurological: He is alert and oriented to person, place, and time. He has normal reflexes. No cranial nerve deficit. Coordination normal.   Psychiatric: He has a normal mood and affect. His behavior is normal. Judgment and thought content normal.     General Musculoskeletal Exam   Gait: antalgic       Right Knee Exam     Inspection   Erythema: absent  Scars: present  Swelling: present  Effusion: present  Deformity: absent  Bruising: absent    Tenderness   Right knee tenderness location: lateral patella.    Crepitus   The patient has crepitus of the patella (moderate).    Range of Motion   Extension: 0   Flexion:  130 normal     Tests   Meniscus   Michi:  Medial -  negative Lateral - negative  Ligament Examination Lachman: normal (-1 to 2mm) PCL-Posterior Drawer: normal (0 to 2mm)     MCL - Valgus: normal (0 to 2mm)  LCL - Varus: normalPivot Shift: normal (Equal)Reverse Pivot Shift: normal (Equal)  Posterior Sag Test: negative  Posterolateral Corner: unstable (>15 degrees difference)  Patella   Patellar apprehension: negative  Passive Patellar Tilt: neutral  Patellar Tracking: normal  Patellar Glide (quadrants): Lateral - 1   Medial - 2  Q-Angle at 90 degrees: normal  Patellar Grind: negative  J-Sign: none    Other   Meniscal Cyst: absent  Popliteal (Baker's) Cyst: absent  Sensation: normal    Comments:  + Step Down test        Left Knee Exam     Inspection   Erythema: absent  Scars: present  Swelling: absent  Effusion: absent  Deformity: absent  Bruising: absent    Tenderness   The patient is experiencing no tenderness.     Range of Motion   Extension: 0   Flexion:  140 normal     Tests   Meniscus   Michi:  Medial - negative Lateral - negative  Stability Lachman: normal (-1 to 2mm) PCL-Posterior Drawer: normal (0 to 2mm)  MCL - Valgus: normal (0 to 2mm)  LCL - Varus: normal (0 to 2mm)Pivot Shift: normal (Equal)Reverse Pivot Shift: normal (Equal)  Posterior Sag Test: negative  Posterolateral Corner: unstable (>15 degrees difference)  Patella   Patellar apprehension: negative  Passive Patellar Tilt: neutral  Patellar Tracking: normal  Patellar Glide (Quadrants): Lateral - 1 Medial - 2  Q-Angle at 90 degrees: normal  Patellar Grind: negative  J-Sign: J sign absent    Other   Meniscal Cyst: absent  Popliteal (Baker's) Cyst: absent  Sensation: normal    Right Hip Exam     Tests   Erin: negative  Left Hip Exam     Tests   Erin: negative          Muscle Strength   Right Lower Extremity   Hip Abduction: 5/5   Quadriceps:  4/5   Hamstrin/5   Left Lower Extremity   Hip Abduction: 5/5   Quadriceps:  5/5   Hamstrin/5     Reflexes     Left Side  Quadriceps:  2+  Achilles:   2+    Right Side   Quadriceps:  2+  Achilles:  2+    Vascular Exam     Right Pulses  Dorsalis Pedis:      2+  Posterior Tibial:      2+        Left Pulses  Dorsalis Pedis:      2+  Posterior Tibial:      2+        Edema  Right Lower Leg: absent  Left Lower Leg: absent    IMAGING:    BKS XR 2/27/19:  Intact R knee PF arthroplasty w/ question of lucency seen around femoral component that could be suggestive of loosening.        Assessment:       Encounter Diagnoses   Name Primary?    Right knee pain, unspecified chronicity Yes    Internal derangement of knee joint, right     Chronic pain of both knees           Plan:       1. IKDC, SF-12 and KOOS was not filled out today in clinic.     RTC in 3 weeks with Dr. Fran Shelton for culture review and preop H&P. Patient will not fill out IKDC, SF-12 and KOOS.    2. We discussed his options today - at this time we decided to evaluate infection as his possible source of recurrent effusion. He has been off his we will draw ESR, CRP and send fluid for cell count and cultures at that time. If these are all negative then his effusions could be due to a maltracking issue or something more mechanical. If the infection workup is positive we will need to pursue explant of the hardware w/ planned 2 stage revision.      3. Aspiration Procedure    After time out was performed, including verification of patient ID, procedure, site and side, availability of information and equipment, review of safety issues, and agreement with consent, the procedure site was marked and the patient was prepped aseptically. 60cc's of serosanguineous joint fluid was aspirated from the right Knee Joint using an 22g x 1.5 needle in sterile fashion without complication. Bandage was applied. Patient was reminded to rest with RICE for 48 hours post injection and to call the clinic immediately for any adverse side effects as explained in clinic today.    4. We reviewed with Leonid rene, the pathology and natural  history of his diagnosis. We have discussed a variety of treatment options including medications, physical therapy and other alternative treatments. I also explained the indications, risks and benefits of surgery. After discussion, Leonid decided to proceed with surgery. The decision was made to go forward with     Right knee (assuming negative cultures from today and no s/s of infection)  1. R knee VMO advancement   2. Lateral retinacular lengthening  3. Arthroscopic debridement/synovectomy    If signs of infection present on labs/culture  RIGHT knee  1. Patellofemoral arthroplasty explantation of components  2. Irrigation and debridement     The details of the surgical procedure were explained, including the location of probable incisions and a description of likely hardware and/or grafts to be used.  The patient understands the likely convalescence after surgery.  Also, we have thoroughly discussed the risks, benefits and alternatives to surgery, including, but not limited to, the risk of infection, joint stiffness, blood clot (including DVT and/or pulmonary embolus), neurologic and vascular injury.  It was explained that, if tissue has been repaired or reconstructed, there is a chance of failure, which may require further management.      All of the patient's questions were answered and informed consent was obtained. The patient will contact us if they have any questions or concerns in the interim.           [facility] patient questionnaires have been collected today.

## 2019-02-27 ENCOUNTER — HOSPITAL ENCOUNTER (OUTPATIENT)
Dept: RADIOLOGY | Facility: HOSPITAL | Age: 69
Discharge: HOME | End: 2019-02-27
Attending: ORTHOPAEDIC SURGERY
Payer: MEDICARE

## 2019-02-27 ENCOUNTER — OFFICE VISIT (OUTPATIENT)
Dept: SPORTS MEDICINE | Facility: CLINIC | Age: 69
End: 2019-02-27
Payer: MEDICARE

## 2019-02-27 VITALS
BODY MASS INDEX: 24.36 KG/M2 | HEART RATE: 62 BPM | WEIGHT: 200 LBS | SYSTOLIC BLOOD PRESSURE: 123 MMHG | DIASTOLIC BLOOD PRESSURE: 79 MMHG | HEIGHT: 76 IN

## 2019-02-27 DIAGNOSIS — M25.561 RIGHT KNEE PAIN, UNSPECIFIED CHRONICITY: ICD-10-CM

## 2019-02-27 DIAGNOSIS — M25.561 RIGHT KNEE PAIN, UNSPECIFIED CHRONICITY: Primary | ICD-10-CM

## 2019-02-27 DIAGNOSIS — M23.91 INTERNAL DERANGEMENT OF KNEE JOINT, RIGHT: ICD-10-CM

## 2019-02-27 DIAGNOSIS — G89.29 CHRONIC PAIN OF BOTH KNEES: ICD-10-CM

## 2019-02-27 DIAGNOSIS — M25.561 CHRONIC PAIN OF BOTH KNEES: ICD-10-CM

## 2019-02-27 DIAGNOSIS — M25.562 CHRONIC PAIN OF BOTH KNEES: ICD-10-CM

## 2019-02-27 LAB
GRAM STN SPEC: NORMAL
GRAM STN SPEC: NORMAL

## 2019-02-27 PROCEDURE — 20610 DRAIN/INJ JOINT/BURSA W/O US: CPT | Mod: S$PBB,RT,, | Performed by: ORTHOPAEDIC SURGERY

## 2019-02-27 PROCEDURE — 20610 DRAIN/INJ JOINT/BURSA W/O US: CPT | Mod: PBBFAC,PO | Performed by: ORTHOPAEDIC SURGERY

## 2019-02-27 PROCEDURE — 87070 CULTURE OTHR SPECIMN AEROBIC: CPT

## 2019-02-27 PROCEDURE — 99214 OFFICE O/P EST MOD 30 MIN: CPT | Mod: PBBFAC,25,PO | Performed by: ORTHOPAEDIC SURGERY

## 2019-02-27 PROCEDURE — 99999 PR PBB SHADOW E&M-EST. PATIENT-LVL IV: ICD-10-PCS | Mod: PBBFAC,,, | Performed by: ORTHOPAEDIC SURGERY

## 2019-02-27 PROCEDURE — 73564 X-RAY EXAM KNEE 4 OR MORE: CPT | Mod: TC,50,FY,PO

## 2019-02-27 PROCEDURE — 87205 SMEAR GRAM STAIN: CPT

## 2019-02-27 PROCEDURE — 87206 SMEAR FLUORESCENT/ACID STAI: CPT

## 2019-02-27 PROCEDURE — 73564 XR KNEE ORTHO BILAT WITH FLEXION: ICD-10-PCS | Mod: 26,50,, | Performed by: RADIOLOGY

## 2019-02-27 PROCEDURE — 99214 PR OFFICE/OUTPT VISIT, EST, LEVL IV, 30-39 MIN: ICD-10-PCS | Mod: 25,S$PBB,, | Performed by: ORTHOPAEDIC SURGERY

## 2019-02-27 PROCEDURE — 99999 PR PBB SHADOW E&M-EST. PATIENT-LVL IV: CPT | Mod: PBBFAC,,, | Performed by: ORTHOPAEDIC SURGERY

## 2019-02-27 PROCEDURE — 99214 OFFICE O/P EST MOD 30 MIN: CPT | Mod: 25,S$PBB,, | Performed by: ORTHOPAEDIC SURGERY

## 2019-02-27 PROCEDURE — 87075 CULTR BACTERIA EXCEPT BLOOD: CPT

## 2019-02-27 PROCEDURE — 87102 FUNGUS ISOLATION CULTURE: CPT

## 2019-02-27 PROCEDURE — 87116 MYCOBACTERIA CULTURE: CPT

## 2019-02-27 PROCEDURE — 73564 X-RAY EXAM KNEE 4 OR MORE: CPT | Mod: 26,50,, | Performed by: RADIOLOGY

## 2019-02-27 PROCEDURE — 20610 PR DRAIN/INJECT LARGE JOINT/BURSA: ICD-10-PCS | Mod: S$PBB,RT,, | Performed by: ORTHOPAEDIC SURGERY

## 2019-02-27 RX ORDER — BISOPROLOL FUMARATE AND HYDROCHLOROTHIAZIDE 2.5; 6.25 MG/1; MG/1
1 TABLET ORAL DAILY
COMMUNITY
End: 2021-02-01 | Stop reason: ALTCHOICE

## 2019-02-28 DIAGNOSIS — M23.91 INTERNAL DERANGEMENT OF RIGHT KNEE: Primary | ICD-10-CM

## 2019-02-28 DIAGNOSIS — T84.84XA PAINFUL ORTHOPAEDIC HARDWARE: ICD-10-CM

## 2019-02-28 DIAGNOSIS — M23.51 CHRONIC INSTABILITY OF RIGHT KNEE: ICD-10-CM

## 2019-03-02 LAB — BACTERIA SPEC AEROBE CULT: NO GROWTH

## 2019-03-06 LAB — BACTERIA SPEC ANAEROBE CULT: NORMAL

## 2019-03-08 NOTE — PROGRESS NOTES
Subjective:          Chief Complaint: Leonid Harris III is a 69 y.o. male who had concerns including Follow-up of the Right Knee.    Patient is here for a follow up for his right knee. He had 95 ccs of serosanguinous fluid aspirated from his knee on 12/19/2018 by Dr. Fran Shelton. He states that his knee effusion returned about a week after aspiration. He feels that not much has changed with his knee pain. He continues to have swelling and knee stiffness. He has trouble with walking up stairs. He has been wearing a knee compression daily. He feels pain along the lateral aspect of the patella with knee flexion. He reports that his PT used tape to pull his patella medially and this provided relief. He would like to get back to cycling and raquetball but cannot sure to knee effusions.    Interval Hx 2/27/19:  Returns today now off of Abx x 4 weeks. Here for aspiration of knee to send fluid off for analysis, cx.      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 Right  Arthroscopy, knee, surgical; for infection, lavage and drainage 01726  2.  Right  Arthroscopy, knee, synovectomy, major 21663  3.  Right  Open incisional and drainage lateral hematoma  4. Right knee Bactisure, 09366  5. KRISTI drain application      DATE OF PROCEDURE: 2/27/2018     ATTENDING SURGEON: Surgeon(s) and Role:     * Fran Shelton MD - Primary     Co-surgeon:  Jose Alberto Perez MD     First Assistant:  SMA Jame     PREOPERATIVE  DIAGNOSIS:  Right  M17.11     POSTOPERATIVE DIAGNOSIS:   Right  Tear, Medial meniscus, old M23.205   M17.11     PROCEDURES(S) PERFORMED:   1. Right  Patellofemoral Arthroplasty, 28627  2.  Right  Arthroscopy, with meniscectomy (medial OR lateral) 86227, medial  3.  Right  Amniox Arthrocentesis, 23773      Handedness: right-handed  Sport played: volleyball      Level: recreational          Leonid also participates in running.  Pain   This is a new problem. The current episode started 1 to 4 weeks ago. The problem occurs intermittently. The problem has been waxing and waning. Associated symptoms include joint swelling. Pertinent negatives include no abdominal pain, chest pain, chills, congestion, coughing, fever, myalgias, nausea, numbness, rash, sore throat or vomiting. The symptoms are aggravated by exertion. He has tried nothing for the symptoms. The treatment provided mild relief.       Review of Systems   Constitution: Negative for chills, fever and night sweats.   HENT: Negative for congestion, hearing loss and sore throat.    Eyes: Negative for blurred vision, discharge, double vision and visual disturbance.   Cardiovascular: Negative for chest pain, leg swelling, palpitations and syncope.   Respiratory: Negative for cough and shortness of breath.    Endocrine: Negative for cold intolerance, heat intolerance and polyuria.   Hematologic/Lymphatic: Negative for bleeding problem.   Skin: Negative for dry skin and rash.   Musculoskeletal: Positive for joint pain, joint swelling and stiffness. Negative for back pain, falls, muscle cramps, muscle weakness and myalgias.   Gastrointestinal: Negative for abdominal pain, melena, nausea and vomiting.   Genitourinary: Negative for hematuria.   Neurological: Negative for focal weakness, loss of balance, numbness and paresthesias.   Psychiatric/Behavioral: Negative for altered mental status.       Pain Related Questions  Over the past 3 days, what was your average pain during  activity? (I.e. running, jogging, walking, climbing stairs, getting dressed, ect.): 2  Over the past 3 days, what was your highest pain level?: 3  Over the past 3 days, what was your lowest pain level? : 2    Other  Was the patient's HEIGHT measured or patient reported?: Patient Reported  Was the patient's WEIGHT measured or patient reported?: Measured      Objective:        General: Leonid is well-developed, well-nourished, appears stated age, in no acute distress, alert and oriented to time, place and person.     General    Vitals reviewed.  Constitutional: He is oriented to person, place, and time. He appears well-developed and well-nourished. No distress.   HENT:   Mouth/Throat: No oropharyngeal exudate.   Eyes: Conjunctivae and EOM are normal. Pupils are equal, round, and reactive to light. Right eye exhibits no discharge. Left eye exhibits no discharge.   Neck: Normal range of motion. Neck supple. No JVD present.   Cardiovascular: Normal heart sounds and intact distal pulses.    No murmur heard.  Pulmonary/Chest: Effort normal and breath sounds normal. No respiratory distress.   Abdominal: Soft. Bowel sounds are normal. He exhibits no distension. There is no tenderness.   Neurological: He is alert and oriented to person, place, and time. He has normal reflexes. No cranial nerve deficit. Coordination normal.   Psychiatric: He has a normal mood and affect. His behavior is normal. Judgment and thought content normal.     General Musculoskeletal Exam   Gait: antalgic       Right Knee Exam     Inspection   Erythema: absent  Scars: present  Swelling: present  Effusion: present  Deformity: absent  Bruising: absent    Tenderness   Right knee tenderness location: lateral patella.    Crepitus   The patient has crepitus of the patella (moderate).    Range of Motion   Extension: 0   Flexion:  130 normal     Tests   Meniscus   Michi:  Medial - negative Lateral - negative  Ligament Examination Lachman: normal (-1 to 2mm)  PCL-Posterior Drawer: normal (0 to 2mm)     MCL - Valgus: normal (0 to 2mm)  LCL - Varus: normalPivot Shift: normal (Equal)Reverse Pivot Shift: normal (Equal)  Posterior Sag Test: negative  Posterolateral Corner: unstable (>15 degrees difference)  Patella   Patellar apprehension: negative  Passive Patellar Tilt: neutral  Patellar Tracking: normal  Patellar Glide (quadrants): Lateral - 1   Medial - 2  Q-Angle at 90 degrees: normal  Patellar Grind: negative  J-Sign: none    Other   Meniscal Cyst: absent  Popliteal (Baker's) Cyst: absent  Sensation: normal    Comments:  + Step Down test        Left Knee Exam     Inspection   Erythema: absent  Scars: present  Swelling: absent  Effusion: absent  Deformity: absent  Bruising: absent    Tenderness   The patient is experiencing no tenderness.     Range of Motion   Extension: 0   Flexion:  140 normal     Tests   Meniscus   Michi:  Medial - negative Lateral - negative  Stability Lachman: normal (-1 to 2mm) PCL-Posterior Drawer: normal (0 to 2mm)  MCL - Valgus: normal (0 to 2mm)  LCL - Varus: normal (0 to 2mm)Pivot Shift: normal (Equal)Reverse Pivot Shift: normal (Equal)  Posterior Sag Test: negative  Posterolateral Corner: unstable (>15 degrees difference)  Patella   Patellar apprehension: negative  Passive Patellar Tilt: neutral  Patellar Tracking: normal  Patellar Glide (Quadrants): Lateral - 1 Medial - 2  Q-Angle at 90 degrees: normal  Patellar Grind: negative  J-Sign: J sign absent    Other   Meniscal Cyst: absent  Popliteal (Baker's) Cyst: absent  Sensation: normal    Right Hip Exam     Tests   Erin: negative  Left Hip Exam     Tests   Erin: negative          Muscle Strength   Right Lower Extremity   Hip Abduction: 5/5   Quadriceps:  4/5   Hamstrin/5   Left Lower Extremity   Hip Abduction: 5/5   Quadriceps:  5/5   Hamstrin/5     Reflexes     Left Side  Quadriceps:  2+  Achilles:  2+    Right Side   Quadriceps:  2+  Achilles:  2+    Vascular Exam     Right  Pulses  Dorsalis Pedis:      2+  Posterior Tibial:      2+        Left Pulses  Dorsalis Pedis:      2+  Posterior Tibial:      2+        Edema  Right Lower Leg: absent  Left Lower Leg: absent    IMAGING:    [facility] XR 2/27/19:  Intact R knee PF arthroplasty w/ question of lucency seen around femoral component that could be suggestive of loosening.        Assessment:       Encounter Diagnoses   Name Primary?    MRSA infection     Internal derangement of knee joint, right Yes        CT right knee:   There appear to be advanced tricompartmental arthritic changes in the right knee.  No acute displaced fracture is visualized.  Right knee effusion.  No bony destructive of lesions.    Postoperative changes of right patellar trochlear prosthesis with persistent mild lateral subluxation of the patella.    The approximate tibial tuberosity to trochlear groove distance measures 2.3-2.1 cm, which is difficult to accurately measure due to metal artifact.        Plan:       1. IKDC, SF-12 and KOOS was not filled out today in clinic.       2. We discussed his options today - at this time we decided to evaluate infection as his possible source of recurrent effusion. He has been off his we will draw ESR, CRP and send fluid for cell count and cultures at that time. If these are all negative then his effusions could be due to a maltracking issue or something more mechanical. If the infection workup is positive we will need to pursue explant of the hardware w/ planned 2 stage revision.      3. Aspiration Procedure    After time out was performed, including verification of patient ID, procedure, site and side, availability of information and equipment, review of safety issues, and agreement with consent, the procedure site was marked and the patient was prepped aseptically. 60cc's of serosanguineous joint fluid was aspirated from the right Knee Joint using an 22g x 1.5 needle in sterile fashion without complication. Bandage was applied.  Patient was reminded to rest with RICE for 48 hours post injection and to call the clinic immediately for any adverse side effects as explained in clinic today.    4. We reviewed with Leonid today, the pathology and natural history of his diagnosis. We have discussed a variety of treatment options including medications, physical therapy and other alternative treatments. I also explained the indications, risks and benefits of surgery. After discussion, Leonid decided to proceed with surgery. The decision was made to go forward with     Right knee   1. R knee anterior tibial tubercle osteotomy  2. R knee VMO advancement   3. Lateral retinacular lengthening  4. Arthroscopic debridement/synovectomy  5. Possible revision of the patellofemoral replacement (have the Kahuna Arthrosurface Implant coming )   Need to discuss with the patient on the morning of surgery one final time and revise consent    The details of the surgical procedure were explained, including the location of probable incisions and a description of likely hardware and/or grafts to be used.  The patient understands the likely convalescence after surgery.  Also, we have thoroughly discussed the risks, benefits and alternatives to surgery, including, but not limited to, the risk of infection, joint stiffness, blood clot (including DVT and/or pulmonary embolus), neurologic and vascular injury.  It was explained that, if tissue has been repaired or reconstructed, there is a chance of failure, which may require further management.    He will be placed on Doxycycline 100mg BID x 4 weeks for infection prophylaxis post-operatively  He was instructed to apply Bactroban ointment to his bilateral nares prior to surgery, starting immediately, due to history of MRSA.  We will use Clindamycin 900mg Sandra-operatively due to history of Red Man Syndrome with Vancomycin at previous surgery    Physical Therapy post op:   Post operatively, the patient will be placed in a hinged  knee immobilizer from 0-45 degrees for two weeks, then 0-60 for two weeks, then 0-90 for another two weeks.   He will perform therapy at [facility] in [place], [place]    All of the patient's questions were answered and informed consent was obtained. The patient will contact us if they have any questions or concerns in the interim.    Surgical date planned for 3/19/19. He will need medical clearance.           [facility] patient questionnaires have been collected today.

## 2019-03-11 ENCOUNTER — OFFICE VISIT (OUTPATIENT)
Dept: SPORTS MEDICINE | Facility: CLINIC | Age: 69
End: 2019-03-11
Payer: MEDICARE

## 2019-03-11 ENCOUNTER — HOSPITAL ENCOUNTER (OUTPATIENT)
Dept: RADIOLOGY | Facility: HOSPITAL | Age: 69
Discharge: HOME | End: 2019-03-11
Attending: ORTHOPAEDIC SURGERY
Payer: MEDICARE

## 2019-03-11 VITALS
DIASTOLIC BLOOD PRESSURE: 80 MMHG | HEART RATE: 62 BPM | BODY MASS INDEX: 22.53 KG/M2 | WEIGHT: 185 LBS | SYSTOLIC BLOOD PRESSURE: 124 MMHG | HEIGHT: 76 IN

## 2019-03-11 DIAGNOSIS — M25.562 CHRONIC PAIN OF BOTH KNEES: ICD-10-CM

## 2019-03-11 DIAGNOSIS — M25.561 CHRONIC PAIN OF BOTH KNEES: ICD-10-CM

## 2019-03-11 DIAGNOSIS — M25.561 RIGHT KNEE PAIN, UNSPECIFIED CHRONICITY: ICD-10-CM

## 2019-03-11 DIAGNOSIS — M23.91 INTERNAL DERANGEMENT OF KNEE JOINT, RIGHT: Primary | ICD-10-CM

## 2019-03-11 DIAGNOSIS — A49.02 MRSA INFECTION: ICD-10-CM

## 2019-03-11 DIAGNOSIS — M23.91 INTERNAL DERANGEMENT OF KNEE JOINT, RIGHT: ICD-10-CM

## 2019-03-11 DIAGNOSIS — G89.29 CHRONIC PAIN OF BOTH KNEES: ICD-10-CM

## 2019-03-11 PROCEDURE — 99214 OFFICE O/P EST MOD 30 MIN: CPT | Mod: S$PBB,,, | Performed by: ORTHOPAEDIC SURGERY

## 2019-03-11 PROCEDURE — 73700 CT LOWER EXTREMITY W/O DYE: CPT | Mod: TC,RT

## 2019-03-11 PROCEDURE — 99999 PR PBB SHADOW E&M-EST. PATIENT-LVL III: ICD-10-PCS | Mod: PBBFAC,,, | Performed by: ORTHOPAEDIC SURGERY

## 2019-03-11 PROCEDURE — 99999 PR PBB SHADOW E&M-EST. PATIENT-LVL III: CPT | Mod: PBBFAC,,, | Performed by: ORTHOPAEDIC SURGERY

## 2019-03-11 PROCEDURE — 73700 CT KNEE WITHOUT CONTRAST RIGHT: ICD-10-PCS | Mod: 26,RT,, | Performed by: RADIOLOGY

## 2019-03-11 PROCEDURE — 73700 CT LOWER EXTREMITY W/O DYE: CPT | Mod: 26,RT,, | Performed by: RADIOLOGY

## 2019-03-11 PROCEDURE — 99213 OFFICE O/P EST LOW 20 MIN: CPT | Mod: PBBFAC,25,PO | Performed by: ORTHOPAEDIC SURGERY

## 2019-03-11 PROCEDURE — 99214 PR OFFICE/OUTPT VISIT, EST, LEVL IV, 30-39 MIN: ICD-10-PCS | Mod: S$PBB,,, | Performed by: ORTHOPAEDIC SURGERY

## 2019-03-11 RX ORDER — OXYCODONE AND ACETAMINOPHEN 10; 325 MG/1; MG/1
1 TABLET ORAL EVERY 6 HOURS PRN
Qty: 90 TABLET | Refills: 0 | Status: SHIPPED | OUTPATIENT
Start: 2019-03-11 | End: 2019-03-25 | Stop reason: SDUPTHER

## 2019-03-11 RX ORDER — TRAMADOL HYDROCHLORIDE 50 MG/1
TABLET ORAL
Qty: 40 TABLET | Refills: 0 | Status: SHIPPED | OUTPATIENT
Start: 2019-03-11 | End: 2019-03-25 | Stop reason: SDUPTHER

## 2019-03-11 RX ORDER — CELECOXIB 200 MG/1
200 CAPSULE ORAL DAILY
Qty: 60 CAPSULE | Refills: 0 | Status: SHIPPED | OUTPATIENT
Start: 2019-03-11 | End: 2019-03-25 | Stop reason: SDUPTHER

## 2019-03-11 RX ORDER — ASPIRIN 325 MG
325 TABLET ORAL DAILY
Status: DISCONTINUED | OUTPATIENT
Start: 2019-03-11 | End: 2019-03-27 | Stop reason: HOSPADM

## 2019-03-11 RX ORDER — DOXYCYCLINE 100 MG/1
100 CAPSULE ORAL 2 TIMES DAILY
Qty: 56 CAPSULE | Refills: 0 | Status: SHIPPED | OUTPATIENT
Start: 2019-03-11 | End: 2019-04-08

## 2019-03-11 RX ORDER — PROMETHAZINE HYDROCHLORIDE 25 MG/1
25 TABLET ORAL EVERY 6 HOURS PRN
Qty: 30 TABLET | Refills: 0 | Status: SHIPPED | OUTPATIENT
Start: 2019-03-11 | End: 2019-03-18

## 2019-03-11 RX ORDER — MUPIROCIN 20 MG/G
OINTMENT TOPICAL
Status: CANCELLED | OUTPATIENT
Start: 2019-03-11

## 2019-03-11 NOTE — H&P
Leonid Harris III  is here for a completion of his perioperative paperwork. he  Is scheduled to undergo  Right knee (assuming negative cultures from today and no s/s of infection)  1. R knee anterior tibial tubercle osteotomy  2. R knee VMO advancement   3. Lateral retinacular lengthening  4. Arthroscopic debridement/synovectomy    on 3/19/2019.  He  does need clearance for this procedure. He will fax us the letter from his PCP.     Risks, indications and benefits of the surgical procedure were discussed with the patient. All questions with regard to surgery, rehab, expected return to functional activities, activities of daily living and recreational endeavors were answered to his satisfaction.    Patient was informed and understands the risks of surgery are greater for patients with a current condition or history of heart disease, obesity, clotting disorders, recurrent infections, steroid use, current or past smoking, and factors such as sedentary lifestyle and noncompliance with medications, therapy or follow-up. The degree of the increased risk is hard to estimate with any degree of precision.    Once no other questions were asked, a brief history and physical exam was then performed.    PAST MEDICAL HISTORY:   Past Medical History:   Diagnosis Date    Basal cell carcinoma     Hyperlipidemia     Prostate cancer      s/p prostatectomy,     PVC (premature ventricular contraction)      PAST SURGICAL HISTORY:   Past Surgical History:   Procedure Laterality Date    ARTHROPLASTY-KNEE WAVE ARTHROSURFACE PATELLO-FEMORAL REPLACEMENT Right 2/27/2018    Performed [facility] at [facility] [place]    ARTHROSCOPIC PARTIAL SYNOVECTOMY-KNEE Right 2/27/2018    Performed by [facility] at [facility] [place]    ARTHROSCOPY-KNEE DEBRIDEMENT Right 4/12/2018    Performed by Fran [facility] at [facility] [place]    INCISION AND DRAINAGE (I&D) Right 4/12/2018    Performed by Fran [facility] at [facility] [place]    INJECTION-STEROID; RIGHT KNEE  AMNIOX Right 2/27/2018    [facility] at [facility]    KNEE SURGERY      MENISCECTOMY-MEDIAL ARTHROSCOPIC Right 2/27/2018    Per[facility] at [facility]    PROSTATECTOMY      TONSILLECTOMY       FAMILY HISTORY:   Family History   Problem Relation Age of Onset    Melanoma Neg Hx      SOCIAL HISTORY:   Social History     Socioeconomic History    Marital status:      Spouse name: Not on file    Number of children: Not on file    Years of education: Not on file    Highest education level: Not on file   Social Needs    Financial resource strain: Not on file    Food insecurity - worry: Not on file    Food insecurity - inability: Not on file    Transportation needs - medical: Not on file    Transportation needs - non-medical: Not on file   Occupational History    Not on file   Tobacco Use    Smoking status: Former Smoker     Types: Cigarettes    Smokeless tobacco: Never Used   Substance and Sexual Activity    Alcohol use: Yes     Alcohol/week: 1.2 oz     Types: 2 Glasses of wine per week    Drug use: No    Sexual activity: Not on file   Other Topics Concern    Not on file   Social History Narrative    Not on file       MEDICATIONS:   Current Outpatient Medications:     atorvastatin (LIPITOR) 10 MG tablet, Take 10 mg by mouth once daily., Disp: , Rfl:     bisoprolol-hydrochlorothiazide 2.5-6.25 mg (ZIAC) 2.5-6.25 mg Tab, Take 1 tablet by mouth once daily., Disp: , Rfl:     diltiaZEM (CARDIZEM CD) 180 MG 24 hr capsule, Take 180 mg by mouth once daily., Disp: , Rfl:     doxycycline (VIBRA-TABS) 100 MG tablet, Take 1 tablet (100 mg total) by mouth once daily., Disp: 90 tablet, Rfl: 3    hydrOXYzine HCl (ATARAX) 25 MG tablet, Take 1-2 tabs PO qhs prn itching., Disp: 60 tablet, Rfl: 0    meloxicam (MOBIC) 15 MG tablet, Take 1 tablet (15 mg total) by mouth once daily., Disp: 30 tablet, Rfl: 2    oxyCODONE-acetaminophen (PERCOCET)  mg per tablet, Take 1  tablet by mouth every 6 (six) hours as needed., Disp: 60 tablet, Rfl: 0    pregabalin (LYRICA) 75 MG capsule, Take 75 mg by mouth 2 (two) times daily., Disp: , Rfl:     zolpidem (AMBIEN) 10 mg Tab, , Disp: , Rfl: 2  ALLERGIES:   Review of patient's allergies indicates:   Allergen Reactions    Vancomycin analogues      DRESS       Review of Systems   Constitution: Negative. Negative for chills, fever and night sweats.   HENT: Negative for congestion and headaches.    Eyes: Negative for blurred vision, left vision loss and right vision loss.   Cardiovascular: Negative for chest pain and syncope.   Respiratory: Negative for cough and shortness of breath.    Endocrine: Negative for polydipsia, polyphagia and polyuria.   Hematologic/Lymphatic: Negative for bleeding problem. Does not bruise/bleed easily.   Skin: Negative for dry skin, itching and rash.   Musculoskeletal: Negative for falls and muscle weakness.   Gastrointestinal: Negative for abdominal pain and bowel incontinence.   Genitourinary: Negative for bladder incontinence and nocturia.   Neurological: Negative for disturbances in coordination, loss of balance and seizures.   Psychiatric/Behavioral: Negative for depression. The patient does not have insomnia.    Allergic/Immunologic: Negative for hives and persistent infections.     PHYSICAL EXAM:  GEN: A&Ox3, WD WN NAD  HEENT: WNL  CHEST: CTAB, no W/R/R  HEART: RRR, no M/R/G   ABD: Soft, NT ND, BS x4 QUADS  MS: Refer to previous note for detailed MS exam  NEURO: CN II-XII intact       The surgical consent was then reviewed with the patient, who agreed with all the contents of the consent form and it was signed. he was then given the [facility] surgery packet to bring with him to [facility] for the anesthesia portion of his perioperative paperwork.     Due to the serious nature of total joint infection and the high prevalence of community acquired MRSA, vancomycin will be used perioperatively.     PHYSICAL  THERAPY:  He was also instructed regarding physical therapy and will begin POD # 1-3. He was given a copy of the original prescription to schedule. Another copy of this prescription was also faxed to [facility].    POST OP CARE: instructions were reviewed including care of the wound and dressing after surgery and when he can shower.     PAIN MANAGEMENT: Leonid Harris III was also given a pain management regime, which includes the TENS unit given to him by Moreno Spivey along with the education required for its use. He was also instructed regarding the Polar ice unit that will be in place after surgery and his postoperative pain medications.     PAIN MEDICATION:  Percocet 10/325mg 1 po q 4-6 hours prn pain  Ultram 50 mg one p.o. q.4-6 hours p.r.n. breakthrough pain,   Phenergan 25 mg one p.o. q.4-6 hours p.r.n. nausea and vomiting.  Celebrex 200 mg BID    Patient denies history of seizures.     Patient was instructed to buy and take:  Aspirin 325mg daily x 6 weeks for DVT prophylaxis starting on the evening after surgery.  Patient will also use bilateral TEDs on lower extremities, SCDs during surgery, and early ambulation post-op. If the patient was previously taking 81mg baby aspirin, they were told to not take it will using the above stated aspirin and to restart the 81mg aspirin after completion of the aspirin dose.       Patient was also told to buy over the counter Prilosec medication and take it once daily for GI protection as long as they are taking NSAIDs or Aspirin.    DVT prophylaxis was discussed with the patient today including risk factors for developing DVTs and history of DVTs. The patient was asked if any specific recommendations were given from the doctor/s that did pre-operative surgical clearance.      Patient was asked if they were taking or using OCP pills or devices. If they answered yes, then they were instructed to stop using OCPs at this pre-operative appointment until 2 months post-op to help  prevent DVT development. They understand that there are other forms of birth control that do not involve hormones. They expressed understanding that ignoring/not following this instruction could result in a DVT which could turn into a deadly pulmonary embolism.      The patient was told that narcotic pain medications may make them drowsy and instructions were given to not sign legal documents, drive or operate heavy machinery, cars, or equipment while under the influence of narcotic medications.     As there were no other questions to be asked, he was given my business card along with Fran Shelton MD business card if he has any questions or concerns prior to surger    He will be placed on Doxycycline 100mg BID x 4 weeks for infection prophylaxis post-operatively  He was instructed to apply Bactroban ointment to his bilateral nares prior to surgery, starting immediately, due to history of MRSA.  We will use Clindamycin 900mg Sandra-operatively due to history of Red Man Syndrome with Vancomycin at previous surgery    Physical Therapy post op:   Post operatively, the patient will be placed in a hinged knee immobilizer from 0-45 degrees for two weeks, then 0-60 for two weeks, then 0-90 for another two weeks.   He will perform therapy at [facility]  in [place], [place]

## 2019-03-13 ENCOUNTER — TELEPHONE (OUTPATIENT)
Dept: SPORTS MEDICINE | Facility: CLINIC | Age: 69
End: 2019-03-13

## 2019-03-13 NOTE — TELEPHONE ENCOUNTER
Spoke to the patient about his request. Will fax over Medical Clearance information to [facility].    ----- Message from Yoav Martinez sent at 3/13/2019  1:09 PM CDT -----  Contact: patient   Please call pt at [phone]     Patient would like to know if any particular testing is required for a medical clearance  Patient would like to speak to staff immediately    Thank you

## 2019-03-15 ENCOUNTER — TELEPHONE (OUTPATIENT)
Dept: SPORTS MEDICINE | Facility: CLINIC | Age: 69
End: 2019-03-15

## 2019-03-15 DIAGNOSIS — M23.91 INTERNAL DERANGEMENT OF KNEE JOINT, RIGHT: Primary | ICD-10-CM

## 2019-03-15 RX ORDER — MUPIROCIN 20 MG/G
OINTMENT TOPICAL 3 TIMES DAILY
Qty: 1 TUBE | Refills: 0 | Status: SHIPPED | OUTPATIENT
Start: 2019-03-15 | End: 2021-02-01 | Stop reason: ALTCHOICE

## 2019-03-15 NOTE — TELEPHONE ENCOUNTER
Spoke to pharmacy.    ----- Message from Yoav Martinez sent at 3/15/2019  4:17 PM CDT -----  Contact: [facility]  Please call Lala at [phone] if any questions  Ref# [id number]  Fax# [phone]    mupirocin (BACTROBAN) 2 % ointment is on back order    Can Mupirocin 2% be substituted?    Thank you

## 2019-03-15 NOTE — TELEPHONE ENCOUNTER
Rx Bactroban sent to [facility].    All of the patient's questions were answered and the patient will contact us if they have any questions or concerns in the interim.

## 2019-03-15 NOTE — TELEPHONE ENCOUNTER
Spoke to pharmacy again explained that the medication order is correct.    ----- Message from Yoav Martinez sent at 3/15/2019  4:48 PM CDT -----  Contact: [facility] [place]  Attn Angelique    Please call [facility] at [phone]  Ref# [id number]    Returning your call regarding mupirocin (BACTROBAN) 2 % ointment back order    Thank you

## 2019-03-17 ENCOUNTER — TELEPHONE (OUTPATIENT)
Dept: SPORTS MEDICINE | Facility: CLINIC | Age: 69
End: 2019-03-17

## 2019-03-17 NOTE — TELEPHONE ENCOUNTER
Right knee   1. R knee anterior tibial tubercle osteotomy  2. R knee VMO advancement   3. Lateral retinacular lengthening  4. Arthroscopic debridement/synovectomy  5. Possible revision of the patellofemoral replacement (have the Kahuna Arthrosurface Implant coming )   Need to discuss with the patient on the morning of surgery one final time and revise consent

## 2019-03-18 ENCOUNTER — TELEPHONE (OUTPATIENT)
Dept: SPORTS MEDICINE | Facility: CLINIC | Age: 69
End: 2019-03-18

## 2019-03-18 ENCOUNTER — HOSPITAL ENCOUNTER (OUTPATIENT)
Dept: PREADMISSION TESTING | Facility: OTHER | Age: 69
Discharge: HOME | End: 2019-03-18
Attending: ORTHOPAEDIC SURGERY
Payer: MEDICARE

## 2019-03-18 VITALS
TEMPERATURE: 98 F | HEART RATE: 72 BPM | BODY MASS INDEX: 23.75 KG/M2 | OXYGEN SATURATION: 97 % | SYSTOLIC BLOOD PRESSURE: 120 MMHG | WEIGHT: 195 LBS | HEIGHT: 76 IN | RESPIRATION RATE: 16 BRPM | DIASTOLIC BLOOD PRESSURE: 76 MMHG

## 2019-03-18 RX ORDER — ACETAMINOPHEN 500 MG
1000 TABLET ORAL
Status: CANCELLED | OUTPATIENT
Start: 2019-03-18 | End: 2019-03-18

## 2019-03-18 RX ORDER — ALBUTEROL SULFATE 0.83 MG/ML
2.5 SOLUTION RESPIRATORY (INHALATION)
Status: CANCELLED | OUTPATIENT
Start: 2019-03-18 | End: 2019-03-18

## 2019-03-18 RX ORDER — SCOLOPAMINE TRANSDERMAL SYSTEM 1 MG/1
1 PATCH, EXTENDED RELEASE TRANSDERMAL ONCE
Status: CANCELLED | OUTPATIENT
Start: 2019-03-18 | End: 2019-03-18

## 2019-03-18 RX ORDER — SODIUM CHLORIDE, SODIUM LACTATE, POTASSIUM CHLORIDE, CALCIUM CHLORIDE 600; 310; 30; 20 MG/100ML; MG/100ML; MG/100ML; MG/100ML
INJECTION, SOLUTION INTRAVENOUS CONTINUOUS
Status: CANCELLED | OUTPATIENT
Start: 2019-03-18

## 2019-03-18 RX ORDER — FAMOTIDINE 20 MG/1
20 TABLET, FILM COATED ORAL
Status: CANCELLED | OUTPATIENT
Start: 2019-03-18 | End: 2019-03-18

## 2019-03-18 NOTE — DISCHARGE INSTRUCTIONS
PRE-ADMIT TESTING -  [phone]    [address]  [place] [facility]          Your surgery has been scheduled at [facility]. We are pleased to have the opportunity to serve you. For Further Information please call [phone].    On the day of surgery please report to the Information Desk on the 1st floor.    · CONTACT YOUR PHYSICIAN'S OFFICE THE DAY PRIOR TO YOUR SURGERY TO OBTAIN YOUR ARRIVAL TIME.     · The evening before surgery do not eat anything after 9 p.m. ( this includes hard candy, chewing gum and mints).  You may only have GATORADE, POWERADE AND WATER  from 9 p.m. until you leave your home.   DO NOT DRINK ANY LIQUIDS ON THE WAY TO THE HOSPITAL.      SPECIAL MEDICATION INSTRUCTIONS: TAKE medications checked off by the Anesthesiologist on your Medication List.    Angiogram Patients: Take medications as instructed by your physician, including aspirin.     Surgery Patients:    If you take ASPIRIN - Your PHYSICIAN/SURGEON will need to inform you IF/OR when you need to stop taking aspirin prior to your surgery.     Do Not take any medications containing IBUPROFEN.  Do Not Wear any make-up or dark nail polish   (especially eye make-up) to surgery. If you come to surgery with makeup on you will be required to remove the makeup or nail polish.    Do not shave your surgical area at least 5 days prior to your surgery. The surgical prep will be performed at the hospital according to Infection Control regulations.    Leave all valuables at home.   Do Not wear any jewelry or watches, including any metal in body piercings. Jewelry must be removed prior to coming to the hospital.  There is a possibility that rings that are unable to be removed may be cut off if they are on the surgical extremity.    Contact Lens must be removed before surgery. Either do not wear the contact lens or bring a case and solution for storage.  Please bring a container for eyeglasses or dentures as required.  Bring  any paperwork your physician has provided, such as consent forms,  history and physicals, doctor's orders, etc.   Bring comfortable clothes that are loose fitting to wear upon discharge. Take into consideration the type of surgery being performed.  Maintain your diet as advised per your physician the day prior to surgery.      Adequate rest the night before surgery is advised.   Park in the Parking lot behind the hospital or in the [facility] across the street from the parking lot. Parking is complimentary.  If you will be discharged the same day as your procedure, please arrange for a responsible adult to drive you home or to accompany you if traveling by taxi.   YOU WILL NOT BE PERMITTED TO DRIVE OR TO LEAVE THE HOSPITAL ALONE AFTER SURGERY.   It is strongly recommended that you arrange for someone to remain with you for the first 24 hrs following your surgery.       Thank you for your cooperation.  The Staff of [facility].        Bathing Instructions                                                                 Please shower the evening before and morning of your procedure with    ANTIBACTERIAL SOAP. ( DIAL, etc )  Concentrate on the surgical area   for at least 3 minutes and rinse completely. Dry off as usual.   Do not use any deodorant, powder, body lotions, perfume, after shave or    cologne.

## 2019-03-19 ENCOUNTER — HOSPITAL ENCOUNTER (OUTPATIENT)
Facility: OTHER | Age: 69
Discharge: HOME | End: 2019-03-19
Attending: ORTHOPAEDIC SURGERY | Admitting: ORTHOPAEDIC SURGERY
Payer: MEDICARE

## 2019-03-19 VITALS
HEIGHT: 76 IN | WEIGHT: 195 LBS | TEMPERATURE: 98 F | RESPIRATION RATE: 16 BRPM | BODY MASS INDEX: 23.75 KG/M2 | SYSTOLIC BLOOD PRESSURE: 127 MMHG | HEART RATE: 65 BPM | DIASTOLIC BLOOD PRESSURE: 74 MMHG | OXYGEN SATURATION: 99 %

## 2019-03-19 DIAGNOSIS — A49.02 MRSA INFECTION: ICD-10-CM

## 2019-03-19 DIAGNOSIS — B35.4 RINGWORM, BODY: Primary | ICD-10-CM

## 2019-03-19 DIAGNOSIS — B96.89 BACTERIAL SKIN INFECTION: ICD-10-CM

## 2019-03-19 DIAGNOSIS — T84.84XA PAINFUL ORTHOPAEDIC HARDWARE: ICD-10-CM

## 2019-03-19 DIAGNOSIS — L08.9 BACTERIAL SKIN INFECTION: ICD-10-CM

## 2019-03-19 DIAGNOSIS — M23.51: ICD-10-CM

## 2019-03-19 DIAGNOSIS — M23.91 INTERNAL DERANGEMENT OF KNEE JOINT, RIGHT: ICD-10-CM

## 2019-03-19 DIAGNOSIS — M23.91 INTERNAL DERANGEMENT OF RIGHT KNEE: Primary | ICD-10-CM

## 2019-03-19 DIAGNOSIS — B35.4 RINGWORM, BODY: ICD-10-CM

## 2019-03-19 PROCEDURE — 25000003 PHARM REV CODE 250: Performed by: ANESTHESIOLOGY

## 2019-03-19 PROCEDURE — 99499 NO LOS: ICD-10-PCS | Mod: ,,, | Performed by: ORTHOPAEDIC SURGERY

## 2019-03-19 PROCEDURE — 99499 UNLISTED E&M SERVICE: CPT | Mod: ,,, | Performed by: ORTHOPAEDIC SURGERY

## 2019-03-19 RX ORDER — ALBUTEROL SULFATE 0.83 MG/ML
2.5 SOLUTION RESPIRATORY (INHALATION)
Status: DISCONTINUED | OUTPATIENT
Start: 2019-03-19 | End: 2019-03-19 | Stop reason: HOSPADM

## 2019-03-19 RX ORDER — CLOTRIMAZOLE 1 %
CREAM (GRAM) TOPICAL 2 TIMES DAILY
Qty: 1 TUBE | Refills: 0 | Status: SHIPPED | OUTPATIENT
Start: 2019-03-19 | End: 2021-08-11

## 2019-03-19 RX ORDER — SODIUM CHLORIDE, SODIUM LACTATE, POTASSIUM CHLORIDE, CALCIUM CHLORIDE 600; 310; 30; 20 MG/100ML; MG/100ML; MG/100ML; MG/100ML
INJECTION, SOLUTION INTRAVENOUS CONTINUOUS
Status: DISCONTINUED | OUTPATIENT
Start: 2019-03-19 | End: 2019-03-19 | Stop reason: HOSPADM

## 2019-03-19 RX ORDER — MUPIROCIN 20 MG/G
OINTMENT TOPICAL
Status: DISCONTINUED | OUTPATIENT
Start: 2019-03-19 | End: 2019-03-19 | Stop reason: HOSPADM

## 2019-03-19 RX ORDER — SCOLOPAMINE TRANSDERMAL SYSTEM 1 MG/1
1 PATCH, EXTENDED RELEASE TRANSDERMAL ONCE
Status: COMPLETED | OUTPATIENT
Start: 2019-03-19 | End: 2019-03-19

## 2019-03-19 RX ORDER — ACETAMINOPHEN 500 MG
1000 TABLET ORAL
Status: COMPLETED | OUTPATIENT
Start: 2019-03-19 | End: 2019-03-19

## 2019-03-19 RX ORDER — SULFAMETHOXAZOLE AND TRIMETHOPRIM 800; 160 MG/1; MG/1
1 TABLET ORAL 2 TIMES DAILY
Qty: 20 TABLET | Refills: 0 | Status: SHIPPED | OUTPATIENT
Start: 2019-03-19 | End: 2021-02-01 | Stop reason: ALTCHOICE

## 2019-03-19 RX ORDER — CEFAZOLIN SODIUM 1 G/3ML
2 INJECTION, POWDER, FOR SOLUTION INTRAMUSCULAR; INTRAVENOUS
Status: DISCONTINUED | OUTPATIENT
Start: 2019-03-19 | End: 2019-03-19 | Stop reason: HOSPADM

## 2019-03-19 RX ORDER — FAMOTIDINE 20 MG/1
20 TABLET, FILM COATED ORAL
Status: COMPLETED | OUTPATIENT
Start: 2019-03-19 | End: 2019-03-19

## 2019-03-19 RX ORDER — SULFAMETHOXAZOLE AND TRIMETHOPRIM 800; 160 MG/1; MG/1
1 TABLET ORAL 2 TIMES DAILY
Qty: 20 TABLET | Refills: 0 | Status: SHIPPED | OUTPATIENT
Start: 2019-03-19 | End: 2019-03-19 | Stop reason: SDUPTHER

## 2019-03-19 RX ORDER — CLINDAMYCIN PHOSPHATE 900 MG/50ML
900 INJECTION, SOLUTION INTRAVENOUS
Status: DISCONTINUED | OUTPATIENT
Start: 2019-03-19 | End: 2019-03-19 | Stop reason: HOSPADM

## 2019-03-19 RX ADMIN — ACETAMINOPHEN 1000 MG: 500 TABLET ORAL at 07:03

## 2019-03-19 RX ADMIN — FAMOTIDINE 20 MG: 20 TABLET, FILM COATED ORAL at 07:03

## 2019-03-19 RX ADMIN — SCOPALAMINE 1 PATCH: 1 PATCH, EXTENDED RELEASE TRANSDERMAL at 07:03

## 2019-03-19 NOTE — H&P
[facility]  History & Physical  Orthopedics    SUBJECTIVE:     Chief Complaint/Reason for Admission: right knee pain and effusions.    History of Present Illness:    Patient is a 69 y.o. male who presents with knee pain and noted skin lesion.    Patient Active Problem List    Diagnosis Date Noted    DRESS syndrome 05/12/2018    MRSA infection 05/12/2018    T wave inversion in EKG 05/12/2018    Hyperlipidemia 05/12/2018    HCAP (healthcare-associated pneumonia) 05/11/2018    Internal derangement of left knee 04/30/2018    Right knee pain 02/27/2018    Knee pain 10/16/2017    Internal derangement of knee joint, right 10/16/2017       No medications prior to admission.       Review of patient's allergies indicates:   Allergen Reactions    Vancomycin analogues      DRESS       Past Medical History:   Diagnosis Date    Basal cell carcinoma     COPD (chronic obstructive pulmonary disease)     Hyperlipidemia     MRSA (methicillin resistant staph aureus) culture positive     Prostate cancer      s/p prostatectomy,     PVC (premature ventricular contraction)      Past Surgical History:   Procedure Laterality Date    ARTHROPLASTY-KNEE WAVE ARTHROSURFACE PATELLO-FEMORAL REPLACEMENT Right 2/27/2018    Performed by [facility] at [facility]    ARTHROSCOPIC PARTIAL SYNOVECTOMY-KNEE Right 2/27/2018    Performed by [facility] at [facility]    ARTHROSCOPY-KNEE DEBRIDEMENT Right 4/12/2018    Performed by [facility] at [facility]    INCISION AND DRAINAGE (I&D) Right 4/12/2018    Performed by [facility] at [facility]    INJECTION-STEROID; RIGHT KNEE AMNIOX Right 2/27/2018    Performed by [facility] at [facility]    KNEE SURGERY      MENISCECTOMY-MEDIAL ARTHROSCOPIC Right 2/27/2018    [facility]formed [facility] at [facility]    PROSTATECTOMY      TONSILLECTOMY       Family History   Problem Relation Age of Onset    Melanoma Neg Hx      Social History     Tobacco Use  "   Smoking status: Former Smoker     Types: Cigarettes    Smokeless tobacco: Never Used   Substance Use Topics    Alcohol use: Yes     Alcohol/week: 1.2 oz     Types: 2 Glasses of wine per week        Review of Systems:  Pertinent items are noted in HPI.    OBJECTIVE:     Vital signs in last 24 hours:  Temp:  [98.1 °F (36.7 °C)] 98.1 °F (36.7 °C)  Pulse:  [65] 65  Resp:  [16] 16  SpO2:  [99 %] 99 %  BP: (127)/(74) 127/74    /74 (BP Location: Right arm, Patient Position: Lying)   Pulse 65   Temp 98.1 °F (36.7 °C) (Oral)   Resp 16   Ht 6' 4" (1.93 m)   Wt 88.5 kg (194 lb 16 oz)   SpO2 99%   BMI 23.74 kg/m²     General Appearance:    Alert, cooperative, no distress, appears stated age   Head:    Normocephalic, without obvious abnormality, atraumatic   Eyes:    PERRL, conjunctiva/corneas clear, EOM's intact, fundi     benign, both eyes        Ears:    Normal TM's and external ear canals, both ears   Nose:   Nares normal, septum midline, mucosa normal, no drainage    or sinus tenderness   Throat:   Lips, mucosa, and tongue normal; teeth and gums normal   Neck:   Supple, symmetrical, trachea midline, no adenopathy;        thyroid:  No enlargement/tenderness/nodules; no carotid    bruit or JVD   Back:     Symmetric, no curvature, ROM normal, no CVA tenderness   Lungs:     Clear to auscultation bilaterally, respirations unlabored   Chest wall:    No tenderness or deformity   Heart:    Regular rate and rhythm, S1 and S2 normal, no murmur, rub   or gallop   Abdomen:     Soft, non-tender, bowel sounds active all four quadrants,     no masses, no organomegaly   Genitalia:    Normal male without lesion, discharge or tenderness   Rectal:    Normal tone, normal prostate, no masses or tenderness;    guaiac negative stool   Extremities:   Extremities normal, atraumatic, no cyanosis or edema   Pulses:   2+ and symmetric all extremities   Skin:   Skin color, texture, turgor normal, no rashes or lesions   Lymph nodes:   " Cervical, supraclavicular, and axillary nodes normal   Neurologic:   CNII-XII intact. Normal strength, sensation and reflexes       throughout       Imaging Review:  Preoperative bilateral knee series    ASSESSMENT/PLAN:     Right knee internal derangement    Case cancelled due to skin lesions.

## 2019-03-19 NOTE — PROGRESS NOTES
Case cancelled due to history of MRSA infection and 24 hour lesion with redness and itching developing at the proximal medial operative knee region. There is also an area in the left axilla consistent with possible ringworm. Bactrim and clotrimazole ordered; back for preoperative visit next Monday and surgery moved to next Tuesday.

## 2019-03-19 NOTE — OP NOTE
[facility]  Surgery Department  Operative Note    SUMMARY     No procedure was performed due to skin lesions noted preoperatively    Surgeon(s) and Role:     * Fran Shelton MD - Primary    Assisting Surgeon: None    Pre-Operative Diagnosis: Internal derangement of right knee [M23.91]  Painful orthopaedic hardware [T84.84XA]  Chronic instability of right knee [M23.51]    Post-Operative Diagnosis: Post-Op Diagnosis Codes:     * Internal derangement of right knee [M23.91]     * Painful orthopaedic hardware [T84.84XA]     * Chronic instability of right knee [M23.51]    None    Specimens:   Specimen (12h ago, onward)    None                  Condition: Good    Disposition: PACU - hemodynamically stable.    Attestation: No procedure performed.      Discharged home on bactrim and antifungal medication.

## 2019-03-25 ENCOUNTER — ANESTHESIA EVENT (OUTPATIENT)
Dept: SURGERY | Facility: OTHER | Age: 69
End: 2019-03-25
Payer: MEDICARE

## 2019-03-25 ENCOUNTER — OFFICE VISIT (OUTPATIENT)
Dept: SPORTS MEDICINE | Facility: CLINIC | Age: 69
End: 2019-03-25
Payer: MEDICARE

## 2019-03-25 VITALS
SYSTOLIC BLOOD PRESSURE: 106 MMHG | BODY MASS INDEX: 23.75 KG/M2 | WEIGHT: 195 LBS | HEIGHT: 76 IN | DIASTOLIC BLOOD PRESSURE: 67 MMHG | HEART RATE: 67 BPM

## 2019-03-25 DIAGNOSIS — M23.91 INTERNAL DERANGEMENT OF RIGHT KNEE: Primary | ICD-10-CM

## 2019-03-25 DIAGNOSIS — M23.91 INTERNAL DERANGEMENT OF KNEE JOINT, RIGHT: ICD-10-CM

## 2019-03-25 DIAGNOSIS — G89.29 CHRONIC PAIN OF RIGHT KNEE: ICD-10-CM

## 2019-03-25 DIAGNOSIS — M25.561 CHRONIC PAIN OF RIGHT KNEE: ICD-10-CM

## 2019-03-25 DIAGNOSIS — Z98.890 S/P RIGHT KNEE SURGERY: ICD-10-CM

## 2019-03-25 DIAGNOSIS — A49.02 MRSA INFECTION: ICD-10-CM

## 2019-03-25 PROCEDURE — 99999 PR PBB SHADOW E&M-EST. PATIENT-LVL III: CPT | Mod: PBBFAC,,, | Performed by: ORTHOPAEDIC SURGERY

## 2019-03-25 PROCEDURE — 99499 UNLISTED E&M SERVICE: CPT | Mod: S$PBB,,, | Performed by: ORTHOPAEDIC SURGERY

## 2019-03-25 PROCEDURE — 99213 OFFICE O/P EST LOW 20 MIN: CPT | Mod: PBBFAC,PO | Performed by: ORTHOPAEDIC SURGERY

## 2019-03-25 PROCEDURE — 99999 PR PBB SHADOW E&M-EST. PATIENT-LVL III: ICD-10-PCS | Mod: PBBFAC,,, | Performed by: ORTHOPAEDIC SURGERY

## 2019-03-25 PROCEDURE — 99499 NO LOS: ICD-10-PCS | Mod: S$PBB,,, | Performed by: ORTHOPAEDIC SURGERY

## 2019-03-25 RX ORDER — ASPIRIN 325 MG
325 TABLET ORAL DAILY
Qty: 42 TABLET | Refills: 0 | Status: SHIPPED | OUTPATIENT
Start: 2019-03-25 | End: 2021-02-01 | Stop reason: ALTCHOICE

## 2019-03-25 RX ORDER — CELECOXIB 200 MG/1
200 CAPSULE ORAL DAILY
Qty: 60 CAPSULE | Refills: 0 | Status: SHIPPED | OUTPATIENT
Start: 2019-03-25 | End: 2019-04-10 | Stop reason: SDUPTHER

## 2019-03-25 RX ORDER — PROMETHAZINE HYDROCHLORIDE 25 MG/1
25 TABLET ORAL EVERY 6 HOURS PRN
Qty: 20 TABLET | Refills: 0 | Status: SHIPPED | OUTPATIENT
Start: 2019-03-25 | End: 2019-04-01

## 2019-03-25 RX ORDER — OXYCODONE AND ACETAMINOPHEN 10; 325 MG/1; MG/1
1 TABLET ORAL EVERY 6 HOURS PRN
Qty: 42 TABLET | Refills: 0 | Status: SHIPPED | OUTPATIENT
Start: 2019-03-25 | End: 2021-02-01 | Stop reason: ALTCHOICE

## 2019-03-25 RX ORDER — SODIUM CHLORIDE 9 MG/ML
INJECTION, SOLUTION INTRAVENOUS CONTINUOUS
Status: CANCELLED | OUTPATIENT
Start: 2019-03-25

## 2019-03-25 RX ORDER — TRAMADOL HYDROCHLORIDE 50 MG/1
TABLET ORAL
Qty: 40 TABLET | Refills: 0 | Status: SHIPPED | OUTPATIENT
Start: 2019-03-25 | End: 2021-02-01 | Stop reason: ALTCHOICE

## 2019-03-25 NOTE — H&P
Leonid Harris III  is here for a completion of his perioperative paperwork. he  Is scheduled to undergo  Right knee   1. R knee anterior tibial tubercle osteotomy  2. R knee VMO advancement   3. Lateral retinacular lengthening  4. Arthroscopic debridement/synovectomy  5. Possible revision patellofemoral arthroplasty     On 3/26/2019.  He has received clearance for this procedure.   He was schedule for this procedure, but the procedure was cancelled due to a fresh wound near the incision site.      Risks, indications and benefits of the surgical procedure were discussed with the patient. All questions with regard to surgery, rehab, expected return to functional activities, activities of daily living and recreational endeavors were answered to his satisfaction.     Patient was informed and understands the risks of surgery are greater for patients with a current condition or history of heart disease, obesity, clotting disorders, recurrent infections, steroid use, current or past smoking, and factors such as sedentary lifestyle and noncompliance with medications, therapy or follow-up. The degree of the increased risk is hard to estimate with any degree of precision.     Once no other questions were asked, a brief history and physical exam was then performed.     PAST MEDICAL HISTORY:        Past Medical History:   Diagnosis Date    Basal cell carcinoma      Hyperlipidemia      Prostate cancer        s/p prostatectomy,     PVC (premature ventricular contraction)        PAST SURGICAL HISTORY:         Past Surgical History:   Procedure Laterality Date    ARTHROPLASTY-KNEE WAVE ARTHROSURFACE PATELLO-FEMORAL REPLACEMENT Right 2/27/2018     Per[facility] at [facility]    ARTHROSCOPIC PARTIAL SYNOVECTOMY-KNEE Right 2/27/2018     Performed by [facility] at [facility]    ARTHROSCOPY-KNEE DEBRIDEMENT Right 4/12/2018     Per[facility] by [facility] at [facility]    INCISION AND DRAINAGE (I&D) Right  4/12/2018     Per[facility] at [facility] [place]    INJECTION-STEROID; RIGHT KNEE AMNIOX Right 2/27/2018     Per[facility] at [facility] [place]    KNEE SURGERY        MENISCECTOMY-MEDIAL ARTHROSCOPIC Right 2/27/2018     [facility] at [facility] [place]    PROSTATECTOMY        TONSILLECTOMY          FAMILY HISTORY:         Family History   Problem Relation Age of Onset    Melanoma Neg Hx        SOCIAL HISTORY:   Social History               Socioeconomic History    Marital status:        Spouse name: Not on file    Number of children: Not on file    Years of education: Not on file    Highest education level: Not on file   Social Needs    Financial resource strain: Not on file    Food insecurity - worry: Not on file    Food insecurity - inability: Not on file    Transportation needs - medical: Not on file    Transportation needs - non-medical: Not on file   Occupational History    Not on file   Tobacco Use    Smoking status: Former Smoker       Types: Cigarettes    Smokeless tobacco: Never Used   Substance and Sexual Activity    Alcohol use: Yes       Alcohol/week: 1.2 oz       Types: 2 Glasses of wine per week    Drug use: No    Sexual activity: Not on file   Other Topics Concern    Not on file   Social History Narrative    Not on file            MEDICATIONS:   Current Outpatient Medications:     atorvastatin (LIPITOR) 10 MG tablet, Take 10 mg by mouth once daily., Disp: , Rfl:     bisoprolol-hydrochlorothiazide 2.5-6.25 mg (ZIAC) 2.5-6.25 mg Tab, Take 1 tablet by mouth once daily., Disp: , Rfl:     diltiaZEM (CARDIZEM CD) 180 MG 24 hr capsule, Take 180 mg by mouth once daily., Disp: , Rfl:     doxycycline (VIBRA-TABS) 100 MG tablet, Take 1 tablet (100 mg total) by mouth once daily., Disp: 90 tablet, Rfl: 3    hydrOXYzine HCl (ATARAX) 25 MG tablet, Take 1-2 tabs PO qhs prn itching., Disp: 60 tablet, Rfl: 0    meloxicam (MOBIC) 15 MG tablet, Take 1 tablet  (15 mg total) by mouth once daily., Disp: 30 tablet, Rfl: 2    oxyCODONE-acetaminophen (PERCOCET)  mg per tablet, Take 1 tablet by mouth every 6 (six) hours as needed., Disp: 60 tablet, Rfl: 0    pregabalin (LYRICA) 75 MG capsule, Take 75 mg by mouth 2 (two) times daily., Disp: , Rfl:     zolpidem (AMBIEN) 10 mg Tab, , Disp: , Rfl: 2  ALLERGIES:         Review of patient's allergies indicates:   Allergen Reactions    Vancomycin analogues         DRESS         Review of Systems   Constitution: Negative. Negative for chills, fever and night sweats.   HENT: Negative for congestion and headaches.    Eyes: Negative for blurred vision, left vision loss and right vision loss.   Cardiovascular: Negative for chest pain and syncope.   Respiratory: Negative for cough and shortness of breath.    Endocrine: Negative for polydipsia, polyphagia and polyuria.   Hematologic/Lymphatic: Negative for bleeding problem. Does not bruise/bleed easily.   Skin: Negative for dry skin, itching and rash.   Musculoskeletal: Negative for falls and muscle weakness.   Gastrointestinal: Negative for abdominal pain and bowel incontinence.   Genitourinary: Negative for bladder incontinence and nocturia.   Neurological: Negative for disturbances in coordination, loss of balance and seizures.   Psychiatric/Behavioral: Negative for depression. The patient does not have insomnia.    Allergic/Immunologic: Negative for hives and persistent infections.      PHYSICAL EXAM:  GEN: A&Ox3, WD WN NAD  HEENT: WNL  CHEST: CTAB, no W/R/R  HEART: RRR, no M/R/G   ABD: Soft, NT ND, BS x4 QUADS  MS: Refer to previous note for detailed MS exam  NEURO: CN II-XII intact        The surgical consent was then reviewed with the patient, who agreed with all the contents of the consent form and it was signed. he was then given the [facility] [place] surgery packet to bring with him to [facility] for the anesthesia portion of his perioperative paperwork.      Due to the serious nature  of total joint infection and the high prevalence of community acquired MRSA, vancomycin will be used perioperatively.      PHYSICAL THERAPY:  He was also instructed regarding physical therapy and will begin POD # 1-3. He was given a copy of the original prescription to schedule. Another copy of this prescription was also faxed to [facility].     POST OP CARE: instructions were reviewed including care of the wound and dressing after surgery and when he can shower.      PAIN MANAGEMENT: Leonid Harris III was also given a pain management regime, which includes the TENS unit given to him by Moreno Spivey along with the education required for its use. He was also instructed regarding the Polar ice unit that will be in place after surgery and his postoperative pain medications.      PAIN MEDICATION:  Percocet 10/325mg 1 po q 4-6 hours prn pain  Ultram 50 mg one p.o. q.4-6 hours p.r.n. breakthrough pain,   Phenergan 25 mg one p.o. q.4-6 hours p.r.n. nausea and vomiting.  Celebrex 200 mg BID     Patient denies history of seizures.      Patient was instructed to buy and take:  Aspirin 325mg daily x 6 weeks for DVT prophylaxis starting on the evening after surgery.  Patient will also use bilateral TEDs on lower extremities, SCDs during surgery, and early ambulation post-op. If the patient was previously taking 81mg baby aspirin, they were told to not take it will using the above stated aspirin and to restart the 81mg aspirin after completion of the aspirin dose.       Patient was also told to buy over the counter Prilosec medication and take it once daily for GI protection as long as they are taking NSAIDs or Aspirin.     DVT prophylaxis was discussed with the patient today including risk factors for developing DVTs and history of DVTs. The patient was asked if any specific recommendations were given from the doctor/s that did pre-operative surgical clearance.      Patient was asked if they were taking or using OCP pills or  devices. If they answered yes, then they were instructed to stop using OCPs at this pre-operative appointment until 2 months post-op to help prevent DVT development. They understand that there are other forms of birth control that do not involve hormones. They expressed understanding that ignoring/not following this instruction could result in a DVT which could turn into a deadly pulmonary embolism.      The patient was told that narcotic pain medications may make them drowsy and instructions were given to not sign legal documents, drive or operate heavy machinery, cars, or equipment while under the influence of narcotic medications.      As there were no other questions to be asked, he was given my business card along with Fran Shelton MD business card if he has any questions or concerns prior to surger     He will be placed on Doxycycline 100mg BID x 4 weeks for infection prophylaxis post-operatively  He was instructed to apply Bactroban ointment to his bilateral nares prior to surgery, starting immediately, due to history of MRSA.  We will use Clindamycin 900mg Sandra-operatively due to history of Red Man Syndrome with Vancomycin at previous surgery     Physical Therapy post op:   Post operatively, the patient will be placed in a hinged knee immobilizer from 0-45 degrees for two weeks, then 0-60 for two weeks, then 0-90 for another two weeks.   He will perform therapy at [facility]  in [place], [place]

## 2019-03-25 NOTE — H&P (VIEW-ONLY)
Leonid Harris III  is here for a completion of his perioperative paperwork. he  Is scheduled to undergo  Right knee   1. R knee anterior tibial tubercle osteotomy  2. R knee VMO advancement   3. Lateral retinacular lengthening  4. Arthroscopic debridement/synovectomy  5. Possible revision patellofemoral arthroplasty     On 3/26/2019.  He has received clearance for this procedure.   He was schedule for this procedure, but the procedure was cancelled due to a fresh wound near the incision site.      Risks, indications and benefits of the surgical procedure were discussed with the patient. All questions with regard to surgery, rehab, expected return to functional activities, activities of daily living and recreational endeavors were answered to his satisfaction.     Patient was informed and understands the risks of surgery are greater for patients with a current condition or history of heart disease, obesity, clotting disorders, recurrent infections, steroid use, current or past smoking, and factors such as sedentary lifestyle and noncompliance with medications, therapy or follow-up. The degree of the increased risk is hard to estimate with any degree of precision.     Once no other questions were asked, a brief history and physical exam was then performed.     PAST MEDICAL HISTORY:        Past Medical History:   Diagnosis Date    Basal cell carcinoma      Hyperlipidemia      Prostate cancer        s/p prostatectomy,     PVC (premature ventricular contraction)        PAST SURGICAL HISTORY:         Past Surgical History:   Procedure Laterality Date    ARTHROPLASTY-KNEE WAVE ARTHROSURFACE PATELLO-FEMORAL REPLACEMENT Right 2/27/2018     Performed by [facility] at [facility] OR    ARTHROSCOPIC PARTIAL SYNOVECTOMY-KNEE Right 2/27/2018     [facility] at [facility]    ARTHROSCOPY-KNEE DEBRIDEMENT Right 4/12/2018     [facility] at [facility] OR    INCISION AND DRAINAGE (I&D) Right  4/12/2018     [facility]    INJECTION-STEROID; RIGHT KNEE AMNIOX Right 2/27/2018     [facility]    KNEE SURGERY        MENISCECTOMY-MEDIAL ARTHROSCOPIC Right 2/27/2018     [facility] at [facility]    PROSTATECTOMY        TONSILLECTOMY          FAMILY HISTORY:         Family History   Problem Relation Age of Onset    Melanoma Neg Hx        SOCIAL HISTORY:   Social History               Socioeconomic History    Marital status:        Spouse name: Not on file    Number of children: Not on file    Years of education: Not on file    Highest education level: Not on file   Social Needs    Financial resource strain: Not on file    Food insecurity - worry: Not on file    Food insecurity - inability: Not on file    Transportation needs - medical: Not on file    Transportation needs - non-medical: Not on file   Occupational History    Not on file   Tobacco Use    Smoking status: Former Smoker       Types: Cigarettes    Smokeless tobacco: Never Used   Substance and Sexual Activity    Alcohol use: Yes       Alcohol/week: 1.2 oz       Types: 2 Glasses of wine per week    Drug use: No    Sexual activity: Not on file   Other Topics Concern    Not on file   Social History Narrative    Not on file            MEDICATIONS:   Current Outpatient Medications:     atorvastatin (LIPITOR) 10 MG tablet, Take 10 mg by mouth once daily., Disp: , Rfl:     bisoprolol-hydrochlorothiazide 2.5-6.25 mg (ZIAC) 2.5-6.25 mg Tab, Take 1 tablet by mouth once daily., Disp: , Rfl:     diltiaZEM (CARDIZEM CD) 180 MG 24 hr capsule, Take 180 mg by mouth once daily., Disp: , Rfl:     doxycycline (VIBRA-TABS) 100 MG tablet, Take 1 tablet (100 mg total) by mouth once daily., Disp: 90 tablet, Rfl: 3    hydrOXYzine HCl (ATARAX) 25 MG tablet, Take 1-2 tabs PO qhs prn itching., Disp: 60 tablet, Rfl: 0    meloxicam (MOBIC) 15 MG tablet, Take 1 tablet  (15 mg total) by mouth once daily., Disp: 30 tablet, Rfl: 2    oxyCODONE-acetaminophen (PERCOCET)  mg per tablet, Take 1 tablet by mouth every 6 (six) hours as needed., Disp: 60 tablet, Rfl: 0    pregabalin (LYRICA) 75 MG capsule, Take 75 mg by mouth 2 (two) times daily., Disp: , Rfl:     zolpidem (AMBIEN) 10 mg Tab, , Disp: , Rfl: 2  ALLERGIES:         Review of patient's allergies indicates:   Allergen Reactions    Vancomycin analogues         DRESS         Review of Systems   Constitution: Negative. Negative for chills, fever and night sweats.   HENT: Negative for congestion and headaches.    Eyes: Negative for blurred vision, left vision loss and right vision loss.   Cardiovascular: Negative for chest pain and syncope.   Respiratory: Negative for cough and shortness of breath.    Endocrine: Negative for polydipsia, polyphagia and polyuria.   Hematologic/Lymphatic: Negative for bleeding problem. Does not bruise/bleed easily.   Skin: Negative for dry skin, itching and rash.   Musculoskeletal: Negative for falls and muscle weakness.   Gastrointestinal: Negative for abdominal pain and bowel incontinence.   Genitourinary: Negative for bladder incontinence and nocturia.   Neurological: Negative for disturbances in coordination, loss of balance and seizures.   Psychiatric/Behavioral: Negative for depression. The patient does not have insomnia.    Allergic/Immunologic: Negative for hives and persistent infections.      PHYSICAL EXAM:  GEN: A&Ox3, WD WN NAD  HEENT: WNL  CHEST: CTAB, no W/R/R  HEART: RRR, no M/R/G   ABD: Soft, NT ND, BS x4 QUADS  MS: Refer to previous note for detailed MS exam  NEURO: CN II-XII intact        The surgical consent was then reviewed with the patient, who agreed with all the contents of the consent form and it was signed. he was then given the [facility] surgery packet to bring with him to [facility] for the anesthesia portion of his perioperative paperwork.      Due to the serious nature  of total joint infection and the high prevalence of community acquired MRSA, vancomycin will be used perioperatively.      PHYSICAL THERAPY:  He was also instructed regarding physical therapy and will begin POD # 1-3. He was given a copy of the original prescription to schedule. Another copy of this prescription was also faxed to [facility].     POST OP CARE: instructions were reviewed including care of the wound and dressing after surgery and when he can shower.      PAIN MANAGEMENT: Leonid Harris III was also given a pain management regime, which includes the TENS unit given to him by Moreno Spivey along with the education required for its use. He was also instructed regarding the Polar ice unit that will be in place after surgery and his postoperative pain medications.      PAIN MEDICATION:  Percocet 10/325mg 1 po q 4-6 hours prn pain  Ultram 50 mg one p.o. q.4-6 hours p.r.n. breakthrough pain,   Phenergan 25 mg one p.o. q.4-6 hours p.r.n. nausea and vomiting.  Celebrex 200 mg BID     Patient denies history of seizures.      Patient was instructed to buy and take:  Aspirin 325mg daily x 6 weeks for DVT prophylaxis starting on the evening after surgery.  Patient will also use bilateral TEDs on lower extremities, SCDs during surgery, and early ambulation post-op. If the patient was previously taking 81mg baby aspirin, they were told to not take it will using the above stated aspirin and to restart the 81mg aspirin after completion of the aspirin dose.       Patient was also told to buy over the counter Prilosec medication and take it once daily for GI protection as long as they are taking NSAIDs or Aspirin.     DVT prophylaxis was discussed with the patient today including risk factors for developing DVTs and history of DVTs. The patient was asked if any specific recommendations were given from the doctor/s that did pre-operative surgical clearance.      Patient was asked if they were taking or using OCP pills or  devices. If they answered yes, then they were instructed to stop using OCPs at this pre-operative appointment until 2 months post-op to help prevent DVT development. They understand that there are other forms of birth control that do not involve hormones. They expressed understanding that ignoring/not following this instruction could result in a DVT which could turn into a deadly pulmonary embolism.      The patient was told that narcotic pain medications may make them drowsy and instructions were given to not sign legal documents, drive or operate heavy machinery, cars, or equipment while under the influence of narcotic medications.      As there were no other questions to be asked, he was given my business card along with Fran Shelton MD business card if he has any questions or concerns prior to surger     He will be placed on Doxycycline 100mg BID x 4 weeks for infection prophylaxis post-operatively  He was instructed to apply Bactroban ointment to his bilateral nares prior to surgery, starting immediately, due to history of MRSA.  We will use Clindamycin 900mg Sandra-operatively due to history of Red Man Syndrome with Vancomycin at previous surgery     Physical Therapy post op:   Post operatively, the patient will be placed in a hinged knee immobilizer from 0-45 degrees for two weeks, then 0-60 for two weeks, then 0-90 for another two weeks.   He will perform therapy at [facility]  in [place], [place]

## 2019-03-26 ENCOUNTER — HOSPITAL ENCOUNTER (OUTPATIENT)
Facility: OTHER | Age: 69
Discharge: HOME | End: 2019-03-27
Attending: ORTHOPAEDIC SURGERY | Admitting: ORTHOPAEDIC SURGERY
Payer: MEDICARE

## 2019-03-26 ENCOUNTER — ANESTHESIA (OUTPATIENT)
Dept: SURGERY | Facility: OTHER | Age: 69
End: 2019-03-26
Payer: MEDICARE

## 2019-03-26 DIAGNOSIS — Z98.890 S/P RIGHT KNEE SURGERY: ICD-10-CM

## 2019-03-26 DIAGNOSIS — G89.29 CHRONIC PAIN OF RIGHT KNEE: ICD-10-CM

## 2019-03-26 DIAGNOSIS — A49.02 MRSA INFECTION: ICD-10-CM

## 2019-03-26 DIAGNOSIS — M23.91 INTERNAL DERANGEMENT OF RIGHT KNEE: ICD-10-CM

## 2019-03-26 DIAGNOSIS — M23.91 INTERNAL DERANGEMENT OF KNEE JOINT, RIGHT: Primary | ICD-10-CM

## 2019-03-26 DIAGNOSIS — M25.561 CHRONIC PAIN OF RIGHT KNEE: ICD-10-CM

## 2019-03-26 PROCEDURE — 63600175 PHARM REV CODE 636 W HCPCS: Performed by: ANESTHESIOLOGY

## 2019-03-26 PROCEDURE — P9045 ALBUMIN (HUMAN), 5%, 250 ML: HCPCS | Mod: JG | Performed by: NURSE ANESTHETIST, CERTIFIED REGISTERED

## 2019-03-26 PROCEDURE — 25000003 PHARM REV CODE 250: Performed by: ORTHOPAEDIC SURGERY

## 2019-03-26 PROCEDURE — 25000003 PHARM REV CODE 250: Performed by: NURSE ANESTHETIST, CERTIFIED REGISTERED

## 2019-03-26 PROCEDURE — 27438 REVISE KNEECAP WITH IMPLANT: CPT | Mod: RT,,, | Performed by: ORTHOPAEDIC SURGERY

## 2019-03-26 PROCEDURE — 71000039 HC RECOVERY, EACH ADD'L HOUR: Performed by: ORTHOPAEDIC SURGERY

## 2019-03-26 PROCEDURE — C9290 INJ, BUPIVACAINE LIPOSOME: HCPCS | Performed by: ORTHOPAEDIC SURGERY

## 2019-03-26 PROCEDURE — S0077 INJECTION, CLINDAMYCIN PHOSP: HCPCS | Performed by: ORTHOPAEDIC SURGERY

## 2019-03-26 PROCEDURE — C1769 GUIDE WIRE: HCPCS | Performed by: ORTHOPAEDIC SURGERY

## 2019-03-26 PROCEDURE — 63600175 PHARM REV CODE 636 W HCPCS: Performed by: NURSE ANESTHETIST, CERTIFIED REGISTERED

## 2019-03-26 PROCEDURE — S0077 INJECTION, CLINDAMYCIN PHOSP: HCPCS | Performed by: PHYSICIAN ASSISTANT

## 2019-03-26 PROCEDURE — C1713 ANCHOR/SCREW BN/BN,TIS/BN: HCPCS | Performed by: ORTHOPAEDIC SURGERY

## 2019-03-26 PROCEDURE — 36000710: Performed by: ORTHOPAEDIC SURGERY

## 2019-03-26 PROCEDURE — 27201423 OPTIME MED/SURG SUP & DEVICES STERILE SUPPLY: Performed by: ORTHOPAEDIC SURGERY

## 2019-03-26 PROCEDURE — 27800903 OPTIME MED/SURG SUP & DEVICES OTHER IMPLANTS: Performed by: ORTHOPAEDIC SURGERY

## 2019-03-26 PROCEDURE — 36000711: Performed by: ORTHOPAEDIC SURGERY

## 2019-03-26 PROCEDURE — 37000009 HC ANESTHESIA EA ADD 15 MINS: Performed by: ORTHOPAEDIC SURGERY

## 2019-03-26 PROCEDURE — 88305 TISSUE EXAM BY PATHOLOGIST: CPT | Performed by: PATHOLOGY

## 2019-03-26 PROCEDURE — 94761 N-INVAS EAR/PLS OXIMETRY MLT: CPT

## 2019-03-26 PROCEDURE — 25000003 PHARM REV CODE 250: Performed by: ANESTHESIOLOGY

## 2019-03-26 PROCEDURE — 88300 SURGICAL PATH GROSS: CPT | Mod: 26,,, | Performed by: PATHOLOGY

## 2019-03-26 PROCEDURE — 88305 TISSUE SPECIMEN TO PATHOLOGY - SURGERY: ICD-10-PCS | Mod: 26,,, | Performed by: PATHOLOGY

## 2019-03-26 PROCEDURE — 63600175 PHARM REV CODE 636 W HCPCS: Performed by: ORTHOPAEDIC SURGERY

## 2019-03-26 PROCEDURE — 27438 PR ARTHROPLASTY PATELLA WITH IMPLANT: ICD-10-PCS | Mod: RT,,, | Performed by: ORTHOPAEDIC SURGERY

## 2019-03-26 PROCEDURE — 88300 TISSUE SPECIMEN TO PATHOLOGY - SURGERY: ICD-10-PCS | Mod: 26,,, | Performed by: PATHOLOGY

## 2019-03-26 PROCEDURE — 25000003 PHARM REV CODE 250: Performed by: SPECIALIST

## 2019-03-26 PROCEDURE — 25000003 PHARM REV CODE 250: Performed by: PHYSICIAN ASSISTANT

## 2019-03-26 PROCEDURE — 88305 TISSUE EXAM BY PATHOLOGIST: CPT | Mod: 26,,, | Performed by: PATHOLOGY

## 2019-03-26 PROCEDURE — 71000033 HC RECOVERY, INTIAL HOUR: Performed by: ORTHOPAEDIC SURGERY

## 2019-03-26 PROCEDURE — 37000008 HC ANESTHESIA 1ST 15 MINUTES: Performed by: ORTHOPAEDIC SURGERY

## 2019-03-26 PROCEDURE — C1776 JOINT DEVICE (IMPLANTABLE): HCPCS | Performed by: ORTHOPAEDIC SURGERY

## 2019-03-26 DEVICE — CHIPS CANCELLOUS 30CC: Type: IMPLANTABLE DEVICE | Site: KNEE | Status: FUNCTIONAL

## 2019-03-26 DEVICE — POST PATELLOFEMORAL 11MM: Type: IMPLANTABLE DEVICE | Site: KNEE | Status: FUNCTIONAL

## 2019-03-26 DEVICE — IMPLANTABLE DEVICE: Type: IMPLANTABLE DEVICE | Site: KNEE | Status: FUNCTIONAL

## 2019-03-26 DEVICE — CEMENT BONE IMPLANT: Type: IMPLANTABLE DEVICE | Site: KNEE | Status: FUNCTIONAL

## 2019-03-26 DEVICE — FIBER CORTICAL ENHNC DEMIN XL: Type: IMPLANTABLE DEVICE | Site: KNEE | Status: FUNCTIONAL

## 2019-03-26 RX ORDER — LIDOCAINE HYDROCHLORIDE 10 MG/ML
0.5 INJECTION, SOLUTION EPIDURAL; INFILTRATION; INTRACAUDAL; PERINEURAL ONCE
Status: DISCONTINUED | OUTPATIENT
Start: 2019-03-26 | End: 2019-03-26 | Stop reason: HOSPADM

## 2019-03-26 RX ORDER — CLINDAMYCIN PHOSPHATE 900 MG/50ML
900 INJECTION, SOLUTION INTRAVENOUS EVERY 12 HOURS
Status: DISCONTINUED | OUTPATIENT
Start: 2019-03-26 | End: 2019-03-27 | Stop reason: HOSPADM

## 2019-03-26 RX ORDER — ZOLPIDEM TARTRATE 5 MG/1
10 TABLET ORAL NIGHTLY PRN
Status: DISCONTINUED | OUTPATIENT
Start: 2019-03-26 | End: 2019-03-27 | Stop reason: HOSPADM

## 2019-03-26 RX ORDER — SODIUM CHLORIDE, SODIUM LACTATE, POTASSIUM CHLORIDE, CALCIUM CHLORIDE 600; 310; 30; 20 MG/100ML; MG/100ML; MG/100ML; MG/100ML
INJECTION, SOLUTION INTRAVENOUS CONTINUOUS
Status: DISCONTINUED | OUTPATIENT
Start: 2019-03-26 | End: 2019-03-26

## 2019-03-26 RX ORDER — ONDANSETRON 2 MG/ML
4 INJECTION INTRAMUSCULAR; INTRAVENOUS DAILY PRN
Status: DISCONTINUED | OUTPATIENT
Start: 2019-03-26 | End: 2019-03-26 | Stop reason: HOSPADM

## 2019-03-26 RX ORDER — BISOPROLOL FUMARATE AND HYDROCHLOROTHIAZIDE 2.5; 6.25 MG/1; MG/1
1 TABLET ORAL DAILY
Status: DISCONTINUED | OUTPATIENT
Start: 2019-03-27 | End: 2019-03-26 | Stop reason: SDUPTHER

## 2019-03-26 RX ORDER — MIDAZOLAM HYDROCHLORIDE 1 MG/ML
5 INJECTION INTRAMUSCULAR; INTRAVENOUS ONCE AS NEEDED
Status: COMPLETED | OUTPATIENT
Start: 2019-03-26 | End: 2019-03-26

## 2019-03-26 RX ORDER — DIPHENHYDRAMINE HYDROCHLORIDE 50 MG/ML
25 INJECTION INTRAMUSCULAR; INTRAVENOUS EVERY 6 HOURS PRN
Status: DISCONTINUED | OUTPATIENT
Start: 2019-03-26 | End: 2019-03-26 | Stop reason: HOSPADM

## 2019-03-26 RX ORDER — OXYCODONE AND ACETAMINOPHEN 10; 325 MG/1; MG/1
1 TABLET ORAL EVERY 6 HOURS PRN
Status: DISCONTINUED | OUTPATIENT
Start: 2019-03-26 | End: 2019-03-27 | Stop reason: HOSPADM

## 2019-03-26 RX ORDER — PHENYLEPHRINE HYDROCHLORIDE 10 MG/ML
INJECTION INTRAVENOUS
Status: DISCONTINUED | OUTPATIENT
Start: 2019-03-26 | End: 2019-03-26

## 2019-03-26 RX ORDER — OXYCODONE HYDROCHLORIDE 5 MG/1
10 TABLET ORAL EVERY 12 HOURS
Status: DISCONTINUED | OUTPATIENT
Start: 2019-03-26 | End: 2019-03-27 | Stop reason: HOSPADM

## 2019-03-26 RX ORDER — DOCUSATE SODIUM 100 MG/1
100 CAPSULE, LIQUID FILLED ORAL 2 TIMES DAILY
Status: DISCONTINUED | OUTPATIENT
Start: 2019-03-26 | End: 2019-03-27 | Stop reason: HOSPADM

## 2019-03-26 RX ORDER — MUPIROCIN 20 MG/G
1 OINTMENT TOPICAL 2 TIMES DAILY
Status: DISCONTINUED | OUTPATIENT
Start: 2019-03-26 | End: 2019-03-27 | Stop reason: HOSPADM

## 2019-03-26 RX ORDER — HYDROMORPHONE HYDROCHLORIDE 1 MG/ML
0.5 INJECTION, SOLUTION INTRAMUSCULAR; INTRAVENOUS; SUBCUTANEOUS
Status: DISCONTINUED | OUTPATIENT
Start: 2019-03-26 | End: 2019-03-27 | Stop reason: HOSPADM

## 2019-03-26 RX ORDER — SODIUM CHLORIDE 0.9 % (FLUSH) 0.9 %
3 SYRINGE (ML) INJECTION
Status: DISCONTINUED | OUTPATIENT
Start: 2019-03-26 | End: 2019-03-27 | Stop reason: HOSPADM

## 2019-03-26 RX ORDER — BACITRACIN 50000 [IU]/1
INJECTION, POWDER, FOR SOLUTION INTRAMUSCULAR
Status: DISCONTINUED | OUTPATIENT
Start: 2019-03-26 | End: 2019-03-26 | Stop reason: HOSPADM

## 2019-03-26 RX ORDER — ROPIVACAINE HYDROCHLORIDE 5 MG/ML
INJECTION, SOLUTION EPIDURAL; INFILTRATION; PERINEURAL
Status: COMPLETED | OUTPATIENT
Start: 2019-03-26 | End: 2019-03-26

## 2019-03-26 RX ORDER — LIDOCAINE HCL/PF 100 MG/5ML
SYRINGE (ML) INTRAVENOUS
Status: DISCONTINUED | OUTPATIENT
Start: 2019-03-26 | End: 2019-03-26

## 2019-03-26 RX ORDER — PROMETHAZINE HYDROCHLORIDE 25 MG/1
25 TABLET ORAL EVERY 6 HOURS PRN
Status: DISCONTINUED | OUTPATIENT
Start: 2019-03-26 | End: 2019-03-27 | Stop reason: HOSPADM

## 2019-03-26 RX ORDER — PROPOFOL 10 MG/ML
VIAL (ML) INTRAVENOUS
Status: DISCONTINUED | OUTPATIENT
Start: 2019-03-26 | End: 2019-03-26

## 2019-03-26 RX ORDER — CEFAZOLIN SODIUM 1 G/3ML
2 INJECTION, POWDER, FOR SOLUTION INTRAMUSCULAR; INTRAVENOUS
Status: DISCONTINUED | OUTPATIENT
Start: 2019-03-26 | End: 2019-03-26 | Stop reason: HOSPADM

## 2019-03-26 RX ORDER — ALBUMIN HUMAN 50 G/1000ML
SOLUTION INTRAVENOUS CONTINUOUS PRN
Status: DISCONTINUED | OUTPATIENT
Start: 2019-03-26 | End: 2019-03-26

## 2019-03-26 RX ORDER — SODIUM CHLORIDE 9 MG/ML
INJECTION, SOLUTION INTRAVENOUS CONTINUOUS
Status: DISCONTINUED | OUTPATIENT
Start: 2019-03-26 | End: 2019-03-26

## 2019-03-26 RX ORDER — MEPERIDINE HYDROCHLORIDE 25 MG/ML
12.5 INJECTION INTRAMUSCULAR; INTRAVENOUS; SUBCUTANEOUS ONCE AS NEEDED
Status: DISCONTINUED | OUTPATIENT
Start: 2019-03-26 | End: 2019-03-26 | Stop reason: HOSPADM

## 2019-03-26 RX ORDER — GLYCOPYRROLATE 0.2 MG/ML
INJECTION INTRAMUSCULAR; INTRAVENOUS
Status: DISCONTINUED | OUTPATIENT
Start: 2019-03-26 | End: 2019-03-26

## 2019-03-26 RX ORDER — DEXAMETHASONE SODIUM PHOSPHATE 4 MG/ML
INJECTION, SOLUTION INTRA-ARTICULAR; INTRALESIONAL; INTRAMUSCULAR; INTRAVENOUS; SOFT TISSUE
Status: DISCONTINUED | OUTPATIENT
Start: 2019-03-26 | End: 2019-03-26

## 2019-03-26 RX ORDER — ONDANSETRON 8 MG/1
8 TABLET, ORALLY DISINTEGRATING ORAL EVERY 8 HOURS PRN
Status: DISCONTINUED | OUTPATIENT
Start: 2019-03-26 | End: 2019-03-27 | Stop reason: HOSPADM

## 2019-03-26 RX ORDER — SODIUM CHLORIDE 9 MG/ML
INJECTION, SOLUTION INTRAVENOUS ONCE
Status: COMPLETED | OUTPATIENT
Start: 2019-03-26 | End: 2019-03-26

## 2019-03-26 RX ORDER — BISOPROLOL FUMARATE AND HYDROCHLOROTHIAZIDE 2.5; 6.25 MG/1; MG/1
1 TABLET ORAL DAILY
Status: DISCONTINUED | OUTPATIENT
Start: 2019-03-27 | End: 2019-03-27 | Stop reason: HOSPADM

## 2019-03-26 RX ORDER — OXYCODONE HYDROCHLORIDE 5 MG/1
5 TABLET ORAL
Status: DISCONTINUED | OUTPATIENT
Start: 2019-03-26 | End: 2019-03-26 | Stop reason: HOSPADM

## 2019-03-26 RX ORDER — CYCLOBENZAPRINE HCL 10 MG
10 TABLET ORAL
Status: DISCONTINUED | OUTPATIENT
Start: 2019-03-26 | End: 2019-03-27 | Stop reason: HOSPADM

## 2019-03-26 RX ORDER — HYDROMORPHONE HYDROCHLORIDE 2 MG/ML
0.2 INJECTION, SOLUTION INTRAMUSCULAR; INTRAVENOUS; SUBCUTANEOUS EVERY 5 MIN PRN
Status: DISCONTINUED | OUTPATIENT
Start: 2019-03-26 | End: 2019-03-26 | Stop reason: HOSPADM

## 2019-03-26 RX ORDER — HYDROMORPHONE HYDROCHLORIDE 2 MG/ML
0.4 INJECTION, SOLUTION INTRAMUSCULAR; INTRAVENOUS; SUBCUTANEOUS EVERY 5 MIN PRN
Status: DISCONTINUED | OUTPATIENT
Start: 2019-03-26 | End: 2019-03-26 | Stop reason: HOSPADM

## 2019-03-26 RX ORDER — FENTANYL CITRATE 50 UG/ML
100 INJECTION, SOLUTION INTRAMUSCULAR; INTRAVENOUS EVERY 5 MIN PRN
Status: COMPLETED | OUTPATIENT
Start: 2019-03-26 | End: 2019-03-26

## 2019-03-26 RX ORDER — ONDANSETRON 2 MG/ML
INJECTION INTRAMUSCULAR; INTRAVENOUS
Status: DISCONTINUED | OUTPATIENT
Start: 2019-03-26 | End: 2019-03-26

## 2019-03-26 RX ORDER — DOXYCYCLINE HYCLATE 100 MG
100 TABLET ORAL EVERY 12 HOURS
Status: DISCONTINUED | OUTPATIENT
Start: 2019-03-26 | End: 2019-03-27 | Stop reason: HOSPADM

## 2019-03-26 RX ORDER — CLINDAMYCIN PHOSPHATE 900 MG/50ML
900 INJECTION, SOLUTION INTRAVENOUS
Status: COMPLETED | OUTPATIENT
Start: 2019-03-26 | End: 2019-03-26

## 2019-03-26 RX ORDER — TRAMADOL HYDROCHLORIDE 50 MG/1
50 TABLET ORAL EVERY 6 HOURS PRN
Status: DISCONTINUED | OUTPATIENT
Start: 2019-03-26 | End: 2019-03-26

## 2019-03-26 RX ADMIN — OXYCODONE AND ACETAMINOPHEN 1 TABLET: 10; 325 TABLET ORAL at 05:03

## 2019-03-26 RX ADMIN — HYDROMORPHONE HYDROCHLORIDE 0.4 MG: 2 INJECTION INTRAMUSCULAR; INTRAVENOUS; SUBCUTANEOUS at 03:03

## 2019-03-26 RX ADMIN — SODIUM CHLORIDE, SODIUM LACTATE, POTASSIUM CHLORIDE, AND CALCIUM CHLORIDE: 600; 310; 30; 20 INJECTION, SOLUTION INTRAVENOUS at 10:03

## 2019-03-26 RX ADMIN — DOXYCYCLINE HYCLATE 100 MG: 100 TABLET, COATED ORAL at 10:03

## 2019-03-26 RX ADMIN — ZOLPIDEM TARTRATE 10 MG: 5 TABLET ORAL at 10:03

## 2019-03-26 RX ADMIN — ALBUMIN (HUMAN): 2.5 SOLUTION INTRAVENOUS at 12:03

## 2019-03-26 RX ADMIN — FENTANYL CITRATE 100 MCG: 50 INJECTION, SOLUTION INTRAMUSCULAR; INTRAVENOUS at 11:03

## 2019-03-26 RX ADMIN — CARBOXYMETHYLCELLULOSE SODIUM 2 DROP: 2.5 SOLUTION/ DROPS OPHTHALMIC at 11:03

## 2019-03-26 RX ADMIN — SODIUM CHLORIDE, SODIUM LACTATE, POTASSIUM CHLORIDE, AND CALCIUM CHLORIDE: 600; 310; 30; 20 INJECTION, SOLUTION INTRAVENOUS at 01:03

## 2019-03-26 RX ADMIN — PROMETHAZINE HYDROCHLORIDE 25 MG: 25 TABLET ORAL at 10:03

## 2019-03-26 RX ADMIN — ROPIVACAINE HYDROCHLORIDE 30 ML: 5 INJECTION, SOLUTION EPIDURAL; INFILTRATION; PERINEURAL at 10:03

## 2019-03-26 RX ADMIN — ONDANSETRON 4 MG: 2 INJECTION INTRAMUSCULAR; INTRAVENOUS at 01:03

## 2019-03-26 RX ADMIN — CLINDAMYCIN PHOSPHATE 900 MG: 18 INJECTION, SOLUTION INTRAVENOUS at 12:03

## 2019-03-26 RX ADMIN — GLYCOPYRROLATE 0.2 MG: 0.2 INJECTION, SOLUTION INTRAMUSCULAR; INTRAVENOUS at 12:03

## 2019-03-26 RX ADMIN — FENTANYL CITRATE 25 MCG: 50 INJECTION, SOLUTION INTRAMUSCULAR; INTRAVENOUS at 02:03

## 2019-03-26 RX ADMIN — DEXAMETHASONE SODIUM PHOSPHATE 8 MG: 4 INJECTION, SOLUTION INTRAMUSCULAR; INTRAVENOUS at 12:03

## 2019-03-26 RX ADMIN — PHENYLEPHRINE HYDROCHLORIDE 100 MCG: 10 INJECTION INTRAVENOUS at 12:03

## 2019-03-26 RX ADMIN — PROPOFOL 200 MG: 10 INJECTION, EMULSION INTRAVENOUS at 11:03

## 2019-03-26 RX ADMIN — ALBUMIN (HUMAN): 2.5 SOLUTION INTRAVENOUS at 01:03

## 2019-03-26 RX ADMIN — MUPIROCIN 1 G: 20 OINTMENT TOPICAL at 10:03

## 2019-03-26 RX ADMIN — LIDOCAINE HYDROCHLORIDE 75 MG: 20 INJECTION, SOLUTION INTRAVENOUS at 11:03

## 2019-03-26 RX ADMIN — PHENYLEPHRINE HYDROCHLORIDE 200 MCG: 10 INJECTION INTRAVENOUS at 01:03

## 2019-03-26 RX ADMIN — DOCUSATE SODIUM 100 MG: 100 CAPSULE, LIQUID FILLED ORAL at 10:03

## 2019-03-26 RX ADMIN — OXYCODONE HYDROCHLORIDE 5 MG: 5 TABLET ORAL at 03:03

## 2019-03-26 RX ADMIN — FENTANYL CITRATE 50 MCG: 50 INJECTION, SOLUTION INTRAMUSCULAR; INTRAVENOUS at 02:03

## 2019-03-26 RX ADMIN — OXYCODONE HYDROCHLORIDE 10 MG: 5 TABLET ORAL at 10:03

## 2019-03-26 RX ADMIN — FENTANYL CITRATE 100 MCG: 50 INJECTION, SOLUTION INTRAMUSCULAR; INTRAVENOUS at 10:03

## 2019-03-26 RX ADMIN — MIDAZOLAM HYDROCHLORIDE 2 MG: 1 INJECTION, SOLUTION INTRAMUSCULAR; INTRAVENOUS at 10:03

## 2019-03-26 RX ADMIN — PHENYLEPHRINE HYDROCHLORIDE 200 MCG: 10 INJECTION INTRAVENOUS at 12:03

## 2019-03-26 RX ADMIN — VASOPRESSIN 2 UNITS: 20 INJECTION, SOLUTION INTRAMUSCULAR; SUBCUTANEOUS at 01:03

## 2019-03-26 RX ADMIN — FENTANYL CITRATE 25 MCG: 50 INJECTION, SOLUTION INTRAMUSCULAR; INTRAVENOUS at 01:03

## 2019-03-26 RX ADMIN — CLINDAMYCIN IN 5 PERCENT DEXTROSE 900 MG: 18 INJECTION, SOLUTION INTRAVENOUS at 10:03

## 2019-03-26 RX ADMIN — SODIUM CHLORIDE: 0.9 INJECTION, SOLUTION INTRAVENOUS at 04:03

## 2019-03-26 RX ADMIN — CEFAZOLIN 2 G: 330 INJECTION, POWDER, FOR SOLUTION INTRAMUSCULAR; INTRAVENOUS at 12:03

## 2019-03-26 RX ADMIN — PHENYLEPHRINE HYDROCHLORIDE 0.5 MCG/KG/MIN: 10 INJECTION INTRAVENOUS at 01:03

## 2019-03-26 RX ADMIN — ONDANSETRON 8 MG: 8 TABLET, ORALLY DISINTEGRATING ORAL at 05:03

## 2019-03-26 NOTE — OP NOTE
[facility]  Surgery Department  Operative Note    SUMMARY     Date of Procedure: 3/26/2019     Procedure: Procedure(s) (LRB):  REALIGNMENT,Anterior medial/realignment of muscle (Right)  RELEASE, KNEE, LATERAL RETINACULA (Right)  TUBERCLEPLASTY,Anterior tibial (Right)     Surgeon(s) and Role:     * Fran Shelton MD - Primary    1st Assisting Surgeon: Scott Orozco MD    Due to the complexity of the procedure it was medically necessary for Scott Orozoc MD to perform first assistant duties.    2nd Assistant:  Fanny Hines PA-C      Pre-Operative Diagnosis: Internal derangement of right knee [M23.91]  Painful orthopaedic hardware [T84.84XA]  Old disruption of ligament of right knee [M23.51]    Post-Operative Diagnosis: Post-Op Diagnosis Codes:     * Internal derangement of right knee [M23.91]     * Painful orthopaedic hardware [T84.84XA]     * Old disruption of ligament of right knee [M23.51]    Anesthesia: General    Technical Procedures Used:  Removal of previous implants with preparation for a larger a patellofemoral replacement and removal of all inflamed and synovitic tissues in the region of concern.    Description of the Findings of the Procedure:  Patient was brought to the operating placed supine position.  Following application of a general anesthetic noting that an additional adductor block was placed in the preoperative hold area,  the patient was then placed in general anesthetic and an LMA applied.  The right leg was carefully padded along the healing. In regions left leg was similarly padded.  A tourniquet was applied proximally on the right leg which was then examined under anesthesia.    The right knee demonstrated a large effusion with range of motion from 0-130 degrees patellar tracking was lateral with lateral patella tilt noted.  There was negative anterior and posterior drawer testing as well as varus valgus stress testing recent concern.  Right leg was then prepped draped  sterile fashion ChloraPrep tear with a bump under the right hip and popliteal post along the right side of the table.  An Ioban was placed over the knee.  Lateral base incision was carried down the skin and fat fascia incorporating the previous incision laterally raise medially and laterally along recent concern.  We then performed a lateral sub vastus approach the knee as well as sending this distally along the proximal tibial region releasing the anterior tibialis fascia muscle off the anterolateral aspect the proximal tibia.    Using a Bovie cautery be demarcated an area of approximately 6 min cm in length for planned tibial tubercle plasty.  The create a tibial tubercle plasty using oscillating saw to create the bony cuts the small our marks cut created anterolaterally.  A half-inch curved osteotome was used carefully to the side of his bony bed with a performed for the lateral release and anterior interval releases to expose the underlying intra-articular portions of the knee.  There is large serous effusion within the knee which is removed all synovitic tissues were removed open dissection using blunt dissection and sharp dissection.  A rongeur was used to remove synovitic tissue along with Bovie cautery as well.  With the anterior interval release approximately 6 for which we are grateful to alyssia the patella demonstrates significant erosive changes along the lateral facet of the previous patella replacement.  Further with there is bony exposed regions along the lateral and medial aspect of the trochlear groove with what appeared to be a well-fixed trochlear implant. No signs of infection was noted. The previously placed 40 mm implant was removed previous placed plate screw was removed.  There is also granulation tissue noted along the recent concerns or femoral trochlea, and a 7 mm offset right component. This fit in excellent fashion pictures were obtained.        Placement of a repeat central screw 11 mm  taper post.  With this in position we removed them to place bone grafting laterally along the region of paired.  Area with a small bony defect.  We then impacted our actual implants in position with Press-Fit fixation achieved.  Pictures obtained.  Attention was then turned to the patella. The patella demonstrated additional synovitic tissue scar along the region concern within removed additional scar in the periphery of the patella component. With this performed we then used an oscillating saw to remove the remnant of the patellar component with the eroded region noted laterally once again.  There was appropriate bone stock remaining wound we then reamed centrally to prepare for the plate the placement of the trial patella trial 35 mm patella was applied with stability and lesion duction of the the tibial tubercle in a medialized anterior medialized position we reviewed demonstrated appropriate stability and tracking.  Results a trial component removed possible the base of the parapatellar region drill the base flap for appropriate cement interdigitation removing removing any remaining cement from the previous implant.  Then applied cement to the our concern placed our patellar component with the divot from the planned 35 mm component noted laterally and then pressed in position.  Extrusion was removed.    All remaining synovitic tissue was removed using a rongeur as well as a Bovie cautery.  Pulsavac lavaged and placed in the heart to prevent any postoperative infection.  On tibial tubercle was reduced into position by anterior medialized in the holding in place with a large reduction clamp.  With this in position we then assessed this with fluoroscopic visualization to verify appropriate recreation normal anatomy.  We felt the inferior pole patella did bisect Blumensaat's line is all we held it in position placed 223.2 drill bits from anterior to posterior lung region concern in oblique fashion to allow for  reduction of the a concern.  The distal drill bit was removed and was overdrilled with a 4.5 drill bit to allow for lag screw technique.  Countersink maneuver was utilized depth gauge utilized we placed a 6.5 mm x 60 mm screw distally. This process was repeated more proximally we replaced we placed 6.5 x 65 mm screw. Excess stability was achieved. Lateral views demonstrated appropriate reduction was given revision recreation of anatomy.  Knee was taken through range of motion demonstrating full tracked centrally from 0-140 degrees. There is no evidence of impingement no lateral subluxation was noted with 30° assessment good stability demonstrated.  Follow up x-ray obtained of all areas irrigation performed copiously on recent concern did take final pictures using the fluoroscopic images well for lateral and AP views.  We then closed the synovium parapatellar tendon region with issues of 1.  Vicryl placed in figure-of-eight fashion.  We then closed the deep soft tissue structures with t in far near near far fashion. We then placed an Aquacel bandage along with gauze ABD pads cast padding along with a Naveen hose stocking.  Patient's knee was placed into a cooling unit and a hinged knee immobilizers locked in 0° extension and flex at rest to 45°.  Patient's large hernias that the elevators removed the patient taken recovery in stable condition.  No Complications and all instrument and sponge counts were correct; no contractures were present     Dr. Fran Shelton performed the mart portions procedure and was present for the key portions of the procedure.    Physical therapy:  Patient began physical therapy postop day 3 - 5.  Range of motion:  Patient begin range of motion for the 1st 2 weeks from 0-45 degrees increasing at 2-3 weeks to 60° at 3-4 weeks to 90°.  Patient avoid flexion past 90° for the 1st 6 weeks from surgery. Weightbearing status:  Full weight-bearing as tolerated with the knee immobilizer locked in extension  with gait for the 1st 6 weeks.      Significant Surgical Tasks Conducted by the Assistant(s), if Applicable: exposure and closure    Complications: No    Estimated Blood Loss (EBL): * No values recorded between 3/26/2019 12:13 PM and 3/26/2019  3:00 PM *           Implants:   Implant Name Type Inv. Item Serial No.  Lot No. LRB No. Used   PIN GUIDE PATELLOFEMORAL XLG - [id number]  PIN GUIDE PATELLOFEMORAL XLG  ARTHROSUR[employer]. [id number] Right 1   [id number]  CHIPS CANCELLOUS 30CC [id number] MUSCULOSKELETAL TRANSPLANT FND [id number] Right 1   POST PATELLOFEMORAL 11MM - [id number]  POST PATELLOFEMORAL 11MM  ARTHROSURFACE INC. [id number] Right 1   FEMORAL TROCHLEA     ARTHROSURFACE INC. [id number] Right 1   CEMENT BONE IMPLANT - [id number]  CEMENT BONE IMPLANT  DEPUY INC. [id number] Right 1   35mm Patella     ARTHROSURFACE INC. [id number] Right 1   [id number]  FIBER CORTICAL ENHNC DEMIN XL [id number] MTF [id number] Right 1       Specimens:   Specimen (12h ago, onward)    Start     Ordered    03/26/19 1347  Specimen to Pathology - Surgery  Once     Comments:  1. Right knee synovitis 2. Right knee explants     Start Status     03/26/19 1347 Collected (03/26/19 1359) Order ID: [id number]       03/26/19 1359                  Condition: Good    Disposition: PACU - hemodynamically stable.    Attestation: I was present and scrubbed for the key portions of the procedure.     Discharge home when stable  Follow-up as scheduled  Condition Stable  Activity as above.

## 2019-03-26 NOTE — BRIEF OP NOTE
Operative Note       Surgery Date: 3/26/2019     Surgeon(s) and Role:     * Fran Shelton MD - Primary    Pre-op Diagnosis:  Internal derangement of right knee [M23.91]  Painful orthopaedic hardware [T84.84XA]  Old disruption of ligament of right knee [M23.51]    Post-op Diagnosis:    Internal derangement of right knee [M23.91]  Painful orthopaedic hardware [T84.84XA]  Old disruption of ligament of right knee [M23.51]    Procedure(s) (LRB):  REALIGNMENT,Anterior medial/realignment of muscle (Right)  RELEASE, KNEE, LATERAL RETINACULA (Right)  TUBERCLEPLASTY,Anterior tibial (Right)    Anesthesia: General    Findings/Key Components:    Internal derangement of right knee [M23.91]  Painful orthopaedic hardware [T84.84XA]  Old disruption of ligament of right knee [M23.51]    Core Measure Documentation:  Were antibiotics extended? No  Was the patient administered a VTE Prophylaxis? No. Short procedure; low risk  Estimated Blood Loss: minimal           Specimens (From admission, onward)    Start     Ordered    03/26/19 1347  Specimen to Pathology - Surgery  Once     Start Status     03/26/19 1347 Collected (03/26/19 1359) Order ID: [id number]       03/26/19 1359        Implants:   Implant Name Type Inv. Item Serial No.  Lot No. LRB No. Used   PIN GUIDE PATELLOFEMORAL XLG - [id number]  PIN GUIDE PATELLOFEMORAL XLG  ARTHROSURFACE INC. [id number] Right 1   [id number]  CHIPS CANCELLOUS 30CC [id number] MUSCULOSKELETAL TRANSPLANT FND [id number] Right 1   POST PATELLOFEMORAL 11MM - [id number]  POST PATELLOFEMORAL 11MM  ARTHROSURFACE INC. [id number] Right 1   FEMORAL TROCHLEA     ARTHROSURFACE INC. [id number] Right 1   CEMENT BONE IMPLANT - [id number]  CEMENT BONE IMPLANT  DEPUY INC. [id number] Right 1   35mm Patella     ARTHROSURFACE INC. [id number] Right 1   [id number]  FIBER CORTICAL ENHNC DEMIN XL [id number] MTF [id number] Right 1       Complications: none           Disposition: PACU -  hemodynamically stable.           Condition: Stable    Attestation:  I was present for the entire procedure.

## 2019-03-26 NOTE — ANESTHESIA PREPROCEDURE EVALUATION
03/26/2019  Leonid Harris III is a 69 y.o., male.    Anesthesia Evaluation    I have reviewed the Patient Summary Reports.    I have reviewed the Nursing Notes.   I have reviewed the Medications.     Review of Systems  Anesthesia Hx:  No problems with previous Anesthesia  History of prior surgery of interest to airway management or planning: Previous anesthesia: General 2/27/18 Knee with general anesthesia.  Airway issues documented on chart review include mask, easy, laryngeal mask airway used  Denies Family Hx of Anesthesia complications.   Denies Personal Hx of Anesthesia complications.   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  Uses daily inhaler   Renal/:  Renal/ Normal     Hepatic/GI:   GERD    Musculoskeletal:   Low back pain Spine Disorders: lumbar Degenerative disease    Neurological:   Spotty neuropathy of bilateral arms and legs post vanco rx  Peripheral Neuropathy    Endocrine:  Endocrine Normal        Physical Exam  General:  Well nourished    Airway/Jaw/Neck:  Airway Findings: Mouth Opening: Normal Tongue: Normal  General Airway Assessment: Adult  Mallampati: I  TM Distance: Normal, at least 6 cm         Dental:  Dental Findings:Upper front fixed bridge x 3 teeth             Anesthesia Plan  Type of Anesthesia, risks & benefits discussed:  Anesthesia Type:  general  Patient's Preference:   Intra-op Monitoring Plan: standard ASA monitors  Intra-op Monitoring Plan Comments:   Post Op Pain Control Plan: multimodal analgesia and peripheral nerve block  Post Op Pain Control Plan Comments:   Induction:   IV  Beta Blocker:         Informed Consent: Patient  understands risks and agrees with Anesthesia plan.  Questions answered. Anesthesia consent signed with patient.  ASA Score: 2     Day of Surgery Review of History & Physical:    H&P update referred to the surgeon.     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

## 2019-03-26 NOTE — DISCHARGE INSTRUCTIONS
[address], [place]                                                                                          [phone]                   Postoperative Instructions for Knee Surgery                 Your Surgery Included:   Open               Arthroscopic   ? Ligament Repair       ? Diagnostic           ? ACL     ? PCL     ? MCL     ? PLLC      ? Synovectomy / Plica Removal ? Meniscal Cartilage Repair / Transplantation      ? Lysis of Adhesions / Manipulation ? Articular Cartilage Repair      ? Interval Release           ? Microfracture       ? OATS   ? ACI      ? Meniscectomy           ? Osteochondral Allograft      ? Meniscal Cartilage Repair  ? Patellar Realignment (Anterior tibial tubercleplasty)       ? Debridement / Chondroplasty         ? Lateral Release   ? Ligament Repair      ? Articular Cartilage Repair          ? Extensor Mechanism             ?   Microfracture  ?  OATS         ?  Cartilage Biopsy ? Tendon Repair          ? Ligament Reconstruction          ? Patella                  ? Quadriceps             ?   ACL    ?   PCL  ? High Tibial Osteotomy       ? PRP Arthrocentesis  ? Joint Replacement         ? Amniox Arthrocentesis           ? Unicompartmental   ? Revision Patellofemoral                  Call our office ([phone]) immediately or message through [facility] if you experience any of the following:       Excessive bleeding or pus like drainage at the incision site       Uncontrollable pain not relieved by pain medication       Excessive swelling or redness at the incision site       Fever above 101.5 degrees not controlled with Tylenol or Motrin       Shortness of Breath or severe calf pain       Any foul odor or blistering from the surgery site    FOR EMERGENCIES: [facility] is the best way to contact us. If on the weekend, page the  at [phone] who will direct your call appropriately.    1.   Pain Management: A cold therapy cuff, pain  medications, local injections, TENs unit, and in some cases, regional anesthesia injections are used to manage your post-operative pain. The decision to use each of these options is based on their risks and benefits.     Medications: You were given one or more of the following medication prescriptions during your preoperative appointment. Follow the instructions on the bottles.     Narcotic Medication (usually Percocet, Roxicodone, or Norco): Begin taking the medication before your knee starts to hurt. Some patients do not like to take any medication, but if you wait until your pain is severe before taking, you will be very uncomfortable for several hours waiting for the narcotic to work. Always take with food.     Nausea / Vomiting: For this issue, we prescribe Phenergan or Zofran, use this medication as directed as needed for nausea.     Cold Therapy: You may have been sent home with a Bugsnag® cold therapy unit and wrap for your knee. Fill with ice and water to the indicated fill line. You can use 20-30 minutes on then off, several times a day. This will help relieve pain and control swelling. Do not sleep with on.     Regional Anesthesia Injections (Blocks): You may have been given a regional nerve block either before or after surgery. This may make your entire leg numb for 24-36 hours.                            * Proceed with caution when bearing weight on your leg.     2.   Diet: Eat a bland diet for the first day after surgery. Progress your diet as tolerated. Constipation may occur with Narcotic usage. We recommend Colace 100mg twice a day while taking narcotics.    3.   Activity: Limit your activity during the first 48 hours, keep your leg elevated with pillows under your heel. After the first 48 hours at home, increase your activity level based on your symptoms.    4.   Dressing: (c) The soft, bulky dressing will be removed the morning after surgery. The Aquacel dressing (tan, long adhesive bandage)  will remain on until the 1st post operative appointment. Do not remove. It is normal for some blood to be seen on the dressings. It is also normal for you to see apparent bruising on the skin around your incisions. If you are concerned by the drainage or the appearance of your wound site, you can send a picture via [facility].    5.   Shower: (c) You may shower on the 3rd day after surgery. The Aquacel bandage is to be covered with saran wrap before showering. Do not submerge limb in any water for 4 weeks or until incisions completely healed.  Do not get bandage wet.    6.   Knee Brace: You may have been sent home in a hinged knee brace. Your brace is set at -10 to 45 degrees of motion for 2 weeks. Wear the brace for 6 weeks, LOCKED in full extension when walking, you will need to wear this brace at all time unless instructed otherwise. You may unlock at rest or for exercises.    Range of motion :   0-2 weeks: -10 - 45 degrees  2-4 weeks: -10 - 60 degrees  4-6 weeks: -10 - 90 degrees  After 6 weeks: full range of motion as tolerated    7.   Your procedure did not require a Continuous Passive Motion (CPM) device.    8.   Weight Bearing: You may have been sent home with crutches. If instructed (see below), use these crutches at all times unless at complete rest.      You may be full weight bearing with brace locked in extension                9.  Knee Exercises: Begin these exercises the first day after surgery in order to help you regain your motion and strength. You may do the following marked exercises:     ? Quad Sets - Begin activating your quadriceps muscle by driving your          knee downward into full knee extension while seated on a table or bed   with a towel rolled and propped under your heel     ? Straight Leg Raise (SLR) - While thomas your quadriceps muscle, lift     your fully extended leg to the level of your non-operative knee (as shown)     ? Heel Slides - With the knee straight, slide your  "heel slowly toward your   buttocks, hold at the endpoint for 10-15 seconds, then slowly straighten     ? Ankle pumps - With your knee straight, move your ankle in a "pumping"    fashion to activate your calf and leg muscles      10.  Physical Therapy: Physical therapy is an essential component to your recovery from surgery. Your physical therapy will start in 3 days.    FIRST POSTOPERATIVE VISIT: As scheduled.             Anesthesia: After Your Surgery  Youve just had surgery. During surgery, you received medication called anesthesia to keep you comfortable and pain-free. After surgery, you may experience some pain or nausea. This is common. Here are some tips for feeling better and recovering after surgery.    Going home  Your doctor or nurse will show you how to take care of yourself when you go home. He or she will also answer your questions. Have an adult family member or friend drive you home. For the first 24 hours after your surgery:    · Do not drive or use heavy equipment.  · Do not make important decisions or sign legal documents.  · Avoid alcohol.  · Have someone stay with you, if needed. He or she can watch for problems and help keep you safe.    Be sure to keep all follow-up appointments with your doctor. And rest after your procedure for as long as your doctor tells you to.    Coping with pain  If you have pain after surgery, pain medication will help you feel better. Take it as directed, before pain becomes severe. Also, ask your doctor or pharmacist about other ways to control pain, such as with heat, ice, and relaxation. And follow any other instructions your surgeon or nurse gives you.    URINARY RETENTION  Should you experience a decrease in your urine output or are unable to urinate following surgery, this can be due to the medications given during surgery.  We recommend you going to the nearest Emergency Department.    Tips for taking pain medication  To get the best relief possible, remember " these points:    · Pain medications can upset your stomach. Taking them with a little food may help.  · Most pain relievers taken by mouth need at least 20 to 30 minutes to take effect.  · Taking medication on a schedule can help you remember to take it. Try to time your medication so that you can take it before beginning an activity, such as dressing, walking, or sitting down for dinner.  · Constipation is a common side effect of pain medications. Contact your doctor before taking any medications like laxatives or stool softeners to help relieve constipation. Also ask about any dietary restrictions, because drinking lots of fluids and eating foods like fruits and vegetables that are high in fiber can also help. Remember, dont take laxatives unless your surgeon has prescribed them.  · Mixing alcohol and pain medication can cause dizziness and slow your breathing. It can even be fatal. Dont drink alcohol while taking pain medication.  · Pain medication can slow your reflexes. Dont drive or operate machinery while taking pain medication.    If your health care provider tells you to take acetaminophen to help relieve your pain, ask him or her how much you are supposed to take each day. (Acetaminophen is the generic name for Tylenol and other brand-name pain relievers.) Acetaminophen or other pain relievers may interact with your prescription medicines or other over-the-counter (OTC) drugs. Some prescription medications contain acetaminophen along with other active ingredients. Using both prescription and OTC acetaminophen for pain can cause you to overdose. The FDA recommends that you read the labels on your OTC medications carefully. This will help you to clearly understand the list of active ingredients, dosing instructions, and any warnings. It may also help you avoid taking too much acetaminophen. If you have questions or don't understand the information, ask your pharmacist or health care provider to explain it  to you before you take the OTC medication.    Managing nausea  Some people have an upset stomach after surgery. This is often due to anesthesia, pain, pain medications, or the stress of surgery. The following tips will help you manage nausea and get good nutrition as you recover. If you were on a special diet before surgery, ask your doctor if you should follow it during recovery. These tips may help:    · Dont push yourself to eat. Your body will tell you when to eat and how much.  · Start off with clear liquids and soup. They are easier to digest.  · Progress to semi-solid foods (mashed potatoes, applesauce, and gelatin) as you feel ready.  · Slowly move to solid foods. Dont eat fatty, rich, or spicy foods at first.  · Dont force yourself to have three large meals a day. Instead, eat smaller amounts more often.  · Take pain medications with a small amount of solid food, such as crackers or toast to avoid nausea.      Call your surgeon if    · You feel too sleepy, dizzy, or groggy (medication may be too strong).  · You have side effects like nausea, vomiting, or skin changes (rash, itching, or hives).     © 9056-1801 [employer]. [address] [place] [address], [place] [address]. All rights reserved. This information is not intended as a substitute for professional medical care. Always follow your healthcare professional's instructions.    PLEASE FOLLOW ANY OTHER INSTRUCTIONS PROVIDED TO YOU BY DR. LEWIS!    Select on Hyperlink below to print updated instructions from [email or web address]  [email or web address]    Select on Hyperlink below to print updated instructions from [email or web address]  T-Scope Breg Brace Instructions

## 2019-03-26 NOTE — NURSING
Pt arrived to floor via stretcher with GHAZAL Everett and transferred self to bed. VS taken and stable on RA and afebrile. IVF started, SCDs applied, oriented to room, call light placed within reach, bed low and locked, and wife at bedside. Pt complains of pain 4/10.  Dressing noted to R knee, CDI. Polar care and knee immobilizer in place.  No acute distress noted at this time. Will continue to monitor.

## 2019-03-26 NOTE — PT/OT/SLP PROGRESS
Occupational Therapy  Not Seen    Leonid Harris III   MRN: [id number]     Patient not seen for Occupational Therapy today due to ( ) departmental protocol for elective surgery patients.    Patient with Internal derangement of right knee [M23.91]  Painful orthopaedic hardware [T84.84XA]  Old disruption of ligament of right knee [M23.51], s/p Procedure(s):  REALIGNMENT,Anterior medial/realignment of muscle  RELEASE, KNEE, LATERAL RETINACULA  TUBERCLEPLASTY,Anterior tibial 3/26/2019 who will be seen for Occupational Therapy evaluation POD#1.    Daina Roberto, OT   3/26/2019

## 2019-03-26 NOTE — ANESTHESIA PROCEDURE NOTES
Right ACB    Patient location during procedure: holding area   Block not for primary anesthetic.  Reason for block: at surgeon's request and post-op pain management   Post-op Pain Location: Right knee  Timeout: 3/26/2019 10:16 AM   End time: 3/26/2019 10:25 AM  Staffing  Anesthesiologist: Branden Solares MD  Performed: anesthesiologist   Preanesthetic Checklist  Completed: patient identified, site marked, surgical consent, pre-op evaluation, timeout performed, IV checked, risks and benefits discussed and monitors and equipment checked  Peripheral Block  Patient position: supine  Prep: ChloraPrep and site prepped and draped  Patient monitoring: heart rate and continuous pulse ox  Block type: adductor canal  Laterality: right  Injection technique: single shot  Needle  Needle type: Echogenic   Needle gauge: 21 G  Needle length: 4 in  Needle localization: anatomical landmarks and ultrasound guidance   -ultrasound image captured on disc.  Assessment  Injection assessment: negative aspiration, negative parasthesia and local visualized surrounding nerve  Paresthesia pain: none  Heart rate change: no  Slow fractionated injection: yes

## 2019-03-26 NOTE — INTERVAL H&P NOTE
The patient has been examined and the H&P has been reviewed:    I concur with the findings and no changes have occurred since H&P was written.    Anesthesia/Surgery risks, benefits and alternative options discussed and understood by patient/family.          Active Hospital Problems    Diagnosis  POA    S/P right knee surgery [Z98.890]  Not Applicable      Resolved Hospital Problems   No resolved problems to display.

## 2019-03-26 NOTE — ANESTHESIA POSTPROCEDURE EVALUATION
Anesthesia Post Evaluation    Patient: Leonid Harris III    Procedure(s) Performed: Procedure(s) (LRB):  REALIGNMENT,Anterior medial/realignment of muscle (Right)  RELEASE, KNEE, LATERAL RETINACULA (Right)  TUBERCLEPLASTY,Anterior tibial (Right)    Final Anesthesia Type: general  Patient location during evaluation: PACU  Patient participation: Yes- Able to Participate  Level of consciousness: awake and alert  Post-procedure vital signs: reviewed and stable  Pain management: adequate  Airway patency: patent  PONV status at discharge: No PONV  Anesthetic complications: no      Cardiovascular status: blood pressure returned to baseline  Respiratory status: unassisted and room air  Hydration status: euvolemic  Follow-up not needed.          Vitals Value Taken Time   /55 3/26/2019  3:10 PM   Temp 36.6 °C (97.8 °F) 3/26/2019  3:00 PM   Pulse 78 3/26/2019  3:22 PM   Resp 16 3/26/2019  3:00 PM   SpO2 100 % 3/26/2019  3:22 PM   Vitals shown include unvalidated device data.      No case tracking events are documented in the log.      Pain/Marguerite Score: Pain Rating Prior to Med Admin: 7 (3/26/2019  3:21 PM)

## 2019-03-26 NOTE — TRANSFER OF CARE
"Anesthesia Transfer of Care Note    Patient: Leonid Harris III    Procedure(s) Performed: Procedure(s) (LRB):  REALIGNMENT,Anterior medial/realignment of muscle (Right)  RELEASE, KNEE, LATERAL RETINACULA (Right)  TUBERCLEPLASTY,Anterior tibial (Right)    Patient location: PACU    Anesthesia Type: general    Transport from OR: Transported from OR on 2-3 L/min O2 by NC with adequate spontaneous ventilation    Post pain: adequate analgesia    Post assessment: no apparent anesthetic complications    Post vital signs: stable    Level of consciousness: awake and alert    Nausea/Vomiting: no nausea/vomiting    Complications: none    Transfer of care protocol was followed      Last vitals:   Visit Vitals  Ht 6' 4" (1.93 m)   Wt 88.5 kg (195 lb)   BMI 23.74 kg/m²     "

## 2019-03-27 VITALS
TEMPERATURE: 98 F | OXYGEN SATURATION: 98 % | RESPIRATION RATE: 18 BRPM | SYSTOLIC BLOOD PRESSURE: 126 MMHG | WEIGHT: 195 LBS | DIASTOLIC BLOOD PRESSURE: 61 MMHG | BODY MASS INDEX: 23.75 KG/M2 | HEART RATE: 70 BPM | HEIGHT: 76 IN

## 2019-03-27 LAB
ANION GAP SERPL CALC-SCNC: 4 MMOL/L (ref 8–16)
BASOPHILS # BLD AUTO: 0.01 K/UL (ref 0–0.2)
BASOPHILS NFR BLD: 0.1 % (ref 0–1.9)
BUN SERPL-MCNC: 21 MG/DL (ref 8–23)
CALCIUM SERPL-MCNC: 9 MG/DL (ref 8.7–10.5)
CHLORIDE SERPL-SCNC: 106 MMOL/L (ref 95–110)
CO2 SERPL-SCNC: 24 MMOL/L (ref 23–29)
CREAT SERPL-MCNC: 1.1 MG/DL (ref 0.5–1.4)
DIFFERENTIAL METHOD: ABNORMAL
EOSINOPHIL # BLD AUTO: 0 K/UL (ref 0–0.5)
EOSINOPHIL NFR BLD: 0.1 % (ref 0–8)
ERYTHROCYTE [DISTWIDTH] IN BLOOD BY AUTOMATED COUNT: 12.4 % (ref 11.5–14.5)
EST. GFR  (AFRICAN AMERICAN): >60 ML/MIN/1.73 M^2
EST. GFR  (NON AFRICAN AMERICAN): >60 ML/MIN/1.73 M^2
FUNGUS SPEC CULT: NORMAL
GLUCOSE SERPL-MCNC: 105 MG/DL (ref 70–110)
HCT VFR BLD AUTO: 33.3 % (ref 40–54)
HGB BLD-MCNC: 11.1 G/DL (ref 14–18)
LYMPHOCYTES # BLD AUTO: 1.2 K/UL (ref 1–4.8)
LYMPHOCYTES NFR BLD: 11.7 % (ref 18–48)
MCH RBC QN AUTO: 30.5 PG (ref 27–31)
MCHC RBC AUTO-ENTMCNC: 33.3 G/DL (ref 32–36)
MCV RBC AUTO: 92 FL (ref 82–98)
MONOCYTES # BLD AUTO: 0.6 K/UL (ref 0.3–1)
MONOCYTES NFR BLD: 6 % (ref 4–15)
NEUTROPHILS # BLD AUTO: 8.4 K/UL (ref 1.8–7.7)
NEUTROPHILS NFR BLD: 81.9 % (ref 38–73)
PLATELET # BLD AUTO: 198 K/UL (ref 150–350)
PMV BLD AUTO: 10.2 FL (ref 9.2–12.9)
POTASSIUM SERPL-SCNC: 4.7 MMOL/L (ref 3.5–5.1)
RBC # BLD AUTO: 3.64 M/UL (ref 4.6–6.2)
SODIUM SERPL-SCNC: 134 MMOL/L (ref 136–145)
WBC # BLD AUTO: 10.25 K/UL (ref 3.9–12.7)

## 2019-03-27 PROCEDURE — 36415 COLL VENOUS BLD VENIPUNCTURE: CPT

## 2019-03-27 PROCEDURE — 99900038 HC OT GENERIC THERAPY SCREENING (STAT)

## 2019-03-27 PROCEDURE — 85025 COMPLETE CBC W/AUTO DIFF WBC: CPT

## 2019-03-27 PROCEDURE — 80048 BASIC METABOLIC PNL TOTAL CA: CPT

## 2019-03-27 PROCEDURE — 97116 GAIT TRAINING THERAPY: CPT

## 2019-03-27 PROCEDURE — 97161 PT EVAL LOW COMPLEX 20 MIN: CPT

## 2019-03-27 PROCEDURE — S0077 INJECTION, CLINDAMYCIN PHOSP: HCPCS | Performed by: PHYSICIAN ASSISTANT

## 2019-03-27 PROCEDURE — 25000003 PHARM REV CODE 250: Performed by: PHYSICIAN ASSISTANT

## 2019-03-27 RX ORDER — TRAMADOL HYDROCHLORIDE 50 MG/1
TABLET ORAL
Qty: 60 TABLET | Refills: 0 | Status: SHIPPED | OUTPATIENT
Start: 2019-03-27 | End: 2021-02-01 | Stop reason: ALTCHOICE

## 2019-03-27 RX ADMIN — OXYCODONE HYDROCHLORIDE 10 MG: 5 TABLET ORAL at 10:03

## 2019-03-27 RX ADMIN — OXYCODONE AND ACETAMINOPHEN 1 TABLET: 10; 325 TABLET ORAL at 07:03

## 2019-03-27 RX ADMIN — DOCUSATE SODIUM 100 MG: 100 CAPSULE, LIQUID FILLED ORAL at 10:03

## 2019-03-27 RX ADMIN — DOXYCYCLINE HYCLATE 100 MG: 100 TABLET, COATED ORAL at 10:03

## 2019-03-27 RX ADMIN — ONDANSETRON 8 MG: 8 TABLET, ORALLY DISINTEGRATING ORAL at 06:03

## 2019-03-27 RX ADMIN — CLINDAMYCIN IN 5 PERCENT DEXTROSE 900 MG: 18 INJECTION, SOLUTION INTRAVENOUS at 10:03

## 2019-03-27 NOTE — PLAN OF CARE
Problem: Adult Inpatient Plan of Care  Goal: Plan of Care Review  Outcome: Ongoing (interventions implemented as appropriate)  Patient is s/p Right knee muscle release/re-alignment POD#1. AAOx4, on RA, VSS, afebrile. Scheduled antibiotics given. Pain managed effectively with scheduled OxyIR 10mg PO. Up using RW to the bathroom.

## 2019-03-27 NOTE — PROGRESS NOTES
[facility]  Orthopedics  Progress Note    Patient Name: Leonid Harris III  MRN: [id number]  Admission Date: 3/26/2019  Hospital Length of Stay: 0 days  Attending Provider: Fran Shelton MD  Primary Care Provider: David Lane MD  Follow-up For: Procedure(s) (LRB):  REALIGNMENT,Anterior medial/realignment of muscle (Right)  RELEASE, KNEE, LATERAL RETINACULA (Right)  TUBERCLEPLASTY,Anterior tibial (Right)    Post-Operative Day: 1 Day Post-Op  Subjective:     Principal Problem:Internal derangement of knee joint, right    Principal Orthopedic Problem: Status post revision right patellofemoral arthroplasty with tibial tubercle osteotomy with Dr. Shelton on 03/26/2019    Interval History: No acute events overnight.  Pain well controlled.  Eating and drinking without nausea or vomiting.  Voiding voluntarily.   Would like to go home today.  Denies fever, chills, chest pain, shortness of breath.        Review of patient's allergies indicates:   Allergen Reactions    Vancomycin analogues      DRESS       Current Facility-Administered Medications   Medication    bisoprolol-hydrochlorothiazide 2.5-6.25 mg per tablet 1 tablet    clindamycin 900 MG/50 ML D5W 900 mg/50 mL IVPB 900 mg    cyclobenzaprine tablet 10 mg    docusate sodium capsule 100 mg    doxycycline tablet 100 mg    HYDROmorphone injection 0.5 mg    mupirocin 2 % ointment 1 g    ondansetron disintegrating tablet 8 mg    oxyCODONE immediate release tablet 10 mg    oxyCODONE-acetaminophen  mg per tablet 1 tablet    promethazine tablet 25 mg    sodium chloride 0.9% flush 3 mL    zolpidem tablet 10 mg     Objective:     Vital Signs (Most Recent):  Temp: 97.9 °F (36.6 °C) (03/27/19 0409)  Pulse: 73 (03/27/19 0409)  Resp: 18 (03/27/19 0409)  BP: 109/62 (03/27/19 0409)  SpO2: (!) 94 % (03/27/19 0409) Vital Signs (24h Range):  Temp:  [96.4 °F (35.8 °C)-97.9 °F (36.6 °C)] 97.9 °F (36.6 °C)  Pulse:  [73-88] 73  Resp:  [16-18]  "18  SpO2:  [94 %-100 %] 94 %  BP: ()/(55-65) 109/62     Weight: 88.5 kg (195 lb)  Height: 6' 4" (193 cm)  Body mass index is 23.74 kg/m².      Intake/Output Summary (Last 24 hours) at 3/27/2019 0750  Last data filed at 3/27/2019 0600  Gross per 24 hour   Intake 1950 ml   Output 1750 ml   Net 200 ml       General    Vitals reviewed.  Constitutional: He is oriented to person, place, and time. He appears well-developed and well-nourished. No distress.   HENT:   Mouth/Throat: No oropharyngeal exudate.   Eyes: Right eye exhibits no discharge. Left eye exhibits no discharge.   Neck: Normal range of motion.   Pulmonary/Chest: Effort normal and breath sounds normal. No respiratory distress.   Neurological: He is alert and oriented to person, place, and time. He has normal reflexes. No cranial nerve deficit. Coordination normal.   Psychiatric: He has a normal mood and affect. His behavior is normal. Judgment and thought content normal.           Right Knee Exam     Inspection   Erythema: absent  Scars: absent  Swelling: present  Effusion: absent  Deformity: absent  Bruising: absent    Range of Motion   Extension: 0   Flexion:  40 abnormal     Tests   Ligament Examination Lachman: normal (-1 to 2mm) PCL-Posterior Drawer: normal (0 to 2mm)     MCL - Valgus: normal (0 to 2mm)  LCL - Varus: normalPivot Shift: normal (Equal)Reverse Pivot Shift: normal (Equal)Dial Test at 30 degrees: normal (< 5 degrees)Dial Test at 90 degrees: normal (< 5 degrees)  Posterior Sag Test: negative  Posterolateral Corner: unstable (>15 degrees difference)  Patella   Passive Patellar Tilt: neutral  Patellar Tracking: normal  Patellar Glide (quadrants): Lateral - 1   Medial - 2  Q-Angle at 90 degrees: normal  Patellar Grind: negative  J-Sign: none    Other   Sensation: normal    Comments:  Postop dressing in place, CDI  Ankle and toes up and downgoing  Sensation intact to light touch throughout  Toes warm and well perfused  Able to perform " straight leg raise     SCDs, Naveen Hose, Hinged knee brace, and polar care in place        Left Knee Exam     Inspection   Erythema: absent  Scars: absent  Swelling: absent  Effusion: absent  Deformity: absent  Bruising: absent    Tenderness   The patient is experiencing no tenderness.     Range of Motion   Extension: 0   Flexion: 140     Tests   Meniscus   Michi:  Medial - negative Lateral - negative  Stability Lachman: normal (-1 to 2mm) PCL-Posterior Drawer: normal (0 to 2mm)  MCL - Valgus: normal (0 to 2mm)  LCL - Varus: normal (0 to 2mm)Pivot Shift: normal (Equal)Reverse Pivot Shift: normal (Equal)Dial Test at 30 degrees: normal (< 5 degrees)Dial Test at 90 degrees: normal (< 5 degrees)  Posterior Sag Test: negative  Posterolateral Corner: unstable (>15 degrees difference)  Patella   Patellar apprehension: negative  Passive Patellar Tilt: neutral  Patellar Tracking: normal  Patellar Glide (Quadrants): Lateral - 1 Medial - 2  Q-Angle at 90 degrees: normal  Patellar Grind: negative  J-Sign: J sign absent    Other   Meniscal Cyst: absent  Popliteal (Baker's) Cyst: absent  Sensation: normal    Right Hip Exam     Tests   Erin: negative  Left Hip Exam     Tests   Erin: negative          Reflexes     Left Side  Quadriceps:  2+  Achilles:  2+    Right Side   Quadriceps:  2+  Achilles:  2+    Vascular Exam     Right Pulses  Dorsalis Pedis:      2+  Posterior Tibial:      2+        Left Pulses  Dorsalis Pedis:      2+  Posterior Tibial:      2+            Significant Labs:   CBC:   Recent Labs   Lab 03/27/19  0523   WBC 10.25   HGB 11.1*   HCT 33.3*        All pertinent labs within the past 24 hours have been reviewed.    Significant Imaging: X-Ray: I have reviewed all pertinent results/findings and my personal findings are:  Tibia tubercle osteotomy with 2 screws in place without evidence of hardware failure.  Evidence of revision patella femoral plasty    Assessment/Plan:     * Internal derangement of knee  joint, right  Status post revision right patellofemoral arthroplasty with tibial tubercle osteotomy with Dr. Shelton on 03/26/2019    - PT/OT   - Multimodal pain control  - Sandra-op antibiotics  - DVT prophylaxis with ambulation, SCDs, and Aspirin     Dispo: anticipate DC home today after clearing PT                 Scott Orozco MD  Orthopedics  [facility]

## 2019-03-27 NOTE — PLAN OF CARE
Problem: Physical Therapy Goal  Goal: Physical Therapy Goal  Outcome: Outcome(s) achieved Date Met: 03/27/19  Patient evaluated and no acute care PT goals identified. Trial-ed RW vs crutches and gait trained with crutches with pt progressing from CGA to mod(I) for ambulation with gait bout of 2x300 ft. Step training performed with pt performing threshold step with crutches with supervision. (I) with bed mobility. Rec for d/c to home with assistance as needed with OP PT.

## 2019-03-27 NOTE — PT/OT/SLP EVAL
Physical Therapy Evaluation, Treatment, and Discharge Note    Patient Name:  Leonid Harris III   MRN:  [id number]    Recommendations:     Discharge Recommendations:  home, outpatient PT   Discharge Equipment Recommendations: none(Has recommended equipment)   Barriers to discharge: None    Assessment:     Leonid Harris III is a 69 y.o. male admitted with a medical diagnosis of Internal derangement of knee joint, right s/p revision R patellofemoral arthroplasty w/ tibial tubercle osteotomy. Pmhx significant for prostate cancer with prostatectomy and COPD.     At this time, patient is functioning at mod(I) with functional mobility with axillary crutches and does not require further acute PT services. Rec for d/c to home with assistance as needed with OP PT.    Recent Surgery: Procedure(s) (LRB):  REALIGNMENT,Anterior medial/realignment of muscle (Right)  RELEASE, KNEE, LATERAL RETINACULA (Right)  TUBERCLEPLASTY,Anterior tibial (Right) 1 Day Post-Op    Plan:     During this hospitalization, patient does not require further acute PT services.  Please re-consult if situation changes.      Subjective     Chief Complaint: Denies pain, eager to go home  Patient/Family Comments/goals: Go home  Pain/Comfort:  · Pain Rating 1: 0/10  · Location - Side 1: Right  · Location - Orientation 1: generalized  · Location 1: knee(pain centralized at tibial tuberosity)  · Pain Addressed 1: Reposition, Distraction, Other (see comments)(cryotherapy)  · Pain Rating Post-Intervention 1: 1/10    Patients cultural, spiritual, Synagogue conflicts given the current situation: no    Living Environment:  Patient resides in a [facility] with one threshold step to enter. He has access to a walk-in shower.    Prior to admission, patients level of function was independent with ambulation, ADLs, and IADLs including driving, cycling, and playing volleyball. Pt is retired. He is very active including competing in [employer] and is hoping to return to  volleyball and cycling. Equipment used at home: none.  DME owned (not currently used): rolling walker and axillary crutches.  Upon discharge, patient will have assistance from his spouse, who is also retired.    Objective:     Communicated with GHAZAL Kim prior to session.  Patient found HOB elevated with cryotherapy, FCD, SCD, peripheral IV upon PT entry to room. Patient agreeable to evaluation.    General Precautions: Standard,     Orthopedic Precautions:RLE weight bearing as tolerated   Braces: Hinged knee brace(Locked in extension for ambulation)     Patient donned yellow socks and gait belt for OOB mobility.    Reviewed orthopedic precautions: locked in extension in hinged knee brace during gait and WBAT.. Patient verbalized understanding. Orthopedic precautions reinforced during session with verbal and tactile cues.     Exams:  · Cognition:   · Patient is oriented to name, , date, place, situation.  · Pt follows approximately 100% of one and two step commands.    · Mood: Pleasant and cooperative.   · Musculoskeletal:  · Posture: Protective guarding surgical joint  · LE ROM/Strength: 5/5 bilateral hip flexion, nonsurgical knee extension, bilateral ankle dorsiflexion and plantarflexion. AROM surgical knee not assessed 2/2 hinged knee brace locked in extension. Surgical knee strength not formally assessed but WFL with mobility.   · Neuromuscular:  · Sensation: Intact to light touch bilateral LEs. Pt denied paresthesias.   · Coordination/Tone/Reflexes: No impairments identified with functional mobility. No formal testing performed.   · Balance: mod(I) for dynamic standing with bilateral UE support.   · Visual-vestibular: No impairments identified with functional mobility. No formal testing performed.  · Integument:  Visible skin intact and surgical extremity dressing clean and dry.   · Cardiopulmonary:  · Edema: None noted   · Color/temperature/pulses: Equal      Functional Mobility:  · Bed Mobility:    "  · Supine to Sit: independence  · Sit to Supine: independence  · Transfers:     · Sit to Stand:  modified independence with rolling walker and axillary crutches  · Gait: x15 ft with RW and 2x300 ft with axillary crutches. Pt able to progress from CGA-mod(I) during gait bout with axillary crutches with no LOB noted. Hinged knee brace remains locked in extension during OOB mobility.   · Stairs: Ascended/descended 2x2" threshold step with axillary crutches with step to pattern with SBA progressing to mod(I).    AM-PAC 6 CLICK MOBILITY  Total Score:23       Therapeutic Activities and Exercises:   Supine<>sit, sit<>stand, gait RW vs axillary with gait and stair training performed with axillary crutches    PT educated patient and spouse re: PT plan of care, role of PT, safety with OOB mobility, use of RW vs crutches, transfer technique, stairs, car transfer, use of ice, and orthopedic precautions.  Pt and spouse verbalize understanding.      AM-PAC 6 CLICK MOBILITY  Total Score:23     Patient left HOB elevated with all lines intact, call button in reach, RN Julio notified, spouse present and cryotherapy donned.    GOALS:   Multidisciplinary Problems     Physical Therapy Goals     Not on file                History:     Past Medical History:   Diagnosis Date    Basal cell carcinoma     COPD (chronic obstructive pulmonary disease)     Hyperlipidemia     MRSA (methicillin resistant staph aureus) culture positive     Prostate cancer      s/p prostatectomy,     PVC (premature ventricular contraction)        Past Surgical History:   Procedure Laterality Date    ARTHROPLASTY-KNEE WAVE ARTHROSURFACE PATELLO-FEMORAL REPLACEMENT Right 2/27/2018    [facility]    ARTHROSCOPIC PARTIAL SYNOVECTOMY-KNEE Right 2/27/2018    [facility] OR    ARTHROSCOPY-KNEE DEBRIDEMENT Right 4/12/2018    [facility] OR    INCISION AND DRAINAGE (I&D) Right " 4/12/2018    [facility] at [facility] OR    INJECTION-STEROID; RIGHT KNEE AMNIOX Right 2/27/2018    [facility] at [facility] OR    KNEE SURGERY      MENISCECTOMY-MEDIAL ARTHROSCOPIC Right 2/27/2018    [facility] at [facility] OR    PROSTATECTOMY      TONSILLECTOMY         Time Tracking:     PT Received On: 03/27/19  PT Start Time: 0911     PT Stop Time: 0945  PT Total Time (min): 34 min     Billable Minutes: Evaluation 15 and Gait Training 19      Lidya Lee, PT  03/27/2019

## 2019-03-27 NOTE — PLAN OF CARE
03/27/19 0922   Final Note   Assessment Type Final Discharge Note   Anticipated Discharge Disposition Home  (outpatient PT)   What phone number can be called within the next 1-3 days to see how you are doing after discharge? [phone]   Hospital Follow Up  Appt(s) scheduled? Yes   Discharge plans and expectations educations in teach back method with documentation complete? Yes   Right Care Referral Info   Post Acute Recommendation No Care

## 2019-03-27 NOTE — HPI
Status post revision right patellofemoral arthroplasty with tibial tubercle osteotomy with Dr. Shelton on 03/26/2019

## 2019-03-27 NOTE — DISCHARGE SUMMARY
[facility]  Orthopedics  Discharge Summary      Patient Name: Leonid Harris III  MRN: [id number]  Admission Date: 3/26/2019  Hospital Length of Stay: 0 days  Discharge Date and Time:  03/27/2019 7:35 AM  Attending Physician: Fran Shelton MD   Discharging Provider: Scott Orozco MD  Primary Care Provider: David Lane MD    HPI:   Status post revision right patellofemoral arthroplasty with tibial tubercle osteotomy with Dr. Shelton on 03/26/2019    Procedure(s) (LRB):  REALIGNMENT,Anterior medial/realignment of muscle (Right)  RELEASE, KNEE, LATERAL RETINACULA (Right)  TUBERCLEPLASTY,Anterior tibial (Right)      Hospital Course:  Patient underwent knee surgery and was transferred to PACU in stable condition.  In PACU, patient received appropriate post-operative care and was transferred to medical floor for neurovascular monitoring and pain control.  There patient progressed appropriately. Once patient was ready, male was discharged home with plans for physical therapy and follow-up with the operative surgeon.    Diet: Regular          Significant Diagnostic Studies: Labs:   CMP   Recent Labs   Lab 03/27/19  0523   *   K 4.7      CO2 24      BUN 21   CREATININE 1.1   CALCIUM 9.0   ANIONGAP 4*   ESTGFRAFRICA >60   EGFRNONAA >60    and CBC   Recent Labs   Lab 03/27/19  0523   WBC 10.25   HGB 11.1*   HCT 33.3*          Pending Diagnostic Studies:     Procedure Component Value Units Date/Time    X-Ray Knee 1 or 2 View Right [[id number]]     Order Status:  Sent Lab Status:  No result         Final Active Diagnoses:    Diagnosis Date Noted POA    PRINCIPAL PROBLEM:  Internal derangement of knee joint, right [M23.91] 10/16/2017 Yes    S/P right knee surgery [Z98.890] 03/26/2019 Not Applicable      Problems Resolved During this Admission:      Discharged Condition: good    Disposition: Home or Self Care    Follow Up:    Patient Instructions:   No discharge procedures  on file.  Medications:  Reconciled Home Medications:      Medication List      CONTINUE taking these medications    aspirin 325 MG tablet  Take 1 tablet (325 mg total) by mouth once daily.     atorvastatin 10 MG tablet  Commonly known as:  LIPITOR  Take 10 mg by mouth once daily.     bisoprolol-hydrochlorothiazide 2.5-6.25 mg 2.5-6.25 mg Tab  Commonly known as:  ZIAC  Take 1 tablet by mouth once daily.     celecoxib 200 MG capsule  Commonly known as:  CeleBREX  Take 1 capsule (200 mg total) by mouth once daily.     clotrimazole 1 % cream  Commonly known as:  LOTRIMIN  Apply topically 2 (two) times daily.     doxycycline 100 MG capsule  Commonly known as:  MONODOX  Take 1 capsule (100 mg total) by mouth 2 (two) times daily.     doxycycline 100 MG tablet  Commonly known as:  VIBRA-TABS  Take 1 tablet (100 mg total) by mouth once daily.     mupirocin 2 % ointment  Commonly known as:  BACTROBAN  Apply topically 3 (three) times daily. Preoperative infection intervention.     oxyCODONE-acetaminophen  mg per tablet  Commonly known as:  PERCOCET  Take 1 tablet by mouth every 6 (six) hours as needed for Pain.     pregabalin 75 MG capsule  Commonly known as:  LYRICA  Take 75 mg by mouth 2 (two) times daily.     promethazine 25 MG tablet  Commonly known as:  PHENERGAN  Take 1 tablet (25 mg total) by mouth every 6 (six) hours as needed for Nausea. Take this if you feel nauseated     sulfamethoxazole-trimethoprim 800-160mg 800-160 mg Tab  Commonly known as:  BACTRIM DS  Take 1 tablet by mouth 2 (two) times daily.     traMADol 50 mg tablet  Commonly known as:  ULTRAM  take 1 tablets by mouth every 4-6 hours for pain as needed     zolpidem 10 mg Tab  Commonly known as:  AMBIEN        ASK your doctor about these medications    mupirocin calcium 2% nasl oint 2 % Oint  Commonly known as:  BACTROBAN  by Nasal route 2 (two) times daily. for 14 days  Ask about: Should I take this medication?            Scott Orozco,  MD  Orthopedics  [facility]

## 2019-03-27 NOTE — PLAN OF CARE
Problem: Occupational Therapy Goal  Goal: Occupational Therapy Goal  OT screen completed, therefore no acute OT goals established. Pt's main limitations are regarding gait and ROM of (L) knee- PT to follow. Discussed walk-in shower transfer set up, toilet set up with DME, and LB dressing techniques. Neither patient, nor wife expressed concerns regarding pt's self-care. Wife is retired so available to assist patient 24/7 as needed. Pt already scheduled to follow up with OP PT upon D/C. No further DME needs- pt already owns axillary crutches, RW and has built-in shower bench.     Ashleigh Ornelas, OTR/L  3/27/2019

## 2019-03-27 NOTE — PLAN OF CARE
SW met with pt at bedside to complete discharge assessment, verified PCP and uses [facility] on [address] in [place], [place].  Pt has crutches, RW, BSC, SC, straight cane and grab bars around tub.  Pt's spouse will provide transportation home.  Pt has Outpt PT scheduled for 3/28/19 at 10am at [facility] in [place].  No needs identified by pt or SW.     03/27/19 0915   Discharge Assessment   Assessment Type Discharge Planning Assessment   Confirmed/corrected address and phone number on facesheet? Yes   Assessment information obtained from? Patient   Communicated expected length of stay with patient/caregiver no   Prior to hospitilization cognitive status: Alert/Oriented   Prior to hospitalization functional status: Independent   Current Functional Status: Assistive Equipment;Needs Assistance   Lives With spouse   Able to Return to Prior Arrangements yes   Is patient able to care for self after discharge? Unable to determine at this time (comments)   Readmission Within the Last 30 Days no previous admission in last 30 days   Patient currently being followed by outpatient case management? No   Patient currently receives any other outside agency services? No   Equipment Currently Used at Home crutches;walker, rolling;bedside commode;shower chair;cane, straight;grab bar   Do you have any problems affording any of your prescribed medications? No   Is the patient taking medications as prescribed? yes   Does the patient have transportation home? Yes   Transportation Anticipated family or friend will provide   Does the patient receive services at the [facility]? No   Discharge Plan A Home   DME Needed Upon Discharge  none   Patient/Family in Agreement with Plan yes

## 2019-03-27 NOTE — ASSESSMENT & PLAN NOTE
Status post revision right patellofemoral arthroplasty with tibial tubercle osteotomy with Dr. Shelton on 03/26/2019    - PT/OT   - Multimodal pain control  - Sandra-op antibiotics  - DVT prophylaxis with ambulation, SCDs, and Aspirin     Dispo: anticipate DC home today after clearing PT

## 2019-03-27 NOTE — SUBJECTIVE & OBJECTIVE
"Principal Problem:Internal derangement of knee joint, right    Principal Orthopedic Problem: Status post revision right patellofemoral arthroplasty with tibial tubercle osteotomy with Dr. Shelton on 03/26/2019    Interval History: No acute events overnight.  Pain well controlled.  Eating and drinking without nausea or vomiting.  Voiding voluntarily.   Would like to go home today.  Denies fever, chills, chest pain, shortness of breath.        Review of patient's allergies indicates:   Allergen Reactions    Vancomycin analogues      DRESS       Current Facility-Administered Medications   Medication    bisoprolol-hydrochlorothiazide 2.5-6.25 mg per tablet 1 tablet    clindamycin 900 MG/50 ML D5W 900 mg/50 mL IVPB 900 mg    cyclobenzaprine tablet 10 mg    docusate sodium capsule 100 mg    doxycycline tablet 100 mg    HYDROmorphone injection 0.5 mg    mupirocin 2 % ointment 1 g    ondansetron disintegrating tablet 8 mg    oxyCODONE immediate release tablet 10 mg    oxyCODONE-acetaminophen  mg per tablet 1 tablet    promethazine tablet 25 mg    sodium chloride 0.9% flush 3 mL    zolpidem tablet 10 mg     Objective:     Vital Signs (Most Recent):  Temp: 97.9 °F (36.6 °C) (03/27/19 0409)  Pulse: 73 (03/27/19 0409)  Resp: 18 (03/27/19 0409)  BP: 109/62 (03/27/19 0409)  SpO2: (!) 94 % (03/27/19 0409) Vital Signs (24h Range):  Temp:  [96.4 °F (35.8 °C)-97.9 °F (36.6 °C)] 97.9 °F (36.6 °C)  Pulse:  [73-88] 73  Resp:  [16-18] 18  SpO2:  [94 %-100 %] 94 %  BP: ()/(55-65) 109/62     Weight: 88.5 kg (195 lb)  Height: 6' 4" (193 cm)  Body mass index is 23.74 kg/m².      Intake/Output Summary (Last 24 hours) at 3/27/2019 0727  Last data filed at 3/27/2019 0600  Gross per 24 hour   Intake 1950 ml   Output 1750 ml   Net 200 ml       General    Vitals reviewed.  Constitutional: He is oriented to person, place, and time. He appears well-developed and well-nourished. No distress.   HENT:   Mouth/Throat: No " oropharyngeal exudate.   Eyes: Right eye exhibits no discharge. Left eye exhibits no discharge.   Neck: Normal range of motion.   Pulmonary/Chest: Effort normal and breath sounds normal. No respiratory distress.   Neurological: He is alert and oriented to person, place, and time. He has normal reflexes. No cranial nerve deficit. Coordination normal.   Psychiatric: He has a normal mood and affect. His behavior is normal. Judgment and thought content normal.           Right Knee Exam     Inspection   Erythema: absent  Scars: absent  Swelling: present  Effusion: absent  Deformity: absent  Bruising: absent    Range of Motion   Extension: 0   Flexion:  40 abnormal     Tests   Ligament Examination Lachman: normal (-1 to 2mm) PCL-Posterior Drawer: normal (0 to 2mm)     MCL - Valgus: normal (0 to 2mm)  LCL - Varus: normalPivot Shift: normal (Equal)Reverse Pivot Shift: normal (Equal)Dial Test at 30 degrees: normal (< 5 degrees)Dial Test at 90 degrees: normal (< 5 degrees)  Posterior Sag Test: negative  Posterolateral Corner: unstable (>15 degrees difference)  Patella   Passive Patellar Tilt: neutral  Patellar Tracking: normal  Patellar Glide (quadrants): Lateral - 1   Medial - 2  Q-Angle at 90 degrees: normal  Patellar Grind: negative  J-Sign: none    Other   Sensation: normal    Comments:  Postop dressing in place, CDI  Ankle and toes up and downgoing  Sensation intact to light touch throughout  Toes warm and well perfused  Able to perform straight leg raise     SCDs, Naveen Hose, Hinged knee brace, and polar care in place        Left Knee Exam     Inspection   Erythema: absent  Scars: absent  Swelling: absent  Effusion: absent  Deformity: absent  Bruising: absent    Tenderness   The patient is experiencing no tenderness.     Range of Motion   Extension: 0   Flexion: 140     Tests   Meniscus   Michi:  Medial - negative Lateral - negative  Stability Lachman: normal (-1 to 2mm) PCL-Posterior Drawer: normal (0 to 2mm)  MCL -  Valgus: normal (0 to 2mm)  LCL - Varus: normal (0 to 2mm)Pivot Shift: normal (Equal)Reverse Pivot Shift: normal (Equal)Dial Test at 30 degrees: normal (< 5 degrees)Dial Test at 90 degrees: normal (< 5 degrees)  Posterior Sag Test: negative  Posterolateral Corner: unstable (>15 degrees difference)  Patella   Patellar apprehension: negative  Passive Patellar Tilt: neutral  Patellar Tracking: normal  Patellar Glide (Quadrants): Lateral - 1 Medial - 2  Q-Angle at 90 degrees: normal  Patellar Grind: negative  J-Sign: J sign absent    Other   Meniscal Cyst: absent  Popliteal (Baker's) Cyst: absent  Sensation: normal    Right Hip Exam     Tests   Erin: negative  Left Hip Exam     Tests   Erin: negative          Reflexes     Left Side  Quadriceps:  2+  Achilles:  2+    Right Side   Quadriceps:  2+  Achilles:  2+    Vascular Exam     Right Pulses  Dorsalis Pedis:      2+  Posterior Tibial:      2+        Left Pulses  Dorsalis Pedis:      2+  Posterior Tibial:      2+            Significant Labs:   CBC:   Recent Labs   Lab 03/27/19  0523   WBC 10.25   HGB 11.1*   HCT 33.3*        All pertinent labs within the past 24 hours have been reviewed.    Significant Imaging: X-Ray: I have reviewed all pertinent results/findings and my personal findings are:  Tibia tubercle osteotomy with 2 screws in place without evidence of hardware failure.  Evidence of revision patella femoral plasty

## 2019-03-27 NOTE — PT/OT/SLP EVAL
"Occupational Therapy  Screen    Name: Leonid Harris III  MRN: [id number]  Admitting Diagnosis:  Internal derangement of knee joint, right 1 Day Post-Op    Recommendations:     Discharge Recommendations: outpatient PT  Discharge Equipment Recommendations:  none  Barriers to discharge:  None    Assessment:     Leonid Harris III is a 69 y.o. male with a medical diagnosis of Internal derangement of knee joint, right. OT screen completed, therefore no acute OT goals established. Pt's main limitations are regarding gait and ROM of (L) knee- PT to follow. Discussed walk-in shower transfer set up, toilet set up with DME, and LB dressing techniques. Neither patient, nor wife expressed concerns regarding pt's self-care. Wife is retired so available to assist patient 24/7 as needed. Pt already scheduled to follow up with OP PT upon D/C. No further DME needs- pt already owns axillary crutches, RW and has built-in shower bench.     Plan:     During this hospitalization, patient does not require further acute OT services.  Please re-consult if situation changes.    · Plan of Care Reviewed with: patient, spouse    Subjective     Chief Complaint: "I think we will be ok."   Patient/Family Comments/goals: return to PLOF    Occupational Profile:  Living Environment: Pt lives with wife in 1-story house with threshold [place]. Walk-in shower with built-in bench and grab bar. Raised toilet.   Previous level of function: independent with ADLs, functional mobility, IADLs (+) driving. Retired.   Equipment Used at home:  crutches, walker, rolling, bedside commode, bath bench, cane, straight, grab bar  Assistance upon Discharge: wife is retired and available 24/7    Pain/Comfort:  · Pain Rating 1: 0/10  · Pain Rating Post-Intervention 1: 0/10    Patients cultural, spiritual, Taoism conflicts given the current situation: no    Objective:     Communicated with: RN prior to session.  Patient found HOB elevated with peripheral IV upon OT " entry to room.    General Precautions: Standard, (fall risk)   Orthopedic Precautions:RLE weight bearing as tolerated   Braces: Hinged knee brace     Occupational Performance:    Bed Mobility/Functional Transfers:  Pt getting set up with IV antibiotics therefore no bed mobility or functional transfers completed. Pt had just worked with PT as well. Anticipating 3 hour car ride home, therefore patient requesting to rest until D/C.     Activities of Daily Living: Pt had already completed ADLs with wife- required min (A) with LB dressing- mainly lacing (R) foot and donning (R) sock secondary to decreased ROM in (R) knee. Educated on LB dressing techniques for improved ease.     Cognitive/Visual Perceptual:  Cognitive/Psychosocial Skills:     -       Oriented to: Person, Place, Time and Situation   -       Follows Commands/attention:Follows multistep  commands  -       Communication: clear/fluent  -       Safety awareness/insight to disability: intact   -       Mood/Affect/Coping skills/emotional control: Appropriate to situation  Visual/Perceptual:      -Intact     Physical Exam: no major physical concerns outside of routine post operative (R) LE limitations observed or noted by PT.     AMPAC 6 Click ADL:  AMPAC Total Score: 22    Treatment & Education:  OT role and POC, LB dressing techniques, shower transfer set up, DME for toileting   Education:    Patient left HOB elevated with all lines intact, call button in reach, RN notified and ortho coordinator (Ansley) and spouse present    GOALS:   Multidisciplinary Problems     Occupational Therapy Goals        Problem: Occupational Therapy Goal    Goal Priority Disciplines Outcome Interventions   Occupational Therapy Goal     OT, PT/OT                     History:     Past Medical History:   Diagnosis Date    Basal cell carcinoma     COPD (chronic obstructive pulmonary disease)     Hyperlipidemia     MRSA (methicillin resistant staph aureus) culture positive      Prostate cancer      s/p prostatectomy,     PVC (premature ventricular contraction)        Past Surgical History:   Procedure Laterality Date    ARTHROPLASTY-KNEE WAVE ARTHROSURFACE PATELLO-FEMORAL REPLACEMENT Right 2/27/2018    [facility]    ARTHROSCOPIC PARTIAL SYNOVECTOMY-KNEE Right 2/27/2018    [facility]    ARTHROSCOPY-KNEE DEBRIDEMENT Right 4/12/2018    [facility]    INCISION AND DRAINAGE (I&D) Right 4/12/2018    [facility] rFan [facility] at [facility]    INJECTION-STEROID; RIGHT KNEE AMNIOX Right 2/27/2018    [facility]    KNEE SURGERY      MENISCECTOMY-MEDIAL ARTHROSCOPIC Right 2/27/2018    [facility]    PROSTATECTOMY      TONSILLECTOMY         Time Tracking:     OT Date of Treatment: 03/27/19  OT Start Time: 0951  OT Stop Time: 1006  OT Total Time (min): 15 min    Billable Minutes: Screen 15 minutes     Ashleigh Ornelas OTR/L  3/27/2019

## 2019-03-27 NOTE — HOSPITAL COURSE
Patient underwent knee surgery and was transferred to PACU in stable condition.  In PACU, patient received appropriate post-operative care and was transferred to medical floor for neurovascular monitoring and pain control.  There patient progressed appropriately. Once patient was ready, male was discharged home with plans for physical therapy and follow-up with the operative surgeon.    Diet: Regular

## 2019-04-05 ENCOUNTER — HOSPITAL ENCOUNTER (OUTPATIENT)
Dept: RADIOLOGY | Facility: HOSPITAL | Age: 69
Discharge: HOME | End: 2019-04-05
Attending: PHYSICIAN ASSISTANT
Payer: MEDICARE

## 2019-04-05 ENCOUNTER — OFFICE VISIT (OUTPATIENT)
Dept: SPORTS MEDICINE | Facility: CLINIC | Age: 69
End: 2019-04-05
Payer: MEDICARE

## 2019-04-05 VITALS
SYSTOLIC BLOOD PRESSURE: 124 MMHG | HEART RATE: 72 BPM | WEIGHT: 195 LBS | DIASTOLIC BLOOD PRESSURE: 73 MMHG | HEIGHT: 76 IN | BODY MASS INDEX: 23.75 KG/M2

## 2019-04-05 DIAGNOSIS — M23.91 INTERNAL DERANGEMENT OF RIGHT KNEE: ICD-10-CM

## 2019-04-05 DIAGNOSIS — M25.561 RIGHT KNEE PAIN, UNSPECIFIED CHRONICITY: ICD-10-CM

## 2019-04-05 DIAGNOSIS — Z98.890 S/P RIGHT KNEE SURGERY: ICD-10-CM

## 2019-04-05 DIAGNOSIS — M25.561 RIGHT KNEE PAIN, UNSPECIFIED CHRONICITY: Primary | ICD-10-CM

## 2019-04-05 PROCEDURE — 99024 PR POST-OP FOLLOW-UP VISIT: ICD-10-PCS | Mod: POP,,, | Performed by: PHYSICIAN ASSISTANT

## 2019-04-05 PROCEDURE — 99024 POSTOP FOLLOW-UP VISIT: CPT | Mod: POP,,, | Performed by: PHYSICIAN ASSISTANT

## 2019-04-05 PROCEDURE — 99999 PR PBB SHADOW E&M-EST. PATIENT-LVL III: ICD-10-PCS | Mod: PBBFAC,,, | Performed by: PHYSICIAN ASSISTANT

## 2019-04-05 PROCEDURE — 73560 XR KNEE 1 OR 2 VIEW RIGHT: ICD-10-PCS | Mod: 26,RT,, | Performed by: RADIOLOGY

## 2019-04-05 PROCEDURE — 99213 OFFICE O/P EST LOW 20 MIN: CPT | Mod: PBBFAC,25,PO | Performed by: PHYSICIAN ASSISTANT

## 2019-04-05 PROCEDURE — 99999 PR PBB SHADOW E&M-EST. PATIENT-LVL III: CPT | Mod: PBBFAC,,, | Performed by: PHYSICIAN ASSISTANT

## 2019-04-05 PROCEDURE — 73560 X-RAY EXAM OF KNEE 1 OR 2: CPT | Mod: 26,RT,, | Performed by: RADIOLOGY

## 2019-04-05 PROCEDURE — 73560 X-RAY EXAM OF KNEE 1 OR 2: CPT | Mod: TC,FY,PO,RT

## 2019-04-05 NOTE — PROGRESS NOTES
Subjective:          Chief Complaint: Leonid Harris III is a 69 y.o. male who had concerns including Post-op Evaluation of the Right Knee.    HPI  Patient presents to clinic for post-op evaluation of right knee. Pain today is 0-1/10 at rest and 2/10 at its worst. He is currently full weight bearing with T-scope brace locked in extension with gait. Occasional nausea that is controlled with phenergan. Denies fever, chills, CP, SOB, calf pain, and vomiting. He is no longer taking pain medication. He has not started formal PT yet, but plans to start at [facility] in [place].    Date of Procedure: 3/26/2019   Procedure: Procedure(s) (LRB):  REALIGNMENT,Anterior medial/realignment of muscle (Right)  RELEASE, KNEE, LATERAL RETINACULA (Right)  TUBERCLEPLASTY,Anterior tibial (Right)      Surgeon(s) and Role:     * Fran Shelton MD - Primary     1st Assisting Surgeon: Scott Orozco MD     Due to the complexity of the procedure it was medically necessary for Scott Orozco MD to perform first assistant duties.     2nd Assistant:  Fanny Hines PA-C   Pre-Operative Diagnosis: Internal derangement of right knee [M23.91]  Painful orthopaedic hardware [T84.84XA]  Old disruption of ligament of right knee [M23.51]     Post-Operative Diagnosis: Post-Op Diagnosis Codes:     * Internal derangement of right knee [M23.91]     * Painful orthopaedic hardware [T84.84XA]     * Old disruption of ligament of right knee [M23.51]    Review of Systems   Constitution: Negative. Negative for chills, fever, weight gain and weight loss.   HENT: Negative for congestion and sore throat.    Eyes: Negative for blurred vision and double vision.   Cardiovascular: Negative for chest pain, leg swelling and palpitations.   Respiratory: Negative for cough and shortness of breath.    Hematologic/Lymphatic: Does not bruise/bleed easily.   Skin: Negative for itching, poor wound healing and rash.   Musculoskeletal: Positive for joint pain, joint swelling and  stiffness. Negative for back pain, muscle weakness and myalgias.   Gastrointestinal: Negative for abdominal pain, constipation, diarrhea, nausea and vomiting.   Genitourinary: Negative.  Negative for frequency and hematuria.   Neurological: Negative for dizziness, headaches, numbness, paresthesias and sensory change.   Psychiatric/Behavioral: Negative for altered mental status and depression. The patient is not nervous/anxious.    Allergic/Immunologic: Negative for hives.       Pain Related Questions  Over the past 3 days, what was your average pain during activity? (I.e. running, jogging, walking, climbing stairs, getting dressed, ect.): 1  Over the past 3 days, what was your highest pain level?: 2  Over the past 3 days, what was your lowest pain level? : 0    Other  How many nights a week are you awakened by your affected body part?: 0  Was the patient's HEIGHT measured or patient reported?: Patient Reported  Was the patient's WEIGHT measured or patient reported?: Measured      Objective:        General: Leonid is well-developed, well-nourished, appears stated age, in no acute distress, alert and oriented to time, place and person.     General    Vitals reviewed.  Constitutional: He is oriented to person, place, and time. He appears well-developed and well-nourished. No distress.   Neck: Normal range of motion.   Cardiovascular: Normal rate and regular rhythm.    Pulmonary/Chest: Effort normal. No respiratory distress.   Neurological: He is alert and oriented to person, place, and time.   Psychiatric: He has a normal mood and affect. His behavior is normal. Thought content normal.           Right Knee Exam     Inspection   Erythema: present  Scars: present  Swelling: present  Effusion: present  Deformity: absent  Bruising: absent    Tenderness   The patient is experiencing no tenderness.     Range of Motion   Extension: 0   Right knee flexion: 45.     Other   Sensation: normal    Comments:  Incision healing well.  Nylon sutures in place. No signs of infection or necrosis. No active drainage. NVI          RADIOGRAPHS:  Right knee:  FINDINGS:  AP and lateral views of the right knee reveal cancellous screws inserted anteriorly in the proximal aspect of the tibial diametaphyseal is compatible with tibial tubercle osteotomy.  These are intact.  I detect no complication of recent surgery.    Prior studies have revealed remote resurfacing of the intercondylar notch of the right femur and resurfacing of the posterior aspect of the right patella with no significant change on today's study.  I suspect fluid in the right suprapatellar bursa similar to 2/20 July 2019.        Assessment:       Encounter Diagnoses   Name Primary?    Right knee pain, unspecified chronicity Yes    S/P right knee surgery     Internal derangement of right knee           Plan:       1. Provided patient with operative note.  2. Evaluated incision. No signs of infection. Aquacel removed. No drainage.  3. Continue  mg once a day.  4. Start PT per protocol at [facility] in [place]. New referral placed.  5. RTC in 1 weeks with Kennedy Brambila for suture removal.   6. Physical therapy:  Patient began physical therapy postop day 3 - 5.  Range of motion:  Patient begin range of motion for the 1st 2 weeks from 0-45 degrees increasing at 2-3 weeks to 60° at 3-4 weeks to 90°.  Patient avoid flexion past 90° for the 1st 6 weeks from surgery. Weightbearing status:  Full weight-bearing as tolerated with the knee immobilizer locked in extension with gait for the 1st 6 weeks.                             Patient questionnaires may have been collected.

## 2019-04-10 ENCOUNTER — OFFICE VISIT (OUTPATIENT)
Dept: SPORTS MEDICINE | Facility: CLINIC | Age: 69
End: 2019-04-10
Payer: MEDICARE

## 2019-04-10 VITALS
SYSTOLIC BLOOD PRESSURE: 111 MMHG | DIASTOLIC BLOOD PRESSURE: 76 MMHG | HEART RATE: 65 BPM | HEIGHT: 76 IN | WEIGHT: 195 LBS | BODY MASS INDEX: 23.75 KG/M2

## 2019-04-10 DIAGNOSIS — M23.91 INTERNAL DERANGEMENT OF RIGHT KNEE: ICD-10-CM

## 2019-04-10 DIAGNOSIS — Z98.890 S/P RIGHT KNEE SURGERY: Primary | ICD-10-CM

## 2019-04-10 DIAGNOSIS — M23.91 INTERNAL DERANGEMENT OF KNEE JOINT, RIGHT: ICD-10-CM

## 2019-04-10 DIAGNOSIS — Z09 SURGERY FOLLOW-UP EXAMINATION: ICD-10-CM

## 2019-04-10 PROCEDURE — 99024 PR POST-OP FOLLOW-UP VISIT: ICD-10-PCS | Mod: POP,,, | Performed by: PHYSICIAN ASSISTANT

## 2019-04-10 PROCEDURE — 99999 PR PBB SHADOW E&M-EST. PATIENT-LVL IV: ICD-10-PCS | Mod: PBBFAC,,, | Performed by: PHYSICIAN ASSISTANT

## 2019-04-10 PROCEDURE — 99024 POSTOP FOLLOW-UP VISIT: CPT | Mod: POP,,, | Performed by: PHYSICIAN ASSISTANT

## 2019-04-10 PROCEDURE — 99999 PR PBB SHADOW E&M-EST. PATIENT-LVL IV: CPT | Mod: PBBFAC,,, | Performed by: PHYSICIAN ASSISTANT

## 2019-04-10 PROCEDURE — 99214 OFFICE O/P EST MOD 30 MIN: CPT | Mod: PBBFAC,PO | Performed by: PHYSICIAN ASSISTANT

## 2019-04-10 RX ORDER — CELECOXIB 200 MG/1
200 CAPSULE ORAL DAILY
Qty: 60 CAPSULE | Refills: 0 | Status: SHIPPED | OUTPATIENT
Start: 2019-04-10 | End: 2019-06-09

## 2019-04-10 NOTE — PROGRESS NOTES
Subjective:          Chief Complaint: Leonid Harris III is a 69 y.o. male who had concerns including Post-op Evaluation of the Right Knee.    Patient presents to clinic for post-op evaluation of right knee. Pain today is 0-1/10 at rest and 2/10 at its worst. He is currently full weight bearing with T-scope brace locked in extension with gait. Occasional nausea that is controlled with phenergan. Denies fever, chills, CP, SOB, calf pain, and vomiting. He is no longer taking pain medication. He has not started formal PT yet, but plans to start at [facility] in [place].    Date of Procedure: 3/26/2019   Procedure: Procedure(s) (LRB):  REALIGNMENT,Anterior medial/realignment of muscle (Right)  RELEASE, KNEE, LATERAL RETINACULA (Right)  TUBERCLEPLASTY,Anterior tibial (Right)      Surgeon(s) and Role:     * Fran Shelton MD - Primary     1st Assisting Surgeon: Scott Orozco MD     Due to the complexity of the procedure it was medically necessary for Scott Orozco MD to perform first assistant duties.     2nd Assistant:  Fanny Hines PA-C   Pre-Operative Diagnosis: Internal derangement of right knee (M23.91)  Painful orthopaedic hardware (T84.84XA)  Old disruption of ligament of right knee (M23.51)     Post-Operative Diagnosis: Post-Op Diagnosis Codes:     * Internal derangement of right knee (M23.91)     * Painful orthopaedic hardware (T84.84XA)     * Old disruption of ligament of right knee (M23.51)            Review of Systems   Constitution: Negative. Negative for chills, fever, weight gain and weight loss.   HENT: Negative for congestion and sore throat.    Eyes: Negative for blurred vision and double vision.   Cardiovascular: Negative for chest pain, leg swelling and palpitations.   Respiratory: Negative for cough and shortness of breath.    Hematologic/Lymphatic: Does not bruise/bleed easily.   Skin: Negative for itching, poor wound healing and rash.   Musculoskeletal: Positive for joint pain, joint swelling  and stiffness. Negative for back pain, muscle weakness and myalgias.   Gastrointestinal: Negative for abdominal pain, constipation, diarrhea, nausea and vomiting.   Genitourinary: Negative.  Negative for frequency and hematuria.   Neurological: Negative for dizziness, headaches, numbness, paresthesias and sensory change.   Psychiatric/Behavioral: Negative for altered mental status and depression. The patient is not nervous/anxious.    Allergic/Immunologic: Negative for hives.       Pain Related Questions  Over the past 3 days, what was your average pain during activity? (I.e. running, jogging, walking, climbing stairs, getting dressed, ect.): 2  Over the past 3 days, what was your highest pain level?: 1  Over the past 3 days, what was your lowest pain level? : 0    Other  How many nights a week are you awakened by your affected body part?: 0  Was the patient's HEIGHT measured or patient reported?: Patient Reported  Was the patient's WEIGHT measured or patient reported?: Measured      Objective:        General: Leonid is well-developed, well-nourished, appears stated age, in no acute distress, alert and oriented to time, place and person.     General    Vitals reviewed.  Constitutional: He is oriented to person, place, and time. He appears well-developed and well-nourished. No distress.   Neck: Normal range of motion.   Cardiovascular: Normal rate and regular rhythm.    Pulmonary/Chest: Effort normal. No respiratory distress.   Neurological: He is alert and oriented to person, place, and time.   Psychiatric: He has a normal mood and affect. His behavior is normal. Thought content normal.           Right Knee Exam     Inspection   Erythema: present  Scars: present  Swelling: present  Effusion: present  Deformity: absent  Bruising: absent    Tenderness   The patient is experiencing no tenderness.     Range of Motion   Extension: 0   Right knee flexion: 45.     Other   Sensation: normal    Comments:  Incision healing  well. Nylon sutures in place. No signs of infection or necrosis. No active drainage. NVI              RADIOGRAPHS:  Right knee:  FINDINGS:  AP and lateral views of the right knee reveal cancellous screws inserted anteriorly in the proximal aspect of the tibial diametaphyseal is compatible with tibial tubercle osteotomy.  These are intact.  I detect no complication of recent surgery.    Prior studies have revealed remote resurfacing of the intercondylar notch of the right femur and resurfacing of the posterior aspect of the right patella with no significant change on today's study.  I suspect fluid in the right suprapatellar bursa similar to 2/20 July 2019.        Assessment:       Encounter Diagnoses   Name Primary?    Internal derangement of knee joint, right     Internal derangement of right knee     S/P right knee surgery Yes    Surgery follow-up examination           Plan:        Dr. Fran Shelton was present in clinic today and directly involved in the decision making process of this clinic evaluation.    1. Provided patient with operative note.  2. Evaluated incision. No signs of infection. Sutures removed. No drainage. Mepore over site while wearing JOBST stocking.  3. Continue  mg q daily. Doxycycline 100 mg and celebrex 200 mg BID  4. Start PT per protocol at [facility] in [place]. New referral placed.  5. IKDC, SF-12 and KOOS was not filled out today in clinic.     RTC in 4 weeks with Fanny Hines PA-C Patient will not fill out IKDC, SF-12 and KOOS on return.    6. Physical therapy:  Patient began physical therapy postop day 3 - 5.  Range of motion:  Patient begin range of motion for the 1st 2 weeks from 0-45 degrees increasing at 2-3 weeks to 60° at 3-4 weeks to 90°.  Patient avoid flexion past 90° for the 1st 6 weeks from surgery. Weightbearing status:  Full weight-bearing as tolerated with the knee immobilizer locked in extension with gait for the 1st 6 weeks.                        Patient  questionnaires may have been collected.

## 2019-04-14 ENCOUNTER — PATIENT MESSAGE (OUTPATIENT)
Dept: SPORTS MEDICINE | Facility: CLINIC | Age: 69
End: 2019-04-14

## 2019-04-16 ENCOUNTER — TELEPHONE (OUTPATIENT)
Dept: SPORTS MEDICINE | Facility: CLINIC | Age: 69
End: 2019-04-16

## 2019-04-16 ENCOUNTER — PATIENT MESSAGE (OUTPATIENT)
Dept: SPORTS MEDICINE | Facility: CLINIC | Age: 69
End: 2019-04-16

## 2019-04-29 ENCOUNTER — TELEPHONE (OUTPATIENT)
Dept: SPORTS MEDICINE | Facility: CLINIC | Age: 69
End: 2019-04-29

## 2019-05-01 LAB
ACID FAST MOD KINY STN SPEC: NORMAL
MYCOBACTERIUM SPEC QL CULT: NORMAL

## 2019-05-09 ENCOUNTER — HOSPITAL ENCOUNTER (OUTPATIENT)
Dept: RADIOLOGY | Facility: HOSPITAL | Age: 69
Discharge: HOME | End: 2019-05-09
Attending: PHYSICIAN ASSISTANT
Payer: MEDICARE

## 2019-05-09 ENCOUNTER — OFFICE VISIT (OUTPATIENT)
Dept: SPORTS MEDICINE | Facility: CLINIC | Age: 69
End: 2019-05-09
Payer: MEDICARE

## 2019-05-09 VITALS
WEIGHT: 195 LBS | HEART RATE: 60 BPM | DIASTOLIC BLOOD PRESSURE: 80 MMHG | SYSTOLIC BLOOD PRESSURE: 132 MMHG | HEIGHT: 76 IN | BODY MASS INDEX: 23.75 KG/M2

## 2019-05-09 DIAGNOSIS — Z98.890 S/P RIGHT KNEE SURGERY: ICD-10-CM

## 2019-05-09 DIAGNOSIS — M23.91 INTERNAL DERANGEMENT OF RIGHT KNEE: ICD-10-CM

## 2019-05-09 DIAGNOSIS — Z09 SURGERY FOLLOW-UP EXAMINATION: ICD-10-CM

## 2019-05-09 DIAGNOSIS — M23.91 INTERNAL DERANGEMENT OF KNEE JOINT, RIGHT: ICD-10-CM

## 2019-05-09 DIAGNOSIS — Z98.890 S/P RIGHT KNEE SURGERY: Primary | ICD-10-CM

## 2019-05-09 PROCEDURE — 99214 OFFICE O/P EST MOD 30 MIN: CPT | Mod: PBBFAC,25,PO | Performed by: PHYSICIAN ASSISTANT

## 2019-05-09 PROCEDURE — 99024 PR POST-OP FOLLOW-UP VISIT: ICD-10-PCS | Mod: POP,,, | Performed by: PHYSICIAN ASSISTANT

## 2019-05-09 PROCEDURE — 73560 X-RAY EXAM OF KNEE 1 OR 2: CPT | Mod: TC,FY,PO,RT

## 2019-05-09 PROCEDURE — 99999 PR PBB SHADOW E&M-EST. PATIENT-LVL IV: ICD-10-PCS | Mod: PBBFAC,,, | Performed by: PHYSICIAN ASSISTANT

## 2019-05-09 PROCEDURE — 99024 POSTOP FOLLOW-UP VISIT: CPT | Mod: POP,,, | Performed by: PHYSICIAN ASSISTANT

## 2019-05-09 PROCEDURE — 99999 PR PBB SHADOW E&M-EST. PATIENT-LVL IV: CPT | Mod: PBBFAC,,, | Performed by: PHYSICIAN ASSISTANT

## 2019-05-09 PROCEDURE — 73560 X-RAY EXAM OF KNEE 1 OR 2: CPT | Mod: 26,RT,, | Performed by: RADIOLOGY

## 2019-05-09 PROCEDURE — 73560 XR KNEE 1 OR 2 VIEW RIGHT: ICD-10-PCS | Mod: 26,RT,, | Performed by: RADIOLOGY

## 2019-05-09 NOTE — PROGRESS NOTES
Subjective:          Chief Complaint: Leonid Harris III is a 69 y.o. male who had concerns including Post-op Evaluation of the Right Knee.    Patient presents to clinic for post-op evaluation of right knee. Pain today is 0-1/10 at rest and 2/10 at its worst. He finished the Doxyclyline 2 weeks ago. Denies fever, chills, CP, SOB, calf pain, and vomiting. He is no longer taking pain medication. He has started formal PT yet, but plans to start at [facility] in [place].    Date of Procedure: 3/26/2019   Procedure: Procedure(s) (LRB):  REALIGNMENT,Anterior medial/realignment of muscle (Right)  RELEASE, KNEE, LATERAL RETINACULA (Right)  TUBERCLEPLASTY,Anterior tibial (Right)      Surgeon(s) and Role:     * Fran Shelton MD - Primary     1st Assisting Surgeon: Scott Orozco MD     Due to the complexity of the procedure it was medically necessary for Scott Orozco MD to perform first assistant duties.     2nd Assistant:  Fanny Hines PA-C   Pre-Operative Diagnosis: Internal derangement of right knee (M23.91)  Painful orthopaedic hardware (T84.84XA)  Old disruption of ligament of right knee (M23.51)     Post-Operative Diagnosis: Post-Op Diagnosis Codes:     * Internal derangement of right knee (M23.91)     * Painful orthopaedic hardware (T84.84XA)     * Old disruption of ligament of right knee (M23.51)            Review of Systems   Constitution: Negative. Negative for chills, fever, weight gain and weight loss.   HENT: Negative for congestion and sore throat.    Eyes: Negative for blurred vision and double vision.   Cardiovascular: Negative for chest pain, leg swelling and palpitations.   Respiratory: Negative for cough and shortness of breath.    Hematologic/Lymphatic: Does not bruise/bleed easily.   Skin: Negative for itching, poor wound healing and rash.   Musculoskeletal: Positive for joint pain, joint swelling and stiffness. Negative for back pain, muscle weakness and myalgias.   Gastrointestinal: Negative for  abdominal pain, constipation, diarrhea, nausea and vomiting.   Genitourinary: Negative.  Negative for frequency and hematuria.   Neurological: Negative for dizziness, headaches, numbness, paresthesias and sensory change.   Psychiatric/Behavioral: Negative for altered mental status and depression. The patient is not nervous/anxious.    Allergic/Immunologic: Negative for hives.                   Objective:        General: Leonid is well-developed, well-nourished, appears stated age, in no acute distress, alert and oriented to time, place and person.     General    Vitals reviewed.  Constitutional: He is oriented to person, place, and time. He appears well-developed and well-nourished. No distress.   Neck: Normal range of motion.   Cardiovascular: Normal rate and regular rhythm.    Pulmonary/Chest: Effort normal. No respiratory distress.   Neurological: He is alert and oriented to person, place, and time.   Psychiatric: He has a normal mood and affect. His behavior is normal. Thought content normal.           Right Knee Exam     Inspection   Erythema: present  Scars: present  Swelling: present  Effusion: present  Deformity: absent  Bruising: absent    Tenderness   The patient is experiencing no tenderness.     Range of Motion   Extension: 0   Right knee flexion: 45.     Other   Sensation: normal    Comments:  Incision healing well.     Radiographic Findings 05/09/2019:    There is patellar realignment surgery, joint effusion and a patellar trochlear prosthesis.  There is mild DJD and a mild varus deformity.  No fracture dislocation bone destruction seen.    Xrays of the right knee were ordered and reviewed by me today. These findings were discussed and reviewed with the patient.          Assessment:       Encounter Diagnoses   Name Primary?    S/P right knee surgery Yes    Internal derangement of right knee     Surgery follow-up examination     Internal derangement of knee joint, right           Plan:       1.  Evaluated incision. No signs of infection.  2. Continue  mg q daily. celebrex 200 mg BID  3. Continue PT per protocol at [facility] in [place]. New referral placed.  4. IKDC, SF-12 and KOOS was not filled out today in clinic.     RTC in 4 weeks with Fanny Hines PA-C Patient will not fill out IKDC, SF-12 and KOOS on return.    5. Physical therapy:  Patient began physical therapy postop day 3 - 5.  Range of motion:  Patient begin range of motion for the 1st 2 weeks from 0-45 degrees increasing at 2-3 weeks to 60° at 3-4 weeks to 90°.  Patient avoid flexion past 90° for the 1st 6 weeks from surgery. Weightbearing status:  Full weight-bearing as tolerated with the knee immobilizer locked in extension with gait for the 1st 6 weeks.      All of the patient's questions were answered and the patient will contact us if they have any questions or concerns in the interim.              Patient questionnaires may have been collected.

## 2019-06-12 ENCOUNTER — HOSPITAL ENCOUNTER (OUTPATIENT)
Dept: RADIOLOGY | Facility: HOSPITAL | Age: 69
Discharge: HOME | End: 2019-06-12
Attending: ORTHOPAEDIC SURGERY
Payer: MEDICARE

## 2019-06-12 ENCOUNTER — OFFICE VISIT (OUTPATIENT)
Dept: SPORTS MEDICINE | Facility: CLINIC | Age: 69
End: 2019-06-12
Payer: MEDICARE

## 2019-06-12 VITALS
SYSTOLIC BLOOD PRESSURE: 128 MMHG | HEIGHT: 76 IN | HEART RATE: 60 BPM | BODY MASS INDEX: 23.75 KG/M2 | WEIGHT: 195 LBS | DIASTOLIC BLOOD PRESSURE: 86 MMHG

## 2019-06-12 DIAGNOSIS — A49.02 MRSA INFECTION: ICD-10-CM

## 2019-06-12 DIAGNOSIS — M25.561 RIGHT KNEE PAIN, UNSPECIFIED CHRONICITY: ICD-10-CM

## 2019-06-12 DIAGNOSIS — M23.92 INTERNAL DERANGEMENT OF LEFT KNEE: ICD-10-CM

## 2019-06-12 DIAGNOSIS — Z98.890 S/P RIGHT KNEE SURGERY: ICD-10-CM

## 2019-06-12 DIAGNOSIS — M25.561 RIGHT KNEE PAIN, UNSPECIFIED CHRONICITY: Primary | ICD-10-CM

## 2019-06-12 PROCEDURE — 73560 X-RAY EXAM OF KNEE 1 OR 2: CPT | Mod: 26,RT,, | Performed by: RADIOLOGY

## 2019-06-12 PROCEDURE — 97760 PR ORTHOTIC MGMT&TRAINJ INITIAL ENC EA 15 MINS: ICD-10-PCS | Mod: GP,,, | Performed by: ORTHOPAEDIC SURGERY

## 2019-06-12 PROCEDURE — 99214 OFFICE O/P EST MOD 30 MIN: CPT | Mod: PBBFAC,25,PO | Performed by: ORTHOPAEDIC SURGERY

## 2019-06-12 PROCEDURE — 73560 XR KNEE 1 OR 2 VIEW RIGHT: ICD-10-PCS | Mod: 26,RT,, | Performed by: RADIOLOGY

## 2019-06-12 PROCEDURE — 99024 PR POST-OP FOLLOW-UP VISIT: ICD-10-PCS | Mod: S$PBB,,, | Performed by: ORTHOPAEDIC SURGERY

## 2019-06-12 PROCEDURE — 99999 PR PBB SHADOW E&M-EST. PATIENT-LVL IV: CPT | Mod: PBBFAC,,, | Performed by: ORTHOPAEDIC SURGERY

## 2019-06-12 PROCEDURE — 97760 ORTHOTIC MGMT&TRAING 1ST ENC: CPT | Mod: GP,,, | Performed by: ORTHOPAEDIC SURGERY

## 2019-06-12 PROCEDURE — 99024 POSTOP FOLLOW-UP VISIT: CPT | Mod: S$PBB,,, | Performed by: ORTHOPAEDIC SURGERY

## 2019-06-12 PROCEDURE — 73560 X-RAY EXAM OF KNEE 1 OR 2: CPT | Mod: TC,FY,PO,RT

## 2019-06-12 PROCEDURE — 99999 PR PBB SHADOW E&M-EST. PATIENT-LVL IV: ICD-10-PCS | Mod: PBBFAC,,, | Performed by: ORTHOPAEDIC SURGERY

## 2019-06-12 RX ORDER — CELECOXIB 200 MG/1
200 CAPSULE ORAL 2 TIMES DAILY
Qty: 60 CAPSULE | Refills: 6 | Status: SHIPPED | OUTPATIENT
Start: 2019-06-12 | End: 2019-07-12

## 2019-06-12 NOTE — PROGRESS NOTES
Subjective:          Chief Complaint: Leonid Harris III is a 69 y.o. male who had concerns including Post-op Evaluation of the Right Knee.    Patient presents to clinic for 3 months post-op evaluation of right knee. Pain today is 0-1/10 at rest and 2/10 at its worst. He finished the Doxyclyline. Denies fever, chills, CP, SOB, calf pain, and vomiting. He is no longer taking pain medication. He has started formal PT at [facility] in [place]. He is improving well and feels that he is doing much better than the previous surgery.    Date of Procedure: 3/26/2019   Procedure: Procedure(s) (LRB):  REALIGNMENT,Anterior medial/realignment of muscle (Right)  RELEASE, KNEE, LATERAL RETINACULA (Right)  TUBERCLEPLASTY,Anterior tibial (Right)      Surgeon(s) and Role:     * Fran Shelton MD - Primary     1st Assisting Surgeon: Scott Orozco MD     Due to the complexity of the procedure it was medically necessary for Scott Orozco MD to perform first assistant duties.     2nd Assistant:  Fanny Hines PA-C   Pre-Operative Diagnosis: Internal derangement of right knee (M23.91)  Painful orthopaedic hardware (T84.84XA)  Old disruption of ligament of right knee (M23.51)     Post-Operative Diagnosis: Post-Op Diagnosis Codes:     * Internal derangement of right knee (M23.91)     * Painful orthopaedic hardware (T84.84XA)     * Old disruption of ligament of right knee (M23.51)            Review of Systems   Constitution: Negative. Negative for chills, fever, weight gain and weight loss.   HENT: Negative for congestion and sore throat.    Eyes: Negative for blurred vision and double vision.   Cardiovascular: Negative for chest pain, leg swelling and palpitations.   Respiratory: Negative for cough and shortness of breath.    Hematologic/Lymphatic: Does not bruise/bleed easily.   Skin: Negative for itching, poor wound healing and rash.   Musculoskeletal: Positive for joint pain, joint swelling and stiffness. Negative for back pain,  muscle weakness and myalgias.   Gastrointestinal: Negative for abdominal pain, constipation, diarrhea, nausea and vomiting.   Genitourinary: Negative.  Negative for frequency and hematuria.   Neurological: Negative for dizziness, headaches, numbness, paresthesias and sensory change.   Psychiatric/Behavioral: Negative for altered mental status and depression. The patient is not nervous/anxious.    Allergic/Immunologic: Negative for hives.       Pain Related Questions  Over the past 3 days, what was your average pain during activity? (I.e. running, jogging, walking, climbing stairs, getting dressed, ect.): 1  Over the past 3 days, what was your highest pain level?: 2  Over the past 3 days, what was your lowest pain level? : 0    Other  How many nights a week are you awakened by your affected body part?: 1  Was the patient's HEIGHT measured or patient reported?: Patient Reported  Was the patient's WEIGHT measured or patient reported?: Measured      Objective:        General: Leonid is well-developed, well-nourished, appears stated age, in no acute distress, alert and oriented to time, place and person.     General    Vitals reviewed.  Constitutional: He is oriented to person, place, and time. He appears well-developed and well-nourished. No distress.   HENT:   Mouth/Throat: No oropharyngeal exudate.   Eyes: Right eye exhibits no discharge. Left eye exhibits no discharge.   Neck: Normal range of motion.   Cardiovascular: Normal rate and regular rhythm.    Pulmonary/Chest: Effort normal and breath sounds normal. No respiratory distress.   Neurological: He is alert and oriented to person, place, and time. He has normal reflexes. No cranial nerve deficit. Coordination normal.   Psychiatric: He has a normal mood and affect. His behavior is normal. Judgment and thought content normal.     General Musculoskeletal Exam   Gait: normal       Right Knee Exam     Inspection   Erythema: absent  Scars: present  Swelling:  present  Effusion: present  Deformity: absent  Bruising: absent    Tenderness   The patient is experiencing no tenderness.     Range of Motion   Extension: 0   Flexion: 140 (140)     Tests   Meniscus   Michi:  Medial - negative Lateral - negative  Ligament Examination Lachman: normal (-1 to 2mm) PCL-Posterior Drawer: normal (0 to 2mm)     MCL - Valgus: normal (0 to 2mm)  LCL - Varus: normalPivot Shift: normal (Equal)Reverse Pivot Shift: normal (Equal)Dial Test at 30 degrees: normal (< 5 degrees)Dial Test at 90 degrees: normal (< 5 degrees)  Posterior Sag Test: negative  Posterolateral Corner: unstable (>15 degrees difference)  Patella   Patellar apprehension: negative  Passive Patellar Tilt: neutral  Patellar Tracking: normal  Patellar Glide (quadrants): Lateral - 1   Medial - 2  Q-Angle at 90 degrees: normal  Patellar Grind: negative  J-Sign: none    Other   Meniscal Cyst: absent  Popliteal (Baker's) Cyst: absent  Sensation: normal    Comments:  Incision healing well.     Left Knee Exam     Inspection   Erythema: absent  Scars: absent  Swelling: absent  Effusion: absent  Deformity: absent  Bruising: absent    Tenderness   The patient is experiencing no tenderness.     Range of Motion   Extension: 0   Flexion: 140     Tests   Meniscus   Michi:  Medial - negative Lateral - negative  Stability Lachman: normal (-1 to 2mm) PCL-Posterior Drawer: normal (0 to 2mm)  MCL - Valgus: normal (0 to 2mm)  LCL - Varus: normal (0 to 2mm)Pivot Shift: normal (Equal)Reverse Pivot Shift: normal (Equal)Dial Test at 30 degrees: normal (< 5 degrees)Dial Test at 90 degrees: normal (< 5 degrees)  Posterior Sag Test: negative  Posterolateral Corner: unstable (>15 degrees difference)  Patella   Patellar apprehension: negative  Passive Patellar Tilt: neutral  Patellar Tracking: normal  Patellar Glide (Quadrants): Lateral - 1 Medial - 2  Q-Angle at 90 degrees: normal  Patellar Grind: negative  J-Sign: J sign absent    Other   Meniscal  Cyst: absent  Popliteal (Baker's) Cyst: absent  Sensation: normal    Right Hip Exam     Tests   Erin: negative  Left Hip Exam     Tests   Erin: negative          Muscle Strength   Right Lower Extremity   Quadriceps:  4/5   Hamstrin/5   Left Lower Extremity   Hip Abduction: 5/5   Quadriceps:  5/5   Hamstrin/5     Reflexes     Left Side  Quadriceps:  2+  Achilles:  2+    Right Side   Quadriceps:  2+  Achilles:  2+    Vascular Exam     Right Pulses  Dorsalis Pedis:      2+  Posterior Tibial:      2+        Left Pulses  Dorsalis Pedis:      2+  Posterior Tibial:      2+          Radiographic Findings 2019:    There is patellar realignment surgery, joint effusion and a patellar trochlear prosthesis.  There is mild DJD and a mild varus deformity.  No fracture dislocation bone destruction seen.    Xrays of the right knee were ordered and reviewed by me today. These findings were discussed and reviewed with the patient.    Narrative     EXAMINATION:  XR KNEE 1 OR 2 VIEW RIGHT    CLINICAL HISTORY:  Pain in right knee    TECHNIQUE:  AP and lateral views of right knee    COMPARISON:  2019    FINDINGS:  Status post patellofemoral arthroplasty and tibial tubercle osteoplasty.  Small joint effusion and soft tissue swelling noted.  Mild degenerative changes are noted.      Impression       As above.               Assessment:       Encounter Diagnoses   Name Primary?    Right knee pain, unspecified chronicity Yes    S/P right knee surgery     Internal derangement of left knee     MRSA infection           Plan:       1. IKDC, SF-12 and KOOS was not filled out today in clinic.     RTC in  3 months with Fran Shelton MD. Patient will fill out IKDC, SF-12 and KOOS and bilateral knee series on return.    2. D/C  mg q daily.     3. Continue celebrex 200 mg BID    4. Continue PT per protocol at [facility] in [place]. New referral placed.    All of the patient's questions were answered and the patient will  contact us if they have any questions or concerns in the interim.    5. [id number] - Fran Shelton MD and SMA Frances, performed a custom orthotic / brace adjustment, fitting and training with the patient. The patient demonstrated understanding and proper care. This was performed for 20 minutes. Viscoskin              Patient questionnaires may have been collected.

## 2019-08-26 ENCOUNTER — PATIENT MESSAGE (OUTPATIENT)
Dept: SPORTS MEDICINE | Facility: CLINIC | Age: 69
End: 2019-08-26

## 2019-09-09 ENCOUNTER — TELEPHONE (OUTPATIENT)
Dept: SPORTS MEDICINE | Facility: CLINIC | Age: 69
End: 2019-09-09

## 2019-09-09 NOTE — TELEPHONE ENCOUNTER
Telephoned patient to reschedule 9/18/19 visit with Dr. Shelton to 10/02/19.     ----- Message from Ciara Marks sent at 9/9/2019  9:22 AM CDT -----  Contact: Self  Pt returning a call back from Delmi and could be reached at [phone]

## 2019-09-09 NOTE — TELEPHONE ENCOUNTER
Telephoned patient leaving message regarding appointment cancellation for 9/18/19. Asked patient to return call to reschedule.

## 2019-10-01 ENCOUNTER — TELEPHONE (OUTPATIENT)
Dept: SPORTS MEDICINE | Facility: CLINIC | Age: 69
End: 2019-10-01

## 2019-10-01 NOTE — TELEPHONE ENCOUNTER
LVM for patient explaining that  had family emergency and will not be seeing patients on tomorrow (10/02/19). Asked that patient return call to reschedule with Dr. Shelton or schedule with David Brambila.

## 2019-10-01 NOTE — TELEPHONE ENCOUNTER
Spoke to the patient and r/s appt to 8:45a with Kennedy Brambila on 10/2. Pt drove in from [place] today and requested to be seen tomorrow.    ----- Message from Rima Shelton sent at 10/1/2019  3:09 PM CDT -----  Contact: self [phone]  Pt calling to return nurse phone call please call back to discuss

## 2019-10-02 ENCOUNTER — HOSPITAL ENCOUNTER (OUTPATIENT)
Dept: RADIOLOGY | Facility: HOSPITAL | Age: 69
Discharge: HOME | End: 2019-10-02
Attending: PHYSICIAN ASSISTANT
Payer: MEDICARE

## 2019-10-02 ENCOUNTER — OFFICE VISIT (OUTPATIENT)
Dept: SPORTS MEDICINE | Facility: CLINIC | Age: 69
End: 2019-10-02
Payer: MEDICARE

## 2019-10-02 VITALS
SYSTOLIC BLOOD PRESSURE: 139 MMHG | HEART RATE: 57 BPM | DIASTOLIC BLOOD PRESSURE: 83 MMHG | WEIGHT: 195 LBS | HEIGHT: 76 IN | BODY MASS INDEX: 23.75 KG/M2

## 2019-10-02 DIAGNOSIS — M25.561 RIGHT KNEE PAIN, UNSPECIFIED CHRONICITY: ICD-10-CM

## 2019-10-02 DIAGNOSIS — Z98.890 S/P RIGHT KNEE SURGERY: ICD-10-CM

## 2019-10-02 DIAGNOSIS — M23.92 INTERNAL DERANGEMENT OF LEFT KNEE: Primary | ICD-10-CM

## 2019-10-02 PROCEDURE — 99999 PR PBB SHADOW E&M-EST. PATIENT-LVL IV: CPT | Mod: PBBFAC,,, | Performed by: PHYSICIAN ASSISTANT

## 2019-10-02 PROCEDURE — 73560 X-RAY EXAM OF KNEE 1 OR 2: CPT | Mod: TC,RT

## 2019-10-02 PROCEDURE — 99213 PR OFFICE/OUTPT VISIT, EST, LEVL III, 20-29 MIN: ICD-10-PCS | Mod: S$PBB,,, | Performed by: PHYSICIAN ASSISTANT

## 2019-10-02 PROCEDURE — 99214 OFFICE O/P EST MOD 30 MIN: CPT | Mod: PBBFAC,25 | Performed by: PHYSICIAN ASSISTANT

## 2019-10-02 PROCEDURE — 99213 OFFICE O/P EST LOW 20 MIN: CPT | Mod: S$PBB,,, | Performed by: PHYSICIAN ASSISTANT

## 2019-10-02 PROCEDURE — 99999 PR PBB SHADOW E&M-EST. PATIENT-LVL IV: ICD-10-PCS | Mod: PBBFAC,,, | Performed by: PHYSICIAN ASSISTANT

## 2019-10-02 PROCEDURE — 73560 XR KNEE 1 OR 2 VIEW RIGHT: ICD-10-PCS | Mod: 26,RT,, | Performed by: RADIOLOGY

## 2019-10-02 PROCEDURE — 73560 X-RAY EXAM OF KNEE 1 OR 2: CPT | Mod: 26,RT,, | Performed by: RADIOLOGY

## 2019-10-02 NOTE — PROGRESS NOTES
Subjective:          Chief Complaint: Leonid Harris III is a 69 y.o. male who had concerns including Follow-up of the Right Knee.    Patient presents to clinic for 6 months s/p ATT of right knee. Pain today is 0-1/10 at rest and 2/10 at its worst. He is still having mild intermittent pain with racquetball and bike riding. No pain with weight training. He is improving well and feels that he is doing much better than the previous surgery.    Date of Procedure: 3/26/2019   Procedure: Procedure(s) (LRB):  REALIGNMENT,Anterior medial/realignment of muscle (Right)  RELEASE, KNEE, LATERAL RETINACULA (Right)  TUBERCLEPLASTY,Anterior tibial (Right)      Surgeon(s) and Role:     * Fran Shelton MD - Primary     1st Assisting Surgeon: Scott Orozco MD     Due to the complexity of the procedure it was medically necessary for Scott Orozco MD to perform first assistant duties.     2nd Assistant:  Fanny Hines PA-C   Pre-Operative Diagnosis: Internal derangement of right knee (M23.91)  Painful orthopaedic hardware (T84.84XA)  Old disruption of ligament of right knee (M23.51)     Post-Operative Diagnosis: Post-Op Diagnosis Codes:     * Internal derangement of right knee (M23.91)     * Painful orthopaedic hardware (T84.84XA)     * Old disruption of ligament of right knee (M23.51)        Follow-up   Pertinent negatives include no abdominal pain, chest pain, chills, congestion, coughing, fever, headaches, joint swelling, myalgias, nausea, numbness, rash, sore throat or vomiting.       Review of Systems   Constitution: Negative. Negative for chills, fever, weight gain and weight loss.   HENT: Negative for congestion and sore throat.    Eyes: Negative for blurred vision and double vision.   Cardiovascular: Negative for chest pain, leg swelling and palpitations.   Respiratory: Negative for cough and shortness of breath.    Hematologic/Lymphatic: Does not bruise/bleed easily.   Skin: Negative for itching, poor wound healing and  rash.   Musculoskeletal: Positive for joint pain and stiffness. Negative for back pain, joint swelling, muscle weakness and myalgias.   Gastrointestinal: Negative for abdominal pain, constipation, diarrhea, nausea and vomiting.   Genitourinary: Negative.  Negative for frequency and hematuria.   Neurological: Negative for dizziness, headaches, numbness, paresthesias and sensory change.   Psychiatric/Behavioral: Negative for altered mental status and depression. The patient is not nervous/anxious.    Allergic/Immunologic: Negative for hives.                   Objective:        General: Leonid is well-developed, well-nourished, appears stated age, in no acute distress, alert and oriented to time, place and person.     General    Vitals reviewed.  Constitutional: He is oriented to person, place, and time. He appears well-developed and well-nourished. No distress.   HENT:   Mouth/Throat: No oropharyngeal exudate.   Eyes: Right eye exhibits no discharge. Left eye exhibits no discharge.   Neck: Normal range of motion.   Cardiovascular: Normal rate and regular rhythm.    Pulmonary/Chest: Effort normal and breath sounds normal. No respiratory distress.   Neurological: He is alert and oriented to person, place, and time. He has normal reflexes. No cranial nerve deficit. Coordination normal.   Psychiatric: He has a normal mood and affect. His behavior is normal. Judgment and thought content normal.     General Musculoskeletal Exam   Gait: normal       Right Knee Exam     Inspection   Erythema: absent  Scars: present  Swelling: absent  Effusion: absent  Deformity: absent  Bruising: absent    Tenderness   The patient is experiencing no tenderness.     Range of Motion   Extension: 0   Flexion: 140     Tests   Meniscus   Michi:  Medial - negative Lateral - negative  Ligament Examination Lachman: normal (-1 to 2mm) PCL-Posterior Drawer: normal (0 to 2mm)     MCL - Valgus: normal (0 to 2mm)  LCL - Varus: normalPivot Shift: normal  (Equal)Reverse Pivot Shift: normal (Equal)Dial Test at 30 degrees: normal (< 5 degrees)Dial Test at 90 degrees: normal (< 5 degrees)  Posterior Sag Test: negative  Posterolateral Corner: unstable (>15 degrees difference)  Patella   Patellar apprehension: negative  Passive Patellar Tilt: neutral  Patellar Tracking: normal  Patellar Glide (quadrants): Lateral - 1   Medial - 2  Q-Angle at 90 degrees: normal  Patellar Grind: negative  J-Sign: none    Other   Meniscal Cyst: absent  Popliteal (Baker's) Cyst: absent  Sensation: normal    Comments:  Incision healing well.   Moderate quadriceps atrophy    Left Knee Exam     Inspection   Erythema: absent  Scars: absent  Swelling: absent  Effusion: absent  Deformity: absent  Bruising: absent    Tenderness   The patient is experiencing no tenderness.     Range of Motion   Extension: 0   Flexion: 140     Tests   Meniscus   Michi:  Medial - negative Lateral - negative  Stability Lachman: normal (-1 to 2mm) PCL-Posterior Drawer: normal (0 to 2mm)  MCL - Valgus: normal (0 to 2mm)  LCL - Varus: normal (0 to 2mm)Pivot Shift: normal (Equal)Reverse Pivot Shift: normal (Equal)Dial Test at 30 degrees: normal (< 5 degrees)Dial Test at 90 degrees: normal (< 5 degrees)  Posterior Sag Test: negative  Posterolateral Corner: unstable (>15 degrees difference)  Patella   Patellar apprehension: negative  Passive Patellar Tilt: neutral  Patellar Tracking: normal  Patellar Glide (Quadrants): Lateral - 1 Medial - 2  Q-Angle at 90 degrees: normal  Patellar Grind: negative  J-Sign: J sign absent    Other   Meniscal Cyst: absent  Popliteal (Baker's) Cyst: absent  Sensation: normal    Right Hip Exam     Tests   Erin: negative  Left Hip Exam     Tests   Erin: negative          Muscle Strength   Right Lower Extremity   Hip Abduction: 5/5   Quadriceps:  4/5   Hamstrin/5   Left Lower Extremity   Hip Abduction: 5/5   Quadriceps:  5/5   Hamstrin/5     Reflexes     Left Side  Quadriceps:   2+  Achilles:  2+    Right Side   Quadriceps:  2+  Achilles:  2+    Vascular Exam     Right Pulses  Dorsalis Pedis:      2+  Posterior Tibial:      2+        Left Pulses  Dorsalis Pedis:      2+  Posterior Tibial:      2+          Radiographic Findings 10/02/2019:    Findings related to prior patellofemoral arthroplasty as well as tibial tubercle osteoplasty, with radiographic findings of bony healing in this region, are again demonstrated.  There is stable radiographic appearance of the included osseous structures and soft tissues with findings consistent with joint effusion again demonstrated.    Xrays of the right knee were ordered and reviewed by me today. These findings were discussed and reviewed with the patient.          Assessment:       Encounter Diagnoses   Name Primary?    Internal derangement of left knee Yes    Right knee pain, unspecified chronicity     S/P right knee surgery           Plan:       1. IKDC, SF-12 and KOOS was not filled out today in clinic.     RTC in 6 months with Fran Shelton MD for 1 year visit. Patient will fill out IKDC, SF-12 and KOOS and bilateral knee series on return.    2. Continue celebrex 200 mg daily    4. Continue HEP, Patient was instructed to avoid high impact sports/exercise and encouraged to bike prior to racquetball to warm up quads. Myofascial massage with foam rolling and lacrosse ball. Isometric squats also recommended. Avoid kneeling and forcefully pushing off right knee.     5. Continue Viscoskin    All of the patient's questions were answered and the patient will contact us if they have any questions or concerns in the interim.                Patient questionnaires may have been collected.

## 2019-12-21 NOTE — PLAN OF CARE
Problem: Occupational Therapy Goal  Goal: Occupational Therapy Goal  Description  Goals to be met by: 5/21/18     Patient will increase functional independence with ADLs by performing:    UE Dressing with Modified [place]. Not met  LE Dressing with Modified [place]. Not met  Grooming while standing with Supervision. Not met  Toileting from bedside commode with Supervision for hygiene and clothing management. Not met  Supine to sit with Modified [place]. Not met  Stand pivot transfers with Modified [place] using RW. Not met  Toilet transfer to bedside commode with Modified [place]. Not met      Outcome: Met   No goals met. Hospital discharge.

## 2019-12-21 NOTE — PT/OT/SLP DISCHARGE
Occupational Therapy Discharge Summary    Leonid Harris III  MRN: [id number]   Principal Problem: DRESS syndrome      Patient Discharged from acute Occupational Therapy on 12/21/19.  Please refer to prior OT note dated 5/14/18 for functional status.    Assessment:      Patient has not met goals.    Objective:     GOALS:   Multidisciplinary Problems     Occupational Therapy Goals     Not on file          Multidisciplinary Problems (Resolved)        Problem: Occupational Therapy Goal    Goal Priority Disciplines Outcome Interventions   Occupational Therapy Goal   (Resolved)     OT, PT/OT Met    Description:  Goals to be met by: 5/21/18     Patient will increase functional independence with ADLs by performing:    UE Dressing with Modified [place]. Not met  LE Dressing with Modified [place]. Not met  Grooming while standing with Supervision. Not met  Toileting from bedside commode with Supervision for hygiene and clothing management. Not met  Supine to sit with Modified [place]. Not met  Stand pivot transfers with Modified [place] using RW. Not met  Toilet transfer to bedside commode with Modified [place]. Not met                       Reasons for Discontinuation of Therapy Services  Transfer to alternate level of care.      Plan:     Patient Discharged to: Outpatient Therapy Services    BING Mccauley  12/21/2019

## 2020-02-24 ENCOUNTER — TELEPHONE (OUTPATIENT)
Dept: SPORTS MEDICINE | Facility: CLINIC | Age: 70
End: 2020-02-24

## 2020-02-24 NOTE — TELEPHONE ENCOUNTER
Spoke to patient.    Scheduled appt for 3/23 at 10:15a with Dr. Shelton.   ----- Message from Marry Talbot sent at 2/24/2020  2:26 PM CST -----  Contact: self  Pt is asking for a call back.      Contact info [phone]

## 2020-04-03 ENCOUNTER — PATIENT MESSAGE (OUTPATIENT)
Dept: SPORTS MEDICINE | Facility: CLINIC | Age: 70
End: 2020-04-03

## 2020-04-07 ENCOUNTER — OFFICE VISIT (OUTPATIENT)
Dept: SPORTS MEDICINE | Facility: CLINIC | Age: 70
End: 2020-04-07
Payer: MEDICARE

## 2020-04-07 DIAGNOSIS — G89.29 CHRONIC PAIN OF RIGHT KNEE: ICD-10-CM

## 2020-04-07 DIAGNOSIS — R93.89 ABNORMAL X-RAY: ICD-10-CM

## 2020-04-07 DIAGNOSIS — Z98.890 S/P RIGHT KNEE SURGERY: ICD-10-CM

## 2020-04-07 DIAGNOSIS — M23.92 INTERNAL DERANGEMENT OF LEFT KNEE: ICD-10-CM

## 2020-04-07 DIAGNOSIS — M25.561 CHRONIC PAIN OF RIGHT KNEE: ICD-10-CM

## 2020-04-07 DIAGNOSIS — M25.569 KNEE PAIN, UNSPECIFIED CHRONICITY, UNSPECIFIED LATERALITY: Primary | ICD-10-CM

## 2020-04-07 DIAGNOSIS — M17.31 POST-TRAUMATIC OSTEOARTHRITIS OF RIGHT KNEE: ICD-10-CM

## 2020-04-07 DIAGNOSIS — M23.91 INTERNAL DERANGEMENT OF KNEE JOINT, RIGHT: ICD-10-CM

## 2020-04-07 PROCEDURE — 99214 PR OFFICE/OUTPT VISIT, EST, LEVL IV, 30-39 MIN: ICD-10-PCS | Mod: 95,,, | Performed by: ORTHOPAEDIC SURGERY

## 2020-04-07 PROCEDURE — 99214 OFFICE O/P EST MOD 30 MIN: CPT | Mod: 95,,, | Performed by: ORTHOPAEDIC SURGERY

## 2020-04-07 RX ORDER — CELECOXIB 200 MG/1
200 CAPSULE ORAL 2 TIMES DAILY
Qty: 60 CAPSULE | Refills: 6 | Status: SHIPPED | OUTPATIENT
Start: 2020-04-07 | End: 2020-05-07

## 2020-04-07 NOTE — PROGRESS NOTES
Subjective:          Chief Complaint: Leonid Harris III is a 70 y.o. male who had no chief complaint listed for this encounter.    Patient presents to clinic for 6 months s/p ATT of right knee. Pain today is 0-1/10 at rest and 2/10 at its worst. He is still having mild intermittent pain with racquetball and bike riding. No pain with weight training. He is improving well and feels that he is doing much better than the previous surgery. However, he has also had continuing lateral patellar pain with arising from a chair. He saw a second opinion who felt if the knee was converted to a total knee would require a patellectomy. He may want to proceed if this lateral knee pain with flexion continues.    Date of Procedure: 3/26/2019   Procedure: Procedure(s) (LRB):  REALIGNMENT,Anterior medial/realignment of muscle (Right)  RELEASE, KNEE, LATERAL RETINACULA (Right)  TUBERCLEPLASTY,Anterior tibial (Right)      Surgeon(s) and Role:     * Fran Shelton MD - Primary     1st Assisting Surgeon: Scott Orozco MD     Due to the complexity of the procedure it was medically necessary for Scott Orozco MD to perform first assistant duties.     2nd Assistant:  Fanny Hines PA-C   Pre-Operative Diagnosis: Internal derangement of right knee (M23.91)  Painful orthopaedic hardware (T84.84XA)  Old disruption of ligament of right knee (M23.51)     Post-Operative Diagnosis: Post-Op Diagnosis Codes:     * Internal derangement of right knee (M23.91)     * Painful orthopaedic hardware (T84.84XA)     * Old disruption of ligament of right knee (M23.51)    H&P  Orthopaedics      Telemedicine visit:  The patient location is: home  The chief complaint leading to consultation is: continuing right knee pain  Visit type: Virtual visit with synchronous audio and video  Total time spent with patient: 25 minutes  Each patient to whom he or she provides medical services by telemedicine is:  (1) informed of the relationship between the physician and  patient and the respective role of any other health care provider with respect to management of the patient; and (2) notified that he or she may decline to receive medical services by telemedicine and may withdraw from such care at any time.    SUBJECTIVE:    History of Present Illness:  Patient is a 70 y.o. male with progressive right knee lateral pain after previous revision patellofemoral replacement surgery    Review of patient's allergies indicates:   -- Vancomycin analogues     --  DRESS    Past Medical History:  No date: Basal cell carcinoma  No date: COPD (chronic obstructive pulmonary disease)  No date: Hyperlipidemia  No date: MRSA (methicillin resistant staph aureus) culture positive  No date: Prostate cancer      Comment:   s/p prostatectomy,   No date: PVC (premature ventricular contraction)  Past Surgical History:  No date: KNEE SURGERY  3/26/2019: LATERAL RETINACULA RELEASE OF KNEE; Right      Comment:  Procedure: RELEASE, KNEE, LATERAL RETINACULA;  Surgeon:                Fran Shelton MD;  Location: [facility];  Service:                Orthopedics;  Laterality: Right;  No date: PROSTATECTOMY  No date: TONSILLECTOMY  Review of patient's family history indicates:  Problem: Melanoma      Relation: Neg Hx          Age of Onset: (Not Specified)    Social History    Tobacco Use      Smoking status: Former Smoker        Types: Cigarettes      Smokeless tobacco: Never Used    Alcohol use: Yes      Alcohol/week: 2.0 standard drinks      Types: 2 Glasses of wine per week    Drug use: No       Review of Systems:  Patient denies constitutional symptoms, cardiac symptoms, respiratory symptoms, GI symptoms.  The remainder of the musculoskeletal ROS is included in the HPI.      OBJECTIVE:    Physical Exam:  Gen:  No acute distress  CV:  Peripherally well-perfused.  Pulses 2+ bilaterally.  Lungs:  Normal respiratory effort.  Abdomen:  Soft, non-tender, non-distended  Head/Neck:  Normocephalic.  Atraumatic. No TTP,  AROM and PROM intact without pain  Neuro:  CN intact without deficit, SILT throughout B/L Upper & Lower Extremities    MSK:  See attached    No imaging was obtained for this visit as patient was unable to complete an in clinic visit.    ASSESSMENT/PLAN:    A/P: Leonid Harris III is a 70 y.o. Continuing right lateral patellar pain with concerns that the knee is not improving    Plan:  1. IKDC, SF-12 and KOOS was not filled out today in clinic.     RTC in 6 weeks with Fran Shelton MD for preoperative history and physical conversion right total knee replacement and hardware removal. Patient will fill out IKDC, SF-12 and KOOS and bilateral knee series on return.    2. Continue celebrex 200 mg daily refilled today    4. Continue HEP, Patient was instructed to avoid high impact sports/exercise and encouraged to bike prior to racquetball to warm up quads. Myofascial massage with foam rolling and lacrosse ball. Isometric squats also recommended. Avoid kneeling and forcefully pushing off right knee.     5. Continue Viscoskin    All of the patient's questions were answered and the patient will contact us if they have any questions or concerns in the interim.    6. ESR and CRP ordered today    7. CT scan right knee ordered to evaluate the bone quality of the patellar regions and trochlear regions                  Follow-up   Pertinent negatives include no abdominal pain, chest pain, chills, congestion, coughing, fever, headaches, joint swelling, myalgias, nausea, numbness, rash, sore throat or vomiting.       Review of Systems   Constitution: Negative. Negative for chills, fever, night sweats, weight gain and weight loss.   HENT: Negative for congestion, hearing loss and sore throat.    Eyes: Negative for blurred vision, double vision and visual disturbance.   Cardiovascular: Negative for chest pain, leg swelling and palpitations.   Respiratory: Negative for cough and shortness of breath.    Endocrine: Negative for  polyuria.   Hematologic/Lymphatic: Negative for bleeding problem. Does not bruise/bleed easily.   Skin: Negative for itching, poor wound healing and rash.   Musculoskeletal: Positive for joint pain and stiffness. Negative for back pain, joint swelling, muscle cramps, muscle weakness and myalgias.   Gastrointestinal: Negative for abdominal pain, constipation, diarrhea, melena, nausea and vomiting.   Genitourinary: Negative.  Negative for frequency and hematuria.   Neurological: Negative for dizziness, headaches, loss of balance, numbness, paresthesias and sensory change.   Psychiatric/Behavioral: Negative for altered mental status and depression. The patient is not nervous/anxious.    Allergic/Immunologic: Negative for hives.                   Objective:        General: Leonid is well-developed, well-nourished, appears stated age, in no acute distress, alert and oriented to time, place and person.     General    Vitals reviewed.  Constitutional: He is oriented to person, place, and time. He appears well-developed and well-nourished. No distress.   HENT:   Mouth/Throat: No oropharyngeal exudate.   Eyes: Right eye exhibits no discharge. Left eye exhibits no discharge.   Neck: Normal range of motion.   Cardiovascular: Normal rate and regular rhythm.    Pulmonary/Chest: Effort normal and breath sounds normal. No respiratory distress.   Neurological: He is alert and oriented to person, place, and time. He has normal reflexes. No cranial nerve deficit. Coordination normal.   Psychiatric: He has a normal mood and affect. His behavior is normal. Judgment and thought content normal.     General Musculoskeletal Exam   Gait: normal       Right Knee Exam     Inspection   Erythema: absent  Scars: present  Swelling: absent  Effusion: absent  Deformity: absent  Bruising: absent    Tenderness   The patient is experiencing no tenderness.     Range of Motion   Extension: 0   Flexion: 140     Tests   Meniscus   Michi:  Medial -  negative Lateral - negative  Ligament Examination Lachman: normal (-1 to 2mm) PCL-Posterior Drawer: normal (0 to 2mm)     MCL - Valgus: normal (0 to 2mm)  LCL - Varus: normalPivot Shift: normal (Equal)Reverse Pivot Shift: normal (Equal)Dial Test at 30 degrees: normal (< 5 degrees)Dial Test at 90 degrees: normal (< 5 degrees)  Posterior Sag Test: negative  Posterolateral Corner: unstable (>15 degrees difference)  Patella   Patellar apprehension: negative  Passive Patellar Tilt: neutral  Patellar Tracking: normal  Patellar Glide (quadrants): Lateral - 1   Medial - 2  Q-Angle at 90 degrees: normal  Patellar Grind: negative  J-Sign: none    Other   Meniscal Cyst: absent  Popliteal (Baker's) Cyst: absent  Sensation: normal    Comments:  Incision healing well.   Moderate quadriceps atrophy    Left Knee Exam     Inspection   Erythema: absent  Scars: absent  Swelling: absent  Effusion: absent  Deformity: absent  Bruising: absent    Tenderness   The patient is experiencing no tenderness.     Range of Motion   Extension: 0   Flexion: 140     Tests   Meniscus   Michi:  Medial - negative Lateral - negative  Stability Lachman: normal (-1 to 2mm) PCL-Posterior Drawer: normal (0 to 2mm)  MCL - Valgus: normal (0 to 2mm)  LCL - Varus: normal (0 to 2mm)Pivot Shift: normal (Equal)Reverse Pivot Shift: normal (Equal)Dial Test at 30 degrees: normal (< 5 degrees)Dial Test at 90 degrees: normal (< 5 degrees)  Posterior Sag Test: negative  Posterolateral Corner: unstable (>15 degrees difference)  Patella   Patellar apprehension: negative  Passive Patellar Tilt: neutral  Patellar Tracking: normal  Patellar Glide (Quadrants): Lateral - 1 Medial - 2  Q-Angle at 90 degrees: normal  Patellar Grind: negative  J-Sign: J sign absent    Other   Meniscal Cyst: absent  Popliteal (Baker's) Cyst: absent  Sensation: normal    Right Hip Exam     Tests   Erin: negative  Left Hip Exam     Tests   Erin: negative          Muscle Strength   Right Lower  Extremity   Hip Abduction: 5/5   Quadriceps:  4/5   Hamstrin/5   Left Lower Extremity   Hip Abduction: 5/5   Quadriceps:  5/5   Hamstrin/5     Reflexes     Left Side  Quadriceps:  2+  Achilles:  2+    Right Side   Quadriceps:  2+  Achilles:  2+    Vascular Exam     Right Pulses  Dorsalis Pedis:      2+  Posterior Tibial:      2+        Left Pulses  Dorsalis Pedis:      2+  Posterior Tibial:      2+          Radiographic Findings 2020:    Findings related to prior patellofemoral arthroplasty as well as tibial tubercle osteoplasty, with radiographic findings of bony healing in this region, are again demonstrated.  There is stable radiographic appearance of the included osseous structures and soft tissues with findings consistent with joint effusion again demonstrated.    Xrays of the right knee were ordered and reviewed by me today. These findings were discussed and reviewed with the patient.          Assessment:       Encounter Diagnoses   Name Primary?    Knee pain, unspecified chronicity, unspecified laterality Yes    S/P right knee surgery     Internal derangement of knee joint, right     Internal derangement of left knee     Chronic pain of right knee     Post-traumatic osteoarthritis of right knee           Plan:       1. IKDC, SF-12 and KOOS was not filled out today in clinic.     RTC in 6 weeks with Fran Shelton MD for preoperative history and physical conversion right total knee replacement and hardware removal. Patient will fill out IKDC, SF-12 and KOOS and bilateral knee series on return.    2. Continue celebrex 200 mg daily refilled today    4. Continue HEP, Patient was instructed to avoid high impact sports/exercise and encouraged to bike prior to racquetball to warm up quads. Myofascial massage with foam rolling and lacrosse ball. Isometric squats also recommended. Avoid kneeling and forcefully pushing off right knee.     5. Continue Viscoskin    All of the patient's questions were  answered and the patient will contact us if they have any questions or concerns in the interim.    6. ESR and CRP ordered today    7. CT scan right knee ordered to evaluate the bone quality of the patellar regions and trochlear regions                Patient questionnaires may have been collected.

## 2020-10-13 NOTE — PROGRESS NOTES
Subjective:          Chief Complaint: Leonid Harris III is a 70 y.o. male who had concerns including Pain of the Right Knee.    Patient presents to clinic for follow up s/p ATT of right knee. Pain has increased over the last 6-10 months. Patient indicates pain over the lateral knee. Pain going up and down stairs. Pain today is 0/10 at rest and 5-6/10 at its worst. He still riding his bike with no issues but if he stands on the bike to turn to pedals, his pain increases. No pain with weight training. He is improving well and feels that he is doing much better than the previous surgery. However, he has also had continuing lateral patellar pain with arising from a chair. He saw a second opinion who felt if the knee was converted to a total knee would require a patellectomy. He may want to proceed if this lateral knee pain with flexion continues.  He is currently taking Celebrex 200 mg.     Date of Procedure: 3/26/2019   Procedure: Procedure(s) (LRB):  REALIGNMENT,Anterior medial/realignment of muscle (Right)  RELEASE, KNEE, LATERAL RETINACULA (Right)  TUBERCLEPLASTY,Anterior tibial (Right)      Surgeon(s) and Role:     * Fran Shelton MD - Primary     1st Assisting Surgeon: Scott Orozco MD     Due to the complexity of the procedure it was medically necessary for Scott Orozco MD to perform first assistant duties.     2nd Assistant:  Fanny Hines PA-C   Pre-Operative Diagnosis: Internal derangement of right knee (M23.91)  Painful orthopaedic hardware (T84.84XA)  Old disruption of ligament of right knee (M23.51)     Post-Operative Diagnosis: Post-Op Diagnosis Codes:     * Internal derangement of right knee (M23.91)     * Painful orthopaedic hardware (T84.84XA)     * Old disruption of ligament of right knee (M23.51)    H&P  Orthopaedics      Telemedicine visit:  The patient location is: home  The chief complaint leading to consultation is: continuing right knee pain  Visit type: Virtual visit with synchronous audio  and video  Total time spent with patient: 25 minutes  Each patient to whom he or she provides medical services by telemedicine is:  (1) informed of the relationship between the physician and patient and the respective role of any other health care provider with respect to management of the patient; and (2) notified that he or she may decline to receive medical services by telemedicine and may withdraw from such care at any time.    SUBJECTIVE:    History of Present Illness:  Patient is a 70 y.o. male with progressive right knee lateral pain after previous revision patellofemoral replacement surgery    Review of patient's allergies indicates:   -- Vancomycin analogues     --  DRESS    Past Medical History:  No date: Basal cell carcinoma  No date: COPD (chronic obstructive pulmonary disease)  No date: Hyperlipidemia  No date: MRSA (methicillin resistant staph aureus) culture positive  No date: Prostate cancer      Comment:   s/p prostatectomy,   No date: PVC (premature ventricular contraction)  Past Surgical History:  No date: KNEE SURGERY  3/26/2019: LATERAL RETINACULA RELEASE OF KNEE; Right      Comment:  Procedure: RELEASE, KNEE, LATERAL RETINACULA;  Surgeon:                Fran Shelton MD;  Location: [facility];  Service:                Orthopedics;  Laterality: Right;  No date: PROSTATECTOMY  No date: TONSILLECTOMY  Review of patient's family history indicates:  Problem: Melanoma      Relation: Neg Hx          Age of Onset: (Not Specified)    Social History    Tobacco Use      Smoking status: Former Smoker        Types: Cigarettes      Smokeless tobacco: Never Used    Alcohol use: Yes      Alcohol/week: 2.0 standard drinks      Types: 2 Glasses of wine per week    Drug use: No       Review of Systems:  Patient denies constitutional symptoms, cardiac symptoms, respiratory symptoms, GI symptoms.  The remainder of the musculoskeletal ROS is included in the HPI.      OBJECTIVE:    Physical Exam:  Gen:  No acute  distress  CV:  Peripherally well-perfused.  Pulses 2+ bilaterally.  Lungs:  Normal respiratory effort.  Abdomen:  Soft, non-tender, non-distended  Head/Neck:  Normocephalic.  Atraumatic. No TTP, AROM and PROM intact without pain  Neuro:  CN intact without deficit, SILT throughout B/L Upper & Lower Extremities    MSK:  See attached    No imaging was obtained for this visit as patient was unable to complete an in clinic visit.    ASSESSMENT/PLAN:    A/P: Leonid Harris III is a 70 y.o. Continuing right lateral patellar pain with concerns that the knee is not improving    Plan:  1. IKDC, SF-12 and KOOS was not filled out today in clinic.     RTC in 6 weeks with Fran Shelton MD for preoperative history and physical conversion right total knee replacement and hardware removal. Patient will fill out IKDC, SF-12 and KOOS and bilateral knee series on return.    2. Continue celebrex 200 mg daily refilled today    4. Continue HEP, Patient was instructed to avoid high impact sports/exercise and encouraged to bike prior to racquetball to warm up quads. Myofascial massage with foam rolling and lacrosse ball. Isometric squats also recommended. Avoid kneeling and forcefully pushing off right knee.     5. Continue Viscoskin    All of the patient's questions were answered and the patient will contact us if they have any questions or concerns in the interim.    6. ESR and CRP ordered today    7. CT scan right knee ordered to evaluate the bone quality of the patellar regions and trochlear regions                  Follow-up  Pertinent negatives include no abdominal pain, chest pain, chills, congestion, coughing, fever, headaches, joint swelling, myalgias, nausea, numbness, rash, sore throat or vomiting.       Review of Systems   Constitution: Negative. Negative for chills, fever, night sweats, weight gain and weight loss.   HENT: Negative.  Negative for congestion, hearing loss and sore throat.    Eyes: Negative.  Negative for  blurred vision, double vision and visual disturbance.   Cardiovascular: Negative.  Negative for chest pain, leg swelling and palpitations.   Respiratory: Negative.  Negative for cough and shortness of breath.    Endocrine: Negative.  Negative for polyuria.   Hematologic/Lymphatic: Negative.  Negative for bleeding problem. Does not bruise/bleed easily.   Skin: Negative.  Negative for itching, poor wound healing and rash.   Musculoskeletal: Positive for joint pain and stiffness. Negative for back pain, joint swelling, muscle cramps, muscle weakness and myalgias.   Gastrointestinal: Negative.  Negative for abdominal pain, constipation, diarrhea, melena, nausea and vomiting.   Genitourinary: Negative.  Negative for frequency and hematuria.   Neurological: Negative.  Negative for dizziness, headaches, loss of balance, numbness, paresthesias and sensory change.   Psychiatric/Behavioral: Negative.  Negative for altered mental status and depression. The patient is not nervous/anxious.    Allergic/Immunologic: Negative.  Negative for hives.       Pain Related Questions  Over the past 3 days, what was your average pain during activity? (I.e. running, jogging, walking, climbing stairs, getting dressed, ect.): 5  Over the past 3 days, what was your highest pain level?: 6  Over the past 3 days, what was your lowest pain level? : 0    Other  How many nights a week are you awakened by your affected body part?: 1  Was the patient's HEIGHT measured or patient reported?: Patient Reported  Was the patient's WEIGHT measured or patient reported?: Measured      Objective:        General: Leonid is well-developed, well-nourished, appears stated age, in no acute distress, alert and oriented to time, place and person.     General    Vitals reviewed.  Constitutional: He is oriented to person, place, and time. He appears well-developed and well-nourished. No distress.   HENT:   Mouth/Throat: No oropharyngeal exudate.   Eyes: Right eye exhibits  no discharge. Left eye exhibits no discharge.   Neck: Normal range of motion.   Cardiovascular: Normal rate and regular rhythm.    Pulmonary/Chest: Effort normal and breath sounds normal. No respiratory distress.   Neurological: He is alert and oriented to person, place, and time. He has normal reflexes. No cranial nerve deficit. Coordination normal.   Psychiatric: He has a normal mood and affect. His behavior is normal. Judgment and thought content normal.     General Musculoskeletal Exam   Gait: normal       Right Knee Exam     Inspection   Erythema: absent  Scars: present  Swelling: absent  Effusion: absent  Deformity: absent  Bruising: absent    Tenderness   The patient is experiencing no tenderness.     Range of Motion   Extension: 0   Flexion: 130     Tests   Meniscus   Michi:  Medial - negative Lateral - negative  Ligament Examination Lachman: normal (-1 to 2mm) PCL-Posterior Drawer: normal (0 to 2mm)     MCL - Valgus: normal (0 to 2mm)  LCL - Varus: normalPivot Shift: normal (Equal)Reverse Pivot Shift: normal (Equal)Dial Test at 30 degrees: normal (< 5 degrees)Dial Test at 90 degrees: normal (< 5 degrees)  Posterior Sag Test: negative  Posterolateral Corner: unstable (>15 degrees difference)  Patella   Patellar apprehension: negative  Passive Patellar Tilt: neutral  Patellar Tracking: normal  Patellar Glide (quadrants): Lateral - 1   Medial - 2  Q-Angle at 90 degrees: normal  Patellar Grind: negative  J-Sign: none    Other   Meniscal Cyst: absent  Popliteal (Baker's) Cyst: absent  Sensation: normal    Comments:  Incision healing well.   Moderate quadriceps atrophy    Left Knee Exam     Inspection   Erythema: absent  Scars: absent  Swelling: absent  Effusion: absent  Deformity: absent  Bruising: absent    Tenderness   The patient is experiencing no tenderness.     Range of Motion   Extension: 0   Flexion: 150     Tests   Meniscus   Michi:  Medial - negative Lateral - negative  Stability Lachman: normal  (-1 to 2mm) PCL-Posterior Drawer: normal (0 to 2mm)  MCL - Valgus: normal (0 to 2mm)  LCL - Varus: normal (0 to 2mm)Pivot Shift: normal (Equal)Reverse Pivot Shift: normal (Equal)Dial Test at 30 degrees: normal (< 5 degrees)Dial Test at 90 degrees: normal (< 5 degrees)  Posterior Sag Test: negative  Posterolateral Corner: unstable (>15 degrees difference)  Patella   Patellar apprehension: negative  Passive Patellar Tilt: neutral  Patellar Tracking: normal  Patellar Glide (Quadrants): Lateral - 1 Medial - 2  Q-Angle at 90 degrees: normal  Patellar Grind: negative  J-Sign: J sign absent    Other   Meniscal Cyst: absent  Popliteal (Baker's) Cyst: absent  Sensation: normal    Right Hip Exam     Tests   Erin: negative  Left Hip Exam     Tests   Erin: negative          Muscle Strength   Right Lower Extremity   Hip Abduction: 5/5   Quadriceps:  4/5   Hamstrin/5   Left Lower Extremity   Hip Abduction: 5/5   Quadriceps:  5/5   Hamstrin/5     Reflexes     Left Side  Achilles:  2+  Quadriceps:  2+    Right Side   Achilles:  2+  Quadriceps:  2+    Vascular Exam     Right Pulses  Dorsalis Pedis:      2+  Posterior Tibial:      2+        Left Pulses  Dorsalis Pedis:      2+  Posterior Tibial:      2+          Radiographic Findings 10/14/2020:    Findings related to prior patellofemoral arthroplasty as well as tibial tubercle osteoplasty, with radiographic findings of bony healing in this region, are again demonstrated.  There is stable radiographic appearance of the included osseous structures and soft tissues with findings consistent with joint effusion again demonstrated.    Xrays of the right knee were ordered and reviewed by me today. These findings were discussed and reviewed with the patient.          Assessment:       Encounter Diagnoses   Name Primary?    Right knee pain, unspecified chronicity Yes    Internal derangement of knee joint, right     S/P right knee surgery           Plan:       1. IKDC, SF-12 and  KOOS was filled out today in clinic.     RTC in 10 days -2 weeks with Fran Shelton MD for Zillretta Injection. Patient will fill out IKDC, SF-12 and KOOS.    2. Continue celebrex 100 mg PRN daily refilled today    4. Continue HEP, Patient was instructed to avoid high impact sports/exercise and encouraged to bike prior to racquetball to warm up quads. Myofascial massage with foam rolling and lacrosse ball. Isometric squats also recommended. Avoid kneeling and forcefully pushing off right knee. Focus on quad strengthening; bridges and short arch quad exercises.     5. Continue Viscoskin    All of the patient's questions were answered and the patient will contact us if they have any questions or concerns in the interim.                  Patient questionnaires may have been collected.

## 2020-10-14 ENCOUNTER — OFFICE VISIT (OUTPATIENT)
Dept: SPORTS MEDICINE | Facility: CLINIC | Age: 70
End: 2020-10-14
Payer: MEDICARE

## 2020-10-14 ENCOUNTER — PATIENT MESSAGE (OUTPATIENT)
Dept: SPORTS MEDICINE | Facility: CLINIC | Age: 70
End: 2020-10-14

## 2020-10-14 ENCOUNTER — TELEPHONE (OUTPATIENT)
Dept: SPORTS MEDICINE | Facility: CLINIC | Age: 70
End: 2020-10-14

## 2020-10-14 ENCOUNTER — HOSPITAL ENCOUNTER (OUTPATIENT)
Dept: RADIOLOGY | Facility: HOSPITAL | Age: 70
Discharge: HOME | End: 2020-10-14
Attending: ORTHOPAEDIC SURGERY
Payer: MEDICARE

## 2020-10-14 VITALS
WEIGHT: 210.13 LBS | HEART RATE: 66 BPM | DIASTOLIC BLOOD PRESSURE: 86 MMHG | HEIGHT: 76 IN | SYSTOLIC BLOOD PRESSURE: 138 MMHG | BODY MASS INDEX: 25.59 KG/M2

## 2020-10-14 DIAGNOSIS — Z98.890 S/P RIGHT KNEE SURGERY: ICD-10-CM

## 2020-10-14 DIAGNOSIS — M25.561 RIGHT KNEE PAIN, UNSPECIFIED CHRONICITY: Primary | ICD-10-CM

## 2020-10-14 DIAGNOSIS — M25.561 RIGHT KNEE PAIN, UNSPECIFIED CHRONICITY: ICD-10-CM

## 2020-10-14 DIAGNOSIS — M23.91 INTERNAL DERANGEMENT OF KNEE JOINT, RIGHT: ICD-10-CM

## 2020-10-14 DIAGNOSIS — M17.31 POST-TRAUMATIC OSTEOARTHRITIS OF RIGHT KNEE: ICD-10-CM

## 2020-10-14 PROCEDURE — 73564 XR KNEE ORTHO BILAT WITH FLEXION: ICD-10-PCS | Mod: 26,50,, | Performed by: RADIOLOGY

## 2020-10-14 PROCEDURE — 99999 PR PBB SHADOW E&M-EST. PATIENT-LVL III: CPT | Mod: PBBFAC,,, | Performed by: ORTHOPAEDIC SURGERY

## 2020-10-14 PROCEDURE — 99213 PR OFFICE/OUTPT VISIT, EST, LEVL III, 20-29 MIN: ICD-10-PCS | Mod: S$PBB,,, | Performed by: ORTHOPAEDIC SURGERY

## 2020-10-14 PROCEDURE — 99213 OFFICE O/P EST LOW 20 MIN: CPT | Mod: S$PBB,,, | Performed by: ORTHOPAEDIC SURGERY

## 2020-10-14 PROCEDURE — 73564 X-RAY EXAM KNEE 4 OR MORE: CPT | Mod: TC,50

## 2020-10-14 PROCEDURE — 99999 PR PBB SHADOW E&M-EST. PATIENT-LVL III: ICD-10-PCS | Mod: PBBFAC,,, | Performed by: ORTHOPAEDIC SURGERY

## 2020-10-14 PROCEDURE — 99213 OFFICE O/P EST LOW 20 MIN: CPT | Mod: PBBFAC,25 | Performed by: ORTHOPAEDIC SURGERY

## 2020-10-14 PROCEDURE — 73564 X-RAY EXAM KNEE 4 OR MORE: CPT | Mod: 26,50,, | Performed by: RADIOLOGY

## 2020-10-14 RX ORDER — CELECOXIB 100 MG/1
100 CAPSULE ORAL 2 TIMES DAILY
Qty: 60 CAPSULE | Refills: 6 | Status: SHIPPED | OUTPATIENT
Start: 2020-10-14 | End: 2021-05-12

## 2020-10-14 NOTE — TELEPHONE ENCOUNTER
Spoke to patient in regard to below. Patient will call us back on tomorrow to reschedule appointment if he has to.    ----- Message from Sharonda Finn sent at 10/14/2020  2:35 PM CDT -----  Pt asking for a callback regarding his appt please contact pt         Contact info [phone]

## 2020-10-14 NOTE — PATIENT INSTRUCTIONS
SO WHAT'S IN MY KNEE?    You've got ZILRETTA (triamcinolone acetonide extended-release injectable suspension). That means you've got microspheres -- tiny particles that deliver medication for about 3 months.     Zilretta is the first and only FDA-approved treatment for osteoarthritis knee pain to use extended release microsphere technology.    ZILRETTA microspheres slowly release pain medication for about 3 months:      What microspheres are doing:   The microspheres slowly and continually release medicine for about 3 months.   They stay in your knee joint -- right where the pain is.   After they've released all their medicine, they turn into carbon dioxide and water, which are found naturally in the body.    After 3 months, they're gone.  This would be a good time to reassess your pain level.    IMPORTANT RISK INFORMATION    What should you tell your doctor BEFORE receiving a ZILRETTA injection?  Tell your doctor about all of the medications you are taking (including both prescription and over-the-counter medicines) and about any medical conditions, especially if you have high blood pressure, heart disease, ulcers, diverticulitis or other gastrointestinal disorders, kidney problems, diabetes, glaucoma, behavior or mood disorders, and/or infections.

## 2020-11-02 ENCOUNTER — OFFICE VISIT (OUTPATIENT)
Dept: SPORTS MEDICINE | Facility: CLINIC | Age: 70
End: 2020-11-02
Payer: MEDICARE

## 2020-11-02 VITALS
DIASTOLIC BLOOD PRESSURE: 84 MMHG | BODY MASS INDEX: 25.57 KG/M2 | SYSTOLIC BLOOD PRESSURE: 142 MMHG | WEIGHT: 210 LBS | HEART RATE: 65 BPM | HEIGHT: 76 IN

## 2020-11-02 DIAGNOSIS — M17.31 POST-TRAUMATIC OSTEOARTHRITIS OF RIGHT KNEE: ICD-10-CM

## 2020-11-02 DIAGNOSIS — M25.561 RIGHT KNEE PAIN, UNSPECIFIED CHRONICITY: Primary | ICD-10-CM

## 2020-11-02 PROCEDURE — 99214 OFFICE O/P EST MOD 30 MIN: CPT | Mod: PBBFAC,25 | Performed by: ORTHOPAEDIC SURGERY

## 2020-11-02 PROCEDURE — 99214 PR OFFICE/OUTPT VISIT, EST, LEVL IV, 30-39 MIN: ICD-10-PCS | Mod: 25,S$PBB,, | Performed by: ORTHOPAEDIC SURGERY

## 2020-11-02 PROCEDURE — 20610 DRAIN/INJ JOINT/BURSA W/O US: CPT | Mod: PBBFAC | Performed by: ORTHOPAEDIC SURGERY

## 2020-11-02 PROCEDURE — 99214 OFFICE O/P EST MOD 30 MIN: CPT | Mod: 25,S$PBB,, | Performed by: ORTHOPAEDIC SURGERY

## 2020-11-02 PROCEDURE — 20610 LARGE JOINT ASPIRATION/INJECTION: R KNEE: ICD-10-PCS | Mod: S$PBB,RT,, | Performed by: ORTHOPAEDIC SURGERY

## 2020-11-02 PROCEDURE — 99999 PR PBB SHADOW E&M-EST. PATIENT-LVL IV: ICD-10-PCS | Mod: PBBFAC,,, | Performed by: ORTHOPAEDIC SURGERY

## 2020-11-02 PROCEDURE — 99999 PR PBB SHADOW E&M-EST. PATIENT-LVL IV: CPT | Mod: PBBFAC,,, | Performed by: ORTHOPAEDIC SURGERY

## 2020-11-02 RX ORDER — INDOMETHACIN 75 MG/1
75 CAPSULE, EXTENDED RELEASE ORAL 2 TIMES DAILY WITH MEALS
Qty: 28 CAPSULE | Refills: 0 | Status: SHIPPED | OUTPATIENT
Start: 2020-11-02 | End: 2020-11-04

## 2020-11-02 RX ORDER — ESOMEPRAZOLE MAGNESIUM 40 MG/1
40 CAPSULE, DELAYED RELEASE ORAL 2 TIMES DAILY
Qty: 60 CAPSULE | Refills: 11 | Status: SHIPPED | OUTPATIENT
Start: 2020-11-02 | End: 2020-11-04

## 2020-11-02 RX ADMIN — TRIAMCINOLONE ACETONIDE EXTENDED-RELEASE INJECTABLE SUSPENSION 32 MG: KIT INTRA-ARTICULAR at 12:11

## 2020-11-02 NOTE — PROCEDURES
Large Joint Aspiration/Injection: R knee    Date/Time: 11/2/2020 12:30 PM  Performed by: Fran Shelton MD  Authorized by: Fran Shelton MD     Consent Done?:  Yes (Verbal)  Indications:  Pain  Site marked: the procedure site was marked    Timeout: prior to procedure the correct patient, procedure, and site was verified    Prep: patient was prepped and draped in usual sterile fashion      Local anesthesia used?: Yes    Local anesthetic:  Topical anesthetic    Details:  Needle Size:  22 G  Approach:  Anterolateral  Location:  Knee  Site:  R knee  Medications:  32 mg triamcinolone acetonide 32 mg  Aspirate amount (mL):  62  Aspirate:  Blood-tinged and serous  Patient tolerance:  Patient tolerated the procedure well with no immediate complications

## 2020-11-02 NOTE — PROGRESS NOTES
Subjective:          Chief Complaint: Leonid Harris III is a 70 y.o. male who had concerns including Injections of the Right Knee.    Leonid Harris III returns today for R knee Zilretta injection. He has persistent effusion. He is taking 100mg Celebrex at this time.      Interval Hx:  Patient presents to clinic for follow up s/p ATT of right knee. Pain has increased over the last 6-10 months. Patient indicates pain over the lateral knee. Pain going up and down stairs. Pain today is 0/10 at rest and 5-6/10 at its worst. He still riding his bike with no issues but if he stands on the bike to turn to pedals, his pain increases. No pain with weight training. He is improving well and feels that he is doing much better than the previous surgery. However, he has also had continuing lateral patellar pain with arising from a chair. He saw a second opinion who felt if the knee was converted to a total knee would require a patellectomy. He may want to proceed if this lateral knee pain with flexion continues.  He is currently taking Celebrex 200 mg.     Date of Procedure: 3/26/2019   Procedure: Procedure(s) (LRB):  REALIGNMENT,Anterior medial/realignment of muscle (Right)  RELEASE, KNEE, LATERAL RETINACULA (Right)  TUBERCLEPLASTY,Anterior tibial (Right)      Surgeon(s) and Role:     * Fran Shelton MD - Primary     1st Assisting Surgeon: Scott Orozco MD     Due to the complexity of the procedure it was medically necessary for Scott Orozco MD to perform first assistant duties.     2nd Assistant:  Fanny Hines PA-C   Pre-Operative Diagnosis: Internal derangement of right knee (M23.91)  Painful orthopaedic hardware (T84.84XA)  Old disruption of ligament of right knee (M23.51)     Post-Operative Diagnosis: Post-Op Diagnosis Codes:     * Internal derangement of right knee (M23.91)     * Painful orthopaedic hardware (T84.84XA)     * Old disruption of ligament of right knee (M23.51)                      Follow-up  Pertinent  negatives include no abdominal pain, chest pain, chills, congestion, coughing, fever, headaches, joint swelling, myalgias, nausea, numbness, rash, sore throat or vomiting.       Review of Systems   Constitution: Negative. Negative for chills, fever, night sweats, weight gain and weight loss.   HENT: Negative.  Negative for congestion, hearing loss and sore throat.    Eyes: Negative.  Negative for blurred vision, double vision and visual disturbance.   Cardiovascular: Negative.  Negative for chest pain, leg swelling and palpitations.   Respiratory: Negative.  Negative for cough and shortness of breath.    Endocrine: Negative.  Negative for polyuria.   Hematologic/Lymphatic: Negative.  Negative for bleeding problem. Does not bruise/bleed easily.   Skin: Negative.  Negative for itching, poor wound healing and rash.   Musculoskeletal: Positive for joint pain and stiffness. Negative for back pain, joint swelling, muscle cramps, muscle weakness and myalgias.   Gastrointestinal: Negative.  Negative for abdominal pain, constipation, diarrhea, melena, nausea and vomiting.   Genitourinary: Negative.  Negative for frequency and hematuria.   Neurological: Negative.  Negative for dizziness, headaches, loss of balance, numbness, paresthesias and sensory change.   Psychiatric/Behavioral: Negative.  Negative for altered mental status and depression. The patient is not nervous/anxious.    Allergic/Immunologic: Negative.  Negative for hives.                   Objective:        General: Leonid is well-developed, well-nourished, appears stated age, in no acute distress, alert and oriented to time, place and person.     General    Vitals reviewed.  Constitutional: He is oriented to person, place, and time. He appears well-developed and well-nourished. No distress.   HENT:   Mouth/Throat: No oropharyngeal exudate.   Eyes: Right eye exhibits no discharge. Left eye exhibits no discharge.   Neck: Normal range of motion.   Cardiovascular:  Normal rate and regular rhythm.    Pulmonary/Chest: Effort normal and breath sounds normal. No respiratory distress.   Neurological: He is alert and oriented to person, place, and time. He has normal reflexes. No cranial nerve deficit. Coordination normal.   Psychiatric: He has a normal mood and affect. His behavior is normal. Judgment and thought content normal.     General Musculoskeletal Exam   Gait: normal       Right Knee Exam     Inspection   Erythema: absent  Scars: present  Swelling: present  Effusion: present  Deformity: absent  Bruising: absent    Tenderness   The patient is experiencing no tenderness.     Range of Motion   Extension: 0   Flexion: 130     Tests   Meniscus   Michi:  Medial - negative Lateral - negative  Ligament Examination Lachman: normal (-1 to 2mm) PCL-Posterior Drawer: normal (0 to 2mm)     MCL - Valgus: normal (0 to 2mm)  LCL - Varus: normalPivot Shift: normal (Equal)Reverse Pivot Shift: normal (Equal)Dial Test at 30 degrees: normal (< 5 degrees)Dial Test at 90 degrees: normal (< 5 degrees)  Posterior Sag Test: negative  Posterolateral Corner: unstable (>15 degrees difference)  Patella   Patellar apprehension: negative  Passive Patellar Tilt: neutral  Patellar Tracking: normal  Patellar Glide (quadrants): Lateral - 1   Medial - 2  Q-Angle at 90 degrees: normal  Patellar Grind: negative  J-Sign: none    Other   Meniscal Cyst: absent  Popliteal (Baker's) Cyst: absent  Sensation: normal    Comments:  Incision healing well.   Moderate quadriceps atrophy    Left Knee Exam     Inspection   Erythema: absent  Scars: absent  Swelling: absent  Effusion: absent  Deformity: absent  Bruising: absent    Tenderness   The patient is experiencing no tenderness.     Range of Motion   Extension: 0   Flexion: 150     Tests   Meniscus   Michi:  Medial - negative Lateral - negative  Stability Lachman: normal (-1 to 2mm) PCL-Posterior Drawer: normal (0 to 2mm)  MCL - Valgus: normal (0 to 2mm)  LCL -  Varus: normal (0 to 2mm)Pivot Shift: normal (Equal)Reverse Pivot Shift: normal (Equal)Dial Test at 30 degrees: normal (< 5 degrees)Dial Test at 90 degrees: normal (< 5 degrees)  Posterior Sag Test: negative  Posterolateral Corner: unstable (>15 degrees difference)  Patella   Patellar apprehension: negative  Passive Patellar Tilt: neutral  Patellar Tracking: normal  Patellar Glide (Quadrants): Lateral - 1 Medial - 2  Q-Angle at 90 degrees: normal  Patellar Grind: negative  J-Sign: J sign absent    Other   Meniscal Cyst: absent  Popliteal (Baker's) Cyst: absent  Sensation: normal    Right Hip Exam     Tests   Erin: negative  Left Hip Exam     Tests   Erin: negative          Muscle Strength   Right Lower Extremity   Hip Abduction: 5/5   Quadriceps:  4/5   Hamstrin/5   Left Lower Extremity   Hip Abduction: 5/5   Quadriceps:  5/5   Hamstrin/5     Reflexes     Left Side  Achilles:  2+  Quadriceps:  2+    Right Side   Achilles:  2+  Quadriceps:  2+    Vascular Exam     Right Pulses  Dorsalis Pedis:      2+  Posterior Tibial:      2+        Left Pulses  Dorsalis Pedis:      2+  Posterior Tibial:      2+          Radiographic Findings 2020:    Findings related to prior patellofemoral arthroplasty as well as tibial tubercle osteoplasty, with radiographic findings of bony healing in this region, are again demonstrated.  There is stable radiographic appearance of the included osseous structures and soft tissues with findings consistent with joint effusion again demonstrated.    Xrays of the right knee were ordered and reviewed by me today. These findings were discussed and reviewed with the patient.          Assessment:       Encounter Diagnoses   Name Primary?    Right knee pain, unspecified chronicity Yes    Post-traumatic osteoarthritis of right knee           Plan:       1. IKDC, SF-12 and KOOS was filled out today in clinic.     RTC in 3 months with Fran Shelton MD virtual visit. Patient will fill out  IKDC, SF-12 and KOOS.    2. D/C Celebrex 100 mg PRN daily for 2 weeks  Indocin 75 and Nexium sent to pharmacy, patient will take for 2 weeks then may resume Celebrex    3. See procedure note.    4. Continue HEP, Patient was instructed to avoid high impact sports/exercise and encouraged to bike prior to racquetball to warm up quads. Myofascial massage with foam rolling and lacrosse ball. Isometric squats also recommended. Avoid kneeling and forcefully pushing off right knee. Focus on quad strengthening; bridges and short arch quad exercises.     5. Continue Viscoskin        All of the patient's questions were answered and the patient will contact us if they have any questions or concerns in the interim.                  Patient questionnaires may have been collected.

## 2020-11-03 ENCOUNTER — PATIENT MESSAGE (OUTPATIENT)
Dept: SPORTS MEDICINE | Facility: CLINIC | Age: 70
End: 2020-11-03

## 2020-11-03 NOTE — TELEPHONE ENCOUNTER
Called patient to discuss Rx. Patient reports that the pharmacy is going to discount med for him and he is okay with the price since it is only for 2 weeks.    Ike Johnson MS, OTC   Sports Medicine Assistant   [facility] [place]

## 2020-12-23 ENCOUNTER — PATIENT MESSAGE (OUTPATIENT)
Dept: INFECTIOUS DISEASES | Facility: CLINIC | Age: 70
End: 2020-12-23

## 2021-01-29 ENCOUNTER — TELEPHONE (OUTPATIENT)
Dept: SPORTS MEDICINE | Facility: CLINIC | Age: 71
End: 2021-01-29

## 2021-02-01 ENCOUNTER — OFFICE VISIT (OUTPATIENT)
Dept: SPORTS MEDICINE | Facility: CLINIC | Age: 71
End: 2021-02-01
Payer: MEDICARE

## 2021-02-01 DIAGNOSIS — M17.31 POST-TRAUMATIC OSTEOARTHRITIS OF RIGHT KNEE: ICD-10-CM

## 2021-02-01 DIAGNOSIS — M23.91 INTERNAL DERANGEMENT OF KNEE JOINT, RIGHT: Primary | ICD-10-CM

## 2021-02-01 DIAGNOSIS — M25.561 CHRONIC PAIN OF RIGHT KNEE: ICD-10-CM

## 2021-02-01 DIAGNOSIS — G89.29 CHRONIC PAIN OF RIGHT KNEE: ICD-10-CM

## 2021-02-01 DIAGNOSIS — Z98.890 S/P RIGHT KNEE SURGERY: ICD-10-CM

## 2021-02-01 PROCEDURE — 99214 OFFICE O/P EST MOD 30 MIN: CPT | Mod: 95,,, | Performed by: ORTHOPAEDIC SURGERY

## 2021-02-01 PROCEDURE — 99214 PR OFFICE/OUTPT VISIT, EST, LEVL IV, 30-39 MIN: ICD-10-PCS | Mod: 95,,, | Performed by: ORTHOPAEDIC SURGERY

## 2021-05-12 ENCOUNTER — PATIENT MESSAGE (OUTPATIENT)
Dept: RESEARCH | Facility: HOSPITAL | Age: 71
End: 2021-05-12

## 2021-08-02 ENCOUNTER — TELEPHONE (OUTPATIENT)
Dept: SPORTS MEDICINE | Facility: CLINIC | Age: 71
End: 2021-08-02

## 2021-08-09 ENCOUNTER — TELEPHONE (OUTPATIENT)
Dept: SPORTS MEDICINE | Facility: CLINIC | Age: 71
End: 2021-08-09

## 2021-08-09 ENCOUNTER — HOSPITAL ENCOUNTER (OUTPATIENT)
Dept: RADIOLOGY | Facility: HOSPITAL | Age: 71
Discharge: HOME | End: 2021-08-09
Attending: ORTHOPAEDIC SURGERY
Payer: MEDICARE

## 2021-08-09 ENCOUNTER — OFFICE VISIT (OUTPATIENT)
Dept: SPORTS MEDICINE | Facility: CLINIC | Age: 71
End: 2021-08-09
Payer: MEDICARE

## 2021-08-09 VITALS
SYSTOLIC BLOOD PRESSURE: 126 MMHG | HEIGHT: 76 IN | HEART RATE: 64 BPM | TEMPERATURE: 99 F | DIASTOLIC BLOOD PRESSURE: 80 MMHG | BODY MASS INDEX: 25.09 KG/M2 | WEIGHT: 206 LBS

## 2021-08-09 DIAGNOSIS — G89.29 CHRONIC PAIN OF RIGHT KNEE: Primary | ICD-10-CM

## 2021-08-09 DIAGNOSIS — Z01.818 PRE-OP TESTING: Primary | ICD-10-CM

## 2021-08-09 DIAGNOSIS — M25.561 CHRONIC PAIN OF RIGHT KNEE: Primary | ICD-10-CM

## 2021-08-09 DIAGNOSIS — M25.461 EFFUSION, RIGHT KNEE: ICD-10-CM

## 2021-08-09 DIAGNOSIS — M25.569 KNEE PAIN, UNSPECIFIED CHRONICITY, UNSPECIFIED LATERALITY: ICD-10-CM

## 2021-08-09 DIAGNOSIS — M17.11 ARTHRITIS OF RIGHT KNEE: ICD-10-CM

## 2021-08-09 LAB
APPEARANCE FLD: NORMAL
BODY FLD TYPE: NORMAL
BODY FLD TYPE: NORMAL
COLOR FLD: NORMAL
CRYSTALS FLD MICRO: NEGATIVE
GRAM STN SPEC: NORMAL
GRAM STN SPEC: NORMAL
LYMPHOCYTES NFR FLD MANUAL: 16 %
MONOS+MACROS NFR FLD MANUAL: 82 %
NEUTROPHILS NFR FLD MANUAL: 2 %
WBC # FLD: 1565 /CU MM

## 2021-08-09 PROCEDURE — 73564 X-RAY EXAM KNEE 4 OR MORE: CPT | Mod: TC,50

## 2021-08-09 PROCEDURE — 87070 CULTURE OTHR SPECIMN AEROBIC: CPT | Performed by: ORTHOPAEDIC SURGERY

## 2021-08-09 PROCEDURE — 89060 EXAM SYNOVIAL FLUID CRYSTALS: CPT | Performed by: ORTHOPAEDIC SURGERY

## 2021-08-09 PROCEDURE — 99214 PR OFFICE/OUTPT VISIT, EST, LEVL IV, 30-39 MIN: ICD-10-PCS | Mod: S$PBB,,, | Performed by: ORTHOPAEDIC SURGERY

## 2021-08-09 PROCEDURE — 87102 FUNGUS ISOLATION CULTURE: CPT | Performed by: ORTHOPAEDIC SURGERY

## 2021-08-09 PROCEDURE — 87116 MYCOBACTERIA CULTURE: CPT | Performed by: ORTHOPAEDIC SURGERY

## 2021-08-09 PROCEDURE — 87075 CULTR BACTERIA EXCEPT BLOOD: CPT | Performed by: ORTHOPAEDIC SURGERY

## 2021-08-09 PROCEDURE — 87205 SMEAR GRAM STAIN: CPT | Performed by: ORTHOPAEDIC SURGERY

## 2021-08-09 PROCEDURE — 99214 OFFICE O/P EST MOD 30 MIN: CPT | Mod: S$PBB,,, | Performed by: ORTHOPAEDIC SURGERY

## 2021-08-09 PROCEDURE — 73564 PR  X-RAY KNEE 4+ VIEW: ICD-10-PCS | Mod: 26,LT,, | Performed by: RADIOLOGY

## 2021-08-09 PROCEDURE — 99999 PR PBB SHADOW E&M-EST. PATIENT-LVL III: ICD-10-PCS | Mod: PBBFAC,,, | Performed by: ORTHOPAEDIC SURGERY

## 2021-08-09 PROCEDURE — 73564 X-RAY EXAM KNEE 4 OR MORE: CPT | Mod: 26,RT,, | Performed by: RADIOLOGY

## 2021-08-09 PROCEDURE — 87206 SMEAR FLUORESCENT/ACID STAI: CPT | Performed by: ORTHOPAEDIC SURGERY

## 2021-08-09 PROCEDURE — 99213 OFFICE O/P EST LOW 20 MIN: CPT | Mod: PBBFAC | Performed by: ORTHOPAEDIC SURGERY

## 2021-08-09 PROCEDURE — 73564 X-RAY EXAM KNEE 4 OR MORE: CPT | Mod: 26,LT,, | Performed by: RADIOLOGY

## 2021-08-09 PROCEDURE — 99999 PR PBB SHADOW E&M-EST. PATIENT-LVL III: CPT | Mod: PBBFAC,,, | Performed by: ORTHOPAEDIC SURGERY

## 2021-08-09 PROCEDURE — 89051 BODY FLUID CELL COUNT: CPT | Performed by: ORTHOPAEDIC SURGERY

## 2021-08-09 RX ORDER — ATORVASTATIN CALCIUM 10 MG/1
TABLET, FILM COATED ORAL
COMMUNITY
End: 2021-08-11 | Stop reason: SDUPTHER

## 2021-08-09 RX ORDER — CELECOXIB 100 MG/1
CAPSULE ORAL
COMMUNITY
Start: 2021-06-16 | End: 2022-09-12 | Stop reason: SDUPTHER

## 2021-08-09 RX ORDER — BISOPROLOL FUMARATE 5 MG/1
5 TABLET, FILM COATED ORAL DAILY
COMMUNITY
Start: 2021-06-16

## 2021-08-10 ENCOUNTER — TELEPHONE (OUTPATIENT)
Dept: PREADMISSION TESTING | Facility: HOSPITAL | Age: 71
End: 2021-08-10

## 2021-08-10 ENCOUNTER — TELEPHONE (OUTPATIENT)
Dept: SPORTS MEDICINE | Facility: CLINIC | Age: 71
End: 2021-08-10

## 2021-08-10 DIAGNOSIS — T84.84XA PAINFUL ORTHOPAEDIC HARDWARE: ICD-10-CM

## 2021-08-10 DIAGNOSIS — M12.561 TRAUMATIC ARTHRITIS OF RIGHT KNEE: Primary | ICD-10-CM

## 2021-08-10 LAB — PATH INTERP FLD-IMP: NORMAL

## 2021-08-11 DIAGNOSIS — M79.604 PAIN OF RIGHT LOWER EXTREMITY: ICD-10-CM

## 2021-08-11 DIAGNOSIS — Z01.818 PRE-OP TESTING: Primary | ICD-10-CM

## 2021-08-12 ENCOUNTER — TELEPHONE (OUTPATIENT)
Dept: SPORTS MEDICINE | Facility: CLINIC | Age: 71
End: 2021-08-12

## 2021-08-12 ENCOUNTER — TELEPHONE (OUTPATIENT)
Dept: PHARMACY | Facility: CLINIC | Age: 71
End: 2021-08-12

## 2021-08-12 DIAGNOSIS — M17.11 PRIMARY OSTEOARTHRITIS OF RIGHT KNEE: Primary | ICD-10-CM

## 2021-08-12 LAB — BACTERIA SPEC AEROBE CULT: NO GROWTH

## 2021-08-12 RX ORDER — CEFAZOLIN SODIUM 2 G/50ML
2 SOLUTION INTRAVENOUS
Status: CANCELLED | OUTPATIENT
Start: 2021-08-12

## 2021-08-12 RX ORDER — AMOXICILLIN 250 MG
1 CAPSULE ORAL 2 TIMES DAILY
Status: CANCELLED | OUTPATIENT
Start: 2021-08-12

## 2021-08-12 RX ORDER — ACETAMINOPHEN 500 MG
1000 TABLET ORAL
Status: CANCELLED | OUTPATIENT
Start: 2021-08-12

## 2021-08-12 RX ORDER — CELECOXIB 200 MG/1
200 CAPSULE ORAL 2 TIMES DAILY
Qty: 60 CAPSULE | Refills: 0 | Status: SHIPPED | OUTPATIENT
Start: 2021-08-12 | End: 2021-10-06 | Stop reason: SDUPTHER

## 2021-08-12 RX ORDER — ASPIRIN 325 MG
325 TABLET ORAL DAILY
Qty: 42 TABLET | Refills: 0 | Status: SHIPPED | OUTPATIENT
Start: 2021-08-12 | End: 2021-10-08

## 2021-08-12 RX ORDER — ONDANSETRON 2 MG/ML
4 INJECTION INTRAMUSCULAR; INTRAVENOUS EVERY 8 HOURS PRN
Status: CANCELLED | OUTPATIENT
Start: 2021-08-12

## 2021-08-12 RX ORDER — PROMETHAZINE HYDROCHLORIDE 25 MG/1
25 TABLET ORAL EVERY 6 HOURS PRN
Qty: 30 TABLET | Refills: 0 | Status: SHIPPED | OUTPATIENT
Start: 2021-08-12

## 2021-08-12 RX ORDER — PREGABALIN 75 MG/1
75 CAPSULE ORAL NIGHTLY
Status: CANCELLED | OUTPATIENT
Start: 2021-08-12

## 2021-08-12 RX ORDER — ROPIVACAINE/EPI/CLONIDINE/KET 2.46-0.005
SYRINGE (ML) INJECTION ONCE
Status: CANCELLED | OUTPATIENT
Start: 2021-08-12 | End: 2021-08-12

## 2021-08-12 RX ORDER — PREGABALIN 75 MG/1
75 CAPSULE ORAL
Status: CANCELLED | OUTPATIENT
Start: 2021-08-12

## 2021-08-12 RX ORDER — METHOCARBAMOL 500 MG/1
500 TABLET, FILM COATED ORAL 3 TIMES DAILY
Qty: 30 TABLET | Refills: 0 | Status: SHIPPED | OUTPATIENT
Start: 2021-08-12 | End: 2021-09-06

## 2021-08-12 RX ORDER — OXYCODONE HYDROCHLORIDE 5 MG/1
10 TABLET ORAL
Status: CANCELLED | OUTPATIENT
Start: 2021-08-12

## 2021-08-12 RX ORDER — PROCHLORPERAZINE EDISYLATE 5 MG/ML
5 INJECTION INTRAMUSCULAR; INTRAVENOUS EVERY 6 HOURS PRN
Status: CANCELLED | OUTPATIENT
Start: 2021-08-12

## 2021-08-12 RX ORDER — OXYCODONE HYDROCHLORIDE 5 MG/1
5 TABLET ORAL
Status: CANCELLED | OUTPATIENT
Start: 2021-08-12

## 2021-08-12 RX ORDER — ASPIRIN 81 MG/1
81 TABLET ORAL 2 TIMES DAILY
Status: CANCELLED | OUTPATIENT
Start: 2021-08-12

## 2021-08-12 RX ORDER — MUPIROCIN 20 MG/G
1 OINTMENT TOPICAL
Status: CANCELLED | OUTPATIENT
Start: 2021-08-12

## 2021-08-12 RX ORDER — FENTANYL CITRATE 50 UG/ML
25 INJECTION, SOLUTION INTRAMUSCULAR; INTRAVENOUS EVERY 5 MIN PRN
Status: CANCELLED | OUTPATIENT
Start: 2021-08-12

## 2021-08-12 RX ORDER — SODIUM CHLORIDE 9 MG/ML
INJECTION, SOLUTION INTRAVENOUS
Status: CANCELLED | OUTPATIENT
Start: 2021-08-12

## 2021-08-12 RX ORDER — MIDAZOLAM HYDROCHLORIDE 1 MG/ML
1 INJECTION INTRAMUSCULAR; INTRAVENOUS EVERY 5 MIN PRN
Status: CANCELLED | OUTPATIENT
Start: 2021-08-12

## 2021-08-12 RX ORDER — BISACODYL 10 MG
10 SUPPOSITORY, RECTAL RECTAL EVERY 12 HOURS PRN
Status: CANCELLED | OUTPATIENT
Start: 2021-08-12

## 2021-08-12 RX ORDER — POLYETHYLENE GLYCOL 3350 17 G/17G
17 POWDER, FOR SOLUTION ORAL DAILY
Status: CANCELLED | OUTPATIENT
Start: 2021-08-12

## 2021-08-12 RX ORDER — SODIUM CHLORIDE 9 MG/ML
INJECTION, SOLUTION INTRAVENOUS CONTINUOUS
Status: CANCELLED | OUTPATIENT
Start: 2021-08-12 | End: 2021-08-13

## 2021-08-12 RX ORDER — OXYCODONE AND ACETAMINOPHEN 10; 325 MG/1; MG/1
1 TABLET ORAL EVERY 6 HOURS PRN
Qty: 28 TABLET | Refills: 0 | Status: SHIPPED | OUTPATIENT
Start: 2021-08-12

## 2021-08-12 RX ORDER — SODIUM CHLORIDE 0.9 % (FLUSH) 0.9 %
10 SYRINGE (ML) INJECTION
Status: CANCELLED | OUTPATIENT
Start: 2021-08-12

## 2021-08-12 RX ORDER — NALOXONE HCL 0.4 MG/ML
0.02 VIAL (ML) INJECTION
Status: CANCELLED | OUTPATIENT
Start: 2021-08-12

## 2021-08-12 RX ORDER — LIDOCAINE HYDROCHLORIDE 10 MG/ML
1 INJECTION, SOLUTION EPIDURAL; INFILTRATION; INTRACAUDAL; PERINEURAL
Status: CANCELLED | OUTPATIENT
Start: 2021-08-12

## 2021-08-12 RX ORDER — CEFAZOLIN SODIUM 2 G/50ML
2 SOLUTION INTRAVENOUS
Status: CANCELLED | OUTPATIENT
Start: 2021-08-12 | End: 2021-08-13

## 2021-08-12 RX ORDER — FAMOTIDINE 20 MG/1
20 TABLET, FILM COATED ORAL 2 TIMES DAILY
Status: CANCELLED | OUTPATIENT
Start: 2021-08-12

## 2021-08-12 RX ORDER — ACETAMINOPHEN 500 MG
1000 TABLET ORAL EVERY 6 HOURS
Status: CANCELLED | OUTPATIENT
Start: 2021-08-12 | End: 2021-08-14

## 2021-08-12 RX ORDER — CELECOXIB 200 MG/1
400 CAPSULE ORAL
Status: CANCELLED | OUTPATIENT
Start: 2021-08-12

## 2021-08-16 LAB — BACTERIA SPEC ANAEROBE CULT: NORMAL

## 2021-08-20 ENCOUNTER — TELEPHONE (OUTPATIENT)
Dept: SPORTS MEDICINE | Facility: CLINIC | Age: 71
End: 2021-08-20

## 2021-08-25 ENCOUNTER — PATIENT MESSAGE (OUTPATIENT)
Dept: ADMINISTRATIVE | Facility: OTHER | Age: 71
End: 2021-08-25

## 2021-08-25 ENCOUNTER — TELEPHONE (OUTPATIENT)
Dept: SPORTS MEDICINE | Facility: CLINIC | Age: 71
End: 2021-08-25

## 2021-08-25 ENCOUNTER — ANESTHESIA EVENT (OUTPATIENT)
Dept: SURGERY | Facility: HOSPITAL | Age: 71
End: 2021-08-25
Payer: MEDICARE

## 2021-08-25 ENCOUNTER — HOSPITAL ENCOUNTER (OUTPATIENT)
Dept: PREADMISSION TESTING | Facility: HOSPITAL | Age: 71
Discharge: HOME | End: 2021-08-25
Attending: ORTHOPAEDIC SURGERY
Payer: MEDICARE

## 2021-08-25 VITALS
HEIGHT: 76 IN | TEMPERATURE: 99 F | SYSTOLIC BLOOD PRESSURE: 140 MMHG | DIASTOLIC BLOOD PRESSURE: 72 MMHG | OXYGEN SATURATION: 97 % | RESPIRATION RATE: 18 BRPM | HEART RATE: 65 BPM | BODY MASS INDEX: 25.82 KG/M2 | WEIGHT: 212 LBS

## 2021-08-26 ENCOUNTER — ANESTHESIA (OUTPATIENT)
Dept: SURGERY | Facility: HOSPITAL | Age: 71
End: 2021-08-26
Payer: MEDICARE

## 2021-08-26 ENCOUNTER — HOSPITAL ENCOUNTER (OUTPATIENT)
Facility: HOSPITAL | Age: 71
Discharge: HOME | End: 2021-08-27
Attending: ORTHOPAEDIC SURGERY | Admitting: ORTHOPAEDIC SURGERY
Payer: COMMERCIAL

## 2021-08-26 DIAGNOSIS — M25.561 CHRONIC PAIN OF RIGHT KNEE: ICD-10-CM

## 2021-08-26 DIAGNOSIS — M17.11 ARTHRITIS OF RIGHT KNEE: ICD-10-CM

## 2021-08-26 DIAGNOSIS — G89.29 CHRONIC PAIN OF RIGHT KNEE: ICD-10-CM

## 2021-08-26 DIAGNOSIS — Z98.890 S/P RIGHT KNEE SURGERY: Primary | ICD-10-CM

## 2021-08-26 DIAGNOSIS — M25.461 EFFUSION, RIGHT KNEE: ICD-10-CM

## 2021-08-26 DIAGNOSIS — M23.91 INTERNAL DERANGEMENT OF KNEE JOINT, RIGHT: ICD-10-CM

## 2021-08-26 LAB
CTP QC/QA: YES
SARS-COV-2 RDRP RESP QL NAA+PROBE: NEGATIVE

## 2021-08-26 PROCEDURE — D9220A PRA ANESTHESIA: ICD-10-PCS | Mod: ANES,,, | Performed by: ANESTHESIOLOGY

## 2021-08-26 PROCEDURE — 25000003 PHARM REV CODE 250: Performed by: PHYSICIAN ASSISTANT

## 2021-08-26 PROCEDURE — 36000711: Performed by: ORTHOPAEDIC SURGERY

## 2021-08-26 PROCEDURE — 76942 ADDUCTOR CANAL CATHETER: ICD-10-PCS | Mod: 26,,, | Performed by: ANESTHESIOLOGY

## 2021-08-26 PROCEDURE — D9220A PRA ANESTHESIA: ICD-10-PCS | Mod: CRNA,,, | Performed by: NURSE ANESTHETIST, CERTIFIED REGISTERED

## 2021-08-26 PROCEDURE — 64448 ADDUCTOR CANAL CATHETER: ICD-10-PCS | Mod: 59,RT,, | Performed by: ANESTHESIOLOGY

## 2021-08-26 PROCEDURE — 37000008 HC ANESTHESIA 1ST 15 MINUTES: Performed by: ORTHOPAEDIC SURGERY

## 2021-08-26 PROCEDURE — 27487 PR REVISE KNEE JOINT REPLACE,ALL PARTS: ICD-10-PCS | Mod: RT,,, | Performed by: ORTHOPAEDIC SURGERY

## 2021-08-26 PROCEDURE — U0002 COVID-19 LAB TEST NON-CDC: HCPCS | Mod: QW,CR,, | Performed by: ORTHOPAEDIC SURGERY

## 2021-08-26 PROCEDURE — U0002: ICD-10-PCS | Mod: QW,CR,, | Performed by: ORTHOPAEDIC SURGERY

## 2021-08-26 PROCEDURE — 27201423 OPTIME MED/SURG SUP & DEVICES STERILE SUPPLY: Performed by: ORTHOPAEDIC SURGERY

## 2021-08-26 PROCEDURE — 88300 SURGICAL PATH GROSS: CPT | Mod: 26,,, | Performed by: PATHOLOGY

## 2021-08-26 PROCEDURE — 64448 NJX AA&/STRD FEM NRV NFS IMG: CPT | Performed by: ANESTHESIOLOGY

## 2021-08-26 PROCEDURE — 25000003 PHARM REV CODE 250: Performed by: ORTHOPAEDIC SURGERY

## 2021-08-26 PROCEDURE — U0002 COVID-19 LAB TEST NON-CDC: HCPCS | Performed by: ORTHOPAEDIC SURGERY

## 2021-08-26 PROCEDURE — 27100025 HC TUBING, SET FLUID WARMER: Performed by: NURSE ANESTHETIST, CERTIFIED REGISTERED

## 2021-08-26 PROCEDURE — 76942 ECHO GUIDE FOR BIOPSY: CPT | Mod: 26,,, | Performed by: ANESTHESIOLOGY

## 2021-08-26 PROCEDURE — 71000033 HC RECOVERY, INTIAL HOUR: Performed by: ORTHOPAEDIC SURGERY

## 2021-08-26 PROCEDURE — 88300 SURGICAL PATH GROSS: CPT | Performed by: PATHOLOGY

## 2021-08-26 PROCEDURE — 63600175 PHARM REV CODE 636 W HCPCS: Performed by: ORTHOPAEDIC SURGERY

## 2021-08-26 PROCEDURE — 37000009 HC ANESTHESIA EA ADD 15 MINS: Performed by: ORTHOPAEDIC SURGERY

## 2021-08-26 PROCEDURE — 94761 N-INVAS EAR/PLS OXIMETRY MLT: CPT

## 2021-08-26 PROCEDURE — 63600175 PHARM REV CODE 636 W HCPCS: Performed by: ANESTHESIOLOGY

## 2021-08-26 PROCEDURE — 71000039 HC RECOVERY, EACH ADD'L HOUR: Performed by: ORTHOPAEDIC SURGERY

## 2021-08-26 PROCEDURE — C1713 ANCHOR/SCREW BN/BN,TIS/BN: HCPCS | Performed by: ORTHOPAEDIC SURGERY

## 2021-08-26 PROCEDURE — 99900035 HC TECH TIME PER 15 MIN (STAT)

## 2021-08-26 PROCEDURE — 63600175 PHARM REV CODE 636 W HCPCS: Performed by: STUDENT IN AN ORGANIZED HEALTH CARE EDUCATION/TRAINING PROGRAM

## 2021-08-26 PROCEDURE — 63600175 PHARM REV CODE 636 W HCPCS: Performed by: NURSE ANESTHETIST, CERTIFIED REGISTERED

## 2021-08-26 PROCEDURE — 25000003 PHARM REV CODE 250: Performed by: ANESTHESIOLOGY

## 2021-08-26 PROCEDURE — 25000003 PHARM REV CODE 250: Performed by: NURSE ANESTHETIST, CERTIFIED REGISTERED

## 2021-08-26 PROCEDURE — C1751 CATH, INF, PER/CENT/MIDLINE: HCPCS | Performed by: ANESTHESIOLOGY

## 2021-08-26 PROCEDURE — 27487 REVISE/REPLACE KNEE JOINT: CPT | Mod: RT,,, | Performed by: ORTHOPAEDIC SURGERY

## 2021-08-26 PROCEDURE — 36000710: Performed by: ORTHOPAEDIC SURGERY

## 2021-08-26 PROCEDURE — D9220A PRA ANESTHESIA: Mod: CRNA,,, | Performed by: NURSE ANESTHETIST, CERTIFIED REGISTERED

## 2021-08-26 PROCEDURE — C1776 JOINT DEVICE (IMPLANTABLE): HCPCS | Performed by: ORTHOPAEDIC SURGERY

## 2021-08-26 PROCEDURE — D9220A PRA ANESTHESIA: Mod: ANES,,, | Performed by: ANESTHESIOLOGY

## 2021-08-26 PROCEDURE — 88300 PR  SURG PATH,GROSS,LEVEL I: ICD-10-PCS | Mod: 26,,, | Performed by: PATHOLOGY

## 2021-08-26 DEVICE — PATELLA ATTUNE MEDLZD DOME 41: Type: IMPLANTABLE DEVICE | Site: KNEE | Status: FUNCTIONAL

## 2021-08-26 DEVICE — IMPLANTABLE DEVICE: Type: IMPLANTABLE DEVICE | Site: KNEE | Status: FUNCTIONAL

## 2021-08-26 DEVICE — CEMENT BONE SMPLX HV GENTMYCN: Type: IMPLANTABLE DEVICE | Site: KNEE | Status: FUNCTIONAL

## 2021-08-26 RX ORDER — ATORVASTATIN CALCIUM 10 MG/1
10 TABLET, FILM COATED ORAL DAILY
Status: DISCONTINUED | OUTPATIENT
Start: 2021-08-27 | End: 2021-08-27 | Stop reason: HOSPADM

## 2021-08-26 RX ORDER — LIDOCAINE HYDROCHLORIDE 10 MG/ML
1 INJECTION, SOLUTION EPIDURAL; INFILTRATION; INTRACAUDAL; PERINEURAL
Status: DISCONTINUED | OUTPATIENT
Start: 2021-08-26 | End: 2021-08-26 | Stop reason: HOSPADM

## 2021-08-26 RX ORDER — ROPIVACAINE HYDROCHLORIDE 2 MG/ML
INJECTION, SOLUTION EPIDURAL; INFILTRATION; PERINEURAL CONTINUOUS
Status: DISCONTINUED | OUTPATIENT
Start: 2021-08-26 | End: 2021-08-27 | Stop reason: HOSPADM

## 2021-08-26 RX ORDER — PROPOFOL 10 MG/ML
VIAL (ML) INTRAVENOUS CONTINUOUS PRN
Status: DISCONTINUED | OUTPATIENT
Start: 2021-08-26 | End: 2021-08-26

## 2021-08-26 RX ORDER — PROCHLORPERAZINE EDISYLATE 5 MG/ML
5 INJECTION INTRAMUSCULAR; INTRAVENOUS EVERY 6 HOURS PRN
Status: DISCONTINUED | OUTPATIENT
Start: 2021-08-26 | End: 2021-08-27 | Stop reason: HOSPADM

## 2021-08-26 RX ORDER — PROPOFOL 10 MG/ML
VIAL (ML) INTRAVENOUS
Status: DISCONTINUED | OUTPATIENT
Start: 2021-08-26 | End: 2021-08-26

## 2021-08-26 RX ORDER — CEFAZOLIN SODIUM 1 G/3ML
2 INJECTION, POWDER, FOR SOLUTION INTRAMUSCULAR; INTRAVENOUS
Status: COMPLETED | OUTPATIENT
Start: 2021-08-26 | End: 2021-08-27

## 2021-08-26 RX ORDER — EPHEDRINE SULFATE 50 MG/ML
INJECTION, SOLUTION INTRAVENOUS
Status: DISCONTINUED | OUTPATIENT
Start: 2021-08-26 | End: 2021-08-26

## 2021-08-26 RX ORDER — DEXAMETHASONE SODIUM PHOSPHATE 4 MG/ML
INJECTION, SOLUTION INTRA-ARTICULAR; INTRALESIONAL; INTRAMUSCULAR; INTRAVENOUS; SOFT TISSUE
Status: DISCONTINUED | OUTPATIENT
Start: 2021-08-26 | End: 2021-08-26

## 2021-08-26 RX ORDER — PREGABALIN 75 MG/1
75 CAPSULE ORAL
Status: COMPLETED | OUTPATIENT
Start: 2021-08-26 | End: 2021-08-26

## 2021-08-26 RX ORDER — KETAMINE HCL IN 0.9 % NACL 50 MG/5 ML
SYRINGE (ML) INTRAVENOUS
Status: DISCONTINUED | OUTPATIENT
Start: 2021-08-26 | End: 2021-08-26

## 2021-08-26 RX ORDER — PHENYLEPHRINE HYDROCHLORIDE 10 MG/ML
INJECTION INTRAVENOUS
Status: DISCONTINUED | OUTPATIENT
Start: 2021-08-26 | End: 2021-08-26

## 2021-08-26 RX ORDER — ONDANSETRON 2 MG/ML
4 INJECTION INTRAMUSCULAR; INTRAVENOUS EVERY 8 HOURS PRN
Status: DISCONTINUED | OUTPATIENT
Start: 2021-08-26 | End: 2021-08-27 | Stop reason: HOSPADM

## 2021-08-26 RX ORDER — TRANEXAMIC ACID 100 MG/ML
1000 INJECTION, SOLUTION INTRAVENOUS
Status: DISCONTINUED | OUTPATIENT
Start: 2021-08-26 | End: 2021-08-26 | Stop reason: HOSPADM

## 2021-08-26 RX ORDER — SODIUM CHLORIDE 9 MG/ML
INJECTION, SOLUTION INTRAVENOUS
Status: COMPLETED | OUTPATIENT
Start: 2021-08-26 | End: 2021-08-26

## 2021-08-26 RX ORDER — ROPIVACAINE/EPI/CLONIDINE/KET 2.46-0.005
SYRINGE (ML) INJECTION ONCE
Status: DISCONTINUED | OUTPATIENT
Start: 2021-08-26 | End: 2021-08-26 | Stop reason: HOSPADM

## 2021-08-26 RX ORDER — FAMOTIDINE 20 MG/1
20 TABLET, FILM COATED ORAL 2 TIMES DAILY
Status: DISCONTINUED | OUTPATIENT
Start: 2021-08-26 | End: 2021-08-27 | Stop reason: HOSPADM

## 2021-08-26 RX ORDER — LIDOCAINE HYDROCHLORIDE 20 MG/ML
INJECTION INTRAVENOUS
Status: DISCONTINUED | OUTPATIENT
Start: 2021-08-26 | End: 2021-08-26

## 2021-08-26 RX ORDER — SODIUM CHLORIDE 9 MG/ML
INJECTION, SOLUTION INTRAVENOUS CONTINUOUS
Status: DISCONTINUED | OUTPATIENT
Start: 2021-08-26 | End: 2021-08-27 | Stop reason: HOSPADM

## 2021-08-26 RX ORDER — NALOXONE HCL 0.4 MG/ML
0.02 VIAL (ML) INJECTION
Status: DISCONTINUED | OUTPATIENT
Start: 2021-08-26 | End: 2021-08-27 | Stop reason: HOSPADM

## 2021-08-26 RX ORDER — FENTANYL CITRATE 50 UG/ML
INJECTION, SOLUTION INTRAMUSCULAR; INTRAVENOUS
Status: DISCONTINUED | OUTPATIENT
Start: 2021-08-26 | End: 2021-08-26

## 2021-08-26 RX ORDER — ASPIRIN 325 MG
325 TABLET ORAL DAILY
Status: DISCONTINUED | OUTPATIENT
Start: 2021-08-27 | End: 2021-08-27 | Stop reason: HOSPADM

## 2021-08-26 RX ORDER — ROPIVACAINE/EPI/CLONIDINE/KET 2.46-0.005
SYRINGE (ML) INJECTION
Status: DISCONTINUED | OUTPATIENT
Start: 2021-08-26 | End: 2021-08-26 | Stop reason: HOSPADM

## 2021-08-26 RX ORDER — BISACODYL 10 MG
10 SUPPOSITORY, RECTAL RECTAL EVERY 12 HOURS PRN
Status: DISCONTINUED | OUTPATIENT
Start: 2021-08-26 | End: 2021-08-27 | Stop reason: HOSPADM

## 2021-08-26 RX ORDER — CELECOXIB 200 MG/1
400 CAPSULE ORAL
Status: COMPLETED | OUTPATIENT
Start: 2021-08-26 | End: 2021-08-26

## 2021-08-26 RX ORDER — TRANEXAMIC ACID 100 MG/ML
1000 INJECTION, SOLUTION INTRAVENOUS
Status: COMPLETED | OUTPATIENT
Start: 2021-08-26 | End: 2021-08-26

## 2021-08-26 RX ORDER — OXYCODONE HYDROCHLORIDE 5 MG/1
5 TABLET ORAL
Status: DISCONTINUED | OUTPATIENT
Start: 2021-08-26 | End: 2021-08-27 | Stop reason: HOSPADM

## 2021-08-26 RX ORDER — FAMOTIDINE 10 MG/ML
INJECTION INTRAVENOUS
Status: DISCONTINUED | OUTPATIENT
Start: 2021-08-26 | End: 2021-08-26

## 2021-08-26 RX ORDER — MIDAZOLAM HYDROCHLORIDE 1 MG/ML
1 INJECTION INTRAMUSCULAR; INTRAVENOUS EVERY 5 MIN PRN
Status: DISCONTINUED | OUTPATIENT
Start: 2021-08-26 | End: 2021-08-26 | Stop reason: HOSPADM

## 2021-08-26 RX ORDER — POLYETHYLENE GLYCOL 3350 17 G/17G
17 POWDER, FOR SOLUTION ORAL DAILY
Status: DISCONTINUED | OUTPATIENT
Start: 2021-08-26 | End: 2021-08-27 | Stop reason: HOSPADM

## 2021-08-26 RX ORDER — CELECOXIB 200 MG/1
200 CAPSULE ORAL 2 TIMES DAILY
Status: DISCONTINUED | OUTPATIENT
Start: 2021-08-26 | End: 2021-08-27 | Stop reason: HOSPADM

## 2021-08-26 RX ORDER — MIDAZOLAM HYDROCHLORIDE 1 MG/ML
INJECTION, SOLUTION INTRAMUSCULAR; INTRAVENOUS
Status: DISCONTINUED | OUTPATIENT
Start: 2021-08-26 | End: 2021-08-26

## 2021-08-26 RX ORDER — CARBOXYMETHYLCELLULOSE SODIUM 10 MG/ML
GEL OPHTHALMIC
Status: DISCONTINUED | OUTPATIENT
Start: 2021-08-26 | End: 2021-08-26

## 2021-08-26 RX ORDER — OXYCODONE HYDROCHLORIDE 10 MG/1
10 TABLET ORAL
Status: DISCONTINUED | OUTPATIENT
Start: 2021-08-26 | End: 2021-08-27 | Stop reason: HOSPADM

## 2021-08-26 RX ORDER — CEFAZOLIN SODIUM 1 G/3ML
2 INJECTION, POWDER, FOR SOLUTION INTRAMUSCULAR; INTRAVENOUS
Status: COMPLETED | OUTPATIENT
Start: 2021-08-26 | End: 2021-08-26

## 2021-08-26 RX ORDER — AMOXICILLIN 250 MG
1 CAPSULE ORAL 2 TIMES DAILY
Status: DISCONTINUED | OUTPATIENT
Start: 2021-08-26 | End: 2021-08-27 | Stop reason: HOSPADM

## 2021-08-26 RX ORDER — SODIUM CHLORIDE 0.9 % (FLUSH) 0.9 %
10 SYRINGE (ML) INJECTION
Status: DISCONTINUED | OUTPATIENT
Start: 2021-08-26 | End: 2021-08-26 | Stop reason: HOSPADM

## 2021-08-26 RX ORDER — MULTIVITAMIN
1 TABLET ORAL DAILY
COMMUNITY

## 2021-08-26 RX ORDER — ACETAMINOPHEN 500 MG
1000 TABLET ORAL EVERY 6 HOURS
Status: DISCONTINUED | OUTPATIENT
Start: 2021-08-26 | End: 2021-08-27 | Stop reason: HOSPADM

## 2021-08-26 RX ORDER — FENTANYL CITRATE 50 UG/ML
25 INJECTION, SOLUTION INTRAMUSCULAR; INTRAVENOUS EVERY 5 MIN PRN
Status: DISCONTINUED | OUTPATIENT
Start: 2021-08-26 | End: 2021-08-26 | Stop reason: HOSPADM

## 2021-08-26 RX ORDER — PREGABALIN 75 MG/1
75 CAPSULE ORAL NIGHTLY
Status: DISCONTINUED | OUTPATIENT
Start: 2021-08-26 | End: 2021-08-27 | Stop reason: HOSPADM

## 2021-08-26 RX ORDER — ASPIRIN 81 MG/1
81 TABLET ORAL 2 TIMES DAILY
Status: DISCONTINUED | OUTPATIENT
Start: 2021-08-26 | End: 2021-08-27

## 2021-08-26 RX ORDER — ONDANSETRON 2 MG/ML
INJECTION INTRAMUSCULAR; INTRAVENOUS
Status: DISCONTINUED | OUTPATIENT
Start: 2021-08-26 | End: 2021-08-26

## 2021-08-26 RX ORDER — MUPIROCIN 20 MG/G
1 OINTMENT TOPICAL
Status: COMPLETED | OUTPATIENT
Start: 2021-08-26 | End: 2021-08-26

## 2021-08-26 RX ORDER — METHOCARBAMOL 500 MG/1
1000 TABLET, FILM COATED ORAL 4 TIMES DAILY
Status: DISCONTINUED | OUTPATIENT
Start: 2021-08-26 | End: 2021-08-27 | Stop reason: HOSPADM

## 2021-08-26 RX ORDER — ROPIVACAINE HYDROCHLORIDE 5 MG/ML
INJECTION, SOLUTION EPIDURAL; INFILTRATION; PERINEURAL
Status: COMPLETED | OUTPATIENT
Start: 2021-08-26 | End: 2021-08-26

## 2021-08-26 RX ORDER — ACETAMINOPHEN 500 MG
1000 TABLET ORAL
Status: COMPLETED | OUTPATIENT
Start: 2021-08-26 | End: 2021-08-26

## 2021-08-26 RX ADMIN — ONDANSETRON 4 MG: 2 INJECTION, SOLUTION INTRAMUSCULAR; INTRAVENOUS at 03:08

## 2021-08-26 RX ADMIN — PHENYLEPHRINE HYDROCHLORIDE 50 MCG: 10 INJECTION INTRAVENOUS at 01:08

## 2021-08-26 RX ADMIN — SODIUM CHLORIDE: 0.9 INJECTION, SOLUTION INTRAVENOUS at 08:08

## 2021-08-26 RX ADMIN — CEFAZOLIN 2 G: 330 INJECTION, POWDER, FOR SOLUTION INTRAMUSCULAR; INTRAVENOUS at 12:08

## 2021-08-26 RX ADMIN — TRANEXAMIC ACID 1000 MG: 100 INJECTION, SOLUTION INTRAVENOUS at 03:08

## 2021-08-26 RX ADMIN — MIDAZOLAM HYDROCHLORIDE 1 MG: 1 INJECTION, SOLUTION INTRAMUSCULAR; INTRAVENOUS at 12:08

## 2021-08-26 RX ADMIN — HYPROMELLOSE 2910 1 DROP: 5 SOLUTION OPHTHALMIC at 09:08

## 2021-08-26 RX ADMIN — ROPIVACAINE HYDROCHLORIDE 10 ML: 5 INJECTION, SOLUTION EPIDURAL; INFILTRATION; PERINEURAL at 10:08

## 2021-08-26 RX ADMIN — SODIUM CHLORIDE: 0.9 INJECTION, SOLUTION INTRAVENOUS at 05:08

## 2021-08-26 RX ADMIN — CARBOXYMETHYLCELLULOSE SODIUM 4 DROP: 10 GEL OPHTHALMIC at 12:08

## 2021-08-26 RX ADMIN — EPHEDRINE SULFATE 5 MG: 50 INJECTION INTRAVENOUS at 02:08

## 2021-08-26 RX ADMIN — LIDOCAINE HYDROCHLORIDE 50 MG: 20 INJECTION, SOLUTION INTRAVENOUS at 12:08

## 2021-08-26 RX ADMIN — Medication 10 MG: at 01:08

## 2021-08-26 RX ADMIN — FAMOTIDINE 20 MG: 20 TABLET, FILM COATED ORAL at 08:08

## 2021-08-26 RX ADMIN — FENTANYL CITRATE 25 MCG: 50 INJECTION, SOLUTION INTRAMUSCULAR; INTRAVENOUS at 12:08

## 2021-08-26 RX ADMIN — FENTANYL CITRATE 25 MCG: 50 INJECTION, SOLUTION INTRAMUSCULAR; INTRAVENOUS at 01:08

## 2021-08-26 RX ADMIN — ACETAMINOPHEN 1000 MG: 500 TABLET ORAL at 05:08

## 2021-08-26 RX ADMIN — PROPOFOL 40 MG: 10 INJECTION, EMULSION INTRAVENOUS at 12:08

## 2021-08-26 RX ADMIN — PHENYLEPHRINE HYDROCHLORIDE 100 MCG: 10 INJECTION INTRAVENOUS at 03:08

## 2021-08-26 RX ADMIN — CELECOXIB 400 MG: 200 CAPSULE ORAL at 08:08

## 2021-08-26 RX ADMIN — CEFAZOLIN 2 G: 330 INJECTION, POWDER, FOR SOLUTION INTRAMUSCULAR; INTRAVENOUS at 08:08

## 2021-08-26 RX ADMIN — MUPIROCIN 1 G: 20 OINTMENT TOPICAL at 08:08

## 2021-08-26 RX ADMIN — LIDOCAINE HYDROCHLORIDE AND EPINEPHRINE 3 ML: 15; 5 INJECTION, SOLUTION EPIDURAL at 02:08

## 2021-08-26 RX ADMIN — METHOCARBAMOL 1000 MG: 500 TABLET ORAL at 04:08

## 2021-08-26 RX ADMIN — FENTANYL CITRATE 25 MCG: 50 INJECTION, SOLUTION INTRAMUSCULAR; INTRAVENOUS at 03:08

## 2021-08-26 RX ADMIN — PREGABALIN 75 MG: 75 CAPSULE ORAL at 08:08

## 2021-08-26 RX ADMIN — OXYCODONE HYDROCHLORIDE 10 MG: 10 TABLET ORAL at 04:08

## 2021-08-26 RX ADMIN — FENTANYL CITRATE 50 MCG: 50 INJECTION INTRAMUSCULAR; INTRAVENOUS at 10:08

## 2021-08-26 RX ADMIN — MEPIVACAINE HYDROCHLORIDE 4 ML: 15 INJECTION, SOLUTION EPIDURAL; INFILTRATION at 12:08

## 2021-08-26 RX ADMIN — Medication 20 MG: at 12:08

## 2021-08-26 RX ADMIN — ACETAMINOPHEN 1000 MG: 500 TABLET ORAL at 08:08

## 2021-08-26 RX ADMIN — DEXAMETHASONE SODIUM PHOSPHATE 8 MG: 4 INJECTION, SOLUTION INTRAMUSCULAR; INTRAVENOUS at 12:08

## 2021-08-26 RX ADMIN — Medication: at 04:08

## 2021-08-26 RX ADMIN — ASPIRIN 81 MG: 81 TABLET, COATED ORAL at 08:08

## 2021-08-26 RX ADMIN — DOCUSATE SODIUM 50MG AND SENNOSIDES 8.6MG 1 TABLET: 8.6; 5 TABLET, FILM COATED ORAL at 08:08

## 2021-08-26 RX ADMIN — TRANEXAMIC ACID 1000 MG: 100 INJECTION, SOLUTION INTRAVENOUS at 01:08

## 2021-08-26 RX ADMIN — PROPOFOL 75 MCG/KG/MIN: 10 INJECTION, EMULSION INTRAVENOUS at 12:08

## 2021-08-26 RX ADMIN — FAMOTIDINE 20 MG: 10 INJECTION, SOLUTION INTRAVENOUS at 12:08

## 2021-08-26 RX ADMIN — CELECOXIB 200 MG: 200 CAPSULE ORAL at 08:08

## 2021-08-26 RX ADMIN — OXYCODONE HYDROCHLORIDE 10 MG: 10 TABLET ORAL at 08:08

## 2021-08-26 RX ADMIN — MIDAZOLAM HYDROCHLORIDE 1 MG: 1 INJECTION, SOLUTION INTRAMUSCULAR; INTRAVENOUS at 10:08

## 2021-08-27 ENCOUNTER — PATIENT MESSAGE (OUTPATIENT)
Dept: SPORTS MEDICINE | Facility: CLINIC | Age: 71
End: 2021-08-27

## 2021-08-27 ENCOUNTER — OFFICE VISIT (OUTPATIENT)
Dept: SPORTS MEDICINE | Facility: CLINIC | Age: 71
End: 2021-08-27
Payer: MEDICARE

## 2021-08-27 VITALS
DIASTOLIC BLOOD PRESSURE: 59 MMHG | BODY MASS INDEX: 24.96 KG/M2 | HEART RATE: 70 BPM | WEIGHT: 205 LBS | OXYGEN SATURATION: 96 % | SYSTOLIC BLOOD PRESSURE: 114 MMHG | HEIGHT: 76 IN | TEMPERATURE: 96 F | RESPIRATION RATE: 18 BRPM

## 2021-08-27 DIAGNOSIS — Z96.651 S/P TOTAL KNEE REPLACEMENT, RIGHT: Primary | ICD-10-CM

## 2021-08-27 DIAGNOSIS — Z96.651 STATUS POST REVISION OF TOTAL REPLACEMENT OF RIGHT KNEE: Primary | ICD-10-CM

## 2021-08-27 PROCEDURE — 99024 PR POST-OP FOLLOW-UP VISIT: ICD-10-PCS | Mod: 95,POP,, | Performed by: PHYSICIAN ASSISTANT

## 2021-08-27 PROCEDURE — 25000003 PHARM REV CODE 250: Performed by: ORTHOPAEDIC SURGERY

## 2021-08-27 PROCEDURE — 97535 SELF CARE MNGMENT TRAINING: CPT

## 2021-08-27 PROCEDURE — 99024 POSTOP FOLLOW-UP VISIT: CPT | Mod: 95,POP,, | Performed by: PHYSICIAN ASSISTANT

## 2021-08-27 PROCEDURE — 97110 THERAPEUTIC EXERCISES: CPT

## 2021-08-27 PROCEDURE — 25000003 PHARM REV CODE 250: Performed by: PHYSICIAN ASSISTANT

## 2021-08-27 PROCEDURE — 97161 PT EVAL LOW COMPLEX 20 MIN: CPT

## 2021-08-27 PROCEDURE — 94761 N-INVAS EAR/PLS OXIMETRY MLT: CPT

## 2021-08-27 PROCEDURE — 25000003 PHARM REV CODE 250: Performed by: ANESTHESIOLOGY

## 2021-08-27 PROCEDURE — 99212 OFFICE O/P EST SF 10 MIN: CPT | Mod: ,,, | Performed by: ANESTHESIOLOGY

## 2021-08-27 PROCEDURE — 99212 PR OFFICE/OUTPT VISIT, EST, LEVL II, 10-19 MIN: ICD-10-PCS | Mod: ,,, | Performed by: ANESTHESIOLOGY

## 2021-08-27 PROCEDURE — 97165 OT EVAL LOW COMPLEX 30 MIN: CPT

## 2021-08-27 PROCEDURE — 97116 GAIT TRAINING THERAPY: CPT

## 2021-08-27 PROCEDURE — 63600175 PHARM REV CODE 636 W HCPCS: Performed by: ORTHOPAEDIC SURGERY

## 2021-08-27 PROCEDURE — 99900035 HC TECH TIME PER 15 MIN (STAT)

## 2021-08-27 RX ORDER — ERYTHROMYCIN 5 MG/G
OINTMENT OPHTHALMIC EVERY 4 HOURS
Status: DISCONTINUED | OUTPATIENT
Start: 2021-08-27 | End: 2021-08-27

## 2021-08-27 RX ORDER — CIPROFLOXACIN HYDROCHLORIDE 3 MG/ML
2 SOLUTION/ DROPS OPHTHALMIC EVERY 4 HOURS
Status: DISCONTINUED | OUTPATIENT
Start: 2021-08-27 | End: 2021-08-27 | Stop reason: HOSPADM

## 2021-08-27 RX ORDER — DOXYCYCLINE 100 MG/1
100 CAPSULE ORAL 2 TIMES DAILY
Qty: 60 CAPSULE | Refills: 0 | Status: SHIPPED | OUTPATIENT
Start: 2021-08-27

## 2021-08-27 RX ORDER — DOXYCYCLINE 100 MG/1
100 CAPSULE ORAL EVERY 12 HOURS
Qty: 60 CAPSULE | Refills: 0 | Status: SHIPPED | OUTPATIENT
Start: 2021-08-27 | End: 2021-09-26

## 2021-08-27 RX ORDER — TOBRAMYCIN 3 MG/ML
1 SOLUTION/ DROPS OPHTHALMIC EVERY 4 HOURS
Status: CANCELLED | OUTPATIENT
Start: 2021-08-27

## 2021-08-27 RX ADMIN — CELECOXIB 200 MG: 200 CAPSULE ORAL at 08:08

## 2021-08-27 RX ADMIN — CEFAZOLIN 2 G: 330 INJECTION, POWDER, FOR SOLUTION INTRAMUSCULAR; INTRAVENOUS at 04:08

## 2021-08-27 RX ADMIN — ASPIRIN 325 MG ORAL TABLET 325 MG: 325 PILL ORAL at 08:08

## 2021-08-27 RX ADMIN — METHOCARBAMOL 1000 MG: 500 TABLET ORAL at 08:08

## 2021-08-27 RX ADMIN — FAMOTIDINE 20 MG: 20 TABLET, FILM COATED ORAL at 08:08

## 2021-08-27 RX ADMIN — POLYETHYLENE GLYCOL 3350 17 G: 17 POWDER, FOR SOLUTION ORAL at 08:08

## 2021-08-27 RX ADMIN — DOCUSATE SODIUM 50MG AND SENNOSIDES 8.6MG 1 TABLET: 8.6; 5 TABLET, FILM COATED ORAL at 08:08

## 2021-08-27 RX ADMIN — ATORVASTATIN CALCIUM 10 MG: 10 TABLET, FILM COATED ORAL at 08:08

## 2021-08-31 ENCOUNTER — PATIENT MESSAGE (OUTPATIENT)
Dept: SPORTS MEDICINE | Facility: CLINIC | Age: 71
End: 2021-08-31

## 2021-08-31 ENCOUNTER — TELEPHONE (OUTPATIENT)
Dept: SPORTS MEDICINE | Facility: CLINIC | Age: 71
End: 2021-08-31

## 2021-09-02 ENCOUNTER — PATIENT MESSAGE (OUTPATIENT)
Dept: ADMINISTRATIVE | Facility: OTHER | Age: 71
End: 2021-09-02

## 2021-09-02 ENCOUNTER — PATIENT MESSAGE (OUTPATIENT)
Dept: SPORTS MEDICINE | Facility: CLINIC | Age: 71
End: 2021-09-02

## 2021-09-02 DIAGNOSIS — Z96.651 S/P TOTAL KNEE REPLACEMENT, RIGHT: Primary | ICD-10-CM

## 2021-09-02 RX ORDER — TRAMADOL HYDROCHLORIDE 50 MG/1
50 TABLET ORAL EVERY 6 HOURS PRN
Qty: 36 TABLET | Refills: 0 | Status: SHIPPED | OUTPATIENT
Start: 2021-09-02

## 2021-09-09 ENCOUNTER — TELEPHONE (OUTPATIENT)
Dept: SPORTS MEDICINE | Facility: CLINIC | Age: 71
End: 2021-09-09

## 2021-09-09 ENCOUNTER — PATIENT MESSAGE (OUTPATIENT)
Dept: ORTHOPEDICS | Facility: CLINIC | Age: 71
End: 2021-09-09

## 2021-09-09 ENCOUNTER — OFFICE VISIT (OUTPATIENT)
Dept: ORTHOPEDICS | Facility: CLINIC | Age: 71
End: 2021-09-09
Payer: MEDICARE

## 2021-09-09 DIAGNOSIS — Z96.651 S/P TOTAL KNEE REPLACEMENT, RIGHT: Primary | ICD-10-CM

## 2021-09-09 PROCEDURE — 99024 POSTOP FOLLOW-UP VISIT: CPT | Mod: 95,POP,, | Performed by: PHYSICIAN ASSISTANT

## 2021-09-09 PROCEDURE — 99024 PR POST-OP FOLLOW-UP VISIT: ICD-10-PCS | Mod: 95,POP,, | Performed by: PHYSICIAN ASSISTANT

## 2021-09-14 LAB — FUNGUS SPEC CULT: NORMAL

## 2021-09-16 ENCOUNTER — PATIENT MESSAGE (OUTPATIENT)
Dept: ORTHOPEDICS | Facility: CLINIC | Age: 71
End: 2021-09-16

## 2021-09-20 LAB
FINAL PATHOLOGIC DIAGNOSIS: NORMAL
GROSS: NORMAL
Lab: NORMAL

## 2021-09-24 ENCOUNTER — PATIENT MESSAGE (OUTPATIENT)
Dept: SPORTS MEDICINE | Facility: CLINIC | Age: 71
End: 2021-09-24

## 2021-09-27 LAB
ACID FAST MOD KINY STN SPEC: NORMAL
MYCOBACTERIUM SPEC QL CULT: NORMAL

## 2021-09-28 ENCOUNTER — PATIENT MESSAGE (OUTPATIENT)
Dept: SPORTS MEDICINE | Facility: CLINIC | Age: 71
End: 2021-09-28

## 2021-10-06 ENCOUNTER — HOSPITAL ENCOUNTER (OUTPATIENT)
Dept: RADIOLOGY | Facility: HOSPITAL | Age: 71
Discharge: HOME | End: 2021-10-06
Attending: ORTHOPAEDIC SURGERY
Payer: MEDICARE

## 2021-10-06 ENCOUNTER — OFFICE VISIT (OUTPATIENT)
Dept: SPORTS MEDICINE | Facility: CLINIC | Age: 71
End: 2021-10-06
Payer: MEDICARE

## 2021-10-06 VITALS
HEART RATE: 65 BPM | SYSTOLIC BLOOD PRESSURE: 117 MMHG | DIASTOLIC BLOOD PRESSURE: 76 MMHG | BODY MASS INDEX: 25.57 KG/M2 | HEIGHT: 76 IN | WEIGHT: 210 LBS

## 2021-10-06 DIAGNOSIS — M25.561 RIGHT KNEE PAIN, UNSPECIFIED CHRONICITY: ICD-10-CM

## 2021-10-06 DIAGNOSIS — M25.561 RIGHT KNEE PAIN, UNSPECIFIED CHRONICITY: Primary | ICD-10-CM

## 2021-10-06 PROCEDURE — 73564 X-RAY EXAM KNEE 4 OR MORE: CPT | Mod: TC,50

## 2021-10-06 PROCEDURE — 99024 PR POST-OP FOLLOW-UP VISIT: ICD-10-PCS | Mod: POP,,, | Performed by: ORTHOPAEDIC SURGERY

## 2021-10-06 PROCEDURE — 73564 XR KNEE ORTHO BILAT WITH FLEXION: ICD-10-PCS | Mod: 26,50,, | Performed by: RADIOLOGY

## 2021-10-06 PROCEDURE — 99215 OFFICE O/P EST HI 40 MIN: CPT | Mod: PBBFAC | Performed by: ORTHOPAEDIC SURGERY

## 2021-10-06 PROCEDURE — 99024 POSTOP FOLLOW-UP VISIT: CPT | Mod: POP,,, | Performed by: ORTHOPAEDIC SURGERY

## 2021-10-06 PROCEDURE — 73564 X-RAY EXAM KNEE 4 OR MORE: CPT | Mod: 26,50,, | Performed by: RADIOLOGY

## 2021-10-06 PROCEDURE — 99999 PR PBB SHADOW E&M-EST. PATIENT-LVL V: ICD-10-PCS | Mod: PBBFAC,,, | Performed by: ORTHOPAEDIC SURGERY

## 2021-10-06 PROCEDURE — 99999 PR PBB SHADOW E&M-EST. PATIENT-LVL V: CPT | Mod: PBBFAC,,, | Performed by: ORTHOPAEDIC SURGERY

## 2021-10-06 RX ORDER — CELECOXIB 200 MG/1
200 CAPSULE ORAL 2 TIMES DAILY
Qty: 60 CAPSULE | Refills: 6 | Status: SHIPPED | OUTPATIENT
Start: 2021-10-06 | End: 2021-11-05

## 2021-11-15 ENCOUNTER — HOSPITAL ENCOUNTER (OUTPATIENT)
Dept: RADIOLOGY | Facility: HOSPITAL | Age: 71
Discharge: HOME | End: 2021-11-15
Attending: ORTHOPAEDIC SURGERY
Payer: MEDICARE

## 2021-11-15 ENCOUNTER — OFFICE VISIT (OUTPATIENT)
Dept: SPORTS MEDICINE | Facility: CLINIC | Age: 71
End: 2021-11-15
Payer: MEDICARE

## 2021-11-15 VITALS
WEIGHT: 210 LBS | HEIGHT: 76 IN | BODY MASS INDEX: 25.57 KG/M2 | DIASTOLIC BLOOD PRESSURE: 71 MMHG | SYSTOLIC BLOOD PRESSURE: 132 MMHG | HEART RATE: 65 BPM

## 2021-11-15 DIAGNOSIS — M25.561 PAIN IN BOTH KNEES, UNSPECIFIED CHRONICITY: Primary | ICD-10-CM

## 2021-11-15 DIAGNOSIS — M23.92 INTERNAL DERANGEMENT OF LEFT KNEE: ICD-10-CM

## 2021-11-15 DIAGNOSIS — M25.561 PAIN IN BOTH KNEES, UNSPECIFIED CHRONICITY: ICD-10-CM

## 2021-11-15 DIAGNOSIS — M23.91 INTERNAL DERANGEMENT OF KNEE JOINT, RIGHT: ICD-10-CM

## 2021-11-15 DIAGNOSIS — M25.562 PAIN IN BOTH KNEES, UNSPECIFIED CHRONICITY: ICD-10-CM

## 2021-11-15 DIAGNOSIS — M25.562 PAIN IN BOTH KNEES, UNSPECIFIED CHRONICITY: Primary | ICD-10-CM

## 2021-11-15 DIAGNOSIS — M25.561 CHRONIC PAIN OF RIGHT KNEE: ICD-10-CM

## 2021-11-15 DIAGNOSIS — Z96.651 S/P TOTAL KNEE REPLACEMENT, RIGHT: ICD-10-CM

## 2021-11-15 DIAGNOSIS — G89.29 CHRONIC PAIN OF RIGHT KNEE: ICD-10-CM

## 2021-11-15 PROBLEM — M17.31 POST-TRAUMATIC OSTEOARTHRITIS OF RIGHT KNEE: Status: RESOLVED | Noted: 2017-10-16 | Resolved: 2021-11-15

## 2021-11-15 PROCEDURE — 73564 X-RAY EXAM KNEE 4 OR MORE: CPT | Mod: TC,50

## 2021-11-15 PROCEDURE — 73564 X-RAY EXAM KNEE 4 OR MORE: CPT | Mod: 26,50,, | Performed by: RADIOLOGY

## 2021-11-15 PROCEDURE — 99024 PR POST-OP FOLLOW-UP VISIT: ICD-10-PCS | Mod: S$PBB,,, | Performed by: ORTHOPAEDIC SURGERY

## 2021-11-15 PROCEDURE — 99999 PR PBB SHADOW E&M-EST. PATIENT-LVL IV: CPT | Mod: PBBFAC,,, | Performed by: ORTHOPAEDIC SURGERY

## 2021-11-15 PROCEDURE — 99999 PR PBB SHADOW E&M-EST. PATIENT-LVL IV: ICD-10-PCS | Mod: PBBFAC,,, | Performed by: ORTHOPAEDIC SURGERY

## 2021-11-15 PROCEDURE — 99024 POSTOP FOLLOW-UP VISIT: CPT | Mod: S$PBB,,, | Performed by: ORTHOPAEDIC SURGERY

## 2021-11-15 PROCEDURE — 99214 OFFICE O/P EST MOD 30 MIN: CPT | Mod: PBBFAC | Performed by: ORTHOPAEDIC SURGERY

## 2021-11-15 PROCEDURE — 73564 XR KNEE ORTHO BILAT WITH FLEXION: ICD-10-PCS | Mod: 26,50,, | Performed by: RADIOLOGY

## 2021-12-14 ENCOUNTER — PATIENT MESSAGE (OUTPATIENT)
Dept: SPORTS MEDICINE | Facility: CLINIC | Age: 71
End: 2021-12-14
Payer: MEDICARE

## 2021-12-16 ENCOUNTER — OFFICE VISIT (OUTPATIENT)
Dept: SPORTS MEDICINE | Facility: CLINIC | Age: 71
End: 2021-12-16
Payer: MEDICARE

## 2021-12-16 ENCOUNTER — HOSPITAL ENCOUNTER (OUTPATIENT)
Dept: RADIOLOGY | Facility: HOSPITAL | Age: 71
Discharge: HOME | End: 2021-12-16
Attending: ORTHOPAEDIC SURGERY
Payer: MEDICARE

## 2021-12-16 VITALS
BODY MASS INDEX: 25.57 KG/M2 | WEIGHT: 210 LBS | DIASTOLIC BLOOD PRESSURE: 79 MMHG | SYSTOLIC BLOOD PRESSURE: 132 MMHG | HEART RATE: 63 BPM | HEIGHT: 76 IN

## 2021-12-16 DIAGNOSIS — M25.562 PAIN IN BOTH KNEES, UNSPECIFIED CHRONICITY: ICD-10-CM

## 2021-12-16 DIAGNOSIS — Z96.651 S/P TOTAL KNEE REPLACEMENT, RIGHT: ICD-10-CM

## 2021-12-16 DIAGNOSIS — M25.561 PAIN IN BOTH KNEES, UNSPECIFIED CHRONICITY: Primary | ICD-10-CM

## 2021-12-16 DIAGNOSIS — M25.461 EFFUSION OF RIGHT KNEE: ICD-10-CM

## 2021-12-16 DIAGNOSIS — M25.562 PAIN IN BOTH KNEES, UNSPECIFIED CHRONICITY: Primary | ICD-10-CM

## 2021-12-16 DIAGNOSIS — M25.561 PAIN IN BOTH KNEES, UNSPECIFIED CHRONICITY: ICD-10-CM

## 2021-12-16 LAB
APPEARANCE FLD: NORMAL
BODY FLD TYPE: NORMAL
BODY FLD TYPE: NORMAL
COLOR FLD: NORMAL
CRYSTALS FLD MICRO: NEGATIVE
GRAM STN SPEC: NORMAL
GRAM STN SPEC: NORMAL
LYMPHOCYTES NFR FLD MANUAL: 7 %
MONOS+MACROS NFR FLD MANUAL: 75 %
NEUTROPHILS NFR FLD MANUAL: 18 %
WBC # FLD: 640 /CU MM

## 2021-12-16 PROCEDURE — 99214 PR OFFICE/OUTPT VISIT, EST, LEVL IV, 30-39 MIN: ICD-10-PCS | Mod: S$PBB,25,, | Performed by: ORTHOPAEDIC SURGERY

## 2021-12-16 PROCEDURE — 99213 OFFICE O/P EST LOW 20 MIN: CPT | Mod: PBBFAC,25 | Performed by: ORTHOPAEDIC SURGERY

## 2021-12-16 PROCEDURE — 20611 DRAIN/INJ JOINT/BURSA W/US: CPT | Mod: PBBFAC | Performed by: ORTHOPAEDIC SURGERY

## 2021-12-16 PROCEDURE — 99214 OFFICE O/P EST MOD 30 MIN: CPT | Mod: S$PBB,25,, | Performed by: ORTHOPAEDIC SURGERY

## 2021-12-16 PROCEDURE — 87102 FUNGUS ISOLATION CULTURE: CPT | Performed by: ORTHOPAEDIC SURGERY

## 2021-12-16 PROCEDURE — 87075 CULTR BACTERIA EXCEPT BLOOD: CPT | Performed by: ORTHOPAEDIC SURGERY

## 2021-12-16 PROCEDURE — 89051 BODY FLUID CELL COUNT: CPT | Performed by: ORTHOPAEDIC SURGERY

## 2021-12-16 PROCEDURE — 87205 SMEAR GRAM STAIN: CPT | Performed by: ORTHOPAEDIC SURGERY

## 2021-12-16 PROCEDURE — 87206 SMEAR FLUORESCENT/ACID STAI: CPT | Performed by: ORTHOPAEDIC SURGERY

## 2021-12-16 PROCEDURE — 99999 PR PBB SHADOW E&M-EST. PATIENT-LVL III: CPT | Mod: PBBFAC,,, | Performed by: ORTHOPAEDIC SURGERY

## 2021-12-16 PROCEDURE — 99999 PR PBB SHADOW E&M-EST. PATIENT-LVL III: ICD-10-PCS | Mod: PBBFAC,,, | Performed by: ORTHOPAEDIC SURGERY

## 2021-12-16 PROCEDURE — 73564 X-RAY EXAM KNEE 4 OR MORE: CPT | Mod: TC,50

## 2021-12-16 PROCEDURE — 89060 EXAM SYNOVIAL FLUID CRYSTALS: CPT | Performed by: ORTHOPAEDIC SURGERY

## 2021-12-16 PROCEDURE — 87116 MYCOBACTERIA CULTURE: CPT | Performed by: ORTHOPAEDIC SURGERY

## 2021-12-16 PROCEDURE — 73564 X-RAY EXAM KNEE 4 OR MORE: CPT | Mod: 26,50,, | Performed by: RADIOLOGY

## 2021-12-16 PROCEDURE — 87070 CULTURE OTHR SPECIMN AEROBIC: CPT | Performed by: ORTHOPAEDIC SURGERY

## 2021-12-16 PROCEDURE — 73564 XR KNEE ORTHO BILAT WITH FLEXION: ICD-10-PCS | Mod: 26,50,, | Performed by: RADIOLOGY

## 2021-12-16 PROCEDURE — 20611 LARGE JOINT ASPIRATION/INJECTION: R KNEE: ICD-10-PCS | Mod: S$PBB,RT,, | Performed by: ORTHOPAEDIC SURGERY

## 2021-12-16 RX ORDER — METHYLPREDNISOLONE 4 MG/1
TABLET ORAL
Qty: 1 EACH | Refills: 0 | Status: SHIPPED | OUTPATIENT
Start: 2021-12-16 | End: 2022-01-06

## 2021-12-16 RX ORDER — TRIAMCINOLONE ACETONIDE 40 MG/ML
80 INJECTION, SUSPENSION INTRA-ARTICULAR; INTRAMUSCULAR
Status: DISCONTINUED | OUTPATIENT
Start: 2021-12-16 | End: 2021-12-16 | Stop reason: HOSPADM

## 2021-12-16 RX ADMIN — TRIAMCINOLONE ACETONIDE 80 MG: 40 INJECTION, SUSPENSION INTRA-ARTICULAR; INTRAMUSCULAR at 09:12

## 2021-12-17 LAB — PATH INTERP FLD-IMP: NORMAL

## 2021-12-19 LAB — BACTERIA SPEC AEROBE CULT: NO GROWTH

## 2021-12-27 LAB — BACTERIA SPEC ANAEROBE CULT: NORMAL

## 2022-01-17 LAB — FUNGUS SPEC CULT: NORMAL

## 2022-02-03 LAB
ACID FAST MOD KINY STN SPEC: NORMAL
MYCOBACTERIUM SPEC QL CULT: NORMAL

## 2022-02-04 NOTE — PROGRESS NOTES
Subjective:          Chief Complaint: Leonid Harris III is a 71 y.o. male who had concerns including Post-op Evaluation of the Right Knee.    Mr. Leonid Harris is here for post op evaluation of the right knee, he is now 5 months s/p the below procedures. He was last seen in our clinic 6 weeks ago for CSI and aspiration. Synovasure results came back negative for infection. He continues to take celebrex and is in physical therapy at [facility] in [place]. He is overall doing well. Endorses that the area that was aspirated at his last visit had previously resolved, but over the past few weeks has started to return, but has plateaud. No fever/chills/malaise. Denies any wound drainage, warmth.     Mr. Leonid Harris is here for post op evaluation of his right knee. He is here for an unscheduled visit. He says over Thanksgiving that he noticed an increase in swelling and pressure in the knee. He continues to take Celebrex 200mg BID and is doing well with this medication. He is at [facility] in [place] and has been progressing as tolerated.     Mr. Leonid Harris is here for post op evaluation of his RIGHT knee. He is now 12 weeks s/p the below procedures. He is overall doing well, he continues to slowly progress with daily activities. He does note some pain with stairs, walking and standing up from a seated position. He is physical therapy at [facility] in [place].       Date of Procedure: 8/26/2021      Procedure: Procedure(s) (LRB):  ARTHROPLASTY, KNEE, TOTAL (Right)  FEMORAL AUTOLOGOUS BONE GRAFTING (Right)  REMOVAL, HARDWARE, DEEP, BURIED, WIRE, PIN, LADONNA (Right)      Surgeon(s) and Role:     * Fran Shelton MD - Primary     Assisting Surgeon: None     Pre-Operative Diagnosis: Traumatic arthritis of right knee (M12.561)  Painful orthopaedic hardware (T84.84XA)     Post-Operative Diagnosis: Post-Op Diagnosis Codes:     * Traumatic arthritis of right knee (M12.561)     * Painful orthopaedic hardware  (T84.84XA)    Pain  Pertinent negatives include no abdominal pain, chest pain, chills, congestion, coughing, fever, joint swelling, nausea, numbness, rash, sore throat or vomiting.       Review of Systems   Constitutional: Negative for chills, fever and night sweats.   HENT: Negative for congestion, hearing loss and sore throat.    Eyes: Negative for blurred vision, discharge, double vision and visual disturbance.   Cardiovascular: Negative for chest pain, leg swelling, palpitations and syncope.   Respiratory: Negative for cough and shortness of breath.    Endocrine: Negative for cold intolerance, heat intolerance and polyuria.   Hematologic/Lymphatic: Negative for bleeding problem.   Skin: Negative for dry skin and rash.   Musculoskeletal: Negative for back pain, joint pain, joint swelling, muscle cramps and muscle weakness.   Gastrointestinal: Negative for abdominal pain, melena, nausea and vomiting.   Genitourinary: Negative for hematuria.   Neurological: Negative for focal weakness, loss of balance, numbness and paresthesias.   Psychiatric/Behavioral: Negative for altered mental status.       Pain Related Questions  Over the past 3 days, what was your average pain during activity? (I.e. running, jogging, walking, climbing stairs, getting dressed, ect.): 2  Over the past 3 days, what was your highest pain level?: 2  Over the past 3 days, what was your lowest pain level? : 0    Other  How many nights a week are you awakened by your affected body part?: 1  Was the patient's HEIGHT measured or patient reported?: Patient Reported  Was the patient's WEIGHT measured or patient reported?: Measured      Objective:        General: Leonid is well-developed, well-nourished, appears stated age, in no acute distress, alert and oriented to time, place and person.     General    Vitals reviewed.  Constitutional: He is oriented to person, place, and time. He appears well-developed and well-nourished. No distress.   HENT:    Mouth/Throat: No oropharyngeal exudate.   Eyes: Right eye exhibits no discharge. Left eye exhibits no discharge.   Pulmonary/Chest: Effort normal and breath sounds normal. No respiratory distress.   Neurological: He is alert and oriented to person, place, and time. He has normal reflexes. No cranial nerve deficit. Coordination normal.   Psychiatric: He has a normal mood and affect. His behavior is normal. Judgment and thought content normal.     General Musculoskeletal Exam   Gait: abnormal       Right Knee Exam     Inspection   Erythema: absent  Scars: present  Swelling: present  Effusion: absent  Deformity: absent  Bruising: absent    Tenderness   The patient is experiencing no tenderness.     Range of Motion   Extension: 0   Flexion:  140 abnormal     Tests   Meniscus   Michi:  Medial - negative Lateral - negative  Ligament Examination Lachman: normal (-1 to 2mm) PCL-Posterior Drawer: normal (0 to 2mm)     MCL - Valgus: normal (0 to 2mm)  LCL - Varus: normalPivot Shift: normal (Equal)Reverse Pivot Shift: normal (Equal)Dial Test at 30 degrees: normal (< 5 degrees)Dial Test at 90 degrees: normal (< 5 degrees)  Posterior Sag Test: negative  Posterolateral Corner: unstable (>15 degrees difference)  Patella   Patellar apprehension: negative  Passive Patellar Tilt: neutral  Patellar Tracking: normal  Patellar Glide (quadrants): Lateral - 1   Medial - 2  Q-Angle at 90 degrees: normal  Patellar Grind: negative  J-Sign: none    Other   Meniscal Cyst: absent  Popliteal (Baker's) Cyst: absent  Sensation: normal    Left Knee Exam     Inspection   Erythema: absent  Scars: absent  Swelling: absent  Effusion: absent  Deformity: absent  Bruising: absent    Tenderness   The patient is experiencing no tenderness.     Range of Motion   Extension: 0   Flexion: 140     Tests   Meniscus   Michi:  Medial - negative Lateral - negative  Stability Lachman: normal (-1 to 2mm) PCL-Posterior Drawer: normal (0 to 2mm)  MCL - Valgus:  normal (0 to 2mm)  LCL - Varus: normal (0 to 2mm)Pivot Shift: normal (Equal)Reverse Pivot Shift: normal (Equal)Dial Test at 30 degrees: normal (< 5 degrees)Dial Test at 90 degrees: normal (< 5 degrees)  Posterior Sag Test: negative  Posterolateral Corner: unstable (>15 degrees difference)  Patella   Patellar apprehension: negative  Passive Patellar Tilt: neutral  Patellar Tracking: normal  Patellar Glide (Quadrants): Lateral - 1 Medial - 2  Q-Angle at 90 degrees: normal  Patellar Grind: negative  J-Sign: J sign absent    Other   Meniscal Cyst: absent  Popliteal (Baker's) Cyst: absent  Sensation: normal    Right Hip Exam     Tests   Erin: negative  Left Hip Exam     Tests   Erin: negative          Muscle Strength   Right Lower Extremity   Hip Abduction: 5/5   Quadriceps:  5/5   Hamstrin/5   Left Lower Extremity   Hip Abduction: 5/5   Quadriceps:  5/5   Hamstrin/5     Reflexes     Left Side  Achilles:  2+  Quadriceps:  2+    Right Side   Achilles:  2+  Quadriceps:  2+    Vascular Exam     Right Pulses  Dorsalis Pedis:      2+  Posterior Tibial:      2+        Left Pulses  Dorsalis Pedis:      2+  Posterior Tibial:      2+          Xray R knee with good placement of implants. No malalignment. No signs of hardware loosening.       Assessment:       Encounter Diagnosis   Name Primary?    S/P total knee replacement, right Yes          Plan:       1. IKDC, SF-12 and KOOS was filled out today in clinic.     RTC in 3 months with Dr. Fran Shelton Patient will fill out IKDC, SF-12 and KOOS on return.    2. Medications: Refills of the following Rx were sent to patients preferred Pharmacy:  No Refills Needed Today, celebrex    3. Physical Therapy: Continue/Begin: Continue at [facility] in [place]    4. HEP: N/A    5. Procedures/Procedural Planning:   N/A    6. DME: Compression stocking prescribed today    7. Work/Sport Status: retired    8. Visit Summary: Patient is overall doing well, much better than his last visit.  Continue to monitor area at superolateral aspect of incision to determine if it gets bigger. F/u 6 months with Xrays of R knee.                         [facility] patient questionnaires have been collected today.

## 2022-02-07 ENCOUNTER — OFFICE VISIT (OUTPATIENT)
Dept: SPORTS MEDICINE | Facility: CLINIC | Age: 72
End: 2022-02-07
Payer: MEDICARE

## 2022-02-07 ENCOUNTER — HOSPITAL ENCOUNTER (OUTPATIENT)
Dept: RADIOLOGY | Facility: HOSPITAL | Age: 72
Discharge: HOME | End: 2022-02-07
Attending: ORTHOPAEDIC SURGERY
Payer: MEDICARE

## 2022-02-07 VITALS
HEART RATE: 62 BPM | BODY MASS INDEX: 25.69 KG/M2 | SYSTOLIC BLOOD PRESSURE: 132 MMHG | WEIGHT: 211 LBS | HEIGHT: 76 IN | DIASTOLIC BLOOD PRESSURE: 80 MMHG

## 2022-02-07 DIAGNOSIS — Z96.651 S/P TOTAL KNEE REPLACEMENT, RIGHT: Primary | ICD-10-CM

## 2022-02-07 DIAGNOSIS — Z96.651 S/P TOTAL KNEE REPLACEMENT, RIGHT: ICD-10-CM

## 2022-02-07 PROCEDURE — 99214 OFFICE O/P EST MOD 30 MIN: CPT | Mod: S$PBB,,, | Performed by: ORTHOPAEDIC SURGERY

## 2022-02-07 PROCEDURE — 99214 PR OFFICE/OUTPT VISIT, EST, LEVL IV, 30-39 MIN: ICD-10-PCS | Mod: S$PBB,,, | Performed by: ORTHOPAEDIC SURGERY

## 2022-02-07 PROCEDURE — 99999 PR PBB SHADOW E&M-EST. PATIENT-LVL IV: CPT | Mod: PBBFAC,,, | Performed by: ORTHOPAEDIC SURGERY

## 2022-02-07 PROCEDURE — 73564 XR KNEE ORTHO BILAT WITH FLEXION: ICD-10-PCS | Mod: 26,50,, | Performed by: STUDENT IN AN ORGANIZED HEALTH CARE EDUCATION/TRAINING PROGRAM

## 2022-02-07 PROCEDURE — 99999 PR PBB SHADOW E&M-EST. PATIENT-LVL IV: ICD-10-PCS | Mod: PBBFAC,,, | Performed by: ORTHOPAEDIC SURGERY

## 2022-02-07 PROCEDURE — 99214 OFFICE O/P EST MOD 30 MIN: CPT | Mod: PBBFAC | Performed by: ORTHOPAEDIC SURGERY

## 2022-02-07 PROCEDURE — 73564 X-RAY EXAM KNEE 4 OR MORE: CPT | Mod: TC,50

## 2022-02-07 PROCEDURE — 73564 X-RAY EXAM KNEE 4 OR MORE: CPT | Mod: 26,50,, | Performed by: STUDENT IN AN ORGANIZED HEALTH CARE EDUCATION/TRAINING PROGRAM

## 2022-03-03 ENCOUNTER — PATIENT MESSAGE (OUTPATIENT)
Dept: SPORTS MEDICINE | Facility: CLINIC | Age: 72
End: 2022-03-03
Payer: MEDICARE

## 2022-03-03 NOTE — TELEPHONE ENCOUNTER
Consulted Fanny Hines PA-C on patients message. Called patient and suggested he continue with Celebrex, wear compression sleeve, begin elevating above level of the heart and ice mulitiple times a day for 20 min at a time as well as hold off on any gym or physical exercises. Patient noted it does tend to swell like this when he does a lot at the gym. Advised him to do the mentioned conservative treatments and hold off on any exercises or gym activities for a week to see if this calms down. He will reach out to us in a week and give an update either way. Offered future aspiration with Dr. Shelton if swelling continues. Patient noted compliance and understanding. He also denied any chills, fever, body aches, denied that the knee felt warm and denied any signs of infection.     Nafisa Moreno   Clinical Assistant to Dr. Fran Shelton

## 2022-03-11 NOTE — PROGRESS NOTES
Subjective:          Chief Complaint: Leonid Harris III is a 72 y.o. male who had concerns including Pain of the Right Knee.    Mr. Leonid Harris is a pleasant man here for follow up of right knee TKA and lateral swelling.     Mr. Leonid Harris is here for post op evaluation of the right knee, he is now 5 months s/p the below procedures. He was last seen in our clinic 6 weeks ago for CSI and aspiration. Synovasure results came back negative for infection. He continues to take celebrex and is in physical therapy at [facility] in [place]. He is overall doing well. Endorses that the area that was aspirated at his last visit had previously resolved, but over the past few weeks has started to return, but has plateaud. No fever/chills/malaise. Denies any wound drainage, warmth.     Mr. Leonid Harris is here for post op evaluation of his right knee. He is here for an unscheduled visit. He says over Thanksgiving that he noticed an increase in swelling and pressure in the knee. He continues to take Celebrex 200mg BID and is doing well with this medication. He is at [facility] in [place] and has been progressing as tolerated.     Mr. Leonid Harris is here for post op evaluation of his RIGHT knee. He is now 12 weeks s/p the below procedures. He is overall doing well, he continues to slowly progress with daily activities. He does note some pain with stairs, walking and standing up from a seated position. He is physical therapy at [facility] in [place].       Date of Procedure: 8/26/2021      Procedure: Procedure(s) (LRB):  ARTHROPLASTY, KNEE, TOTAL (Right)  FEMORAL AUTOLOGOUS BONE GRAFTING (Right)  REMOVAL, HARDWARE, DEEP, BURIED, WIRE, PIN, LADONNA (Right)      Surgeon(s) and Role:     * Fran Shelton MD - Primary     Assisting Surgeon: None     Pre-Operative Diagnosis: Traumatic arthritis of right knee (M12.561)  Painful orthopaedic hardware (T84.84XA)     Post-Operative Diagnosis: Post-Op Diagnosis Codes:     *  Traumatic arthritis of right knee (M12.561)     * Painful orthopaedic hardware (T84.84XA)    Pain  Pertinent negatives include no abdominal pain, chest pain, chills, congestion, coughing, fever, joint swelling, nausea, numbness, rash, sore throat or vomiting.       Review of Systems   Constitutional: Negative for chills, fever and night sweats.   HENT: Negative for congestion, hearing loss and sore throat.    Eyes: Negative for blurred vision, discharge, double vision and visual disturbance.   Cardiovascular: Negative for chest pain, leg swelling, palpitations and syncope.   Respiratory: Negative for cough and shortness of breath.    Endocrine: Negative for cold intolerance, heat intolerance and polyuria.   Hematologic/Lymphatic: Negative for bleeding problem.   Skin: Negative for dry skin and rash.   Musculoskeletal: Negative for back pain, joint pain, joint swelling, muscle cramps and muscle weakness.   Gastrointestinal: Negative for abdominal pain, melena, nausea and vomiting.   Genitourinary: Negative for hematuria.   Neurological: Negative for focal weakness, loss of balance, numbness and paresthesias.   Psychiatric/Behavioral: Negative for altered mental status.                   Objective:        General: Leonid is well-developed, well-nourished, appears stated age, in no acute distress, alert and oriented to time, place and person.     General    Vitals reviewed.  Constitutional: He is oriented to person, place, and time. He appears well-developed and well-nourished. No distress.   HENT:   Mouth/Throat: No oropharyngeal exudate.   Eyes: Right eye exhibits no discharge. Left eye exhibits no discharge.   Pulmonary/Chest: Effort normal and breath sounds normal. No respiratory distress.   Neurological: He is alert and oriented to person, place, and time. He has normal reflexes. No cranial nerve deficit. Coordination normal.   Psychiatric: He has a normal mood and affect. His behavior is normal. Judgment and  thought content normal.     General Musculoskeletal Exam   Gait: abnormal       Right Knee Exam     Inspection   Erythema: absent  Scars: present  Swelling: present  Effusion: absent  Deformity: absent  Bruising: absent    Tenderness   The patient is experiencing no tenderness.     Range of Motion   Extension: 0   Flexion:  140 abnormal     Tests   Meniscus   Michi:  Medial - negative Lateral - negative  Ligament Examination Lachman: normal (-1 to 2mm) PCL-Posterior Drawer: normal (0 to 2mm)     MCL - Valgus: normal (0 to 2mm)  LCL - Varus: normalPivot Shift: normal (Equal)Reverse Pivot Shift: normal (Equal)Dial Test at 30 degrees: normal (< 5 degrees)Dial Test at 90 degrees: normal (< 5 degrees)  Posterior Sag Test: negative  Posterolateral Corner: unstable (>15 degrees difference)  Patella   Patellar apprehension: negative  Passive Patellar Tilt: neutral  Patellar Tracking: normal  Patellar Glide (quadrants): Lateral - 1   Medial - 2  Q-Angle at 90 degrees: normal  Patellar Grind: negative  J-Sign: none    Other   Meniscal Cyst: absent  Popliteal (Baker's) Cyst: absent  Sensation: normal    Left Knee Exam     Inspection   Erythema: absent  Scars: absent  Swelling: absent  Effusion: absent  Deformity: absent  Bruising: absent    Tenderness   The patient is experiencing no tenderness.     Range of Motion   Extension: 0   Flexion: 140     Tests   Meniscus   Michi:  Medial - negative Lateral - negative  Stability Lachman: normal (-1 to 2mm) PCL-Posterior Drawer: normal (0 to 2mm)  MCL - Valgus: normal (0 to 2mm)  LCL - Varus: normal (0 to 2mm)Pivot Shift: normal (Equal)Reverse Pivot Shift: normal (Equal)Dial Test at 30 degrees: normal (< 5 degrees)Dial Test at 90 degrees: normal (< 5 degrees)  Posterior Sag Test: negative  Posterolateral Corner: unstable (>15 degrees difference)  Patella   Patellar apprehension: negative  Passive Patellar Tilt: neutral  Patellar Tracking: normal  Patellar Glide (Quadrants):  Lateral - 1 Medial - 2  Q-Angle at 90 degrees: normal  Patellar Grind: negative  J-Sign: J sign absent    Other   Meniscal Cyst: absent  Popliteal (Baker's) Cyst: absent  Sensation: normal    Right Hip Exam     Tests   Erin: negative  Left Hip Exam     Tests   Erin: negative          Muscle Strength   Right Lower Extremity   Hip Abduction: 5/5   Quadriceps:  5/5   Hamstrin/5   Left Lower Extremity   Hip Abduction: 5/5   Quadriceps:  5/5   Hamstrin/5     Reflexes     Left Side  Achilles:  2+  Quadriceps:  2+    Right Side   Achilles:  2+  Quadriceps:  2+    Vascular Exam     Right Pulses  Dorsalis Pedis:      2+  Posterior Tibial:      2+        Left Pulses  Dorsalis Pedis:      2+  Posterior Tibial:      2+          Xray R knee with good placement of implants. No malalignment. No signs of hardware loosening.       Assessment:       Encounter Diagnoses   Name Primary?    Pain in both knees, unspecified chronicity Yes    S/P total knee replacement, right           Plan:       1. IKDC, SF-12 and KOOS was filled out today in clinic.     RTC in 3 months with Dr. Fran Shelton Patient will fill out IKDC, SF-12 and KOOS on return.    2. Medications: Refills of the following Rx were sent to patients preferred Pharmacy:  No Refills Needed Today, celebrex    3. Physical Therapy: Continue/Begin: Continue at [facility] in [place]    4. HEP: N/A    5. Procedures/Procedural Planning:   We reviewed with Leonid today, the pathology and natural history of his diagnosis. We had an extensive discussion as to the conservative treatment and management of their condition. We also discussed the variety of treatment options to include medication, physical therapy, diagnostic testing as well as other treatments.The decision was made to go forward with:     knee - right    1. Triamcinolone performed today, see procedure note.        6. DME: Compression stocking prescribed today    7. Work/Sport Status: retired    8. Visit Summary: 14cc  aspirated and CSI given today, follow up at next reguarly scheduled appt. Should allow limited activities for the next 6 weeks so he can scar in and heal. Patient noted understanding                        [facility] patient questionnaires have been collected today.

## 2022-03-16 ENCOUNTER — OFFICE VISIT (OUTPATIENT)
Dept: SPORTS MEDICINE | Facility: CLINIC | Age: 72
End: 2022-03-16
Payer: MEDICARE

## 2022-03-16 ENCOUNTER — HOSPITAL ENCOUNTER (OUTPATIENT)
Dept: RADIOLOGY | Facility: HOSPITAL | Age: 72
Discharge: HOME | End: 2022-03-16
Attending: ORTHOPAEDIC SURGERY
Payer: MEDICARE

## 2022-03-16 VITALS
HEART RATE: 64 BPM | DIASTOLIC BLOOD PRESSURE: 72 MMHG | BODY MASS INDEX: 25.57 KG/M2 | SYSTOLIC BLOOD PRESSURE: 119 MMHG | HEIGHT: 76 IN | WEIGHT: 210 LBS

## 2022-03-16 DIAGNOSIS — M25.561 PAIN IN BOTH KNEES, UNSPECIFIED CHRONICITY: ICD-10-CM

## 2022-03-16 DIAGNOSIS — M25.561 PAIN IN BOTH KNEES, UNSPECIFIED CHRONICITY: Primary | ICD-10-CM

## 2022-03-16 DIAGNOSIS — M25.562 PAIN IN BOTH KNEES, UNSPECIFIED CHRONICITY: Primary | ICD-10-CM

## 2022-03-16 DIAGNOSIS — Z96.651 S/P TOTAL KNEE REPLACEMENT, RIGHT: ICD-10-CM

## 2022-03-16 DIAGNOSIS — M25.562 PAIN IN BOTH KNEES, UNSPECIFIED CHRONICITY: ICD-10-CM

## 2022-03-16 PROCEDURE — 73564 XR KNEE ORTHO BILAT WITH FLEXION: ICD-10-PCS | Mod: 26,50,, | Performed by: RADIOLOGY

## 2022-03-16 PROCEDURE — 99999 PR PBB SHADOW E&M-EST. PATIENT-LVL III: CPT | Mod: PBBFAC,,, | Performed by: ORTHOPAEDIC SURGERY

## 2022-03-16 PROCEDURE — 20611 DRAIN/INJ JOINT/BURSA W/US: CPT | Mod: PBBFAC | Performed by: ORTHOPAEDIC SURGERY

## 2022-03-16 PROCEDURE — 73564 X-RAY EXAM KNEE 4 OR MORE: CPT | Mod: 26,50,, | Performed by: RADIOLOGY

## 2022-03-16 PROCEDURE — 97760 ORTHOTIC MGMT&TRAING 1ST ENC: CPT | Mod: GP,,, | Performed by: ORTHOPAEDIC SURGERY

## 2022-03-16 PROCEDURE — 99214 PR OFFICE/OUTPT VISIT, EST, LEVL IV, 30-39 MIN: ICD-10-PCS | Mod: S$PBB,25,, | Performed by: ORTHOPAEDIC SURGERY

## 2022-03-16 PROCEDURE — 97760 PR ORTHOTIC MGMT&TRAINJ INITIAL ENC EA 15 MINS: ICD-10-PCS | Mod: GP,,, | Performed by: ORTHOPAEDIC SURGERY

## 2022-03-16 PROCEDURE — 73564 X-RAY EXAM KNEE 4 OR MORE: CPT | Mod: TC,50

## 2022-03-16 PROCEDURE — 99214 OFFICE O/P EST MOD 30 MIN: CPT | Mod: S$PBB,25,, | Performed by: ORTHOPAEDIC SURGERY

## 2022-03-16 PROCEDURE — 99213 OFFICE O/P EST LOW 20 MIN: CPT | Mod: PBBFAC,25 | Performed by: ORTHOPAEDIC SURGERY

## 2022-03-16 PROCEDURE — 20611 LARGE JOINT ASPIRATION/INJECTION: R KNEE: ICD-10-PCS | Mod: S$PBB,RT,, | Performed by: ORTHOPAEDIC SURGERY

## 2022-03-16 PROCEDURE — 99999 PR PBB SHADOW E&M-EST. PATIENT-LVL III: ICD-10-PCS | Mod: PBBFAC,,, | Performed by: ORTHOPAEDIC SURGERY

## 2022-03-16 RX ORDER — TRIAMCINOLONE ACETONIDE 40 MG/ML
80 INJECTION, SUSPENSION INTRA-ARTICULAR; INTRAMUSCULAR
Status: DISCONTINUED | OUTPATIENT
Start: 2022-03-16 | End: 2022-03-16 | Stop reason: HOSPADM

## 2022-03-16 RX ADMIN — TRIAMCINOLONE ACETONIDE 80 MG: 40 INJECTION, SUSPENSION INTRA-ARTICULAR; INTRAMUSCULAR at 01:03

## 2022-03-16 NOTE — PROCEDURES
"Large Joint Aspiration/Injection: R knee    Date/Time: 3/16/2022 1:15 PM  Performed by: Fran Shelton MD  Authorized by: Fran Shelton MD     Consent Done?:  Yes (Verbal)  Indications:  Pain  Site marked: the procedure site was marked    Timeout: prior to procedure the correct patient, procedure, and site was verified    Prep: patient was prepped and draped in usual sterile fashion    Local anesthesia used?: No    Local anesthetic:  Topical anesthetic (naropin 0.2%)  Anesthetic total (ml):  3      Details:  Needle Size:  22 G  Ultrasonic Guidance for needle placement?: Yes    Images are saved and documented.  Approach: superolateral.  Location:  Knee  Site:  R knee  Medications:  80 mg triamcinolone acetonide 40 mg/mL  Aspirate amount (mL):  14  Aspirate:  Blood-tinged  Patient tolerance:  Patient tolerated the procedure well with no immediate complications     Description of ultrasound utilization for needle guidance:   Ultrasound guidance used for needle localization. Images saved and stored for documentation. The knee joint was visualized. Dynamic visualization of the 22g x 1.5" needle was continuous throughout the procedure.       "

## 2022-04-28 NOTE — PROGRESS NOTES
Subjective:          Chief Complaint: Leonid Harris III is a 72 y.o. male who had concerns including Post-op Evaluation (f/u ).    Mr. Leonid Harris is a pleasant 71 yo male here for follow up of right knee TKA, now 8 months s/p. He has been having recurrent swelling in the lateral aspect of his knee which our office has aspirated twice before. This improved following the last aspiration and he does not feel that it has returned. He was compliant with resting for ~4 weeks and has recently returned to low impact exercise on a stationary bike. Has not yet returned to racket ball.     Mr. Leonid Harris is here for post op evaluation of the right knee, he is now 5 months s/p the below procedures. He was last seen in our clinic 6 weeks ago for CSI and aspiration. Synovasure results came back negative for infection. He continues to take celebrex and is in physical therapy at [facility] in [place]. He is overall doing well. Endorses that the area that was aspirated at his last visit had previously resolved, but over the past few weeks has started to return, but has plateaud. No fever/chills/malaise. Denies any wound drainage, warmth.     Mr. Leonid Harris is here for post op evaluation of his right knee. He is here for an unscheduled visit. He says over Thanksgiving that he noticed an increase in swelling and pressure in the knee. He continues to take Celebrex 200mg BID and is doing well with this medication. He is at [facility] in [place] and has been progressing as tolerated.     Mr. Leonid Harris is here for post op evaluation of his RIGHT knee. He is now 12 weeks s/p the below procedures. He is overall doing well, he continues to slowly progress with daily activities. He does note some pain with stairs, walking and standing up from a seated position. He is physical therapy at [facility] in [place].       Date of Procedure: 8/26/2021      Procedure: Procedure(s) (LRB):  ARTHROPLASTY, KNEE, TOTAL (Right)  FEMORAL  AUTOLOGOUS BONE GRAFTING (Right)  REMOVAL, HARDWARE, DEEP, BURIED, WIRE, PIN, LADONNA (Right)      Surgeon(s) and Role:     * Fran Shelton MD - Primary     Assisting Surgeon: None     Pre-Operative Diagnosis: Traumatic arthritis of right knee (M12.561)  Painful orthopaedic hardware (T84.84XA)     Post-Operative Diagnosis: Post-Op Diagnosis Codes:     * Traumatic arthritis of right knee (M12.561)     * Painful orthopaedic hardware (T84.84XA)    Pain  Pertinent negatives include no abdominal pain, chest pain, chills, congestion, coughing, fever, joint swelling, nausea, numbness, rash, sore throat or vomiting.       Review of Systems   Constitutional: Negative for chills, fever and night sweats.   HENT: Negative for congestion, hearing loss and sore throat.    Eyes: Negative for blurred vision, discharge, double vision and visual disturbance.   Cardiovascular: Negative for chest pain, leg swelling, palpitations and syncope.   Respiratory: Negative for cough and shortness of breath.    Endocrine: Negative for cold intolerance, heat intolerance and polyuria.   Hematologic/Lymphatic: Negative for bleeding problem.   Skin: Negative for dry skin and rash.   Musculoskeletal: Negative for back pain, joint pain, joint swelling, muscle cramps and muscle weakness.   Gastrointestinal: Negative for abdominal pain, melena, nausea and vomiting.   Genitourinary: Negative for hematuria.   Neurological: Negative for focal weakness, loss of balance, numbness and paresthesias.   Psychiatric/Behavioral: Negative for altered mental status.                   Objective:        General: Leonid is well-developed, well-nourished, appears stated age, in no acute distress, alert and oriented to time, place and person.     General    Vitals reviewed.  Constitutional: He is oriented to person, place, and time. He appears well-developed and well-nourished. No distress.   HENT:   Mouth/Throat: No oropharyngeal exudate.   Eyes: Right eye exhibits no  discharge. Left eye exhibits no discharge.   Pulmonary/Chest: Effort normal and breath sounds normal. No respiratory distress.   Neurological: He is alert and oriented to person, place, and time. He has normal reflexes. No cranial nerve deficit. Coordination normal.   Psychiatric: He has a normal mood and affect. His behavior is normal. Judgment and thought content normal.     General Musculoskeletal Exam   Gait: abnormal       Right Knee Exam     Inspection   Erythema: absent  Scars: present  Swelling: present  Effusion: absent  Deformity: absent  Bruising: absent    Tenderness   The patient is experiencing no tenderness.     Range of Motion   Extension: 0   Flexion: 140     Tests   Meniscus   Michi:  Medial - negative Lateral - negative  Ligament Examination Lachman: normal (-1 to 2mm) PCL-Posterior Drawer: normal (0 to 2mm)     MCL - Valgus: normal (0 to 2mm)  LCL - Varus: normalPivot Shift: normal (Equal)Reverse Pivot Shift: normal (Equal)Dial Test at 30 degrees: normal (< 5 degrees)Dial Test at 90 degrees: normal (< 5 degrees)  Posterior Sag Test: negative  Posterolateral Corner: stable  Patella   Patellar apprehension: negative  Passive Patellar Tilt: neutral  Patellar Tracking: normal  Patellar Glide (quadrants): Lateral - 1   Medial - 2  Q-Angle at 90 degrees: normal  Patellar Grind: negative  J-Sign: none    Other   Meniscal Cyst: absent  Popliteal (Baker's) Cyst: absent  Sensation: normal    Left Knee Exam     Inspection   Erythema: absent  Scars: absent  Swelling: absent  Effusion: absent  Deformity: absent  Bruising: absent    Tenderness   The patient is experiencing no tenderness.     Range of Motion   Extension: 0   Flexion: 140     Tests   Meniscus   Michi:  Medial - negative Lateral - negative  Stability Lachman: normal (-1 to 2mm) PCL-Posterior Drawer: normal (0 to 2mm)  MCL - Valgus: normal (0 to 2mm)  LCL - Varus: normal (0 to 2mm)Pivot Shift: normal (Equal)Reverse Pivot Shift: normal  (Equal)Dial Test at 30 degrees: normal (< 5 degrees)Dial Test at 90 degrees: normal (< 5 degrees)  Posterior Sag Test: negative  Posterolateral Corner: stable  Patella   Patellar apprehension: negative  Passive Patellar Tilt: neutral  Patellar Tracking: normal  Patellar Glide (Quadrants): Lateral - 1 Medial - 2  Q-Angle at 90 degrees: normal  Patellar Grind: negative  J-Sign: J sign absent    Other   Meniscal Cyst: absent  Popliteal (Baker's) Cyst: absent  Sensation: normal    Right Hip Exam     Tests   Erin: negative  Left Hip Exam     Tests   Erin: negative          Muscle Strength   Right Lower Extremity   Hip Abduction: 5/5   Quadriceps:  5/5   Hamstrin/5   Left Lower Extremity   Hip Abduction: 5/5   Quadriceps:  5/5   Hamstrin/5     Reflexes     Left Side  Achilles:  2+  Quadriceps:  2+    Right Side   Achilles:  2+  Quadriceps:  2+    Vascular Exam     Right Pulses  Dorsalis Pedis:      2+  Posterior Tibial:      2+        Left Pulses  Dorsalis Pedis:      2+  Posterior Tibial:      2+          Xray R knee with good placement of implants. No malalignment. No signs of hardware loosening.       Assessment:       Encounter Diagnoses   Name Primary?    Pain in both knees, unspecified chronicity Yes    S/P total knee arthroplasty, right     S/P total knee replacement, right           Plan:       1. IKDC, SF-12 and KOOS was filled out today in clinic.     RTC in 3 months with Dr. Fran Shelton Patient will fill out IKDC, SF-12 and KOOS on return.    2. Medications: Refills of the following Rx were sent to patients preferred Pharmacy:  No Refills Needed Today, continue celebrex    3. Physical Therapy: Continue/Begin: Continue at [facility] in [place]    4. HEP: N/A    5. Procedures/Procedural Planning:   We reviewed with Leonid today, the pathology and natural history of his diagnosis. We had an extensive discussion as to the conservative treatment and management of their condition. We also discussed the  variety of treatment options to include medication, physical therapy, diagnostic testing as well as other treatments.The decision was made to go forward with:     knee - right    1. Triamcinolone performed today, see procedure note.        6. DME: Compression stocking prescribed today    7. Work/Sport Status: retired    8. Visit Summary:                       [facility] patient questionnaires have been collected today.

## 2022-05-02 ENCOUNTER — OFFICE VISIT (OUTPATIENT)
Dept: SPORTS MEDICINE | Facility: CLINIC | Age: 72
End: 2022-05-02
Payer: MEDICARE

## 2022-05-02 ENCOUNTER — HOSPITAL ENCOUNTER (OUTPATIENT)
Dept: RADIOLOGY | Facility: HOSPITAL | Age: 72
Discharge: HOME | End: 2022-05-02
Attending: ORTHOPAEDIC SURGERY
Payer: MEDICARE

## 2022-05-02 VITALS
HEART RATE: 71 BPM | RESPIRATION RATE: 18 BRPM | HEIGHT: 76 IN | SYSTOLIC BLOOD PRESSURE: 133 MMHG | BODY MASS INDEX: 24.36 KG/M2 | WEIGHT: 200 LBS | DIASTOLIC BLOOD PRESSURE: 87 MMHG

## 2022-05-02 DIAGNOSIS — M25.562 PAIN IN BOTH KNEES, UNSPECIFIED CHRONICITY: Primary | ICD-10-CM

## 2022-05-02 DIAGNOSIS — Z96.651 S/P TOTAL KNEE REPLACEMENT, RIGHT: ICD-10-CM

## 2022-05-02 DIAGNOSIS — M25.561 PAIN IN BOTH KNEES, UNSPECIFIED CHRONICITY: Primary | ICD-10-CM

## 2022-05-02 DIAGNOSIS — M25.562 PAIN IN BOTH KNEES, UNSPECIFIED CHRONICITY: ICD-10-CM

## 2022-05-02 DIAGNOSIS — Z96.651 S/P TOTAL KNEE ARTHROPLASTY, RIGHT: ICD-10-CM

## 2022-05-02 DIAGNOSIS — M25.561 PAIN IN BOTH KNEES, UNSPECIFIED CHRONICITY: ICD-10-CM

## 2022-05-02 PROCEDURE — 73564 XR KNEE ORTHO BILAT WITH FLEXION: ICD-10-PCS | Mod: 26,50,, | Performed by: RADIOLOGY

## 2022-05-02 PROCEDURE — 99999 PR PBB SHADOW E&M-EST. PATIENT-LVL IV: ICD-10-PCS | Mod: PBBFAC,,, | Performed by: ORTHOPAEDIC SURGERY

## 2022-05-02 PROCEDURE — 99214 OFFICE O/P EST MOD 30 MIN: CPT | Mod: PBBFAC | Performed by: ORTHOPAEDIC SURGERY

## 2022-05-02 PROCEDURE — 73564 X-RAY EXAM KNEE 4 OR MORE: CPT | Mod: TC,50

## 2022-05-02 PROCEDURE — 97110 PR THERAPEUTIC EXERCISES: ICD-10-PCS | Mod: GP,,, | Performed by: ORTHOPAEDIC SURGERY

## 2022-05-02 PROCEDURE — 99999 PR PBB SHADOW E&M-EST. PATIENT-LVL IV: CPT | Mod: PBBFAC,,, | Performed by: ORTHOPAEDIC SURGERY

## 2022-05-02 PROCEDURE — 99213 PR OFFICE/OUTPT VISIT, EST, LEVL III, 20-29 MIN: ICD-10-PCS | Mod: S$PBB,,, | Performed by: ORTHOPAEDIC SURGERY

## 2022-05-02 PROCEDURE — 97110 THERAPEUTIC EXERCISES: CPT | Mod: GP,,, | Performed by: ORTHOPAEDIC SURGERY

## 2022-05-02 PROCEDURE — 73564 X-RAY EXAM KNEE 4 OR MORE: CPT | Mod: 26,50,, | Performed by: RADIOLOGY

## 2022-05-02 PROCEDURE — 99213 OFFICE O/P EST LOW 20 MIN: CPT | Mod: S$PBB,,, | Performed by: ORTHOPAEDIC SURGERY

## 2022-06-22 NOTE — PATIENT INSTRUCTIONS
RTC in 2-3 weeks with Dr. Fran Shelton  Patient will not fill out IKDC, SF-12 and KOOS.    2. Continue PT per protocol - [facility]    3. Full WB, OK for full ROM    4. Continue IV vancomycin 1.5 g q12 via picc  - plan for additional 2 weeks    5. ESR, CRP, CBC, BMP, vancomycin next Monday once a week for additional 2-3 weeks                
97

## 2022-09-09 NOTE — PROGRESS NOTES
Subjective:          Chief Complaint: Leonid Harris III is a 72 y.o. male who had concerns including Follow-up of the Right Knee.    Mr. Jaquez comes to clinic for follow up of the right knee, he is overall doing well. He has not had any incidences of swelling in the knee. He is playing [employer]bl[employer] and feeling good.     Mr. Leonid Harris is a pleasant 73 yo male here for follow up of right knee TKA, now 8 months s/p. He has been having recurrent swelling in the lateral aspect of his knee which our office has aspirated twice before. This improved following the last aspiration and he does not feel that it has returned. He was compliant with resting for ~4 weeks and has recently returned to low impact exercise on a stationary bike. Has not yet returned to racket ball.     Mr. Leonid Harris is here for post op evaluation of the right knee, he is now 5 months s/p the below procedures. He was last seen in our clinic 6 weeks ago for CSI and aspiration. Synovasure results came back negative for infection. He continues to take celebrex and is in physical therapy at [facility] in [place]. He is overall doing well. Endorses that the area that was aspirated at his last visit had previously resolved, but over the past few weeks has started to return, but has plateaud. No fever/chills/malaise. Denies any wound drainage, warmth.     Mr. Leonid Harris is here for post op evaluation of his right knee. He is here for an unscheduled visit. He says over Thanksgiving that he noticed an increase in swelling and pressure in the knee. He continues to take Celebrex 200mg BID and is doing well with this medication. He is at [facility] in [place] and has been progressing as tolerated.     Mr. Leonid Harris is here for post op evaluation of his RIGHT knee. He is now 12 weeks s/p the below procedures. He is overall doing well, he continues to slowly progress with daily activities. He does note some pain with stairs, walking and standing up  from a seated position. He is physical therapy at [facility]  in [place].       Date of Procedure: 8/26/2021      Procedure: Procedure(s) (LRB):  ARTHROPLASTY, KNEE, TOTAL (Right)  FEMORAL AUTOLOGOUS BONE GRAFTING (Right)  REMOVAL, HARDWARE, DEEP, BURIED, WIRE, PIN, LADONNA (Right)      Surgeon(s) and Role:     * Fran Shelton MD - Primary     Assisting Surgeon: None     Pre-Operative Diagnosis: Traumatic arthritis of right knee [M12.561]  Painful orthopaedic hardware [T84.84XA]     Post-Operative Diagnosis: Post-Op Diagnosis Codes:     * Traumatic arthritis of right knee [M12.561]     * Painful orthopaedic hardware [T84.84XA]    Pain  Pertinent negatives include no abdominal pain, chest pain, chills, congestion, coughing, fever, joint swelling, nausea, numbness, rash, sore throat or vomiting.     Review of Systems   Constitutional: Negative for chills, fever and night sweats.   HENT:  Negative for congestion, hearing loss and sore throat.    Eyes:  Negative for blurred vision, discharge, double vision and visual disturbance.   Cardiovascular:  Negative for chest pain, leg swelling, palpitations and syncope.   Respiratory:  Negative for cough and shortness of breath.    Endocrine: Negative for cold intolerance, heat intolerance and polyuria.   Hematologic/Lymphatic: Negative for bleeding problem.   Skin:  Negative for dry skin and rash.   Musculoskeletal:  Negative for back pain, joint pain, joint swelling, muscle cramps and muscle weakness.   Gastrointestinal:  Negative for abdominal pain, melena, nausea and vomiting.   Genitourinary:  Negative for hematuria.   Neurological:  Negative for focal weakness, loss of balance, numbness and paresthesias.   Psychiatric/Behavioral:  Negative for altered mental status.                  Objective:        General: Leonid is well-developed, well-nourished, appears stated age, in no acute distress, alert and oriented to time, place and person.     General    Vitals  reviewed.  Constitutional: He is oriented to person, place, and time. He appears well-developed and well-nourished. No distress.   HENT:   Mouth/Throat: No oropharyngeal exudate.   Eyes: Right eye exhibits no discharge. Left eye exhibits no discharge.   Pulmonary/Chest: Effort normal and breath sounds normal. No respiratory distress.   Neurological: He is alert and oriented to person, place, and time. He has normal reflexes. No cranial nerve deficit. Coordination normal.   Psychiatric: He has a normal mood and affect. His behavior is normal. Judgment and thought content normal.     General Musculoskeletal Exam   Gait: abnormal       Right Knee Exam     Inspection   Erythema: absent  Scars: present  Swelling: present  Effusion: absent  Deformity: absent  Bruising: absent    Tenderness   The patient is experiencing no tenderness.     Range of Motion   Extension:  0   Flexion:  140     Tests   Meniscus   Michi:  Medial - negative Lateral - negative  Ligament Examination   Lachman: normal (-1 to 2mm)   PCL-Posterior Drawer: normal (0 to 2mm)     MCL - Valgus: normal (0 to 2mm)  LCL - Varus: normal  Pivot Shift: normal (Equal)  Reverse Pivot Shift: normal (Equal)  Dial Test at 30 degrees: normal (< 5 degrees)  Dial Test at 90 degrees: normal (< 5 degrees)  Posterior Sag Test: negative  Posterolateral Corner: stable  Patella   Patellar apprehension: negative  Passive Patellar Tilt: neutral  Patellar Tracking: normal  Patellar Glide (quadrants): Lateral - 1   Medial - 2  Q-Angle at 90 degrees: normal  Patellar Grind: negative  J-Sign: none    Other   Meniscal Cyst: absent  Popliteal (Baker's) Cyst: absent  Sensation: normal    Comments:  Mild quad atrophy R     Left Knee Exam     Inspection   Erythema: absent  Scars: absent  Swelling: absent  Effusion: absent  Deformity: absent  Bruising: absent    Tenderness   The patient is experiencing no tenderness.     Range of Motion   Extension:  0   Flexion:  140     Tests    Meniscus   Michi:  Medial - negative Lateral - negative  Stability   Lachman: normal (-1 to 2mm)   PCL-Posterior Drawer: normal (0 to 2mm)  MCL - Valgus: normal (0 to 2mm)  LCL - Varus: normal (0 to 2mm)  Pivot Shift: normal (Equal)  Reverse Pivot Shift: normal (Equal)  Dial Test at 30 degrees: normal (< 5 degrees)  Dial Test at 90 degrees: normal (< 5 degrees)  Posterior Sag Test: negative  Posterolateral Corner: stable  Patella   Patellar apprehension: negative  Passive Patellar Tilt: neutral  Patellar Tracking: normal  Patellar Glide (Quadrants): Lateral - 1 Medial - 2  Q-Angle at 90 degrees: normal  Patellar Grind: negative  J-Sign: J sign absent    Other   Meniscal Cyst: absent  Popliteal (Baker's) Cyst: absent  Sensation: normal    Right Hip Exam     Tests   Erin: negative  Left Hip Exam     Tests   Erin: negative          Muscle Strength   Right Lower Extremity   Hip Abduction: 5/5   Quadriceps:  5/5   Hamstrin/5   Left Lower Extremity   Hip Abduction: 5/5   Quadriceps:  5/5   Hamstrin/5     Reflexes     Left Side  Achilles:  2+  Quadriceps:  2+    Right Side   Achilles:  2+  Quadriceps:  2+    Vascular Exam     Right Pulses  Dorsalis Pedis:      2+  Posterior Tibial:      2+        Left Pulses  Dorsalis Pedis:      2+  Posterior Tibial:      2+      Xray R knee with good placement of implants. No malalignment. No signs of hardware loosening.       Assessment:       Encounter Diagnosis   Name Primary?    S/P total knee arthroplasty, right Yes            Plan:       1. RTC in 12 months with Dr. Fran Shelton. IKDC, SF-12 and KOOS was filled out today in clinic. Patient will fill out IKDC, SF-12 and KOOS on return.    2. Medications: Refills of the following Rx were sent to patients preferred Pharmacy:  No Refills Needed Today    3. Physical Therapy:     4. HEP: N/A    5. Procedures/Procedural Planning:   N/A    6. DME: N/A    7. Work/Sport Status: retired    8. Visit Summary: Patient is overall  doing well, we are very happy with his progress.                          [facility] patient questionnaires have been collected today.

## 2022-09-12 ENCOUNTER — HOSPITAL ENCOUNTER (OUTPATIENT)
Dept: RADIOLOGY | Facility: HOSPITAL | Age: 72
Discharge: HOME | End: 2022-09-12
Attending: ORTHOPAEDIC SURGERY
Payer: MEDICARE

## 2022-09-12 ENCOUNTER — OFFICE VISIT (OUTPATIENT)
Dept: SPORTS MEDICINE | Facility: CLINIC | Age: 72
End: 2022-09-12
Payer: MEDICARE

## 2022-09-12 VITALS
HEIGHT: 76 IN | HEART RATE: 54 BPM | TEMPERATURE: 98 F | DIASTOLIC BLOOD PRESSURE: 73 MMHG | BODY MASS INDEX: 24.49 KG/M2 | WEIGHT: 201.13 LBS | SYSTOLIC BLOOD PRESSURE: 125 MMHG

## 2022-09-12 DIAGNOSIS — M25.562 PAIN IN BOTH KNEES, UNSPECIFIED CHRONICITY: ICD-10-CM

## 2022-09-12 DIAGNOSIS — M25.561 PAIN IN BOTH KNEES, UNSPECIFIED CHRONICITY: Primary | ICD-10-CM

## 2022-09-12 DIAGNOSIS — Z96.651 S/P TOTAL KNEE ARTHROPLASTY, RIGHT: Primary | ICD-10-CM

## 2022-09-12 DIAGNOSIS — M25.562 PAIN IN BOTH KNEES, UNSPECIFIED CHRONICITY: Primary | ICD-10-CM

## 2022-09-12 DIAGNOSIS — M25.561 PAIN IN BOTH KNEES, UNSPECIFIED CHRONICITY: ICD-10-CM

## 2022-09-12 PROCEDURE — 73564 X-RAY EXAM KNEE 4 OR MORE: CPT | Mod: TC,50

## 2022-09-12 PROCEDURE — 99213 OFFICE O/P EST LOW 20 MIN: CPT | Mod: PBBFAC | Performed by: ORTHOPAEDIC SURGERY

## 2022-09-12 PROCEDURE — 73564 X-RAY EXAM KNEE 4 OR MORE: CPT | Mod: 26,50,, | Performed by: RADIOLOGY

## 2022-09-12 PROCEDURE — 99999 PR PBB SHADOW E&M-EST. PATIENT-LVL III: ICD-10-PCS | Mod: PBBFAC,,, | Performed by: ORTHOPAEDIC SURGERY

## 2022-09-12 PROCEDURE — 99214 OFFICE O/P EST MOD 30 MIN: CPT | Mod: S$PBB,,, | Performed by: ORTHOPAEDIC SURGERY

## 2022-09-12 PROCEDURE — 99214 PR OFFICE/OUTPT VISIT, EST, LEVL IV, 30-39 MIN: ICD-10-PCS | Mod: S$PBB,,, | Performed by: ORTHOPAEDIC SURGERY

## 2022-09-12 PROCEDURE — 73564 XR KNEE ORTHO BILAT WITH FLEXION: ICD-10-PCS | Mod: 26,50,, | Performed by: RADIOLOGY

## 2022-09-12 PROCEDURE — 99999 PR PBB SHADOW E&M-EST. PATIENT-LVL III: CPT | Mod: PBBFAC,,, | Performed by: ORTHOPAEDIC SURGERY

## 2022-09-12 RX ORDER — CELECOXIB 100 MG/1
200 CAPSULE ORAL 2 TIMES DAILY
Qty: 60 CAPSULE | Refills: 12 | Status: SHIPPED | OUTPATIENT
Start: 2022-09-12

## 2022-10-23 NOTE — OR NURSING
Called into Dr. Shelton's OR to inform them of pt's scab to Right medial lower leg (surgical side).  No new orders obtained.   No

## 2023-08-26 ENCOUNTER — PATIENT MESSAGE (OUTPATIENT)
Dept: ADMINISTRATIVE | Facility: OTHER | Age: 73
End: 2023-08-26
Payer: MEDICARE

## (undated) DEVICE — PAD COLD THERAPY KNEE WRAP ON

## (undated) DEVICE — SUT ETHILON 4-0 BLK MONO

## (undated) DEVICE — DRAPE INCISE IOBAN 2 23X17IN

## (undated) DEVICE — HOOD T-5 TEAR AWAY STERILE

## (undated) DEVICE — SUT STRATAFIX PDO 1 CT-1

## (undated) DEVICE — SOL BETADINE 5%

## (undated) DEVICE — SEE MEDLINE ITEM 153151

## (undated) DEVICE — DRAPE STERI INSTRUMENT 1018

## (undated) DEVICE — SEE MEDLINE ITEM 152530

## (undated) DEVICE — COVER BACK TABLE 72X21

## (undated) DEVICE — SEE MEDLINE ITEM 157169

## (undated) DEVICE — DRILL BIT

## (undated) DEVICE — UNDERGLOVES BIOGEL PI SZ 7 LF

## (undated) DEVICE — ELECTRODE REM PLYHSV RETURN 9

## (undated) DEVICE — GLOVE BIOGEL SKINSENSE PI 8.5

## (undated) DEVICE — DRESSING AQUACEL AG ADV 3.5X12

## (undated) DEVICE — SUT VICRYL+ 1 CT1 18IN

## (undated) DEVICE — NDL MAYO 2

## (undated) DEVICE — PAD ABD 8X10 STERILE

## (undated) DEVICE — CONTAINER SPECIMEN STRL 4OZ

## (undated) DEVICE — SYR 30CC LUER LOCK

## (undated) DEVICE — SOL IRR NACL .9% 3000ML

## (undated) DEVICE — ORTHOCORD W/OS-6

## (undated) DEVICE — SUT MCRYL PLUS 4-0 PS2 27IN

## (undated) DEVICE — SYS CLSR DERMABOND PRINEO 22CM

## (undated) DEVICE — GLOVE BIOGEL SKINSENSE PI 7.0

## (undated) DEVICE — PENCIL ELECTROSURG HOLST W/BLD

## (undated) DEVICE — ELECTRODE NEEDLE 1IN

## (undated) DEVICE — KIT EVACUATOR 3-SPRING 1/8 DRN

## (undated) DEVICE — SUT VICRYL PLUS 3-0 SH 18IN

## (undated) DEVICE — DRAPE STERI U-SHAPED 47X51IN

## (undated) DEVICE — PADDING CAST SPECIALIST 6X4YD

## (undated) DEVICE — DRAPE PLASTIC U 60X72

## (undated) DEVICE — SEE MEDLINE ITEM 146347

## (undated) DEVICE — PAD ELECTRODE STERILE LG 2X3.5

## (undated) DEVICE — DRESSING XEROFORM FOIL PK 1X8

## (undated) DEVICE — GOWN B1 X-LG X-LONG

## (undated) DEVICE — ADHESIVE DERMABOND ADVANCED

## (undated) DEVICE — BLADE SAG DUAL 18MMX1.27MMX90M

## (undated) DEVICE — TOURNIQUET SB QC SP 34X4IN

## (undated) DEVICE — SPONGE LAP 18X18 PREWASHED

## (undated) DEVICE — SUT ETHILON 3-0 FS-1 30

## (undated) DEVICE — TAPE SILK 3IN

## (undated) DEVICE — SET DECANTER MEDICHOICE

## (undated) DEVICE — SEE MEDLINE ITEM 157150

## (undated) DEVICE — APPLICATOR CHLORAPREP ORN 26ML

## (undated) DEVICE — SUT PDS II 1 CT VIL MONO 36

## (undated) DEVICE — SYS KNEE EPAK PIN ATTUNE
Type: IMPLANTABLE DEVICE | Site: KNEE | Status: NON-FUNCTIONAL
Removed: 2021-08-26

## (undated) DEVICE — MIXER BONE CEMENT

## (undated) DEVICE — KIT TOTAL KNEE TKOFG

## (undated) DEVICE — MASK FLYTE HOOD PEEL AWAY

## (undated) DEVICE — TRAY FOLEY 16FR INFECTION CONT

## (undated) DEVICE — NEEDLE HYPODERMIC ALUM HUB 22G

## (undated) DEVICE — PADDING CAST 6IN(OR)

## (undated) DEVICE — TUBE SET INFLOW/OUTFLOW

## (undated) DEVICE — TOURNIQUET SB QC DP 34X4IN

## (undated) DEVICE — Device

## (undated) DEVICE — NDL SAFETY 22G X 1.5 ECLIPSE

## (undated) DEVICE — DRAPE EMERALD 87X114.75X113

## (undated) DEVICE — SOL NACL 0.9% INJ 250ML BG

## (undated) DEVICE — SUT 2/0 27IN PDS II VIO MO

## (undated) DEVICE — PAD CAST SPECIALIST STRL 6

## (undated) DEVICE — BANDAGE ESMARK 6X12

## (undated) DEVICE — UNDERGLOVES BIOGEL PI SIZE 8.5

## (undated) DEVICE — SUT MONOCRYL 3-0 PS-2 UND

## (undated) DEVICE — DRESSING XEROFORM GAUZE 5X9

## (undated) DEVICE — SEE MEDLINE ITEM 152622

## (undated) DEVICE — BLADE SURG STAINLESS STEEL #15

## (undated) DEVICE — SEE MEDLINE ITEM 146345

## (undated) DEVICE — DRESSING SPONGE 16PLY 4X4 NS

## (undated) DEVICE — BLADE RECIP DS OFST 70X1X12.5

## (undated) DEVICE — SUT PDS VIL/BLU DUAL ORTHO

## (undated) DEVICE — CHLORAPREP W TINT 26ML APPL

## (undated) DEVICE — SOL 9P NACL IRR PIC IL

## (undated) DEVICE — SHAVER ULTRAFFR 4.2MM

## (undated) DEVICE — NDL 22GA X1 1/2 REG BEVEL

## (undated) DEVICE — DRESSING AQUACEL AG 3.5X10IN

## (undated) DEVICE — ELECTRODE 90 DEGREE ANGLE

## (undated) DEVICE — ELECTRODE BLD 1 INCH TEFLON

## (undated) DEVICE — BRACE KNEE T SCOPE PREMIER

## (undated) DEVICE — PAD ELECTRODE STER 1.5X3

## (undated) DEVICE — SEE MEDLINE ITEM 157131

## (undated) DEVICE — UNDERGLOVES BIOGEL PI SIZE 7.5

## (undated) DEVICE — DRAPE C-ARMOR EQUIPMENT COVER

## (undated) DEVICE — GAUZE SPONGE 4X4 12PLY

## (undated) DEVICE — BLADE SHAVER PREBENT 4.2MM

## (undated) DEVICE — BLADE SAG 18.0X1.27X100

## (undated) DEVICE — BLADE SURG STAINLESS STEEL #10

## (undated) DEVICE — BLADE 4.2MM PREBENT ULTRACUT

## (undated) DEVICE — PIN GUIDE PATELLOFEMORAL XLG
Type: IMPLANTABLE DEVICE | Site: KNEE | Status: NON-FUNCTIONAL
Removed: 2019-03-26

## (undated) DEVICE — POSITIONER IV ARMBOARD FOAM

## (undated) DEVICE — SUT STRATAFIX 1 PDS CT-1

## (undated) DEVICE — SYS PRINEO SKIN CLOSURE

## (undated) DEVICE — KIT IRR SUCTION HND PIECE

## (undated) DEVICE — SEALER BIPOLAR TISSUE 6.0

## (undated) DEVICE — BLADE RECP OFFSET 77.5X11X1.23

## (undated) DEVICE — SEE MEDLINE ITEM 152523

## (undated) DEVICE — UNDERGLOVES BIOGEL PI SIZE 8

## (undated) DEVICE — SEE MEDLINE ITEM 146298

## (undated) DEVICE — SHAVER BUR PEAR 6MMX19CM

## (undated) DEVICE — SEE MEDLINE ITEM 152548

## (undated) DEVICE — INTERPULSE SET

## (undated) DEVICE — BLADE SURG #15 CARBON STEEL

## (undated) DEVICE — PIN GUIDE PATELLOFEMORAL XLG
Type: IMPLANTABLE DEVICE | Site: KNEE | Status: NON-FUNCTIONAL
Removed: 2018-02-27

## (undated) DEVICE — SOL NACL 0.9% INJ PF/100 150